# Patient Record
Sex: FEMALE | Race: BLACK OR AFRICAN AMERICAN | Employment: FULL TIME | URBAN - METROPOLITAN AREA
[De-identification: names, ages, dates, MRNs, and addresses within clinical notes are randomized per-mention and may not be internally consistent; named-entity substitution may affect disease eponyms.]

---

## 2017-01-11 DIAGNOSIS — I10 ESSENTIAL HYPERTENSION: ICD-10-CM

## 2017-01-11 DIAGNOSIS — E11.65 TYPE 2 DIABETES MELLITUS WITH HYPERGLYCEMIA, WITHOUT LONG-TERM CURRENT USE OF INSULIN (HCC): ICD-10-CM

## 2017-01-11 DIAGNOSIS — E55.9 HYPOVITAMINOSIS D: ICD-10-CM

## 2017-01-12 ENCOUNTER — LAB ONLY (OUTPATIENT)
Dept: INTERNAL MEDICINE CLINIC | Age: 57
End: 2017-01-12

## 2017-01-13 LAB
25(OH)D3 SERPL-MCNC: 21.9 NG/ML (ref 32–100)
A-G RATIO,AGRAT: 1.9 RATIO (ref 1.1–2.6)
ABSOLUTE LYMPHOCYTE COUNT, 10803: 1.9 K/UL (ref 1–4.8)
ALBUMIN SERPL-MCNC: 4.2 G/DL (ref 3.5–5)
ALP SERPL-CCNC: 25 U/L (ref 25–115)
ALT SERPL-CCNC: 13 U/L (ref 5–40)
ANION GAP SERPL CALC-SCNC: 17 MMOL/L
AST SERPL W P-5'-P-CCNC: 14 U/L (ref 10–37)
AVG GLU, 10930: 129 MG/DL (ref 91–123)
BASOPHILS # BLD: 0 K/UL (ref 0–0.2)
BASOPHILS NFR BLD: 0 % (ref 0–2)
BILIRUB SERPL-MCNC: 0.2 MG/DL (ref 0.2–1.2)
BUN SERPL-MCNC: 18 MG/DL (ref 6–22)
CALCIUM SERPL-MCNC: 9.5 MG/DL (ref 8.4–10.5)
CHLORIDE SERPL-SCNC: 103 MMOL/L (ref 98–110)
CHOLEST SERPL-MCNC: 168 MG/DL (ref 110–200)
CO2 SERPL-SCNC: 26 MMOL/L (ref 20–32)
CREAT SERPL-MCNC: 0.8 MG/DL (ref 0.5–1.2)
EOSINOPHIL # BLD: 0.1 K/UL (ref 0–0.5)
EOSINOPHIL NFR BLD: 2 % (ref 0–6)
ERYTHROCYTE [DISTWIDTH] IN BLOOD BY AUTOMATED COUNT: 13.8 % (ref 10–16)
GFRAA, 66117: >60
GFRNA, 66118: >60
GLOBULIN,GLOB: 2.2 G/DL (ref 2–4)
GLUCOSE SERPL-MCNC: 93 MG/DL (ref 65–99)
GRANULOCYTES,GRANS: 57 % (ref 40–75)
HBA1C MFR BLD HPLC: 6.1 % (ref 4.8–5.9)
HCT VFR BLD AUTO: 38.8 % (ref 35.1–48)
HDLC SERPL-MCNC: 65 MG/DL (ref 40–59)
HGB BLD-MCNC: 12 G/DL (ref 11.7–16)
LDLC SERPL CALC-MCNC: 95 MG/DL (ref 50–99)
LYMPHOCYTES, LYMLT: 37 % (ref 27–45)
MCH RBC QN AUTO: 27 PG (ref 26–34)
MCHC RBC AUTO-ENTMCNC: 31 G/DL (ref 32–36)
MCV RBC AUTO: 86 FL (ref 80–95)
MONOCYTES # BLD: 0.2 K/UL (ref 0.1–0.9)
MONOCYTES NFR BLD: 4 % (ref 3–9)
NEUTROPHILS # BLD AUTO: 2.9 K/UL (ref 1.8–7.7)
PLATELET # BLD AUTO: 360 K/UL (ref 140–440)
PMV BLD AUTO: 9.3 FL (ref 6–10.8)
POTASSIUM SERPL-SCNC: 4.1 MMOL/L (ref 3.5–5.5)
PROT SERPL-MCNC: 6.4 G/DL (ref 6.4–8.3)
RBC # BLD AUTO: 4.49 M/UL (ref 3.8–5.2)
SODIUM SERPL-SCNC: 146 MMOL/L (ref 133–145)
T4 FREE SERPL-MCNC: 1.1 NG/DL (ref 0.9–1.8)
TRIGL SERPL-MCNC: 44 MG/DL (ref 40–149)
TSH SERPL DL<=0.005 MIU/L-ACNC: 2.32 MCU/ML (ref 0.27–4.2)
VLDLC SERPL CALC-MCNC: 9 MG/DL (ref 8–30)
WBC # BLD AUTO: 5.2 K/UL (ref 4–11)

## 2017-01-19 ENCOUNTER — OFFICE VISIT (OUTPATIENT)
Dept: INTERNAL MEDICINE CLINIC | Age: 57
End: 2017-01-19

## 2017-01-19 VITALS
SYSTOLIC BLOOD PRESSURE: 134 MMHG | DIASTOLIC BLOOD PRESSURE: 72 MMHG | TEMPERATURE: 97.8 F | BODY MASS INDEX: 33.61 KG/M2 | WEIGHT: 178 LBS | HEIGHT: 61 IN | HEART RATE: 65 BPM | RESPIRATION RATE: 16 BRPM | OXYGEN SATURATION: 98 %

## 2017-01-19 DIAGNOSIS — E55.9 HYPOVITAMINOSIS D: ICD-10-CM

## 2017-01-19 DIAGNOSIS — E66.9 OBESITY (BMI 30.0-34.9): ICD-10-CM

## 2017-01-19 DIAGNOSIS — E11.65 TYPE 2 DIABETES MELLITUS WITH HYPERGLYCEMIA, WITHOUT LONG-TERM CURRENT USE OF INSULIN (HCC): Primary | ICD-10-CM

## 2017-01-19 DIAGNOSIS — R09.81 SINUS CONGESTION: ICD-10-CM

## 2017-01-19 DIAGNOSIS — I10 ESSENTIAL HYPERTENSION: ICD-10-CM

## 2017-01-19 DIAGNOSIS — Z23 NEED FOR PNEUMOCOCCAL VACCINATION: ICD-10-CM

## 2017-01-19 RX ORDER — ERGOCALCIFEROL 1.25 MG/1
50000 CAPSULE ORAL
Qty: 8 CAP | Refills: 5 | Status: SHIPPED | OUTPATIENT
Start: 2017-01-19 | End: 2017-12-12 | Stop reason: SDUPTHER

## 2017-01-19 RX ORDER — CETIRIZINE HCL 10 MG
10 TABLET ORAL DAILY
Qty: 30 TAB | Refills: 0 | Status: SHIPPED | OUTPATIENT
Start: 2017-01-19 | End: 2018-03-04

## 2017-01-19 RX ORDER — CYCLOBENZAPRINE HCL 10 MG
5 TABLET ORAL
Qty: 60 TAB | Refills: 0 | Status: SHIPPED | OUTPATIENT
Start: 2017-01-19 | End: 2017-12-12 | Stop reason: ALTCHOICE

## 2017-01-19 NOTE — PROGRESS NOTES
Chief Complaint   Patient presents with    Follow-up    Hypertension       HPI:  Patient is a 49year old obese  female with medical problems listed below presents today for follow up of Hypertension, DM 2, Hypovitaminosis D, etc. She has not taken Contrave for the last several weeks and ate more during the holidays and has gained 6 pounds since her last visit 3 months ago. She endorse recent sinus congestion causing irritation around her nose that sometimes causes nose bleed. Otherwise, she has been feeling well and voices no other complaints today. She is complaint with taking her other medications with no adverse effects reported. Past Medical History   Diagnosis Date    AR (allergic rhinitis)     Arthritis     Heart murmur     Hypertension     Hypovitaminosis D 3/3/2014       No Known Allergies      Current Outpatient Prescriptions   Medication Sig Dispense Refill    amLODIPine (NORVASC) 5 mg tablet TAKE 1 TABLET BY MOUTH EVERY DAY 30 Tab 5    ergocalciferol (VITAMIN D2) 50,000 unit capsule Take 50,000 Units by mouth.  losartan-hydrochlorothiazide (HYZAAR) 100-25 mg per tablet TAKE 1 TABLET BY MOUTH EVERY DAY 30 Tab 3    naltrexone-buPROPion (CONTRAVE) 8-90 mg TbER ER tablet 2 tablets  Tab 0    ondansetron (ZOFRAN ODT) 4 mg disintegrating tablet DISSOLVE 1 TABLET BY MOUTH EVERY EIGHT (8) HOURS AS NEEDED FOR NAUSEA. 30 Tab 0    polyethylene glycol (MIRALAX) 17 gram packet Take 1 Packet by mouth daily. 30 Packet 0    fluticasone (FLONASE) 50 mcg/actuation nasal spray 2 Sprays by Both Nostrils route daily as needed for Rhinitis. 1 Bottle 3    ibuprofen (MOTRIN) 600 mg tablet Take 1 Tab by mouth every six (6) hours as needed for Pain.  60 Tab 0          ROS:  Constitutional: Negative for fever, chills, or fatigue  HEENT: Positive for recent sinus congestion  Neurological: Negative for headache, dizziness, visual disturbance, or loss of conciousness  Respiratory: Negative for SOB, hemoptysis, or wheezing  Cardiovascular: Negative for chest pain, palpitation, or leg swelling  Gastrointestinal: Negative for abdominal pain, nausea, vomiting, diarrhea, blood in stool, melena, or heartburn  Musculoskeletal: Negative for falls        Physical Exam:  Visit Vitals    /72    Pulse 65    Temp 97.8 °F (36.6 °C) (Oral)    Resp 16    Ht 5' 1\" (1.549 m)    Wt 178 lb (80.7 kg)    SpO2 98%    BMI 33.63 kg/m2       General: Obese black female, a & o x 3, afebrile, well-nourished, interacting appropriately, in no acute distress  HEENT: Dried blood noted in nose  Respiratory: symmetrical chest expansion, lung sounds clear bilaterally  Cardiovascular: normal S1S2, regular rate and rhythm, no murmurs, no peripheral edema, no JVD  Abdomen: non-distended, normoactive bowel sounds x 4 quadrants, soft, non-tender to palpation  Extremity: No edema noted  DM foot exam: Pedal pulses palpable and no callus noted        Assessment/Plan:    ICD-10-CM ICD-9-CM    1. Type 2 diabetes mellitus with hyperglycemia, without long-term current use of insulin (HCC) E11.65 250.00 Controlled with recent HBA1C at 6.1. Continue current meds and she was counseled on diabetic diet. HEMOGLOBIN A1C W/O EAG     790.29 MICROALBUMIN, UR, RAND W/ MICROALBUMIN/CREA RATIO   2. Essential hypertension I10 401.9 Controlled  Continue current meds and she was counseled on low salt diet. 3. Hypovitaminosis D E55.9 268.9 Recent VIt D low at 21.9  Vit D 50,000 units Q week started today   4. Obesity (BMI 30.0-34. 9) E66.9 278.00 I have reviewed/discussed the above normal BMI with the patient. I have recommended the following interventions: dietary management education, guidance, and counseling, encourage exercise and lifestyle education regarding diet . The plan is as follows: I have counseled this patient on diet and exercise regimens. .    naltrexone-buPROPion (CONTRAVE) 8-90 mg TbER ER tablet   5.  Need for pneumococcal vaccination Z23 V03.82 Prevnar given today  PNEUMOCOCCAL CONJ VACCINE 13 VALENT IM   6. Sinus congestion R09.81 478.19 cetirizine (ZYRTEC) 10 mg tablet         Orders Placed This Encounter    PNEUMOCOCCAL CONJ VACCINE 13 VALENT IM    HEMOGLOBIN A1C W/O EAG     Standing Status:   Future     Standing Expiration Date:   2018    MICROALBUMIN, UR, RAND W/ MICROALBUMIN/CREA RATIO     Standing Status:   Future     Standing Expiration Date:   2018    ergocalciferol (VITAMIN D2) 50,000 unit capsule     Sig: Take 1 Cap by mouth every seven (7) days. Dispense:  8 Cap     Refill:  5    cyclobenzaprine (FLEXERIL) 10 mg tablet     Sig: Take 0.5 Tabs by mouth two (2) times daily as needed for Muscle Spasm(s). Dispense:  60 Tab     Refill:  0    cetirizine (ZYRTEC) 10 mg tablet     Sig: Take 1 Tab by mouth daily. Dispense:  30 Tab     Refill:  0    naltrexone-buPROPion (CONTRAVE) 8-90 mg TbER ER tablet     Si tablets BID     Dispense:  120 Tab     Refill:  0        Recent labs reviewed with pt        Additional Notes: Discussed today's diagnosis, treatment plans. Discussed medication indications and side effects. Weight Management: Counseled  After Visit Summary: Discussed provided printed patient instructions. Answered questions accordingly. Follow-up Disposition: In 3 months with labs 1 week prior.           Krystina Tobias DO, MPH  Internal Medicine

## 2017-01-19 NOTE — PROGRESS NOTES
Pt is here for 3 month follow up. HTN, Diabetes, Cholesterol, Vit D Def. Labs done    Do you have an advance directive no  Request Pt bring a copy of advance directive for scanning. Do you want information on an advance directive no        1. Have you been to the ER, urgent care clinic since your last visit? Hospitalized since your last visit? No    2. Have you seen or consulted any other health care providers outside of the [address] [place] [address] since your last visit? Include any pap smears or colon screening. Dr Shin Avina exam 1/17      Pt given Prevnar 13 vaccine in R deltoid per verbral read back order Dr Sean Rascon  Pt tolerated procedure w/o reaction.

## 2017-01-19 NOTE — MR AVS SNAPSHOT
Visit Information Date & Time Provider Department Dept. Phone Encounter #  
 1/19/2017  8:00 AM Corey Gallardo, DO [facility] 8 [id number] Follow-up Instructions Return in about 3 months (around 4/19/2017) for Labs 1 week before. Upcoming Health Maintenance Date Due Pneumococcal 19-64 Medium Risk (1 of 1 - PPSV23) 4/23/1981 DTaP/Tdap/Td series (1 - Tdap) 9/2/2012 PAP AKA CERVICAL CYTOLOGY 10/16/2016 FOOT EXAM Q1 4/7/2017 MICROALBUMIN Q1 6/30/2017 HEMOGLOBIN A1C Q6M 7/12/2017 BREAST CANCER SCRN MAMMOGRAM 10/1/2017 EYE EXAM RETINAL OR DILATED Q1 11/8/2017 LIPID PANEL Q1 1/12/2018 COLONOSCOPY 5/14/2022 Allergies as of 1/19/2017  Review Complete On: 1/19/2017 By: Corey Gallardo, DO No Known Allergies Current Immunizations  Reviewed on 10/13/2016 Name Date Influenza Vaccine 11/3/2014 Influenza Vaccine (Quad) PF 10/13/2016, 10/8/2015 Pneumococcal Conjugate (PCV-13)  Incomplete Td 9/1/2012 Not reviewed this visit You Were Diagnosed With   
  
 Codes Comments Type 2 diabetes mellitus with hyperglycemia, without long-term current use of insulin (HCC)    -  Primary ICD-10-CM: E11.65 ICD-9-CM: 250.00, 790.29 Essential hypertension     ICD-10-CM: I10 
ICD-9-CM: 401.9 Hypovitaminosis D     ICD-10-CM: E55.9 ICD-9-CM: 268.9 Obesity (BMI 30.0-34.9)     ICD-10-CM: Y16.5 ICD-9-CM: 278.00 Need for pneumococcal vaccination     ICD-10-CM: R11 ICD-9-CM: V03.82 Vitals BP Pulse Temp Resp Height(growth percentile) Weight(growth percentile) 134/72 65 97.8 °F (36.6 °C) (Oral) 16 5' 1\" (1.549 m) 178 lb (80.7 kg) SpO2 BMI OB Status Smoking Status 98% 33.63 kg/m2 Hysterectomy Never Smoker Vitals History BMI and BSA Data Body Mass Index Body Surface Area  
 33.63 kg/m 2 1.86 m 2 Preferred Pharmacy Pharmacy Name Phone [id number]- Kalin Dunn, [address] [place] [address] Your Updated Medication List  
  
   
This list is accurate as of: 1/19/17  8:50 AM.  Always use your most recent med list. amLODIPine 5 mg tablet Commonly known as:  Dixon Poli TAKE 1 TABLET BY MOUTH EVERY DAY  
  
 cetirizine 10 mg tablet Commonly known as:  ZYRTEC Take 1 Tab by mouth daily. cyclobenzaprine 10 mg tablet Commonly known as:  FLEXERIL Take 0.5 Tabs by mouth two (2) times daily as needed for Muscle Spasm(s). ergocalciferol 50,000 unit capsule Commonly known as:  VITAMIN D2 Take 1 Cap by mouth every seven (7) days. fluticasone 50 mcg/actuation nasal spray Commonly known as:  [place] Busy 2 Sprays by Both Nostrils route daily as needed for Rhinitis. ibuprofen 600 mg tablet Commonly known as:  MOTRIN Take 1 Tab by mouth every six (6) hours as needed for Pain.  
  
 losartan-hydroCHLOROthiazide 100-25 mg per tablet Commonly known as:  HYZAAR  
TAKE 1 TABLET BY MOUTH EVERY DAY  
  
 naltrexone-buPROPion 8-90 mg Tber ER tablet Commonly known as:  CONTRAVE  
2 tablets BID  
  
 ondansetron 4 mg disintegrating tablet Commonly known as:  ZOFRAN ODT  
DISSOLVE 1 TABLET BY MOUTH EVERY EIGHT (8) HOURS AS NEEDED FOR NAUSEA. polyethylene glycol 17 gram packet Commonly known as:  Don Black Take 1 Packet by mouth daily. Prescriptions Printed Refills  
 ergocalciferol (VITAMIN D2) 50,000 unit capsule 5 Sig: Take 1 Cap by mouth every seven (7) days. Class: Print Route: Oral  
 cyclobenzaprine (FLEXERIL) 10 mg tablet 0 Sig: Take 0.5 Tabs by mouth two (2) times daily as needed for Muscle Spasm(s). Class: Print Route: Oral  
 cetirizine (ZYRTEC) 10 mg tablet 0 Sig: Take 1 Tab by mouth daily. Class: Print Route: Oral  
  
We Performed the Following PNEUMOCOCCAL CONJ VACCINE 13 VALENT IM [id number] CPT(R)] Follow-up Instructions Return in about 3 months (around 4/19/2017) for Labs 1 week before. To-Do List   
 04/19/2017 Lab:  HEMOGLOBIN A1C W/O EAG   
  
 04/19/2017 Lab:  MICROALBUMIN, UR, RAND W/ MICROALBUMIN/CREA RATIO Patient Instructions Vitamin D (By mouth) Vitamin D (VYE-ta-min D) Maintains calcium and phosphorus in your body and makes your bones strong. This medicine is a vitamin. Brand Name(s):Mohan Vitamin D, Mohan Vitamin D3, Delta D3, Dialyvite Vitamin D3 Max, Drisdol, Leader Vitamin D3, Dardne Echo , Aurelio Natural Vitamin D, Nature's Blend Super Strength Vitamin D3, Nature's Blend Vitamin D, Nature's Blend Vitamin D3, PharmAssure Vitamin D-3, R[employer] Aid Vitamin D-3, [employer] Naturals Vitamin D3, Thera-D 2000 There may be other brand names for this medicine. When This Medicine Should Not Be Used: You should not use this medicine if you have had an allergic reaction to vitamin D. How to Use This Medicine:  
Tablet, Liquid Filled Capsule · Your doctor will tell you how much medicine to use. Do not use more than directed. · Follow the instructions on the medicine label if you are using this medicine without a prescription. · It is best to take this medicine with food or milk. · Carefully follow your doctor's instructions about any special diet. If a dose is missed: · Take a dose as soon as you remember. If it is almost time for your next dose, wait until then and take a regular dose. Do not take extra medicine to make up for a missed dose. How to Store and Dispose of This Medicine: · Store the medicine in a closed container at room temperature, away from heat, moisture, and direct light. · Ask your pharmacist, doctor, or health caregiver about the best way to dispose of any outdated medicine or medicine no longer needed. · Keep all medicine out of the reach of children. Never share your medicine with anyone. Drugs and Foods to Avoid: Ask your doctor or pharmacist before using any other medicine, including over-the-counter medicines, vitamins, and herbal products. · Make sure your doctor knows about all other medicines you are using. Warnings While Using This Medicine: · Make sure your doctor knows if you are pregnant or breast feeding. · Make sure your doctor knows if you have kidney stones, sarcoidosis, or overactive parathyroid gland. · You should not use this medicine if you are under 25years of age. Possible Side Effects While Using This Medicine:  
Call your doctor right away if you notice any of these side effects: · Allergic reaction: Itching or hives, swelling in your face or hands, swelling or tingling in your mouth or throat, chest tightness, trouble breathing · Change in how much or how often you urinate. If you notice these less serious side effects, talk with your doctor: · Diarrhea. · Headache. · Weight loss. If you notice other side effects that you think are caused by this medicine, tell your doctor. Call your doctor for medical advice about side effects. You may report side effects to FDA at 8-412-FDA-6114 © 2016 [address] is for End User's use only and may not be sold, redistributed or otherwise used for commercial purposes. The above information is an  only. It is not intended as medical advice for individual conditions or treatments. Talk to your doctor, nurse or pharmacist before following any medical regimen to see if it is safe and effective for you. DASH Diet: Care Instructions Your Care Instructions The DASH diet is an eating plan that can help lower your blood pressure. DASH stands for Dietary Approaches to Stop Hypertension. Hypertension is high blood pressure. The DASH diet focuses on eating foods that are high in calcium, potassium, and magnesium. These nutrients can lower blood pressure.  The foods that are highest in these nutrients are fruits, vegetables, low-fat dairy products, nuts, seeds, and legumes. But taking calcium, potassium, and magnesium supplements instead of eating foods that are high in those nutrients does not have the same effect. The DASH diet also includes whole grains, fish, and poultry. The DASH diet is one of several lifestyle changes your doctor may recommend to lower your high blood pressure. Your doctor may also want you to decrease the amount of sodium in your diet. Lowering sodium while following the DASH diet can lower blood pressure even further than just the DASH diet alone. Follow-up care is a key part of your treatment and safety. Be sure to make and go to all appointments, and call your doctor if you are having problems. It's also a good idea to know your test results and keep a list of the medicines you take. How can you care for yourself at home? Following the DASH diet · Eat 4 to 5 servings of fruit each day. A serving is 1 medium-sized piece of fruit, ½ cup chopped or canned fruit, 1/4 cup dried fruit, or 4 ounces (½ cup) of fruit juice. Choose fruit more often than fruit juice. · Eat 4 to 5 servings of vegetables each day. A serving is 1 cup of lettuce or raw leafy vegetables, ½ cup of chopped or cooked vegetables, or 4 ounces (½ cup) of vegetable juice. Choose vegetables more often than vegetable juice. · Get 2 to 3 servings of low-fat and fat-free dairy each day. A serving is 8 ounces of milk, 1 cup of yogurt, or 1 ½ ounces of cheese. · Eat 6 to 8 servings of grains each day. A serving is 1 slice of bread, 1 ounce of dry cereal, or ½ cup of cooked rice, pasta, or cooked cereal. Try to choose whole-grain products as much as possible. · Limit lean meat, poultry, and fish to 2 servings each day. A serving is 3 ounces, about the size of a deck of cards.  
· Eat 4 to 5 servings of nuts, seeds, and legumes (cooked dried beans, lentils, and split peas) each week. A serving is 1/3 cup of nuts, 2 tablespoons of seeds, or ½ cup of cooked beans or peas. · Limit fats and oils to 2 to 3 servings each day. A serving is 1 teaspoon of vegetable oil or 2 tablespoons of salad dressing. · Limit sweets and added sugars to 5 servings or less a week. A serving is 1 tablespoon jelly or jam, ½ cup sorbet, or 1 cup of lemonade. · Eat less than 2,300 milligrams (mg) of sodium a day. If you limit your sodium to 1,500 mg a day, you can lower your blood pressure even more. Tips for success · Start small. Do not try to make dramatic changes to your diet all at once. You might feel that you are missing out on your favorite foods and then be more likely to not follow the plan. Make small changes, and stick with them. Once those changes become habit, add a few more changes. · Try some of the following: ¨ Make it a goal to eat a fruit or vegetable at every meal and at snacks. This will make it easy to get the recommended amount of fruits and vegetables each day. ¨ Try yogurt topped with fruit and nuts for a snack or healthy dessert. ¨ Add lettuce, tomato, cucumber, and onion to sandwiches. ¨ Combine a ready-made pizza crust with low-fat mozzarella cheese and lots of vegetable toppings. Try using tomatoes, squash, spinach, broccoli, carrots, cauliflower, and onions. ¨ Have a variety of cut-up vegetables with a low-fat dip as an appetizer instead of chips and dip. ¨ Sprinkle sunflower seeds or chopped almonds over salads. Or try adding chopped walnuts or almonds to cooked vegetables. ¨ Try some vegetarian meals using beans and peas. Add garbanzo or kidney beans to salads. Make burritos and tacos with mashed [place] beans or black beans. Where can you learn more? Go to http://killian-heather.info/. Enter A684 in the search box to learn more about \"DASH Diet: Care Instructions. \" Current as of: March 23, 2016 Content Version: 11.1 © 2488-0788 Healthwise, Incorporated. Care instructions adapted under license by [employer] (which disclaims liability or warranty for this information). If you have questions about a medical condition or this instruction, always ask your healthcare professional. [address] any warranty or liability for your use of this information. Learning About Diabetes Food Guidelines Your Care Instructions Meal planning is important to manage diabetes. It helps keep your blood sugar at a target level (which you set with your doctor). You don't have to eat special foods. You can eat what your family eats, including sweets once in a while. But you do have to pay attention to how often you eat and how much you eat of certain foods. You may want to work with a dietitian or a certified diabetes educator (CDE) to help you plan meals and snacks. A dietitian or CDE can also help you lose weight if that is one of your goals. What should you know about eating carbs? Managing the amount of carbohydrate (carbs) you eat is an important part of healthy meals when you have diabetes. Carbohydrate is found in many foods. · Learn which foods have carbs. And learn the amounts of carbs in different foods. ¨ Bread, cereal, pasta, and rice have about 15 grams of carbs in a serving. A serving is 1 slice of bread (1 ounce), ½ cup of cooked cereal, or 1/3 cup of cooked pasta or rice. ¨ Fruits have 15 grams of carbs in a serving. A serving is 1 small fresh fruit, such as an apple or orange; ½ of a banana; ½ cup of cooked or canned fruit; ½ cup of fruit juice; 1 cup of melon or raspberries; or 2 tablespoons of dried fruit. ¨ Milk and no-sugar-added yogurt have 15 grams of carbs in a serving. A serving is 1 cup of milk or 2/3 cup of no-sugar-added yogurt. ¨ Starchy vegetables have 15 grams of carbs in a serving.  A serving is ½ cup of mashed potatoes or sweet potato; 1 cup winter squash; ½ of a small baked potato; ½ cup of cooked beans; or ½ cup cooked corn or green peas. · Learn how much carbs to eat each day and at each meal. A dietitian or CDE can teach you how to keep track of the amount of carbs you eat. This is called carbohydrate counting. · If you are not sure how to count carbohydrate grams, use the Plate Method to plan meals. It is a good, quick way to make sure that you have a balanced meal. It also helps you spread carbs throughout the day. ¨ Divide your plate by types of foods. Put non-starchy vegetables on half the plate, meat or other protein food on one-quarter of the plate, and a grain or starchy vegetable in the final quarter of the plate. To this you can add a small piece of fruit and 1 cup of milk or yogurt, depending on how many carbs you are supposed to eat at a meal. 
· Try to eat about the same amount of carbs at each meal. Do not \"save up\" your daily allowance of carbs to eat at one meal. 
· Proteins have very little or no carbs per serving. Examples of proteins are beef, chicken, turkey, fish, eggs, tofu, cheese, cottage cheese, and peanut butter. A serving size of meat is 3 ounces, which is about the size of a deck of cards. Examples of meat substitute serving sizes (equal to 1 ounce of meat) are 1/4 cup of cottage cheese, 1 egg, 1 tablespoon of peanut butter, and ½ cup of tofu. How can you eat out and still eat healthy? · Learn to estimate the serving sizes of foods that have carbohydrate. If you measure food at home, it will be easier to estimate the amount in a serving of restaurant food. · If the meal you order has too much carbohydrate (such as potatoes, corn, or baked beans), ask to have a low-carbohydrate food instead. Ask for a salad or green vegetables. · If you use insulin, check your blood sugar before and after eating out to help you plan how much to eat in the future.  
· If you eat more carbohydrate at a meal than you had planned, take a walk or do other exercise. This will help lower your blood sugar. What else should you know? · Limit saturated fat, such as the fat from meat and dairy products. This is a healthy choice because people who have diabetes are at higher risk of heart disease. So choose lean cuts of meat and nonfat or low-fat dairy products. Use olive or canola oil instead of butter or shortening when cooking. · Don't skip meals. Your blood sugar may drop too low if you skip meals and take insulin or certain medicines for diabetes. · Check with your doctor before you drink alcohol. Alcohol can cause your blood sugar to drop too low. Alcohol can also cause a bad reaction if you take certain diabetes medicines. Follow-up care is a key part of your treatment and safety. Be sure to make and go to all appointments, and call your doctor if you are having problems. It's also a good idea to know your test results and keep a list of the medicines you take. Where can you learn more? Go to http://killian-heather.info/. Enter L942 in the search box to learn more about \"Learning About Diabetes Food Guidelines. \" Current as of: May 23, 2016 Content Version: 11.1 © 0767-1864 Maytech. Care instructions adapted under license by [employer] (which disclaims liability or warranty for this information). If you have questions about a medical condition or this instruction, always ask your healthcare professional. [address] any warranty or liability for your use of this information. Starting a Weight Loss Plan: Care Instructions Your Care Instructions If you are thinking about losing weight, it can be hard to know where to start. Your doctor can help you set up a weight loss plan that best meets your needs. You may want to take a class on nutrition or exercise, or join a weight loss support group.  If you have questions about how to make changes to your eating or exercise habits, ask your doctor about seeing a registered dietitian or an exercise specialist. 
It can be a big challenge to lose weight. But you do not have to make huge changes at once. Make small changes, and stick with them. When those changes become habit, add a few more changes. If you do not think you are ready to make changes right now, try to pick a date in the future. Make an appointment to see your doctor to discuss whether the time is right for you to start a plan. Follow-up care is a key part of your treatment and safety. Be sure to make and go to all appointments, and call your doctor if you are having problems. Its also a good idea to know your test results and keep a list of the medicines you take. How can you care for yourself at home? · Set realistic goals. Many people expect to lose much more weight than is likely. A weight loss of 5% to 10% of your body weight may be enough to improve your health. · Get family and friends involved to provide support. Talk to them about why you are trying to lose weight, and ask them to help. They can help by participating in exercise and having meals with you, even if they may be eating something different. · Find what works best for you. If you do not have time or do not like to cook, a program that offers meal replacement bars or shakes may be better for you. Or if you like to prepare meals, finding a plan that includes daily menus and recipes may be best. 
· Ask your doctor about other health professionals who can help you achieve your weight loss goals. ¨ A dietitian can help you make healthy changes in your diet. ¨ An exercise specialist or  can help you develop a safe and effective exercise program. 
¨ A counselor or psychiatrist can help you cope with issues such as depression, anxiety, or family problems that can make it hard to focus on weight loss. · Consider joining a support group for people who are trying to lose weight. Your doctor can suggest groups in your area. Where can you learn more? Go to http://killian-heather.info/. Enter W283 in the search box to learn more about \"Starting a Weight Loss Plan: Care Instructions. \" Current as of: February 16, 2016 Content Version: 11.1 © 9912-4541 [employer]. Care instructions adapted under license by [employer] (which disclaims liability or warranty for this information). If you have questions about a medical condition or this instruction, always ask your healthcare professional. [address] any warranty or liability for your use of this information. Introducing [facility]! Dear Sofiya Vila: Thank you for requesting a EXENDIS account. Our records indicate that you already have an active EXENDIS account. You can access your account anytime at [email or web address] - teamwork across [email or web address] Did you know that you can access your hospital and ER discharge instructions at any time in EXENDIS? You can also review all of your test results from your hospital stay or ER visit. Additional Information If you have questions, please visit the Frequently Asked Questions section of the EXENDIS website at [email or web address] - [email or web address] Remember, EXENDIS is NOT to be used for urgent needs. For medical emergencies, dial 911. Now available from your iPhone and Android! Please provide this summary of care documentation to your next provider. Your primary care clinician is listed as Vanessa . If you have any questions after today's visit, please call [phone].

## 2017-01-19 NOTE — PATIENT INSTRUCTIONS
Vitamin D (By mouth)   Vitamin D (VYE-ta-min D)  Maintains calcium and phosphorus in your body and makes your bones strong. This medicine is a vitamin. Brand Name(s):Mohan Vitamin D, Mohan Vitamin D3, Delta D3, Dialyvite Vitamin D3 Max, Drisdol, Leader Vitamin D3, Henriquez Hands , Aurelio Natural Vitamin D, Nature's Blend Super Strength Vitamin D3, Nature's Blend Vitamin D, Nature's Blend Vitamin D3, PharmAssure Vitamin D-3, [facility] Vitamin D-3, [place] Naturals Vitamin D3, Thera-D 2000   There may be other brand names for this medicine. When This Medicine Should Not Be Used: You should not use this medicine if you have had an allergic reaction to vitamin D. How to Use This Medicine:   Tablet, Liquid Filled Capsule  · Your doctor will tell you how much medicine to use. Do not use more than directed. · Follow the instructions on the medicine label if you are using this medicine without a prescription. · It is best to take this medicine with food or milk. · Carefully follow your doctor's instructions about any special diet. If a dose is missed:   · Take a dose as soon as you remember. If it is almost time for your next dose, wait until then and take a regular dose. Do not take extra medicine to make up for a missed dose. How to Store and Dispose of This Medicine:   · Store the medicine in a closed container at room temperature, away from heat, moisture, and direct light. · Ask your pharmacist, doctor, or health caregiver about the best way to dispose of any outdated medicine or medicine no longer needed. · Keep all medicine out of the reach of children. Never share your medicine with anyone. Drugs and Foods to Avoid:   Ask your doctor or pharmacist before using any other medicine, including over-the-counter medicines, vitamins, and herbal products. · Make sure your doctor knows about all other medicines you are using.   Warnings While Using This Medicine:   · Make sure your doctor knows if you are pregnant or breast feeding. · Make sure your doctor knows if you have kidney stones, sarcoidosis, or overactive parathyroid gland. · You should not use this medicine if you are under 25years of age. Possible Side Effects While Using This Medicine:   Call your doctor right away if you notice any of these side effects:  · Allergic reaction: Itching or hives, swelling in your face or hands, swelling or tingling in your mouth or throat, chest tightness, trouble breathing  · Change in how much or how often you urinate. If you notice these less serious side effects, talk with your doctor:   · Diarrhea. · Headache. · Weight loss. If you notice other side effects that you think are caused by this medicine, tell your doctor. Call your doctor for medical advice about side effects. You may report side effects to FDA at 6-491-FDA-3795  © 2016 [address] is for End User's use only and may not be sold, redistributed or otherwise used for commercial purposes. The above information is an  only. It is not intended as medical advice for individual conditions or treatments. Talk to your doctor, nurse or pharmacist before following any medical regimen to see if it is safe and effective for you. DASH Diet: Care Instructions  Your Care Instructions  The DASH diet is an eating plan that can help lower your blood pressure. DASH stands for Dietary Approaches to Stop Hypertension. Hypertension is high blood pressure. The DASH diet focuses on eating foods that are high in calcium, potassium, and magnesium. These nutrients can lower blood pressure. The foods that are highest in these nutrients are fruits, vegetables, low-fat dairy products, nuts, seeds, and legumes. But taking calcium, potassium, and magnesium supplements instead of eating foods that are high in those nutrients does not have the same effect. The DASH diet also includes whole grains, fish, and poultry.   The DASH diet is one of several lifestyle changes your doctor may recommend to lower your high blood pressure. Your doctor may also want you to decrease the amount of sodium in your diet. Lowering sodium while following the DASH diet can lower blood pressure even further than just the DASH diet alone. Follow-up care is a key part of your treatment and safety. Be sure to make and go to all appointments, and call your doctor if you are having problems. It's also a good idea to know your test results and keep a list of the medicines you take. How can you care for yourself at home? Following the DASH diet  · Eat 4 to 5 servings of fruit each day. A serving is 1 medium-sized piece of fruit, ½ cup chopped or canned fruit, 1/4 cup dried fruit, or 4 ounces (½ cup) of fruit juice. Choose fruit more often than fruit juice. · Eat 4 to 5 servings of vegetables each day. A serving is 1 cup of lettuce or raw leafy vegetables, ½ cup of chopped or cooked vegetables, or 4 ounces (½ cup) of vegetable juice. Choose vegetables more often than vegetable juice. · Get 2 to 3 servings of low-fat and fat-free dairy each day. A serving is 8 ounces of milk, 1 cup of yogurt, or 1 ½ ounces of cheese. · Eat 6 to 8 servings of grains each day. A serving is 1 slice of bread, 1 ounce of dry cereal, or ½ cup of cooked rice, pasta, or cooked cereal. Try to choose whole-grain products as much as possible. · Limit lean meat, poultry, and fish to 2 servings each day. A serving is 3 ounces, about the size of a deck of cards. · Eat 4 to 5 servings of nuts, seeds, and legumes (cooked dried beans, lentils, and split peas) each week. A serving is 1/3 cup of nuts, 2 tablespoons of seeds, or ½ cup of cooked beans or peas. · Limit fats and oils to 2 to 3 servings each day. A serving is 1 teaspoon of vegetable oil or 2 tablespoons of salad dressing. · Limit sweets and added sugars to 5 servings or less a week.  A serving is 1 tablespoon jelly or jam, ½ cup sorbet, or 1 cup of lemonade. · Eat less than 2,300 milligrams (mg) of sodium a day. If you limit your sodium to 1,500 mg a day, you can lower your blood pressure even more. Tips for success  · Start small. Do not try to make dramatic changes to your diet all at once. You might feel that you are missing out on your favorite foods and then be more likely to not follow the plan. Make small changes, and stick with them. Once those changes become habit, add a few more changes. · Try some of the following:  ¨ Make it a goal to eat a fruit or vegetable at every meal and at snacks. This will make it easy to get the recommended amount of fruits and vegetables each day. ¨ Try yogurt topped with fruit and nuts for a snack or healthy dessert. ¨ Add lettuce, tomato, cucumber, and onion to sandwiches. ¨ Combine a ready-made pizza crust with low-fat mozzarella cheese and lots of vegetable toppings. Try using tomatoes, squash, spinach, broccoli, carrots, cauliflower, and onions. ¨ Have a variety of cut-up vegetables with a low-fat dip as an appetizer instead of chips and dip. ¨ Sprinkle sunflower seeds or chopped almonds over salads. Or try adding chopped walnuts or almonds to cooked vegetables. ¨ Try some vegetarian meals using beans and peas. Add garbanzo or kidney beans to salads. Make burritos and tacos with mashed [place] beans or black beans. Where can you learn more? Go to http://killian-heather.info/. Enter V486 in the search box to learn more about \"DASH Diet: Care Instructions. \"  Current as of: March 23, 2016  Content Version: 11.1  © 6394-2634 [employer]. Care instructions adapted under license by [employer] (which disclaims liability or warranty for this information).  If you have questions about a medical condition or this instruction, always ask your healthcare professional. [address] any warranty or liability for your use of this information. Learning About Diabetes Food Guidelines  Your Care Instructions  Meal planning is important to manage diabetes. It helps keep your blood sugar at a target level (which you set with your doctor). You don't have to eat special foods. You can eat what your family eats, including sweets once in a while. But you do have to pay attention to how often you eat and how much you eat of certain foods. You may want to work with a dietitian or a certified diabetes educator (CDE) to help you plan meals and snacks. A dietitian or CDE can also help you lose weight if that is one of your goals. What should you know about eating carbs? Managing the amount of carbohydrate (carbs) you eat is an important part of healthy meals when you have diabetes. Carbohydrate is found in many foods. · Learn which foods have carbs. And learn the amounts of carbs in different foods. ¨ Bread, cereal, pasta, and rice have about 15 grams of carbs in a serving. A serving is 1 slice of bread (1 ounce), ½ cup of cooked cereal, or 1/3 cup of cooked pasta or rice. ¨ Fruits have 15 grams of carbs in a serving. A serving is 1 small fresh fruit, such as an apple or orange; ½ of a banana; ½ cup of cooked or canned fruit; ½ cup of fruit juice; 1 cup of melon or raspberries; or 2 tablespoons of dried fruit. ¨ Milk and no-sugar-added yogurt have 15 grams of carbs in a serving. A serving is 1 cup of milk or 2/3 cup of no-sugar-added yogurt. ¨ Starchy vegetables have 15 grams of carbs in a serving. A serving is ½ cup of mashed potatoes or sweet potato; 1 cup winter squash; ½ of a small baked potato; ½ cup of cooked beans; or ½ cup cooked corn or green peas. · Learn how much carbs to eat each day and at each meal. A dietitian or CDE can teach you how to keep track of the amount of carbs you eat. This is called carbohydrate counting. · If you are not sure how to count carbohydrate grams, use the Plate Method to plan meals.  It is a good, quick way to make sure that you have a balanced meal. It also helps you spread carbs throughout the day. ¨ Divide your plate by types of foods. Put non-starchy vegetables on half the plate, meat or other protein food on one-quarter of the plate, and a grain or starchy vegetable in the final quarter of the plate. To this you can add a small piece of fruit and 1 cup of milk or yogurt, depending on how many carbs you are supposed to eat at a meal.  · Try to eat about the same amount of carbs at each meal. Do not \"save up\" your daily allowance of carbs to eat at one meal.  · Proteins have very little or no carbs per serving. Examples of proteins are beef, chicken, turkey, fish, eggs, tofu, cheese, cottage cheese, and peanut butter. A serving size of meat is 3 ounces, which is about the size of a deck of cards. Examples of meat substitute serving sizes (equal to 1 ounce of meat) are 1/4 cup of cottage cheese, 1 egg, 1 tablespoon of peanut butter, and ½ cup of tofu. How can you eat out and still eat healthy? · Learn to estimate the serving sizes of foods that have carbohydrate. If you measure food at home, it will be easier to estimate the amount in a serving of restaurant food. · If the meal you order has too much carbohydrate (such as potatoes, corn, or baked beans), ask to have a low-carbohydrate food instead. Ask for a salad or green vegetables. · If you use insulin, check your blood sugar before and after eating out to help you plan how much to eat in the future. · If you eat more carbohydrate at a meal than you had planned, take a walk or do other exercise. This will help lower your blood sugar. What else should you know? · Limit saturated fat, such as the fat from meat and dairy products. This is a healthy choice because people who have diabetes are at higher risk of heart disease. So choose lean cuts of meat and nonfat or low-fat dairy products.  Use olive or canola oil instead of butter or shortening when cooking. · Don't skip meals. Your blood sugar may drop too low if you skip meals and take insulin or certain medicines for diabetes. · Check with your doctor before you drink alcohol. Alcohol can cause your blood sugar to drop too low. Alcohol can also cause a bad reaction if you take certain diabetes medicines. Follow-up care is a key part of your treatment and safety. Be sure to make and go to all appointments, and call your doctor if you are having problems. It's also a good idea to know your test results and keep a list of the medicines you take. Where can you learn more? Go to http://killian-heather.info/. Enter P097 in the search box to learn more about \"Learning About Diabetes Food Guidelines. \"  Current as of: May 23, 2016  Content Version: 11.1  © 6998-0966 [employer]. Care instructions adapted under license by [employer] (which disclaims liability or warranty for this information). If you have questions about a medical condition or this instruction, always ask your healthcare professional. [address] any warranty or liability for your use of this information. Starting a Weight Loss Plan: Care Instructions  Your Care Instructions  If you are thinking about losing weight, it can be hard to know where to start. Your doctor can help you set up a weight loss plan that best meets your needs. You may want to take a class on nutrition or exercise, or join a weight loss support group. If you have questions about how to make changes to your eating or exercise habits, ask your doctor about seeing a registered dietitian or an exercise specialist.  It can be a big challenge to lose weight. But you do not have to make huge changes at once. Make small changes, and stick with them. When those changes become habit, add a few more changes. If you do not think you are ready to make changes right now, try to pick a date in the future.  Make an appointment to see your doctor to discuss whether the time is right for you to start a plan. Follow-up care is a key part of your treatment and safety. Be sure to make and go to all appointments, and call your doctor if you are having problems. Its also a good idea to know your test results and keep a list of the medicines you take. How can you care for yourself at home? · Set realistic goals. Many people expect to lose much more weight than is likely. A weight loss of 5% to 10% of your body weight may be enough to improve your health. · Get family and friends involved to provide support. Talk to them about why you are trying to lose weight, and ask them to help. They can help by participating in exercise and having meals with you, even if they may be eating something different. · Find what works best for you. If you do not have time or do not like to cook, a program that offers meal replacement bars or shakes may be better for you. Or if you like to prepare meals, finding a plan that includes daily menus and recipes may be best.  · Ask your doctor about other health professionals who can help you achieve your weight loss goals. ¨ A dietitian can help you make healthy changes in your diet. ¨ An exercise specialist or  can help you develop a safe and effective exercise program.  ¨ A counselor or psychiatrist can help you cope with issues such as depression, anxiety, or family problems that can make it hard to focus on weight loss. · Consider joining a support group for people who are trying to lose weight. Your doctor can suggest groups in your area. Where can you learn more? Go to http://killian-heather.info/. Enter D192 in the search box to learn more about \"Starting a Weight Loss Plan: Care Instructions. \"  Current as of: February 16, 2016  Content Version: 11.1  © 0586-0545 [employer].  Care instructions adapted under license by [employer] (which disclaims liability or warranty for this information). If you have questions about a medical condition or this instruction, always ask your healthcare professional. [address] any warranty or liability for your use of this information.

## 2017-01-30 ENCOUNTER — TELEPHONE (OUTPATIENT)
Dept: INTERNAL MEDICINE CLINIC | Age: 57
End: 2017-01-30

## 2017-01-30 RX ORDER — TRAMADOL HYDROCHLORIDE AND ACETAMINOPHEN 37.5; 325 MG/1; MG/1
1 TABLET ORAL
Qty: 40 TAB | Refills: 0 | Status: SHIPPED | OUTPATIENT
Start: 2017-01-30 | End: 2018-03-04

## 2017-01-30 NOTE — TELEPHONE ENCOUNTER
Pt aware presciption is available for  at [address] per Arabella Zamora until 5 PM today.  Pt given address and directions     Per pt her  (on phi) will pick the prescription up today

## 2017-01-30 NOTE — TELEPHONE ENCOUNTER
Pt calling regarding arm pain is worse since OV 01/19/2017 said\" pain is 10 times worse\"    She said the flexeril does NOT help much, makes her sleepy and when it wears off the pain is back.     She missed work today due pain    Request call asap, does she need f/u appt here, or to be referred to specialist?    [facility] is [facility]

## 2017-02-07 ENCOUNTER — OFFICE VISIT (OUTPATIENT)
Dept: INTERNAL MEDICINE CLINIC | Age: 57
End: 2017-02-07

## 2017-02-07 VITALS
SYSTOLIC BLOOD PRESSURE: 110 MMHG | DIASTOLIC BLOOD PRESSURE: 70 MMHG | TEMPERATURE: 98.3 F | WEIGHT: 176.2 LBS | OXYGEN SATURATION: 98 % | HEART RATE: 70 BPM | HEIGHT: 61 IN | BODY MASS INDEX: 33.27 KG/M2 | RESPIRATION RATE: 17 BRPM

## 2017-02-07 DIAGNOSIS — M54.6 ACUTE RIGHT-SIDED THORACIC BACK PAIN: Primary | ICD-10-CM

## 2017-02-07 PROBLEM — M54.50 LOW BACK PAIN: Status: ACTIVE | Noted: 2017-02-07

## 2017-02-07 NOTE — PROGRESS NOTES
Chief Complaint   Patient presents with    Spasms     back    Back Pain     right mid under shoulder blade    Other     requesting xray be ordered. 1. Have you been to the ER, urgent care clinic since your last visit? Hospitalized since your last visit? No    2. Have you seen or consulted any other health care providers outside of the [facility] since your last visit? Include any pap smears or colon screening.  No

## 2017-02-07 NOTE — PATIENT INSTRUCTIONS
Back Pain: Care Instructions  Your Care Instructions    Back pain has many possible causes. It is often related to problems with muscles and ligaments of the back. It may also be related to problems with the nerves, discs, or bones of the back. Moving, lifting, standing, sitting, or sleeping in an awkward way can strain the back. Sometimes you don't notice the injury until later. Arthritis is another common cause of back pain. Although it may hurt a lot, back pain usually improves on its own within several weeks. Most people recover in 12 weeks or less. Using good home treatment and being careful not to stress your back can help you feel better sooner. Follow-up care is a key part of your treatment and safety. Be sure to make and go to all appointments, and call your doctor if you are having problems. Its also a good idea to know your test results and keep a list of the medicines you take. How can you care for yourself at home? · Sit or lie in positions that are most comfortable and reduce your pain. Try one of these positions when you lie down:  ¨ Lie on your back with your knees bent and supported by large pillows. ¨ Lie on the floor with your legs on the seat of a sofa or chair. Salomon Ports on your side with your knees and hips bent and a pillow between your legs. ¨ Lie on your stomach if it does not make pain worse. · Do not sit up in bed, and avoid soft couches and twisted positions. Bed rest can help relieve pain at first, but it delays healing. Avoid bed rest after the first day of back pain. · Change positions every 30 minutes. If you must sit for long periods of time, take breaks from sitting. Get up and walk around, or lie in a comfortable position. · Try using a heating pad on a low or medium setting for 15 to 20 minutes every 2 or 3 hours. Try a warm shower in place of one session with the heating pad. · You can also try an ice pack for 10 to 15 minutes every 2 to 3 hours.  Put a thin cloth between the ice pack and your skin. · Take pain medicines exactly as directed. ¨ If the doctor gave you a prescription medicine for pain, take it as prescribed. ¨ If you are not taking a prescription pain medicine, ask your doctor if you can take an over-the-counter medicine. · Take short walks several times a day. You can start with 5 to 10 minutes, 3 or 4 times a day, and work up to longer walks. Walk on level surfaces and avoid hills and stairs until your back is better. · Return to work and other activities as soon as you can. Continued rest without activity is usually not good for your back. · To prevent future back pain, do exercises to stretch and strengthen your back and stomach. Learn how to use good posture, safe lifting techniques, and proper body mechanics. When should you call for help? Call your doctor now or seek immediate medical care if:  · You have new or worsening numbness in your legs. · You have new or worsening weakness in your legs. (This could make it hard to stand up.)  · You lose control of your bladder or bowels. Watch closely for changes in your health, and be sure to contact your doctor if:  · Your pain gets worse. · You are not getting better after 2 weeks. Where can you learn more? Go to http://killian-heather.info/. Enter O052 in the search box to learn more about \"Back Pain: Care Instructions. \"  Current as of: May 23, 2016  Content Version: 11.1  © 0064-8868 [employer] [place]. Care instructions adapted under license by [employer] (which disclaims liability or warranty for this information). If you have questions about a medical condition or this instruction, always ask your healthcare professional. [address] any warranty or liability for your use of this information. Back Pain: Care Instructions  Your Care Instructions    Back pain has many possible causes.  It is often related to problems with muscles and ligaments of the back. It may also be related to problems with the nerves, discs, or bones of the back. Moving, lifting, standing, sitting, or sleeping in an awkward way can strain the back. Sometimes you don't notice the injury until later. Arthritis is another common cause of back pain. Although it may hurt a lot, back pain usually improves on its own within several weeks. Most people recover in 12 weeks or less. Using good home treatment and being careful not to stress your back can help you feel better sooner. Follow-up care is a key part of your treatment and safety. Be sure to make and go to all appointments, and call your doctor if you are having problems. Its also a good idea to know your test results and keep a list of the medicines you take. How can you care for yourself at home? · Sit or lie in positions that are most comfortable and reduce your pain. Try one of these positions when you lie down:  ¨ Lie on your back with your knees bent and supported by large pillows. ¨ Lie on the floor with your legs on the seat of a sofa or chair. Harrison Loach on your side with your knees and hips bent and a pillow between your legs. ¨ Lie on your stomach if it does not make pain worse. · Do not sit up in bed, and avoid soft couches and twisted positions. Bed rest can help relieve pain at first, but it delays healing. Avoid bed rest after the first day of back pain. · Change positions every 30 minutes. If you must sit for long periods of time, take breaks from sitting. Get up and walk around, or lie in a comfortable position. · Try using a heating pad on a low or medium setting for 15 to 20 minutes every 2 or 3 hours. Try a warm shower in place of one session with the heating pad. · You can also try an ice pack for 10 to 15 minutes every 2 to 3 hours. Put a thin cloth between the ice pack and your skin. · Take pain medicines exactly as directed.   ¨ If the doctor gave you a prescription medicine for pain, take it as prescribed. ¨ If you are not taking a prescription pain medicine, ask your doctor if you can take an over-the-counter medicine. · Take short walks several times a day. You can start with 5 to 10 minutes, 3 or 4 times a day, and work up to longer walks. Walk on level surfaces and avoid hills and stairs until your back is better. · Return to work and other activities as soon as you can. Continued rest without activity is usually not good for your back. · To prevent future back pain, do exercises to stretch and strengthen your back and stomach. Learn how to use good posture, safe lifting techniques, and proper body mechanics. When should you call for help? Call your doctor now or seek immediate medical care if:  · You have new or worsening numbness in your legs. · You have new or worsening weakness in your legs. (This could make it hard to stand up.)  · You lose control of your bladder or bowels. Watch closely for changes in your health, and be sure to contact your doctor if:  · Your pain gets worse. · You are not getting better after 2 weeks. Where can you learn more? Go to http://killian-heather.info/. Enter D368 in the search box to learn more about \"Back Pain: Care Instructions. \"  Current as of: May 23, 2016  Content Version: 11.1  © 6457-1803 [employer]. Care instructions adapted under license by [employer] (which disclaims liability or warranty for this information). If you have questions about a medical condition or this instruction, always ask your healthcare professional. [place] [address] any warranty or liability for your use of this information.

## 2017-02-07 NOTE — PROGRESS NOTES
Chief Complaint   Patient presents with    Spasms     back    Back Pain     right mid under shoulder blade    Other     requesting xray be ordered. HPI:  Patient is a 49year old  female with medical problems listed below presents today for follow up for back pain. She has been having back pain for several weeks now that is mostly localized on her right thoracic region that was described as spasms and worse on standing. She denied recent fall, injury, or trauma and was given Flexeril initially with no improvement and was later switched to Ultracet with some improvement noted. She has been seen twice by a chiropractor who performed stretching and manipulation to the area with improvement also noted. She states that the pain is virtually gone, but she is requesting imaging for further evaluation. Otherwise, she feels well and denies abdominal pain, nausea, vomiting, fever, chills, CP, or SOB. Past Medical History   Diagnosis Date    AR (allergic rhinitis)     Arthritis     Heart murmur     Hypertension     Hypovitaminosis D 3/3/2014       No Known Allergies      Current Outpatient Prescriptions   Medication Sig Dispense Refill    ergocalciferol (VITAMIN D2) 50,000 unit capsule Take 1 Cap by mouth every seven (7) days. 8 Cap 5    cetirizine (ZYRTEC) 10 mg tablet Take 1 Tab by mouth daily. 30 Tab 0    naltrexone-buPROPion (CONTRAVE) 8-90 mg TbER ER tablet 2 tablets  Tab 0    amLODIPine (NORVASC) 5 mg tablet TAKE 1 TABLET BY MOUTH EVERY DAY 30 Tab 5    ondansetron (ZOFRAN ODT) 4 mg disintegrating tablet DISSOLVE 1 TABLET BY MOUTH EVERY EIGHT (8) HOURS AS NEEDED FOR NAUSEA. 30 Tab 0    polyethylene glycol (MIRALAX) 17 gram packet Take 1 Packet by mouth daily. 30 Packet 0    fluticasone (FLONASE) 50 mcg/actuation nasal spray 2 Sprays by Both Nostrils route daily as needed for Rhinitis.  1 Bottle 3    losartan-hydrochlorothiazide (HYZAAR) 100-25 mg per tablet TAKE 1 TABLET BY MOUTH EVERY DAY 30 Tab 3    ibuprofen (MOTRIN) 600 mg tablet Take 1 Tab by mouth every six (6) hours as needed for Pain. 60 Tab 0    traMADol-acetaminophen (ULTRACET) 37.5-325 mg per tablet Take 1 Tab by mouth every four (4) hours as needed for Pain. Max Daily Amount: 6 Tabs. 40 Tab 0    cyclobenzaprine (FLEXERIL) 10 mg tablet Take 0.5 Tabs by mouth two (2) times daily as needed for Muscle Spasm(s). 60 Tab 0          ROS:  Pertinent as in HPI        Physical Exam:  Visit Vitals    /70 (BP 1 Location: Left arm, BP Patient Position: Sitting)    Pulse 70    Temp 98.3 °F (36.8 °C) (Oral)    Resp 17    Ht 5' 1\" (1.549 m)    Wt 176 lb 3.2 oz (79.9 kg)    SpO2 98%    BMI 33.29 kg/m2       General: a & o x 3, afebrile, well-nourished, interacting appropriately, in no acute distress  Respiratory: symmetrical chest expansion, lung sounds clear bilaterally  Cardiovascular: normal S1S2, regular rate and rhythm  Abdomen: non-distended, normoactive bowel sounds x 4 quadrants, soft, non-tender to palpation  Musculoskeletal: No tenderness noted on back        Assessment/Plan:    ICD-10-CM ICD-9-CM    1. Acute right-sided thoracic back pain M54.6 724.1 Much improved. She was advised to continue Flexeril and Ultracet as needed and will obtain imaging. XR SPINE THORAC 4 V         Orders Placed This Encounter    XR SPINE THORAC 4 V     Standing Status:   Future     Standing Expiration Date:   3/7/2018     Order Specific Question:   Reason for Exam     Answer:   Evaluate acute rt sided thoracic pain     Order Specific Question:   Is Patient Allergic to Contrast Dye? Answer:   Unknown           Additional Notes: Discussed today's diagnosis, treatment plans. Discussed medication indications and side effects. After Visit Summary: Discussed provided printed patient instructions. Answered questions accordingly.   Follow-up Disposition: In 1 week to review imaging          Marcela Parks DO, MPH  Internal Medicine

## 2017-02-07 NOTE — MR AVS SNAPSHOT
Visit Information Date & Time Provider Department Dept. Phone Encounter #  
 2/7/2017  8:00  [address] DO [facility] [phone] Follow-up Instructions Return in about 1 week (around 2/14/2017) for to review imaging. Your Appointments 4/13/2017  8:00 AM  
LAB with [address] DO [facility] (--) Appt Note: 3 mo f/u lab [address] [id number] [phone]  
  
   
 [address] [place] [address]  
  
    
 4/20/2017  8:00 AM  
ROUTINE CARE with [address] DO [facility] (--) Appt Note: 3 mo f/u  
 [address] [id number] [phone]  
  
   
 [address] [place] [id number] Upcoming Health Maintenance Date Due DTaP/Tdap/Td series (1 - Tdap) 9/2/2012 PAP AKA CERVICAL CYTOLOGY 10/16/2016 FOOT EXAM Q1 4/7/2017 MICROALBUMIN Q1 6/30/2017 HEMOGLOBIN A1C Q6M 7/12/2017 BREAST CANCER SCRN MAMMOGRAM 10/1/2017 EYE EXAM RETINAL OR DILATED Q1 11/8/2017 LIPID PANEL Q1 1/12/2018 COLONOSCOPY 5/14/2022 Allergies as of 2/7/2017  Review Complete On: 2/7/2017 By: Parul Goldman LPN No Known Allergies Current Immunizations  Reviewed on 10/13/2016 Name Date Influenza Vaccine 11/3/2014 Influenza Vaccine (Quad) PF 10/13/2016, 10/8/2015 Pneumococcal Conjugate (PCV-13) 1/19/2017 Td 9/1/2012 Not reviewed this visit You Were Diagnosed With   
  
 Codes Comments Acute right-sided thoracic back pain    -  Primary ICD-10-CM: M54.6 ICD-9-CM: 724.1 Vitals BP Pulse Temp Resp Height(growth percentile) Weight(growth percentile) 110/70 (BP 1 Location: Left arm, BP Patient Position: Sitting) 70 98.3 °F (36.8 °C) (Oral) 17 5' 1\" (1.549 m) 176 lb 3.2 oz (79.9 kg) SpO2 BMI OB Status Smoking Status 98% 33.29 kg/m2 Hysterectomy Never Smoker Vitals History BMI and BSA Data Body Mass Index Body Surface Area  
 33.29 kg/m 2 1.85 m 2 Preferred Pharmacy Pharmacy Name Phone [facility] Derek Cordova [address] [place] [address] [phone] Your Updated Medication List  
  
   
This list is accurate as of: 2/7/17  9:00 AM.  Always use your most recent med list. amLODIPine 5 mg tablet Commonly known as:  Pardo Cater TAKE 1 TABLET BY MOUTH EVERY DAY  
  
 cetirizine 10 mg tablet Commonly known as:  ZYRTEC Take 1 Tab by mouth daily. cyclobenzaprine 10 mg tablet Commonly known as:  FLEXERIL Take 0.5 Tabs by mouth two (2) times daily as needed for Muscle Spasm(s). ergocalciferol 50,000 unit capsule Commonly known as:  VITAMIN D2 Take 1 Cap by mouth every seven (7) days. fluticasone 50 mcg/actuation nasal spray Commonly known as:  Candida Reges 2 Sprays by Both Nostrils route daily as needed for Rhinitis. ibuprofen 600 mg tablet Commonly known as:  MOTRIN Take 1 Tab by mouth every six (6) hours as needed for Pain.  
  
 losartan-hydroCHLOROthiazide 100-25 mg per tablet Commonly known as:  HYZAAR  
TAKE 1 TABLET BY MOUTH EVERY DAY  
  
 naltrexone-buPROPion 8-90 mg Tber ER tablet Commonly known as:  CONTRAVE  
2 tablets BID  
  
 ondansetron 4 mg disintegrating tablet Commonly known as:  ZOFRAN ODT  
DISSOLVE 1 TABLET BY MOUTH EVERY EIGHT (8) HOURS AS NEEDED FOR NAUSEA. polyethylene glycol 17 gram packet Commonly known as:  Iliana Nicci Take 1 Packet by mouth daily. traMADol-acetaminophen 37.5-325 mg per tablet Commonly known as:  ULTRACET Take 1 Tab by mouth every four (4) hours as needed for Pain. Max Daily Amount: 6 Tabs. Follow-up Instructions Return in about 1 week (around 2/14/2017) for to review imaging. To-Do List   
 02/07/2017 Imaging:  XR SPINE THORAC 4 V Patient Instructions Back Pain: Care Instructions Your Care Instructions Back pain has many possible causes. It is often related to problems with muscles and ligaments of the back. It may also be related to problems with the nerves, discs, or bones of the back. Moving, lifting, standing, sitting, or sleeping in an awkward way can strain the back. Sometimes you don't notice the injury until later. Arthritis is another common cause of back pain. Although it may hurt a lot, back pain usually improves on its own within several weeks. Most people recover in 12 weeks or less. Using good home treatment and being careful not to stress your back can help you feel better sooner. Follow-up care is a key part of your treatment and safety. Be sure to make and go to all appointments, and call your doctor if you are having problems. Its also a good idea to know your test results and keep a list of the medicines you take. How can you care for yourself at home? · Sit or lie in positions that are most comfortable and reduce your pain. Try one of these positions when you lie down: ¨ Lie on your back with your knees bent and supported by large pillows. ¨ Lie on the floor with your legs on the seat of a sofa or chair. Jose Running on your side with your knees and hips bent and a pillow between your legs. ¨ Lie on your stomach if it does not make pain worse. · Do not sit up in bed, and avoid soft couches and twisted positions. Bed rest can help relieve pain at first, but it delays healing. Avoid bed rest after the first day of back pain. · Change positions every 30 minutes. If you must sit for long periods of time, take breaks from sitting. Get up and walk around, or lie in a comfortable position. · Try using a heating pad on a low or medium setting for 15 to 20 minutes every 2 or 3 hours. Try a warm shower in place of one session with the heating pad. · You can also try an ice pack for 10 to 15 minutes every 2 to 3 hours. Put a thin cloth between the ice pack and your skin. · Take pain medicines exactly as directed. ¨ If the doctor gave you a prescription medicine for pain, take it as prescribed. ¨ If you are not taking a prescription pain medicine, ask your doctor if you can take an over-the-counter medicine. · Take short walks several times a day. You can start with 5 to 10 minutes, 3 or 4 times a day, and work up to longer walks. Walk on level surfaces and avoid hills and stairs until your back is better. · Return to work and other activities as soon as you can. Continued rest without activity is usually not good for your back. · To prevent future back pain, do exercises to stretch and strengthen your back and stomach. Learn how to use good posture, safe lifting techniques, and proper body mechanics. When should you call for help? Call your doctor now or seek immediate medical care if: 
· You have new or worsening numbness in your legs. · You have new or worsening weakness in your legs. (This could make it hard to stand up.) · You lose control of your bladder or bowels. Watch closely for changes in your health, and be sure to contact your doctor if: 
· Your pain gets worse. · You are not getting better after 2 weeks. Where can you learn more? Go to http://killian-heather.info/. Enter S696 in the search box to learn more about \"Back Pain: Care Instructions. \" Current as of: May 23, 2016 Content Version: 11.1 © 3259-1760 [employer]. Care instructions adapted under license by [employer] (which disclaims liability or warranty for this information). If you have questions about a medical condition or this instruction, always ask your healthcare professional. [place] [address] any warranty or liability for your use of this information. Back Pain: Care Instructions Your Care Instructions Back pain has many possible causes. It is often related to problems with muscles and ligaments of the back. It may also be related to problems with the nerves, discs, or bones of the back. Moving, lifting, standing, sitting, or sleeping in an awkward way can strain the back. Sometimes you don't notice the injury until later. Arthritis is another common cause of back pain. Although it may hurt a lot, back pain usually improves on its own within several weeks. Most people recover in 12 weeks or less. Using good home treatment and being careful not to stress your back can help you feel better sooner. Follow-up care is a key part of your treatment and safety. Be sure to make and go to all appointments, and call your doctor if you are having problems. Its also a good idea to know your test results and keep a list of the medicines you take. How can you care for yourself at home? · Sit or lie in positions that are most comfortable and reduce your pain. Try one of these positions when you lie down: ¨ Lie on your back with your knees bent and supported by large pillows. ¨ Lie on the floor with your legs on the seat of a sofa or chair. [place] Left on your side with your knees and hips bent and a pillow between your legs. ¨ Lie on your stomach if it does not make pain worse. · Do not sit up in bed, and avoid soft couches and twisted positions. Bed rest can help relieve pain at first, but it delays healing. Avoid bed rest after the first day of back pain. · Change positions every 30 minutes. If you must sit for long periods of time, take breaks from sitting. Get up and walk around, or lie in a comfortable position. · Try using a heating pad on a low or medium setting for 15 to 20 minutes every 2 or 3 hours. Try a warm shower in place of one session with the heating pad. · You can also try an ice pack for 10 to 15 minutes every 2 to 3 hours. Put a thin cloth between the ice pack and your skin. · Take pain medicines exactly as directed. ¨ If the doctor gave you a prescription medicine for pain, take it as prescribed. ¨ If you are not taking a prescription pain medicine, ask your doctor if you can take an over-the-counter medicine. · Take short walks several times a day. You can start with 5 to 10 minutes, 3 or 4 times a day, and work up to longer walks. Walk on level surfaces and avoid hills and stairs until your back is better. · Return to work and other activities as soon as you can. Continued rest without activity is usually not good for your back. · To prevent future back pain, do exercises to stretch and strengthen your back and stomach. Learn how to use good posture, safe lifting techniques, and proper body mechanics. When should you call for help? Call your doctor now or seek immediate medical care if: 
· You have new or worsening numbness in your legs. · You have new or worsening weakness in your legs. (This could make it hard to stand up.) · You lose control of your bladder or bowels. Watch closely for changes in your health, and be sure to contact your doctor if: 
· Your pain gets worse. · You are not getting better after 2 weeks. Where can you learn more? Go to http://killian-haether.info/. Enter A614 in the search box to learn more about \"Back Pain: Care Instructions. \" Current as of: May 23, 2016 Content Version: 11.1 © 7687-0698 [employer]. Care instructions adapted under license by [employer] (which disclaims liability or warranty for this information). If you have questions about a medical condition or this instruction, always ask your healthcare professional. [address] any warranty or liability for your use of this information. Introducing [facility]! Dear Nathen New: Thank you for requesting a [facility] account.   Our records indicate that you already have an active [email or web address] account. You can access your account anytime at [email or web address] Did you know that you can access your hospital and ER discharge instructions at any time in [email or web address]? You can also review all of your test results from your hospital stay or ER visit. Additional Information If you have questions, please visit the Frequently Asked Questions section of the [email or web address] website at [email or web address] Remember, [email or web address] is NOT to be used for urgent needs. For medical emergencies, dial 911. Now available from your iPhone and Android! Please provide this summary of care documentation to your next provider. Your primary care clinician is listed as Mony Hutchins. If you have any questions after today's visit, please call [phone].

## 2017-02-08 ENCOUNTER — HOSPITAL ENCOUNTER (OUTPATIENT)
Dept: GENERAL RADIOLOGY | Age: 57
Discharge: HOME | End: 2017-02-08
Payer: COMMERCIAL

## 2017-02-08 DIAGNOSIS — M54.6 ACUTE RIGHT-SIDED THORACIC BACK PAIN: ICD-10-CM

## 2017-02-08 PROCEDURE — 72070 X-RAY EXAM THORAC SPINE 2VWS: CPT

## 2017-02-14 ENCOUNTER — OFFICE VISIT (OUTPATIENT)
Dept: INTERNAL MEDICINE CLINIC | Age: 57
End: 2017-02-14

## 2017-02-14 VITALS
DIASTOLIC BLOOD PRESSURE: 60 MMHG | HEIGHT: 61 IN | OXYGEN SATURATION: 98 % | SYSTOLIC BLOOD PRESSURE: 110 MMHG | TEMPERATURE: 98.8 F | WEIGHT: 177.8 LBS | BODY MASS INDEX: 33.57 KG/M2 | RESPIRATION RATE: 16 BRPM | HEART RATE: 80 BPM

## 2017-02-14 DIAGNOSIS — M51.34 DDD (DEGENERATIVE DISC DISEASE), THORACIC: Primary | ICD-10-CM

## 2017-02-14 NOTE — PROGRESS NOTES
Chief Complaint   Patient presents with    Results     xray     Patient is here for Xray result review. 1. Have you been to the ER, urgent care clinic since your last visit? Hospitalized since your last visit? No    2. Have you seen or consulted any other health care providers outside of the [address] [place] [address] since your last visit? Include any pap smears or colon screening.  No

## 2017-02-14 NOTE — PATIENT INSTRUCTIONS
Arthritis: Care Instructions  Your Care Instructions  Arthritis, also called osteoarthritis, is a breakdown of the cartilage that cushions your joints. When the cartilage wears down, your bones rub against each other. This causes pain and stiffness. Many people have some arthritis as they age. Arthritis most often affects the joints of the spine, hands, hips, knees, or feet. You can take simple measures to protect your joints, ease your pain, and help you stay active. Follow-up care is a key part of your treatment and safety. Be sure to make and go to all appointments, and call your doctor if you are having problems. It's also a good idea to know your test results and keep a list of the medicines you take. How can you care for yourself at home? · Stay at a healthy weight. Being overweight puts extra strain on your joints. · Talk to your doctor or physical therapist about exercises that will help ease joint pain. ¨ Stretch. You may enjoy gentle forms of yoga to help keep your joints and muscles flexible. ¨ Walk instead of jog. Other types of exercise that are less stressful on the joints include riding a bicycle, swimming, or water exercise. ¨ Lift weights. Strong muscles help reduce stress on your joints. Stronger thigh muscles, for example, take some of the stress off of the knees and hips. Learn the right way to lift weights so you do not make joint pain worse. · Take your medicines exactly as prescribed. Call your doctor if you think you are having a problem with your medicine. · Take pain medicines exactly as directed. ¨ If the doctor gave you a prescription medicine for pain, take it as prescribed. ¨ If you are not taking a prescription pain medicine, ask your doctor if you can take an over-the-counter medicine. · Use a cane, crutch, walker, or another device if you need help to get around. These can help rest your joints.  You also can use other things to make life easier, such as a higher toilet seat and padded handles on kitchen utensils. · Do not sit in low chairs, which can make it hard to get up. · Put heat or cold on your sore joints as needed. Use whichever helps you most. You also can take turns with hot and cold packs. ¨ Apply heat 2 or 3 times a day for 20 to 30 minutesusing a heating pad, hot shower, or hot packto relieve pain and stiffness. ¨ Put ice or a cold pack on your sore joint for 10 to 20 minutes at a time. Put a thin cloth between the ice and your skin. When should you call for help? Call your doctor now or seek immediate medical care if:  · You have sudden swelling, warmth, or pain in any joint. · You have joint pain and a fever or rash. · You have such bad pain that you cannot use a joint. Watch closely for changes in your health, and be sure to contact your doctor if:  · You have mild joint symptoms that continue even with more than 6 weeks of care at home. · You have stomach pain or other problems with your medicine. Where can you learn more? Go to http://killian-heather.info/. Enter N715 in the search box to learn more about \"Arthritis: Care Instructions. \"  Current as of: February 24, 2016  Content Version: 11.1  © 6885-1130 [employer]. Care instructions adapted under license by [employer] (which disclaims liability or warranty for this information). If you have questions about a medical condition or this instruction, always ask your healthcare professional. [place] [address] any warranty or liability for your use of this information.

## 2017-02-14 NOTE — MR AVS SNAPSHOT
Visit Information Date & Time Provider Department Dept. Phone Encounter #  
 2/14/2017  7:45 AM Lori Bound, DO [facility] [id number] Your Appointments 4/13/2017  8:00 AM  
LAB with Lori Bound, DO [facility] (--) Appt Note: 3 mo f/u lab [address] [id number] [phone]  
  
   
 [address] [address]  
  
    
 4/20/2017  8:00 AM  
ROUTINE CARE with Lori Bound, DO [facility] (--) Appt Note: 3 mo f/u  
 [address] [id number] [phone]  
  
   
 [address] [address] Upcoming Health Maintenance Date Due DTaP/Tdap/Td series (1 - Tdap) 9/2/2012 PAP AKA CERVICAL CYTOLOGY 10/16/2016 FOOT EXAM Q1 4/7/2017 MICROALBUMIN Q1 6/30/2017 HEMOGLOBIN A1C Q6M 7/12/2017 BREAST CANCER SCRN MAMMOGRAM 10/1/2017 EYE EXAM RETINAL OR DILATED Q1 11/8/2017 LIPID PANEL Q1 1/12/2018 COLONOSCOPY 5/14/2022 Allergies as of 2/14/2017  Review Complete On: 2/14/2017 By: Se Lipscomb LPN No Known Allergies Current Immunizations  Reviewed on 10/13/2016 Name Date Influenza Vaccine 11/3/2014 Influenza Vaccine (Quad) PF 10/13/2016, 10/8/2015 Pneumococcal Conjugate (PCV-13) 1/19/2017 Td 9/1/2012 Not reviewed this visit You Were Diagnosed With   
  
 Codes Comments DDD (degenerative disc disease), thoracic    -  Primary ICD-10-CM: M51.34 
ICD-9-CM: 722.51 Vitals BP Pulse Temp Resp Height(growth percentile) Weight(growth percentile) 110/60 (BP 1 Location: Left arm, BP Patient Position: Sitting) 80 98.8 °F (37.1 °C) (Oral) 16 5' 1\" (1.549 m) 177 lb 12.8 oz (80.6 kg) SpO2 BMI OB Status Smoking Status 98% 33.6 kg/m2 Hysterectomy Never Smoker Vitals History BMI and BSA Data Body Mass Index Body Surface Area  
 33.6 kg/m 2 1.86 m 2 [facility] Derek Cordova [address] [place] [address] [phone] Your Updated Medication List  
  
   
This list is accurate as of: 2/14/17  8:25 AM.  Always use your most recent med list. amLODIPine 5 mg tablet Commonly known as:  [place] TAKE 1 TABLET BY MOUTH EVERY DAY  
  
 cetirizine 10 mg tablet Commonly known as:  ZYRTEC Take 1 Tab by mouth daily. cyclobenzaprine 10 mg tablet Commonly known as:  FLEXERIL Take 0.5 Tabs by mouth two (2) times daily as needed for Muscle Spasm(s). ergocalciferol 50,000 unit capsule Commonly known as:  VITAMIN D2 Take 1 Cap by mouth every seven (7) days. fluticasone 50 mcg/actuation nasal spray Commonly known as:  Rea Tovar 2 Sprays by Both Nostrils route daily as needed for Rhinitis. ibuprofen 600 mg tablet Commonly known as:  MOTRIN Take 1 Tab by mouth every six (6) hours as needed for Pain.  
  
 losartan-hydroCHLOROthiazide 100-25 mg per tablet Commonly known as:  HYZAAR  
TAKE 1 TABLET BY MOUTH EVERY DAY  
  
 naltrexone-buPROPion 8-90 mg Tber ER tablet Commonly known as:  CONTRAVE  
2 tablets BID  
  
 ondansetron 4 mg disintegrating tablet Commonly known as:  ZOFRAN ODT  
DISSOLVE 1 TABLET BY MOUTH EVERY EIGHT (8) HOURS AS NEEDED FOR NAUSEA. polyethylene glycol 17 gram packet Commonly known as:  Serene Jimena Take 1 Packet by mouth daily. traMADol-acetaminophen 37.5-325 mg per tablet Commonly known as:  ULTRACET Take 1 Tab by mouth every four (4) hours as needed for Pain. Max Daily Amount: 6 Tabs. We Performed the Following REFERRAL TO ORTHOPEDICS [[id number] Custom] Comments:  
 Pt with moderate multilevel degenerative disc disease noted on recent imaging - please evaluate and treat. Thanks. Jennifer Person Referral Information Referral ID Referred By Referred To  
  
 [id number] Tierra Haas MD   
   [address] [facility] [address]   
   [address] Phone: [phone] Fax: [phone] Visits Status Start Date End Date 1 New Request 2/14/17 2/14/18 If your referral has a status of pending review or denied, additional information will be sent to support the outcome of this decision. Patient Instructions Arthritis: Care Instructions Your Care Instructions Arthritis, also called osteoarthritis, is a breakdown of the cartilage that cushions your joints. When the cartilage wears down, your bones rub against each other. This causes pain and stiffness. Many people have some arthritis as they age. Arthritis most often affects the joints of the spine, hands, hips, knees, or feet. You can take simple measures to protect your joints, ease your pain, and help you stay active. Follow-up care is a key part of your treatment and safety. Be sure to make and go to all appointments, and call your doctor if you are having problems. It's also a good idea to know your test results and keep a list of the medicines you take. How can you care for yourself at home? · Stay at a healthy weight. Being overweight puts extra strain on your joints. · Talk to your doctor or physical therapist about exercises that will help ease joint pain. ¨ Stretch. You may enjoy gentle forms of yoga to help keep your joints and muscles flexible. ¨ Walk instead of jog. Other types of exercise that are less stressful on the joints include riding a bicycle, swimming, or water exercise. ¨ Lift weights. Strong muscles help reduce stress on your joints. Stronger thigh muscles, for example, take some of the stress off of the knees and hips. Learn the right way to lift weights so you do not make joint pain worse. · Take your medicines exactly as prescribed. Call your doctor if you think you are having a problem with your medicine. · Take pain medicines exactly as directed. ¨ If the doctor gave you a prescription medicine for pain, take it as prescribed. ¨ If you are not taking a prescription pain medicine, ask your doctor if you can take an over-the-counter medicine. · Use a cane, crutch, walker, or another device if you need help to get around. These can help rest your joints. You also can use other things to make life easier, such as a higher toilet seat and padded handles on kitchen utensils. · Do not sit in low chairs, which can make it hard to get up. · Put heat or cold on your sore joints as needed. Use whichever helps you most. You also can take turns with hot and cold packs. ¨ Apply heat 2 or 3 times a day for 20 to 30 minutesusing a heating pad, hot shower, or hot packto relieve pain and stiffness. ¨ Put ice or a cold pack on your sore joint for 10 to 20 minutes at a time. Put a thin cloth between the ice and your skin. When should you call for help? Call your doctor now or seek immediate medical care if: 
· You have sudden swelling, warmth, or pain in any joint. · You have joint pain and a fever or rash. · You have such bad pain that you cannot use a joint. Watch closely for changes in your health, and be sure to contact your doctor if: 
· You have mild joint symptoms that continue even with more than 6 weeks of care at home. · You have stomach pain or other problems with your medicine. Where can you learn more? Go to http://killian-heather.info/. Enter X938 in the search box to learn more about \"Arthritis: Care Instructions. \" Current as of: February 24, 2016 Content Version: 11.1 © 5949-0987 [employer].  Care instructions adapted under license by [employer] (which disclaims liability or warranty for this information). If you have questions about a medical condition or this instruction, always ask your healthcare professional. [address] any warranty or liability for your use of this information. Introducing [facility]! Dear Nadine Castano: Thank you for requesting a tripJane account. Our records indicate that you already have an active tripJane account. You can access your account anytime at [email or web address] Did you know that you can access your hospital and ER discharge instructions at any time in tripJane? You can also review all of your test results from your hospital stay or ER visit. Additional Information If you have questions, please visit the Frequently Asked Questions section of the tripJane website at [email or web address] Remember, tripJane is NOT to be used for urgent needs. For medical emergencies, dial 911. Now available from your iPhone and Android! Please provide this summary of care documentation to your next provider. Your primary care clinician is listed as Nika Briscoe. If you have any questions after today's visit, please call [phone].

## 2017-02-14 NOTE — PROGRESS NOTES
Chief Complaint   Patient presents with    Results     xray       HPI:  Patient is a 49year old  female with medical problems listed below presents today to review imaging ordered at her recent visit one week ago to evaluate acute right sided thoracic back pain. Thoracic imaging done on 2/8/17 revealed moderate multilevel degenerative disc disease. She states that the pain is virtually gone for which she takes Flexeril as needed. Otherwise, she feels well today and denies abdominal pain, nausea, vomiting, fever, chills, CP, or SOB. Past Medical History   Diagnosis Date    AR (allergic rhinitis)     Arthritis     Heart murmur     Hypertension     Hypovitaminosis D 3/3/2014       No Known Allergies      Current Outpatient Prescriptions   Medication Sig Dispense Refill    traMADol-acetaminophen (ULTRACET) 37.5-325 mg per tablet Take 1 Tab by mouth every four (4) hours as needed for Pain. Max Daily Amount: 6 Tabs. 40 Tab 0    ergocalciferol (VITAMIN D2) 50,000 unit capsule Take 1 Cap by mouth every seven (7) days. 8 Cap 5    cyclobenzaprine (FLEXERIL) 10 mg tablet Take 0.5 Tabs by mouth two (2) times daily as needed for Muscle Spasm(s). 60 Tab 0    cetirizine (ZYRTEC) 10 mg tablet Take 1 Tab by mouth daily. 30 Tab 0    naltrexone-buPROPion (CONTRAVE) 8-90 mg TbER ER tablet 2 tablets  Tab 0    amLODIPine (NORVASC) 5 mg tablet TAKE 1 TABLET BY MOUTH EVERY DAY 30 Tab 5    ondansetron (ZOFRAN ODT) 4 mg disintegrating tablet DISSOLVE 1 TABLET BY MOUTH EVERY EIGHT (8) HOURS AS NEEDED FOR NAUSEA. 30 Tab 0    polyethylene glycol (MIRALAX) 17 gram packet Take 1 Packet by mouth daily. 30 Packet 0    fluticasone (FLONASE) 50 mcg/actuation nasal spray 2 Sprays by Both Nostrils route daily as needed for Rhinitis.  1 Bottle 3    losartan-hydrochlorothiazide (HYZAAR) 100-25 mg per tablet TAKE 1 TABLET BY MOUTH EVERY DAY 30 Tab 3    ibuprofen (MOTRIN) 600 mg tablet Take 1 Tab by mouth every six (6) hours as needed for Pain. 60 Tab 0          ROS:  Pertinent as in HPI        Physical Exam:  Visit Vitals    /60 (BP 1 Location: Left arm, BP Patient Position: Sitting)    Pulse 80    Temp 98.8 °F (37.1 °C) (Oral)    Resp 16    Ht 5' 1\" (1.549 m)    Wt 177 lb 12.8 oz (80.6 kg)    SpO2 98%    BMI 33.6 kg/m2     General: a & o x 3, afebrile, well-nourished, interacting appropriately, in no acute distress  Respiratory: symmetrical chest expansion, lung sounds clear bilaterally  Cardiovascular: normal S1S2, regular rate and rhythm  Abdomen: non-distended, normoactive bowel sounds x 4 quadrants, soft, non-tender to palpation  Musculoskeletal: No tenderness noted on mid back        Assessment/Plan:    ICD-10-CM ICD-9-CM    1. DDD (degenerative disc disease), thoracic M51.34 722.51 Much improved. She was advised to continue Flexeril and will refer to [address] [place] [address] This Encounter    REFERRAL TO ORTHOPEDICS     Referral Priority:   Routine     Referral Type:   Consultation     Referral Reason:   Specialty Services Required     Referred to Provider:   Saloni Andres MD         Additional Notes: Discussed today's diagnosis, treatment plans. Discussed medication indications and side effects. After Visit Summary: Discussed provided printed patient instructions. Answered questions accordingly.   Follow-up Disposition: As previously scheduled          Corey Gallardo DO, MPH  Internal Medicine

## 2017-03-22 ENCOUNTER — OFFICE VISIT (OUTPATIENT)
Dept: ORTHOPEDIC SURGERY | Age: 57
End: 2017-03-22

## 2017-03-22 VITALS
BODY MASS INDEX: 33.99 KG/M2 | WEIGHT: 180 LBS | SYSTOLIC BLOOD PRESSURE: 116 MMHG | OXYGEN SATURATION: 98 % | DIASTOLIC BLOOD PRESSURE: 63 MMHG | HEART RATE: 64 BPM | HEIGHT: 61 IN | RESPIRATION RATE: 14 BRPM

## 2017-03-22 DIAGNOSIS — M51.34 DDD (DEGENERATIVE DISC DISEASE), THORACIC: Primary | ICD-10-CM

## 2017-03-22 DIAGNOSIS — M47.816 SPONDYLOSIS OF LUMBAR REGION WITHOUT MYELOPATHY OR RADICULOPATHY: ICD-10-CM

## 2017-03-22 DIAGNOSIS — M54.9 BACK PAIN, UNSPECIFIED BACK LOCATION, UNSPECIFIED BACK PAIN LATERALITY, UNSPECIFIED CHRONICITY: ICD-10-CM

## 2017-03-22 RX ORDER — METHYLPREDNISOLONE 4 MG/1
TABLET ORAL
Refills: 0 | COMMUNITY
Start: 2017-01-24 | End: 2017-09-05 | Stop reason: ALTCHOICE

## 2017-03-22 NOTE — PROGRESS NOTES
[address], [address]  [place] [address]  Phone: [phone]  Fax: [phone]        Myriam Armstrong  : 1960  PCP: Suzzanne Fabry, DO      NEW PATIENT      ASSESSMENT AND PLAN     Anderson Acharya was seen today for back pain. Diagnoses and all orders for this visit:    DDD (degenerative disc disease), thoracic    Spondylosis of lumbar region without myelopathy or radiculopathy    Back pain, unspecified back location, unspecified back pain laterality, unspecified chronicity  -     [[id number]] LS Spine 4V    1. Physical therapy and medications discussed as possible treatment options. 2. Continue Naprosyn. 3. Advised to stay active as tolerated. To stand every hour at work. Recommend using a pillow as reminder  to improve posture. 4. Discussed urgent symptoms with pt.   5. Given home exercises. Follow-up Disposition:  Return if symptoms worsen or fail to improve. CHIEF COMPLAINT  Vibha Stone is seen today in consultation at the request of Dr. Ramón Block for complaints of back pain and stiffness x 3 months. HISTORY OF PRESENT ILLNESS  Vibha Stone is a 49 y.o. female. Pt denies any specific incident or injury that caused their pain. Her pain is felt with standing and lifting. She denies any pain in the office today. Pt states that when she first started having back pain, she was experiencing muscle spasms. Pt went to the chiropractor with benefit that relieved her muscle spasms in her back. She states that she feels a difference in her back now. Her back pain will feel tired with prolonged standing. Her pain is felt with cooking and doing dishes. She denies any shooting pain in her BLE. She reports that her pain is mainly felt in her back. She denies and pain and weakness in her back with walking. Pt stays active by walking. She denies her mid back pain wrapping around her rib cage. She denies a PMHx of shingles.  With standing, she experiences pain in her low back. No weakness of BLE. No saddle paresthesia. Pt no longer takes Flexeril as she is unable to tolerate it. Pt takes PRN Naprosyn. Denies persistent fevers, chills, weight changes, neurogenic bowel or bladder symptoms. Pt denies recent ED visits or hospitalizations. 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 She has been using an aqua track that benefits her. Pain Assessment  3/22/2017   Location of Pain Back   Severity of Pain 0   Quality of Pain Dull;Aching; Sharp   Duration of Pain Persistent   Frequency of Pain Constant   Aggravating Factors Standing   Limiting Behavior Yes   Relieving Factors Rest;Heat   Result of Injury No         XR Results (most recent):    Results from Hospital Encounter encounter on 17   XR SPINE THORAC 2 V   Narrative EXAM: Thoracic X-ray    INDICATION: Right-sided thoracic pain    TECHNIQUE: 2 views of the thoracic spine obtained. COMPARISON: None. FINDINGS: There are 12 thoracic type vertebra. The alignment of the thoracic  spine is unremarkable. No evidence for acute fracture or dislocation. Pedicles  are present at every level. Moderate disc space height loss is noted at every  level with anterior osteophyte formation. Impression IMPRESSION:  1. Moderate multilevel degenerative disc disease.                Lumbar spine xray films from 14 reviewed:  Impression:    Normal lumbar spine           PAST MEDICAL HISTORY   Past Medical History:   Diagnosis Date    AR (allergic rhinitis)     Arthritis     Heart murmur     Hypertension     Hypovitaminosis D 3/3/2014       Past Surgical History:   Procedure Laterality Date    HX  SECTION      HX COLONOSCOPY      normal per patient (by Dr. Jodi Lenz)   [place] HYSTERECTOMY  2006    s/p vaginal hysterectomy        MEDICATIONS    Current Outpatient Prescriptions   Medication Sig Dispense Refill    traMADol-acetaminophen (ULTRACET) 37.5-325 mg per tablet Take 1 Tab by mouth every four (4) hours as needed for Pain. Max Daily Amount: 6 Tabs. 40 Tab 0    ergocalciferol (VITAMIN D2) 50,000 unit capsule Take 1 Cap by mouth every seven (7) days. 8 Cap 5    cyclobenzaprine (FLEXERIL) 10 mg tablet Take 0.5 Tabs by mouth two (2) times daily as needed for Muscle Spasm(s). 60 Tab 0    cetirizine (ZYRTEC) 10 mg tablet Take 1 Tab by mouth daily. 30 Tab 0    naltrexone-buPROPion (CONTRAVE) 8-90 mg TbER ER tablet 2 tablets  Tab 0    amLODIPine (NORVASC) 5 mg tablet TAKE 1 TABLET BY MOUTH EVERY DAY 30 Tab 5    ondansetron (ZOFRAN ODT) 4 mg disintegrating tablet DISSOLVE 1 TABLET BY MOUTH EVERY EIGHT (8) HOURS AS NEEDED FOR NAUSEA. 30 Tab 0    polyethylene glycol (MIRALAX) 17 gram packet Take 1 Packet by mouth daily. 30 Packet 0    fluticasone (FLONASE) 50 mcg/actuation nasal spray 2 Sprays by Both Nostrils route daily as needed for Rhinitis. 1 Bottle 3    losartan-hydrochlorothiazide (HYZAAR) 100-25 mg per tablet TAKE 1 TABLET BY MOUTH EVERY DAY 30 Tab 3    ibuprofen (MOTRIN) 600 mg tablet Take 1 Tab by mouth every six (6) hours as needed for Pain. 60 Tab 0    methylPREDNISolone (MEDROL DOSEPACK) 4 mg tablet TAKE AS DIRECTED ORALLY  0       ALLERGIES  No Known Allergies       SOCIAL HISTORY    Social History     Social History    Marital status:      Spouse name: N/A    Number of children: N/A    Years of education: N/A     Occupational History    Not on file.      Social History Main Topics    Smoking status: Never Smoker    Smokeless tobacco: Never Used    Alcohol use 0.5 oz/week     1 Standard drinks or equivalent per week    Drug use: No    Sexual activity: Yes     Partners: Male     Other Topics Concern    Not on file     Social History Narrative       FAMILY HISTORY  Family History   Problem Relation Age of Onset    Hypertension Mother     Hypertension Father     Headache Father     Diabetes Father    [address] [facility] [address] Arthritis-rheumatoid Father     Hypertension Sister     Diabetes Sister     Cancer Sister 47     breast cancer    Hypertension Brother          REVIEW OF SYSTEMS  Review of Systems   Constitutional: Negative for chills, fever and weight loss. Respiratory: Negative for shortness of breath. Cardiovascular: Negative for chest pain. Gastrointestinal: Negative for constipation. Negative for fecal incontinence   Genitourinary: Negative for dysuria. Negative for urinary incontinence   Musculoskeletal:        Per HPI   Skin: Negative for rash. Neurological: Negative for dizziness, tingling, tremors, focal weakness and headaches. Endo/Heme/Allergies: Does not bruise/bleed easily. Psychiatric/Behavioral: The patient does not have insomnia. PHYSICAL EXAMINATION  Visit Vitals    /63 (BP 1 Location: Left arm, BP Patient Position: Sitting)    Pulse 64    Resp 14    Ht 5' 1\" (1.549 m)    Wt 180 lb (81.6 kg)    SpO2 98%    BMI 34.01 kg/m2          Accompanied by self. Constitutional:  Well developed, well nourished, in no acute distress. Psychiatric: Affect and mood are appropriate. Integumentary: No rashes or abrasions noted on exposed areas. Cardiovascular/Peripheral Vascular: Intact l pulses. No peripheral edema is noted. Lymphatic:  No evidence of lymphedema. No cervical lymphadenopathy. SPINE/MUSCULOSKELETAL EXAM    Elevation of RT shoulder compared to LT. Lumbar spine:  No rash, ecchymosis, or gross obliquity. No fasciculations. No focal atrophy is noted. No tenderness to palpation at the sciatic notch. SI joints non-tender. Trochanters non tender. Sensation grossly intact to light touch.       MOTOR:       Hip Flex  Quads Hamstrings Ankle DF EHL Ankle PF   Right +4/5 +4/5 +4/5 +4/5 +4/5 +4/5   Left +4/5 +4/5 +4/5 +4/5 +4/5 +4/5     DTRs are 2+ biceps, triceps, brachioradialis, and LT patella,   DTRs are hypoactive of RT patella and bilateral Achilles. Straight Leg raise negative. Mild difficulty with tandem gait. Heel walk intact. Toe rise intact    Ambulation without assistive device. FWB. RADIOGRAPHS  Lumbar spine xray films reviewed:  1) Normal alignment. 2) Mild facet changes in lower lumbar level  3) No instability       Written by Yaima Urbano, as dictated by Deborah Christian MD.    Ms. Sadie Mireles may have a reminder for a \"due or due soon\" health maintenance. I have asked that she contact her primary care provider for follow-up on this health maintenance.

## 2017-03-22 NOTE — MR AVS SNAPSHOT
Visit Information Date & Time Provider Department Dept. Phone Encounter #  
 3/22/2017  9:30 AM Ashley Queen MD [facility] [phone] [id number] Follow-up Instructions Return if symptoms worsen or fail to improve. Your Appointments 4/13/2017  8:00 AM  
LAB with Laura Scott DO Internists at [facility] (--) Appt Note: 3 mo f/u lab [address] [id number] [phone]  
  
   
 [address] [phone]  
  
    
 4/20/2017  8:00 AM  
ROUTINE CARE with Laura Scott DO Internists at [facility] (--) Appt Note: 3 mo f/u  
 [address] [id number] [phone]  
  
   
 [address] [phone] Upcoming Health Maintenance Date Due DTaP/Tdap/Td series (1 - Tdap) 9/2/2012 PAP AKA CERVICAL CYTOLOGY 10/16/2016 FOOT EXAM Q1 4/7/2017 MICROALBUMIN Q1 6/30/2017 HEMOGLOBIN A1C Q6M 7/12/2017 BREAST CANCER SCRN MAMMOGRAM 10/1/2017 EYE EXAM RETINAL OR DILATED Q1 11/8/2017 LIPID PANEL Q1 1/12/2018 COLONOSCOPY 5/14/2022 Allergies as of 3/22/2017  Review Complete On: 3/22/2017 By: Ashley Queen MD  
 No Known Allergies Current Immunizations  Reviewed on 10/13/2016 Name Date Influenza Vaccine 11/3/2014 Influenza Vaccine (Quad) PF 10/13/2016, 10/8/2015 Pneumococcal Conjugate (PCV-13) 1/19/2017 Td 9/1/2012 Not reviewed this visit You Were Diagnosed With   
  
 Codes Comments DDD (degenerative disc disease), thoracic    -  Primary ICD-10-CM: M51.34 
ICD-9-CM: 722.51 Spondylosis of lumbar region without myelopathy or radiculopathy     ICD-10-CM: M47.816 ICD-9-CM: 721.3 Back pain, unspecified back location, unspecified back pain laterality, unspecified chronicity     ICD-10-CM: M54.9 ICD-9-CM: 724.5 Vitals BP Pulse Resp Height(growth percentile) Weight(growth percentile) SpO2  
 116/63 (BP 1 Location: Left arm, BP Patient Position: Sitting) 64 14 5' 1\" (1.549 m) 180 lb (81.6 kg) 98% BMI OB Status Smoking Status 34.01 kg/m2 Hysterectomy Never Smoker BMI and BSA Data Body Mass Index Body Surface Area 34.01 kg/m 2 1.87 m 2 Preferred Pharmacy Pharmacy Name Phone [facility] Derek Cordova [address] [place], [place] [phone] Your Updated Medication List  
  
   
This list is accurate as of: 3/22/17 10:47 AM.  Always use your most recent med list. amLODIPine 5 mg tablet Commonly known as:  Voncile [place] TAKE 1 TABLET BY MOUTH EVERY DAY  
  
 cetirizine 10 mg tablet Commonly known as:  ZYRTEC Take 1 Tab by mouth daily. cyclobenzaprine 10 mg tablet Commonly known as:  FLEXERIL Take 0.5 Tabs by mouth two (2) times daily as needed for Muscle Spasm(s). ergocalciferol 50,000 unit capsule Commonly known as:  VITAMIN D2 Take 1 Cap by mouth every seven (7) days. fluticasone 50 mcg/actuation nasal spray Commonly known as:  [place] Fails 2 Sprays by Both Nostrils route daily as needed for Rhinitis. ibuprofen 600 mg tablet Commonly known as:  MOTRIN Take 1 Tab by mouth every six (6) hours as needed for Pain.  
  
 losartan-hydroCHLOROthiazide 100-25 mg per tablet Commonly known as:  HYZAAR  
TAKE 1 TABLET BY MOUTH EVERY DAY  
  
 methylPREDNISolone 4 mg tablet Commonly known as:  MEDROL DOSEPACK  
TAKE AS DIRECTED ORALLY  
  
 naltrexone-buPROPion 8-90 mg Tber ER tablet Commonly known as:  CONTRAVE  
2 tablets BID  
  
 ondansetron 4 mg disintegrating tablet Commonly known as:  ZOFRAN ODT  
DISSOLVE 1 TABLET BY MOUTH EVERY EIGHT (8) HOURS AS NEEDED FOR NAUSEA. polyethylene glycol 17 gram packet Commonly known as:  Demetrice  Take 1 Packet by mouth daily. traMADol-acetaminophen 37.5-325 mg per tablet Commonly known as:  ULTRACET Take 1 Tab by mouth every four (4) hours as needed for Pain. Max Daily Amount: 6 Tabs. We Performed the Following AMB POC XRAY, SPINE, LUMBOSACRAL; 4+ [id number] CPT(R)] Follow-up Instructions Return if symptoms worsen or fail to improve. Patient Instructions Back Care and Preventing Injuries: Care Instructions Your Care Instructions You can hurt your back doing many everyday activities: lifting a heavy box, bending down to garden, exercising at the gym, and even getting out of bed. But you can keep your back strong and healthy by doing some exercises. You also can follow a few tips for sitting, sleeping, and lifting to avoid hurting your back again. Talk to your doctor before you start an exercise program. Ask for help if you want to learn more about keeping your back healthy. Follow-up care is a key part of your treatment and safety. Be sure to make and go to all appointments, and call your doctor if you are having problems. It's also a good idea to know your test results and keep a list of the medicines you take. How can you care for yourself at home? · Stay at a healthy weight to avoid strain on your lower back. · Do not smoke. Smoking increases the risk of osteoporosis, which weakens the spine. If you need help quitting, talk to your doctor about stop-smoking programs and medicines. These can increase your chances of quitting for good. · Make sure you sleep in a position that maintains your back's normal curves and on a mattress that feels comfortable. Sleep on your side with a pillow between your knees, or sleep on your back with a pillow under your knees. These positions can reduce strain on your back. · When you get out of bed, lie on your side and bend both knees. Drop your feet over the edge of the bed as you push up with both arms.  Scoot to the edge of the bed. Make sure your feet are in line with your rear end (buttocks), and then stand up. · If you must stand for a long time, put one foot on a stool, ledge, or box. Exercise to strengthen your back and other muscles · Get at least 30 minutes of exercise on most days of the week. Walking is a good choice. You also may want to do other activities, such as running, swimming, cycling, or playing tennis or team sports. · Stretch your back muscles. Here are few exercises to try: ¨ Lie on your back with your knees bent and your feet flat on the floor. Gently pull one bent knee to your chest. Put that foot back on the floor, and then pull the other knee to your chest. Hold for 15 to 30 seconds. Repeat 2 to 4 times. ¨ Do pelvic tilts. Lie on your back with your knees bent. Tighten your stomach muscles. Pull your belly button (navel) in and up toward your ribs. You should feel like your back is pressing to the floor and your hips and pelvis are slightly lifting off the floor. Hold for 6 seconds while breathing smoothly. · Keep your core muscles strong. The muscles of your back, belly (abdomen), and buttocks support your spine. ¨ Pull in your belly, and imagine pulling your navel toward your spine. Hold this for 6 seconds, then relax. Remember to keep breathing normally as you tense your muscles. ¨ Do curl-ups. Always do them with your knees bent. Keep your low back on the floor, and curl your shoulders toward your knees using a smooth, slow motion. Keep your arms folded across your chest. If this bothers your neck, try putting your hands behind your neck (not your head), with your elbows spread apart. ¨ Lie on your back with your knees bent and your feet flat on the floor. Tighten your belly muscles, and then push with your feet and raise your buttocks up a few inches. Hold this position 6 seconds as you continue to breathe normally, then lower yourself slowly to the floor. Repeat 8 to 12 times. ¨ If you like group exercise, try Pilates or yoga. These classes have poses that strengthen the core muscles. Protect your back when you sit · Place a small pillow, a rolled-up towel, or a lumbar roll in the curve of your back if you need extra support. · Sit in a chair that is low enough to let you place both feet flat on the floor with both knees nearly level with your hips. If your chair or desk is too high, use a foot rest to raise your knees. · When driving, keep your knees nearly level with your hips. Sit straight, and drive with both hands on the steering wheel. Your arms should be in a slightly bent position. · Try a kneeling chair, which helps tilt your hips forward. This takes pressure off your lower back. · Try sitting on an exercise ball. It can rock from side to side, which helps keep your back loose. Lift properly · Squat down, bending at the hips and knees only. If you need to, put one knee to the floor and extend your other knee in front of you, bent at a right angle (half kneeling). · Press your chest straight forward. This helps keep your upper back straight while keeping a slight arch in your low back. · Hold the load as close to your body as possible, at the level of your navel. · Use your feet to change direction, taking small steps. · Lead with your hips as you change direction. Keep your shoulders in line with your hips as you move. Do not twist your body. · Set down your load carefully, squatting with your knees and hips only. When should you call for help? Watch closely for changes in your health, and be sure to contact your doctor if: 
· You want more exercises to make your back and other core muscles stronger. Where can you learn more? Go to http://killian-heather.info/. Enter S810 in the search box to learn more about \"Back Care and Preventing Injuries: Care Instructions. \" Current as of: May 23, 2016 Content Version: 11.1 © 0510-3738 Healthwise, Incorporated. Care instructions adapted under license by [employer] (which disclaims liability or warranty for this information). If you have questions about a medical condition or this instruction, always ask your healthcare professional. [address] any warranty or liability for your use of this information. Healthy Upper Back: Exercises Your Care Instructions Here are some examples of exercises for your upper back. Start each exercise slowly. Ease off the exercise if you start to have pain. Your doctor or physical therapist will tell you when you can start these exercises and which ones will work best for you. How to do the exercises Lower neck and upper back stretch 1. Stretch your arms out in front of your body. Clasp one hand on top of your other hand. 2. Gently reach out so that you feel your shoulder blades stretching away from each other. 3. Gently bend your head forward. 4. Hold for 15 to 30 seconds. 5. Repeat 2 to 4 times. Midback stretch Note: If you have knee pain, do not do this exercise. 1. Kneel on the floor, and sit back on your ankles. 2. Lean forward, place your hands on the floor, and stretch your arms out in front of you. Rest your head between your arms. 3. Gently push your chest toward the floor, reaching as far in front of you as possible. 4. Hold for 15 to 30 seconds. 5. Repeat 2 to 4 times. Shoulder rolls 1. Sit comfortably with your feet shoulder-width apart. You can also do this exercise while standing. 2. Roll your shoulders up, then back, and then down in a smooth, circular motion. 3. Repeat 2 to 4 times. Wall push-up 1. Stand against a wall with your feet about 12 to 24 inches back from the wall. If you feel any pain when you do this exercise, stand closer to the wall. 2. Place your hands on the wall slightly wider apart than your shoulders, and lean forward. 3. Gently lean your body toward the wall. Then push back to your starting position. Keep the motion smooth and controlled. 4. Repeat 8 to 12 times. Resisted shoulder blade squeeze Note: For this exercise, you will need elastic exercise material, such as surgical tubing or Thera-Band. 1. Sit or stand, holding the band in both hands in front of you. Keep your elbows close to your sides, bent at a 90-degree angle. Your palms should face up. 2. Squeeze your shoulder blades together, and move your arms to the outside, stretching the band. Be sure to keep your elbows at your sides while you do this. 3. Relax. 4. Repeat 8 to 12 times. Resisted rows Note: For this exercise, you will need elastic exercise material, such as surgical tubing or Thera-Band. 1. Put the band around a solid object, such as a bedpost, at about waist level. Hold one end of the band in each hand. 2. With your elbows at your sides and bent to 90 degrees, pull the band back to move your shoulder blades toward each other. Return to the starting position. 3. Repeat 8 to 12 times. Follow-up care is a key part of your treatment and safety. Be sure to make and go to all appointments, and call your doctor if you are having problems. It's also a good idea to know your test results and keep a list of the medicines you take. Where can you learn more? Go to http://killian-heather.info/. Enter B519 in the search box to learn more about \"Healthy Upper Back: Exercises. \" Current as of: May 23, 2016 Content Version: 11.1 © 7147-1175 Healthwise, Incorporated. Care instructions adapted under license by [employer] (which disclaims liability or warranty for this information). If you have questions about a medical condition or this instruction, always ask your healthcare professional. [address] any warranty or liability for your use of this information. Low Back Pain: Exercises Your Care Instructions Here are some examples of typical rehabilitation exercises for your condition. Start each exercise slowly. Ease off the exercise if you start to have pain. Your doctor or physical therapist will tell you when you can start these exercises and which ones will work best for you. How to do the exercises Press-up 6. Lie on your stomach, supporting your body with your forearms. 7. Press your elbows down into the floor to raise your upper back. As you do this, relax your stomach muscles and allow your back to arch without using your back muscles. As your press up, do not let your hips or pelvis come off the floor. 8. Hold for 15 to 30 seconds, then relax. 9. Repeat 2 to 4 times. Alternate arm and leg (bird dog) exercise Note: Do this exercise slowly. Try to keep your body straight at all times, and do not let one hip drop lower than the other. 6. Start on the floor, on your hands and knees. 7. Tighten your belly muscles. 8. Raise one leg off the floor, and hold it straight out behind you. Be careful not to let your hip drop down, because that will twist your trunk. 9. Hold for about 6 seconds, then lower your leg and switch to the other leg. 10. Repeat 8 to 12 times on each leg. 11. Over time, work up to holding for 10 to 30 seconds each time. 12. If you feel stable and secure with your leg raised, try raising the opposite arm straight out in front of you at the same time. Knee-to-chest exercise 4. Lie on your back with your knees bent and your feet flat on the floor. 5. Bring one knee to your chest, keeping the other foot flat on the floor (or keeping the other leg straight, whichever feels better on your lower back). 6. Keep your lower back pressed to the floor. Hold for at least 15 to 30 seconds. 7. Relax, and lower the knee to the starting position. 8. Repeat with the other leg. Repeat 2 to 4 times with each leg. 9. To get more stretch, put your other leg flat on the floor while pulling your knee to your chest. 
Curl-ups 5. Lie on the floor on your back with your knees bent at a 90-degree angle. Your feet should be flat on the floor, about 12 inches from your buttocks. 6. Cross your arms over your chest. If this bothers your neck, try putting your hands behind your neck (not your head), with your elbows spread apart. 7. Slowly tighten your belly muscles and raise your shoulder blades off the floor. 8. Keep your head in line with your body, and do not press your chin to your chest. 
9. Hold this position for 1 or 2 seconds, then slowly lower yourself back down to the floor. 10. Repeat 8 to 12 times. Pelvic tilt exercise 5. Lie on your back with your knees bent. 6. \"Brace\" your stomach. This means to tighten your muscles by pulling in and imagining your belly button moving toward your spine. You should feel like your back is pressing to the floor and your hips and pelvis are rocking back. 7. Hold for about 6 seconds while you breathe smoothly. 8. Repeat 8 to 12 times. Heel dig bridging 4. Lie on your back with both knees bent and your ankles bent so that only your heels are digging into the floor. Your knees should be bent about 90 degrees. 5. Then push your heels into the floor, squeeze your buttocks, and lift your hips off the floor until your shoulders, hips, and knees are all in a straight line. 6. Hold for about 6 seconds as you continue to breathe normally, and then slowly lower your hips back down to the floor and rest for up to 10 seconds. 7. Do 8 to 12 repetitions. Hamstring stretch in doorway 1. Lie on your back in a doorway, with one leg through the open door. 2. Slide your leg up the wall to straighten your knee. You should feel a gentle stretch down the back of your leg. 3. Hold the stretch for at least 15 to 30 seconds.  Do not arch your back, point your toes, or bend either knee. Keep one heel touching the floor and the other heel touching the wall. 4. Repeat with your other leg. 5. Do 2 to 4 times for each leg. Hip flexor stretch 1. Kneel on the floor with one knee bent and one leg behind you. Place your forward knee over your foot. Keep your other knee touching the floor. 2. Slowly push your hips forward until you feel a stretch in the upper thigh of your rear leg. 3. Hold the stretch for at least 15 to 30 seconds. Repeat with your other leg. 4. Do 2 to 4 times on each side. Wall sit 1. Stand with your back 10 to 12 inches away from a wall. 2. Lean into the wall until your back is flat against it. 3. Slowly slide down until your knees are slightly bent, pressing your lower back into the wall. 4. Hold for about 6 seconds, then slide back up the wall. 5. Repeat 8 to 12 times. Follow-up care is a key part of your treatment and safety. Be sure to make and go to all appointments, and call your doctor if you are having problems. It's also a good idea to know your test results and keep a list of the medicines you take. Where can you learn more? Go to http://killian-heather.info/. Enter P646 in the search box to learn more about \"Low Back Pain: Exercises. \" Current as of: May 23, 2016 Content Version: 11.1 © 7901-9719 [employer], Incorporated. Care instructions adapted under license by [employer] (which disclaims liability or warranty for this information). If you have questions about a medical condition or this instruction, always ask your healthcare professional. [place] [address] any warranty or liability for your use of this information. Introducing [facility]! Dear Christopher Quintero: Thank you for requesting a [employer] account. Our records indicate that you already have an active [employer] account. You can access your account anytime at [email or web address] Did you know that you can access your hospital and ER discharge instructions at any time in [employer]? You can also review all of your test results from your hospital stay or ER visit. Additional Information If you have questions, please visit the Frequently Asked Questions section of the [employer] website at [email or web address] Remember, [employer] is NOT to be used for urgent needs. For medical emergencies, dial 911. Now available from your iPhone and Android! Please provide this summary of care documentation to your next provider. Your primary care clinician is listed as Corey Milian.. If you have any questions after today's visit, please call [phone].

## 2017-03-22 NOTE — PATIENT INSTRUCTIONS
Back Care and Preventing Injuries: Care Instructions  Your Care Instructions  You can hurt your back doing many everyday activities: lifting a heavy box, bending down to garden, exercising at the gym, and even getting out of bed. But you can keep your back strong and healthy by doing some exercises. You also can follow a few tips for sitting, sleeping, and lifting to avoid hurting your back again. Talk to your doctor before you start an exercise program. Ask for help if you want to learn more about keeping your back healthy. Follow-up care is a key part of your treatment and safety. Be sure to make and go to all appointments, and call your doctor if you are having problems. It's also a good idea to know your test results and keep a list of the medicines you take. How can you care for yourself at home? · Stay at a healthy weight to avoid strain on your lower back. · Do not smoke. Smoking increases the risk of osteoporosis, which weakens the spine. If you need help quitting, talk to your doctor about stop-smoking programs and medicines. These can increase your chances of quitting for good. · Make sure you sleep in a position that maintains your back's normal curves and on a mattress that feels comfortable. Sleep on your side with a pillow between your knees, or sleep on your back with a pillow under your knees. These positions can reduce strain on your back. · When you get out of bed, lie on your side and bend both knees. Drop your feet over the edge of the bed as you push up with both arms. Scoot to the edge of the bed. Make sure your feet are in line with your rear end (buttocks), and then stand up. · If you must stand for a long time, put one foot on a stool, ledge, or box. Exercise to strengthen your back and other muscles  · Get at least 30 minutes of exercise on most days of the week. Walking is a good choice.  You also may want to do other activities, such as running, swimming, cycling, or playing tennis or team sports. · Stretch your back muscles. Here are few exercises to try:  Jyoti Russell on your back with your knees bent and your feet flat on the floor. Gently pull one bent knee to your chest. Put that foot back on the floor, and then pull the other knee to your chest. Hold for 15 to 30 seconds. Repeat 2 to 4 times. ¨ Do pelvic tilts. Lie on your back with your knees bent. Tighten your stomach muscles. Pull your belly button (navel) in and up toward your ribs. You should feel like your back is pressing to the floor and your hips and pelvis are slightly lifting off the floor. Hold for 6 seconds while breathing smoothly. · Keep your core muscles strong. The muscles of your back, belly (abdomen), and buttocks support your spine. ¨ Pull in your belly, and imagine pulling your navel toward your spine. Hold this for 6 seconds, then relax. Remember to keep breathing normally as you tense your muscles. ¨ Do curl-ups. Always do them with your knees bent. Keep your low back on the floor, and curl your shoulders toward your knees using a smooth, slow motion. Keep your arms folded across your chest. If this bothers your neck, try putting your hands behind your neck (not your head), with your elbows spread apart. ¨ Lie on your back with your knees bent and your feet flat on the floor. Tighten your belly muscles, and then push with your feet and raise your buttocks up a few inches. Hold this position 6 seconds as you continue to breathe normally, then lower yourself slowly to the floor. Repeat 8 to 12 times. ¨ If you like group exercise, try Pilates or yoga. These classes have poses that strengthen the core muscles. Protect your back when you sit  · Place a small pillow, a rolled-up towel, or a lumbar roll in the curve of your back if you need extra support. · Sit in a chair that is low enough to let you place both feet flat on the floor with both knees nearly level with your hips.  If your chair or desk is too high, use a foot rest to raise your knees. · When driving, keep your knees nearly level with your hips. Sit straight, and drive with both hands on the steering wheel. Your arms should be in a slightly bent position. · Try a kneeling chair, which helps tilt your hips forward. This takes pressure off your lower back. · Try sitting on an exercise ball. It can rock from side to side, which helps keep your back loose. Lift properly  · Squat down, bending at the hips and knees only. If you need to, put one knee to the floor and extend your other knee in front of you, bent at a right angle (half kneeling). · Press your chest straight forward. This helps keep your upper back straight while keeping a slight arch in your low back. · Hold the load as close to your body as possible, at the level of your navel. · Use your feet to change direction, taking small steps. · Lead with your hips as you change direction. Keep your shoulders in line with your hips as you move. Do not twist your body. · Set down your load carefully, squatting with your knees and hips only. When should you call for help? Watch closely for changes in your health, and be sure to contact your doctor if:  · You want more exercises to make your back and other core muscles stronger. Where can you learn more? Go to http://killian-heather.info/. Enter S810 in the search box to learn more about \"Back Care and Preventing Injuries: Care Instructions. \"  Current as of: May 23, 2016  Content Version: 11.1  © 9589-6067 [employer]. Care instructions adapted under license by [employer] (which disclaims liability or warranty for this information). If you have questions about a medical condition or this instruction, always ask your healthcare professional. [place] [address] any warranty or liability for your use of this information.        Healthy Upper Back: Exercises  Your Care Instructions  Here are some examples of exercises for your upper back. Start each exercise slowly. Ease off the exercise if you start to have pain. Your doctor or physical therapist will tell you when you can start these exercises and which ones will work best for you. How to do the exercises  Lower neck and upper back stretch    1. Stretch your arms out in front of your body. Clasp one hand on top of your other hand. 2. Gently reach out so that you feel your shoulder blades stretching away from each other. 3. Gently bend your head forward. 4. Hold for 15 to 30 seconds. 5. Repeat 2 to 4 times. Midback stretch    Note: If you have knee pain, do not do this exercise. 1. Kneel on the floor, and sit back on your ankles. 2. Lean forward, place your hands on the floor, and stretch your arms out in front of you. Rest your head between your arms. 3. Gently push your chest toward the floor, reaching as far in front of you as possible. 4. Hold for 15 to 30 seconds. 5. Repeat 2 to 4 times. Shoulder rolls    1. Sit comfortably with your feet shoulder-width apart. You can also do this exercise while standing. 2. Roll your shoulders up, then back, and then down in a smooth, circular motion. 3. Repeat 2 to 4 times. Wall push-up    1. Stand against a wall with your feet about 12 to 24 inches back from the wall. If you feel any pain when you do this exercise, stand closer to the wall. 2. Place your hands on the wall slightly wider apart than your shoulders, and lean forward. 3. Gently lean your body toward the wall. Then push back to your starting position. Keep the motion smooth and controlled. 4. Repeat 8 to 12 times. Resisted shoulder blade squeeze    Note: For this exercise, you will need elastic exercise material, such as surgical tubing or Thera-Band. 1. Sit or stand, holding the band in both hands in front of you. Keep your elbows close to your sides, bent at a 90-degree angle. Your palms should face up.   2. Squeeze your shoulder blades together, and move your arms to the outside, stretching the band. Be sure to keep your elbows at your sides while you do this. 3. Relax. 4. Repeat 8 to 12 times. Resisted rows    Note: For this exercise, you will need elastic exercise material, such as surgical tubing or Thera-Band. 1. Put the band around a solid object, such as a bedpost, at about waist level. Hold one end of the band in each hand. 2. With your elbows at your sides and bent to 90 degrees, pull the band back to move your shoulder blades toward each other. Return to the starting position. 3. Repeat 8 to 12 times. Follow-up care is a key part of your treatment and safety. Be sure to make and go to all appointments, and call your doctor if you are having problems. It's also a good idea to know your test results and keep a list of the medicines you take. Where can you learn more? Go to http://killianaDealioheather.info/. Enter S102 in the search box to learn more about \"Healthy Upper Back: Exercises. \"  Current as of: May 23, 2016  Content Version: 11.1  © 0563-6827 [employer]. Care instructions adapted under license by [employer] (which disclaims liability or warranty for this information). If you have questions about a medical condition or this instruction, always ask your healthcare professional. [address] any warranty or liability for your use of this information. Low Back Pain: Exercises  Your Care Instructions  Here are some examples of typical rehabilitation exercises for your condition. Start each exercise slowly. Ease off the exercise if you start to have pain. Your doctor or physical therapist will tell you when you can start these exercises and which ones will work best for you. How to do the exercises  Press-up    6. Lie on your stomach, supporting your body with your forearms. 7. Press your elbows down into the floor to raise your upper back.  As you do this, relax your stomach muscles and allow your back to arch without using your back muscles. As your press up, do not let your hips or pelvis come off the floor. 8. Hold for 15 to 30 seconds, then relax. 9. Repeat 2 to 4 times. Alternate arm and leg (bird dog) exercise    Note: Do this exercise slowly. Try to keep your body straight at all times, and do not let one hip drop lower than the other. 6. Start on the floor, on your hands and knees. 7. Tighten your belly muscles. 8. Raise one leg off the floor, and hold it straight out behind you. Be careful not to let your hip drop down, because that will twist your trunk. 9. Hold for about 6 seconds, then lower your leg and switch to the other leg. 10. Repeat 8 to 12 times on each leg. 11. Over time, work up to holding for 10 to 30 seconds each time. 12. If you feel stable and secure with your leg raised, try raising the opposite arm straight out in front of you at the same time. Knee-to-chest exercise    4. Lie on your back with your knees bent and your feet flat on the floor. 5. Bring one knee to your chest, keeping the other foot flat on the floor (or keeping the other leg straight, whichever feels better on your lower back). 6. Keep your lower back pressed to the floor. Hold for at least 15 to 30 seconds. 7. Relax, and lower the knee to the starting position. 8. Repeat with the other leg. Repeat 2 to 4 times with each leg. 9. To get more stretch, put your other leg flat on the floor while pulling your knee to your chest.  Curl-ups    5. Lie on the floor on your back with your knees bent at a 90-degree angle. Your feet should be flat on the floor, about 12 inches from your buttocks. 6. Cross your arms over your chest. If this bothers your neck, try putting your hands behind your neck (not your head), with your elbows spread apart. 7. Slowly tighten your belly muscles and raise your shoulder blades off the floor.   8. Keep your head in line with your body, and do not press your chin to your chest.  9. Hold this position for 1 or 2 seconds, then slowly lower yourself back down to the floor. 10. Repeat 8 to 12 times. Pelvic tilt exercise    5. Lie on your back with your knees bent. 6. \"Brace\" your stomach. This means to tighten your muscles by pulling in and imagining your belly button moving toward your spine. You should feel like your back is pressing to the floor and your hips and pelvis are rocking back. 7. Hold for about 6 seconds while you breathe smoothly. 8. Repeat 8 to 12 times. Heel dig bridging    4. Lie on your back with both knees bent and your ankles bent so that only your heels are digging into the floor. Your knees should be bent about 90 degrees. 5. Then push your heels into the floor, squeeze your buttocks, and lift your hips off the floor until your shoulders, hips, and knees are all in a straight line. 6. Hold for about 6 seconds as you continue to breathe normally, and then slowly lower your hips back down to the floor and rest for up to 10 seconds. 7. Do 8 to 12 repetitions. Hamstring stretch in doorway    1. Lie on your back in a doorway, with one leg through the open door. 2. Slide your leg up the wall to straighten your knee. You should feel a gentle stretch down the back of your leg. 3. Hold the stretch for at least 15 to 30 seconds. Do not arch your back, point your toes, or bend either knee. Keep one heel touching the floor and the other heel touching the wall. 4. Repeat with your other leg. 5. Do 2 to 4 times for each leg. Hip flexor stretch    1. Kneel on the floor with one knee bent and one leg behind you. Place your forward knee over your foot. Keep your other knee touching the floor. 2. Slowly push your hips forward until you feel a stretch in the upper thigh of your rear leg. 3. Hold the stretch for at least 15 to 30 seconds. Repeat with your other leg. 4. Do 2 to 4 times on each side. Wall sit    1.  Stand with your back 10 to 12 inches away from a wall. 2. Lean into the wall until your back is flat against it. 3. Slowly slide down until your knees are slightly bent, pressing your lower back into the wall. 4. Hold for about 6 seconds, then slide back up the wall. 5. Repeat 8 to 12 times. Follow-up care is a key part of your treatment and safety. Be sure to make and go to all appointments, and call your doctor if you are having problems. It's also a good idea to know your test results and keep a list of the medicines you take. Where can you learn more? Go to http://killian-heather.info/. Enter S804 in the search box to learn more about \"Low Back Pain: Exercises. \"  Current as of: May 23, 2016  Content Version: 11.1  © 3425-8483 [employer]. Care instructions adapted under license by [employer] (which disclaims liability or warranty for this information). If you have questions about a medical condition or this instruction, always ask your healthcare professional. [address] any warranty or liability for your use of this information.

## 2017-04-19 DIAGNOSIS — E11.65 TYPE 2 DIABETES MELLITUS WITH HYPERGLYCEMIA, WITHOUT LONG-TERM CURRENT USE OF INSULIN (HCC): ICD-10-CM

## 2017-04-25 ENCOUNTER — LAB ONLY (OUTPATIENT)
Dept: INTERNAL MEDICINE CLINIC | Age: 57
End: 2017-04-25

## 2017-04-26 LAB
AVG GLU, 10930: 125 MG/DL (ref 91–123)
CREATININE, URINE: 310 MG/DL
HBA1C MFR BLD HPLC: 6 % (ref 4.8–5.9)
MICROALB/CREAT RATIO, 140286: NORMAL MCG/MG OF CREATININE (ref 0–30)
MICROALBUMIN,URINE RANDOM 140054: <12 UG/ML (ref 0.1–17)

## 2017-05-02 ENCOUNTER — OFFICE VISIT (OUTPATIENT)
Dept: INTERNAL MEDICINE CLINIC | Age: 57
End: 2017-05-02

## 2017-05-02 VITALS
TEMPERATURE: 98.3 F | HEART RATE: 63 BPM | SYSTOLIC BLOOD PRESSURE: 110 MMHG | HEIGHT: 61 IN | OXYGEN SATURATION: 99 % | DIASTOLIC BLOOD PRESSURE: 70 MMHG | BODY MASS INDEX: 33.68 KG/M2 | RESPIRATION RATE: 16 BRPM | WEIGHT: 178.4 LBS

## 2017-05-02 DIAGNOSIS — E11.65 TYPE 2 DIABETES MELLITUS WITH HYPERGLYCEMIA, WITHOUT LONG-TERM CURRENT USE OF INSULIN (HCC): ICD-10-CM

## 2017-05-02 DIAGNOSIS — E66.9 OBESITY (BMI 30.0-34.9): ICD-10-CM

## 2017-05-02 DIAGNOSIS — M51.34 DDD (DEGENERATIVE DISC DISEASE), THORACIC: ICD-10-CM

## 2017-05-02 DIAGNOSIS — E55.9 HYPOVITAMINOSIS D: ICD-10-CM

## 2017-05-02 DIAGNOSIS — Z79.899 ENCOUNTER FOR LONG-TERM (CURRENT) USE OF MEDICATIONS: ICD-10-CM

## 2017-05-02 DIAGNOSIS — I10 ESSENTIAL HYPERTENSION: Primary | ICD-10-CM

## 2017-05-02 NOTE — PROGRESS NOTES
Chief Complaint   Patient presents with    Hypertension    Vitamin D Deficiency    Diabetes    Results     lab F/U   Patient is her for 3 mth F/U  1. Have you been to the ER, urgent care clinic since your last visit? Hospitalized since your last visit? No    2. Have you seen or consulted any other health care providers outside of the [address] [place] [address] since your last visit? Include any pap smears or colon screening.  Yes Dr Ce Castrejon

## 2017-05-02 NOTE — PATIENT INSTRUCTIONS
DASH Diet: Care Instructions  Your Care Instructions  The DASH diet is an eating plan that can help lower your blood pressure. DASH stands for Dietary Approaches to Stop Hypertension. Hypertension is high blood pressure. The DASH diet focuses on eating foods that are high in calcium, potassium, and magnesium. These nutrients can lower blood pressure. The foods that are highest in these nutrients are fruits, vegetables, low-fat dairy products, nuts, seeds, and legumes. But taking calcium, potassium, and magnesium supplements instead of eating foods that are high in those nutrients does not have the same effect. The DASH diet also includes whole grains, fish, and poultry. The DASH diet is one of several lifestyle changes your doctor may recommend to lower your high blood pressure. Your doctor may also want you to decrease the amount of sodium in your diet. Lowering sodium while following the DASH diet can lower blood pressure even further than just the DASH diet alone. Follow-up care is a key part of your treatment and safety. Be sure to make and go to all appointments, and call your doctor if you are having problems. It's also a good idea to know your test results and keep a list of the medicines you take. How can you care for yourself at home? Following the DASH diet  · Eat 4 to 5 servings of fruit each day. A serving is 1 medium-sized piece of fruit, ½ cup chopped or canned fruit, 1/4 cup dried fruit, or 4 ounces (½ cup) of fruit juice. Choose fruit more often than fruit juice. · Eat 4 to 5 servings of vegetables each day. A serving is 1 cup of lettuce or raw leafy vegetables, ½ cup of chopped or cooked vegetables, or 4 ounces (½ cup) of vegetable juice. Choose vegetables more often than vegetable juice. · Get 2 to 3 servings of low-fat and fat-free dairy each day. A serving is 8 ounces of milk, 1 cup of yogurt, or 1 ½ ounces of cheese. · Eat 6 to 8 servings of grains each day.  A serving is 1 slice of bread, 1 ounce of dry cereal, or ½ cup of cooked rice, pasta, or cooked cereal. Try to choose whole-grain products as much as possible. · Limit lean meat, poultry, and fish to 2 servings each day. A serving is 3 ounces, about the size of a deck of cards. · Eat 4 to 5 servings of nuts, seeds, and legumes (cooked dried beans, lentils, and split peas) each week. A serving is 1/3 cup of nuts, 2 tablespoons of seeds, or ½ cup of cooked beans or peas. · Limit fats and oils to 2 to 3 servings each day. A serving is 1 teaspoon of vegetable oil or 2 tablespoons of salad dressing. · Limit sweets and added sugars to 5 servings or less a week. A serving is 1 tablespoon jelly or jam, ½ cup sorbet, or 1 cup of lemonade. · Eat less than 2,300 milligrams (mg) of sodium a day. If you limit your sodium to 1,500 mg a day, you can lower your blood pressure even more. Tips for success  · Start small. Do not try to make dramatic changes to your diet all at once. You might feel that you are missing out on your favorite foods and then be more likely to not follow the plan. Make small changes, and stick with them. Once those changes become habit, add a few more changes. · Try some of the following:  ¨ Make it a goal to eat a fruit or vegetable at every meal and at snacks. This will make it easy to get the recommended amount of fruits and vegetables each day. ¨ Try yogurt topped with fruit and nuts for a snack or healthy dessert. ¨ Add lettuce, tomato, cucumber, and onion to sandwiches. ¨ Combine a ready-made pizza crust with low-fat mozzarella cheese and lots of vegetable toppings. Try using tomatoes, squash, spinach, broccoli, carrots, cauliflower, and onions. ¨ Have a variety of cut-up vegetables with a low-fat dip as an appetizer instead of chips and dip. ¨ Sprinkle sunflower seeds or chopped almonds over salads. Or try adding chopped walnuts or almonds to cooked vegetables. ¨ Try some vegetarian meals using beans and peas. Add garbanzo or kidney beans to salads. Make burritos and tacos with mashed [place] beans or black beans. Where can you learn more? Go to http://killian-heather.info/. Enter Z456 in the search box to learn more about \"DASH Diet: Care Instructions. \"  Current as of: March 23, 2016  Content Version: 11.2  © 1250-4733 [employer]. Care instructions adapted under license by [employer] (which disclaims liability or warranty for this information). If you have questions about a medical condition or this instruction, always ask your healthcare professional. [place] [address] any warranty or liability for your use of this information. Learning About Diabetes Food Guidelines  Your Care Instructions  Meal planning is important to manage diabetes. It helps keep your blood sugar at a target level (which you set with your doctor). You don't have to eat special foods. You can eat what your family eats, including sweets once in a while. But you do have to pay attention to how often you eat and how much you eat of certain foods. You may want to work with a dietitian or a certified diabetes educator (CDE) to help you plan meals and snacks. A dietitian or CDE can also help you lose weight if that is one of your goals. What should you know about eating carbs? Managing the amount of carbohydrate (carbs) you eat is an important part of healthy meals when you have diabetes. Carbohydrate is found in many foods. · Learn which foods have carbs. And learn the amounts of carbs in different foods. ¨ Bread, cereal, pasta, and rice have about 15 grams of carbs in a serving. A serving is 1 slice of bread (1 ounce), ½ cup of cooked cereal, or 1/3 cup of cooked pasta or rice. ¨ Fruits have 15 grams of carbs in a serving.  A serving is 1 small fresh fruit, such as an apple or orange; ½ of a banana; ½ cup of cooked or canned fruit; ½ cup of fruit juice; 1 cup of melon or raspberries; or 2 tablespoons of dried fruit. ¨ Milk and no-sugar-added yogurt have 15 grams of carbs in a serving. A serving is 1 cup of milk or 2/3 cup of no-sugar-added yogurt. ¨ Starchy vegetables have 15 grams of carbs in a serving. A serving is ½ cup of mashed potatoes or sweet potato; 1 cup winter squash; ½ of a small baked potato; ½ cup of cooked beans; or ½ cup cooked corn or green peas. · Learn how much carbs to eat each day and at each meal. A dietitian or CDE can teach you how to keep track of the amount of carbs you eat. This is called carbohydrate counting. · If you are not sure how to count carbohydrate grams, use the Plate Method to plan meals. It is a good, quick way to make sure that you have a balanced meal. It also helps you spread carbs throughout the day. ¨ Divide your plate by types of foods. Put non-starchy vegetables on half the plate, meat or other protein food on one-quarter of the plate, and a grain or starchy vegetable in the final quarter of the plate. To this you can add a small piece of fruit and 1 cup of milk or yogurt, depending on how many carbs you are supposed to eat at a meal.  · Try to eat about the same amount of carbs at each meal. Do not \"save up\" your daily allowance of carbs to eat at one meal.  · Proteins have very little or no carbs per serving. Examples of proteins are beef, chicken, turkey, fish, eggs, tofu, cheese, cottage cheese, and peanut butter. A serving size of meat is 3 ounces, which is about the size of a deck of cards. Examples of meat substitute serving sizes (equal to 1 ounce of meat) are 1/4 cup of cottage cheese, 1 egg, 1 tablespoon of peanut butter, and ½ cup of tofu. How can you eat out and still eat healthy? · Learn to estimate the serving sizes of foods that have carbohydrate. If you measure food at home, it will be easier to estimate the amount in a serving of restaurant food.   · If the meal you order has too much carbohydrate (such as potatoes, corn, or baked beans), ask to have a low-carbohydrate food instead. Ask for a salad or green vegetables. · If you use insulin, check your blood sugar before and after eating out to help you plan how much to eat in the future. · If you eat more carbohydrate at a meal than you had planned, take a walk or do other exercise. This will help lower your blood sugar. What else should you know? · Limit saturated fat, such as the fat from meat and dairy products. This is a healthy choice because people who have diabetes are at higher risk of heart disease. So choose lean cuts of meat and nonfat or low-fat dairy products. Use olive or canola oil instead of butter or shortening when cooking. · Don't skip meals. Your blood sugar may drop too low if you skip meals and take insulin or certain medicines for diabetes. · Check with your doctor before you drink alcohol. Alcohol can cause your blood sugar to drop too low. Alcohol can also cause a bad reaction if you take certain diabetes medicines. Follow-up care is a key part of your treatment and safety. Be sure to make and go to all appointments, and call your doctor if you are having problems. It's also a good idea to know your test results and keep a list of the medicines you take. Where can you learn more? Go to http://killian-heather.info/. Enter V522 in the search box to learn more about \"Learning About Diabetes Food Guidelines. \"  Current as of: May 23, 2016  Content Version: 11.2  © 6043-3403 [employer]. Care instructions adapted under license by [employer] (which disclaims liability or warranty for this information). If you have questions about a medical condition or this instruction, always ask your healthcare professional. [place] [address] any warranty or liability for your use of this information.        Starting a Weight Loss Plan: Care Instructions  Your Care Instructions  If you are thinking about losing weight, it can be hard to know where to start. Your doctor can help you set up a weight loss plan that best meets your needs. You may want to take a class on nutrition or exercise, or join a weight loss support group. If you have questions about how to make changes to your eating or exercise habits, ask your doctor about seeing a registered dietitian or an exercise specialist.  It can be a big challenge to lose weight. But you do not have to make huge changes at once. Make small changes, and stick with them. When those changes become habit, add a few more changes. If you do not think you are ready to make changes right now, try to pick a date in the future. Make an appointment to see your doctor to discuss whether the time is right for you to start a plan. Follow-up care is a key part of your treatment and safety. Be sure to make and go to all appointments, and call your doctor if you are having problems. Its also a good idea to know your test results and keep a list of the medicines you take. How can you care for yourself at home? · Set realistic goals. Many people expect to lose much more weight than is likely. A weight loss of 5% to 10% of your body weight may be enough to improve your health. · Get family and friends involved to provide support. Talk to them about why you are trying to lose weight, and ask them to help. They can help by participating in exercise and having meals with you, even if they may be eating something different. · Find what works best for you. If you do not have time or do not like to cook, a program that offers meal replacement bars or shakes may be better for you. Or if you like to prepare meals, finding a plan that includes daily menus and recipes may be best.  · Ask your doctor about other health professionals who can help you achieve your weight loss goals. ¨ A dietitian can help you make healthy changes in your diet.   ¨ An exercise specialist or  can help you develop a safe and effective exercise program.  ¨ A counselor or psychiatrist can help you cope with issues such as depression, anxiety, or family problems that can make it hard to focus on weight loss. · Consider joining a support group for people who are trying to lose weight. Your doctor can suggest groups in your area. Where can you learn more? Go to http://killian-heather.info/. Enter M148 in the search box to learn more about \"Starting a Weight Loss Plan: Care Instructions. \"  Current as of: October 13, 2016  Content Version: 11.2  © 5290-7264 [employer]. Care instructions adapted under license by [employer] (which disclaims liability or warranty for this information). If you have questions about a medical condition or this instruction, always ask your healthcare professional. [address] any warranty or liability for your use of this information.

## 2017-05-02 NOTE — MR AVS SNAPSHOT
Visit Information Date & Time Provider Department Dept. Phone Encounter #  
 5/2/2017  8:00 AM Luan Cabrera DO [facility] 8 [id number] Follow-up Instructions Return in about 3 months (around 8/2/2017) for Labs 1 week before. Upcoming Health Maintenance Date Due DTaP/Tdap/Td series (1 - Tdap) 9/2/2012 PAP AKA CERVICAL CYTOLOGY 10/16/2016 FOOT EXAM Q1 4/7/2017 INFLUENZA AGE 9 TO ADULT 8/1/2017 BREAST CANCER SCRN MAMMOGRAM 10/1/2017 HEMOGLOBIN A1C Q6M 10/25/2017 EYE EXAM RETINAL OR DILATED Q1 11/8/2017 LIPID PANEL Q1 1/12/2018 MICROALBUMIN Q1 4/25/2018 COLONOSCOPY 5/14/2022 Allergies as of 5/2/2017  Review Complete On: 5/2/2017 By: Luan Cabrera DO No Known Allergies Current Immunizations  Reviewed on 10/13/2016 Name Date Influenza Vaccine 11/3/2014 Influenza Vaccine (Quad) PF 10/13/2016, 10/8/2015 Pneumococcal Conjugate (PCV-13) 1/19/2017 Td 9/1/2012 Not reviewed this visit You Were Diagnosed With   
  
 Codes Comments Essential hypertension    -  Primary ICD-10-CM: I10 
ICD-9-CM: 401.9 Type 2 diabetes mellitus with hyperglycemia, without long-term current use of insulin (HCC)     ICD-10-CM: E11.65 ICD-9-CM: 250.00, 790.29 Hypovitaminosis D     ICD-10-CM: E55.9 ICD-9-CM: 268.9 Obesity (BMI 30.0-34.9)     ICD-10-CM: G16.5 ICD-9-CM: 278.00   
 DDD (degenerative disc disease), thoracic     ICD-10-CM: M51.34 
ICD-9-CM: 722.51 Encounter for long-term (current) use of medications     ICD-10-CM: Z79.899 ICD-9-CM: V58.69 Vitals BP Pulse Temp Resp Height(growth percentile) Weight(growth percentile) 110/70 (BP 1 Location: Left arm, BP Patient Position: Sitting) 63 98.3 °F (36.8 °C) (Oral) 16 5' 1\" (1.549 m) 178 lb 6.4 oz (80.9 kg) SpO2 BMI OB Status Smoking Status 99% 33.71 kg/m2 Hysterectomy Never Smoker Vitals History BMI and BSA Data Body Mass Index Body Surface Area 33.71 kg/m 2 1.87 m 2 Preferred Pharmacy Pharmacy Name Phone [facility] Derek Cordova [address] [place] [address] [phone] Your Updated Medication List  
  
   
This list is accurate as of: 5/2/17  8:35 AM.  Always use your most recent med list. amLODIPine 5 mg tablet Commonly known as:  Andrea Durie TAKE 1 TABLET BY MOUTH EVERY DAY  
  
 cetirizine 10 mg tablet Commonly known as:  ZYRTEC Take 1 Tab by mouth daily. cyclobenzaprine 10 mg tablet Commonly known as:  FLEXERIL Take 0.5 Tabs by mouth two (2) times daily as needed for Muscle Spasm(s). ergocalciferol 50,000 unit capsule Commonly known as:  VITAMIN D2 Take 1 Cap by mouth every seven (7) days. fluticasone 50 mcg/actuation nasal spray Commonly known as:  Triny Najjar 2 Sprays by Both Nostrils route daily as needed for Rhinitis. ibuprofen 600 mg tablet Commonly known as:  MOTRIN Take 1 Tab by mouth every six (6) hours as needed for Pain.  
  
 losartan-hydroCHLOROthiazide 100-25 mg per tablet Commonly known as:  HYZAAR  
TAKE 1 TABLET BY MOUTH EVERY DAY  
  
 methylPREDNISolone 4 mg tablet Commonly known as:  MEDROL DOSEPACK  
TAKE AS DIRECTED ORALLY  
  
 naltrexone-buPROPion 8-90 mg Tber ER tablet Commonly known as:  CONTRAVE  
2 tablets BID  
  
 ondansetron 4 mg disintegrating tablet Commonly known as:  ZOFRAN ODT  
DISSOLVE 1 TABLET BY MOUTH EVERY EIGHT (8) HOURS AS NEEDED FOR NAUSEA. polyethylene glycol 17 gram packet Commonly known as:  Elbridge Delgado Take 1 Packet by mouth daily. traMADol-acetaminophen 37.5-325 mg per tablet Commonly known as:  ULTRACET Take 1 Tab by mouth every four (4) hours as needed for Pain. Max Daily Amount: 6 Tabs. Follow-up Instructions Return in about 3 months (around 8/2/2017) for Labs 1 week before. To-Do List   
 07/31/2017 Lab:  CBC WITH AUTOMATED DIFF   
  
 07/31/2017 Lab:  HEMOGLOBIN A1C W/O EAG   
  
 07/31/2017 Lab:  LIPID PANEL   
  
 07/31/2017 Lab:  METABOLIC PANEL, COMPREHENSIVE   
  
 07/31/2017 Lab:  T4, FREE   
  
 07/31/2017 Lab:  TSH 3RD GENERATION   
  
 07/31/2017 Lab:  VITAMIN D, 25 HYDROXY Patient Instructions DASH Diet: Care Instructions Your Care Instructions The DASH diet is an eating plan that can help lower your blood pressure. DASH stands for Dietary Approaches to Stop Hypertension. Hypertension is high blood pressure. The DASH diet focuses on eating foods that are high in calcium, potassium, and magnesium. These nutrients can lower blood pressure. The foods that are highest in these nutrients are fruits, vegetables, low-fat dairy products, nuts, seeds, and legumes. But taking calcium, potassium, and magnesium supplements instead of eating foods that are high in those nutrients does not have the same effect. The DASH diet also includes whole grains, fish, and poultry. The DASH diet is one of several lifestyle changes your doctor may recommend to lower your high blood pressure. Your doctor may also want you to decrease the amount of sodium in your diet. Lowering sodium while following the DASH diet can lower blood pressure even further than just the DASH diet alone. Follow-up care is a key part of your treatment and safety. Be sure to make and go to all appointments, and call your doctor if you are having problems. It's also a good idea to know your test results and keep a list of the medicines you take. How can you care for yourself at home? Following the DASH diet · Eat 4 to 5 servings of fruit each day. A serving is 1 medium-sized piece of fruit, ½ cup chopped or canned fruit, 1/4 cup dried fruit, or 4 ounces (½ cup) of fruit juice. Choose fruit more often than fruit juice. · Eat 4 to 5 servings of vegetables each day. A serving is 1 cup of lettuce or raw leafy vegetables, ½ cup of chopped or cooked vegetables, or 4 ounces (½ cup) of vegetable juice. Choose vegetables more often than vegetable juice. · Get 2 to 3 servings of low-fat and fat-free dairy each day. A serving is 8 ounces of milk, 1 cup of yogurt, or 1 ½ ounces of cheese. · Eat 6 to 8 servings of grains each day. A serving is 1 slice of bread, 1 ounce of dry cereal, or ½ cup of cooked rice, pasta, or cooked cereal. Try to choose whole-grain products as much as possible. · Limit lean meat, poultry, and fish to 2 servings each day. A serving is 3 ounces, about the size of a deck of cards. · Eat 4 to 5 servings of nuts, seeds, and legumes (cooked dried beans, lentils, and split peas) each week. A serving is 1/3 cup of nuts, 2 tablespoons of seeds, or ½ cup of cooked beans or peas. · Limit fats and oils to 2 to 3 servings each day. A serving is 1 teaspoon of vegetable oil or 2 tablespoons of salad dressing. · Limit sweets and added sugars to 5 servings or less a week. A serving is 1 tablespoon jelly or jam, ½ cup sorbet, or 1 cup of lemonade. · Eat less than 2,300 milligrams (mg) of sodium a day. If you limit your sodium to 1,500 mg a day, you can lower your blood pressure even more. Tips for success · Start small. Do not try to make dramatic changes to your diet all at once. You might feel that you are missing out on your favorite foods and then be more likely to not follow the plan. Make small changes, and stick with them. Once those changes become habit, add a few more changes. · Try some of the following: ¨ Make it a goal to eat a fruit or vegetable at every meal and at snacks. This will make it easy to get the recommended amount of fruits and vegetables each day. ¨ Try yogurt topped with fruit and nuts for a snack or healthy dessert. ¨ Add lettuce, tomato, cucumber, and onion to sandwiches. ¨ Combine a ready-made pizza crust with low-fat mozzarella cheese and lots of vegetable toppings. Try using tomatoes, squash, spinach, broccoli, carrots, cauliflower, and onions. ¨ Have a variety of cut-up vegetables with a low-fat dip as an appetizer instead of chips and dip. ¨ Sprinkle sunflower seeds or chopped almonds over salads. Or try adding chopped walnuts or almonds to cooked vegetables. ¨ Try some vegetarian meals using beans and peas. Add garbanzo or kidney beans to salads. Make burritos and tacos with mashed [place] beans or black beans. Where can you learn more? Go to http://killian-heather.info/. Enter X048 in the search box to learn more about \"DASH Diet: Care Instructions. \" Current as of: March 23, 2016 Content Version: 11.2 © 4181-5757 [employer]. Care instructions adapted under license by [employer] (which disclaims liability or warranty for this information). If you have questions about a medical condition or this instruction, always ask your healthcare professional. [place] [address] any warranty or liability for your use of this information. Learning About Diabetes Food Guidelines Your Care Instructions Meal planning is important to manage diabetes. It helps keep your blood sugar at a target level (which you set with your doctor). You don't have to eat special foods. You can eat what your family eats, including sweets once in a while. But you do have to pay attention to how often you eat and how much you eat of certain foods. You may want to work with a dietitian or a certified diabetes educator (CDE) to help you plan meals and snacks. A dietitian or CDE can also help you lose weight if that is one of your goals. What should you know about eating carbs? Managing the amount of carbohydrate (carbs) you eat is an important part of healthy meals when you have diabetes. Carbohydrate is found in many foods. · Learn which foods have carbs. And learn the amounts of carbs in different foods. ¨ Bread, cereal, pasta, and rice have about 15 grams of carbs in a serving. A serving is 1 slice of bread (1 ounce), ½ cup of cooked cereal, or 1/3 cup of cooked pasta or rice. ¨ Fruits have 15 grams of carbs in a serving. A serving is 1 small fresh fruit, such as an apple or orange; ½ of a banana; ½ cup of cooked or canned fruit; ½ cup of fruit juice; 1 cup of melon or raspberries; or 2 tablespoons of dried fruit. ¨ Milk and no-sugar-added yogurt have 15 grams of carbs in a serving. A serving is 1 cup of milk or 2/3 cup of no-sugar-added yogurt. ¨ Starchy vegetables have 15 grams of carbs in a serving. A serving is ½ cup of mashed potatoes or sweet potato; 1 cup winter squash; ½ of a small baked potato; ½ cup of cooked beans; or ½ cup cooked corn or green peas. · Learn how much carbs to eat each day and at each meal. A dietitian or CDE can teach you how to keep track of the amount of carbs you eat. This is called carbohydrate counting. · If you are not sure how to count carbohydrate grams, use the Plate Method to plan meals. It is a good, quick way to make sure that you have a balanced meal. It also helps you spread carbs throughout the day. ¨ Divide your plate by types of foods. Put non-starchy vegetables on half the plate, meat or other protein food on one-quarter of the plate, and a grain or starchy vegetable in the final quarter of the plate. To this you can add a small piece of fruit and 1 cup of milk or yogurt, depending on how many carbs you are supposed to eat at a meal. 
· Try to eat about the same amount of carbs at each meal. Do not \"save up\" your daily allowance of carbs to eat at one meal. 
· Proteins have very little or no carbs per serving. Examples of proteins are beef, chicken, turkey, fish, eggs, tofu, cheese, cottage cheese, and peanut butter.  A serving size of meat is 3 ounces, which is about the size of a deck of cards. Examples of meat substitute serving sizes (equal to 1 ounce of meat) are 1/4 cup of cottage cheese, 1 egg, 1 tablespoon of peanut butter, and ½ cup of tofu. How can you eat out and still eat healthy? · Learn to estimate the serving sizes of foods that have carbohydrate. If you measure food at home, it will be easier to estimate the amount in a serving of restaurant food. · If the meal you order has too much carbohydrate (such as potatoes, corn, or baked beans), ask to have a low-carbohydrate food instead. Ask for a salad or green vegetables. · If you use insulin, check your blood sugar before and after eating out to help you plan how much to eat in the future. · If you eat more carbohydrate at a meal than you had planned, take a walk or do other exercise. This will help lower your blood sugar. What else should you know? · Limit saturated fat, such as the fat from meat and dairy products. This is a healthy choice because people who have diabetes are at higher risk of heart disease. So choose lean cuts of meat and nonfat or low-fat dairy products. Use olive or canola oil instead of butter or shortening when cooking. · Don't skip meals. Your blood sugar may drop too low if you skip meals and take insulin or certain medicines for diabetes. · Check with your doctor before you drink alcohol. Alcohol can cause your blood sugar to drop too low. Alcohol can also cause a bad reaction if you take certain diabetes medicines. Follow-up care is a key part of your treatment and safety. Be sure to make and go to all appointments, and call your doctor if you are having problems. It's also a good idea to know your test results and keep a list of the medicines you take. Where can you learn more? Go to http://killian-heather.info/. Enter Q091 in the search box to learn more about \"Learning About Diabetes Food Guidelines. \" Current as of: May 23, 2016 Content Version: 11.2 © 4698-8135 [employer]. Care instructions adapted under license by [employer] (which disclaims liability or warranty for this information). If you have questions about a medical condition or this instruction, always ask your healthcare professional. [address] any warranty or liability for your use of this information. Starting a Weight Loss Plan: Care Instructions Your Care Instructions If you are thinking about losing weight, it can be hard to know where to start. Your doctor can help you set up a weight loss plan that best meets your needs. You may want to take a class on nutrition or exercise, or join a weight loss support group. If you have questions about how to make changes to your eating or exercise habits, ask your doctor about seeing a registered dietitian or an exercise specialist. 
It can be a big challenge to lose weight. But you do not have to make huge changes at once. Make small changes, and stick with them. When those changes become habit, add a few more changes. If you do not think you are ready to make changes right now, try to pick a date in the future. Make an appointment to see your doctor to discuss whether the time is right for you to start a plan. Follow-up care is a key part of your treatment and safety. Be sure to make and go to all appointments, and call your doctor if you are having problems. Its also a good idea to know your test results and keep a list of the medicines you take. How can you care for yourself at home? · Set realistic goals. Many people expect to lose much more weight than is likely. A weight loss of 5% to 10% of your body weight may be enough to improve your health. · Get family and friends involved to provide support. Talk to them about why you are trying to lose weight, and ask them to help.  They can help by participating in exercise and having meals with you, even if they may be eating something different. · Find what works best for you. If you do not have time or do not like to cook, a program that offers meal replacement bars or shakes may be better for you. Or if you like to prepare meals, finding a plan that includes daily menus and recipes may be best. 
· Ask your doctor about other health professionals who can help you achieve your weight loss goals. ¨ A dietitian can help you make healthy changes in your diet. ¨ An exercise specialist or  can help you develop a safe and effective exercise program. 
¨ A counselor or psychiatrist can help you cope with issues such as depression, anxiety, or family problems that can make it hard to focus on weight loss. · Consider joining a support group for people who are trying to lose weight. Your doctor can suggest groups in your area. Where can you learn more? Go to http://killian-heather.info/. Enter X980 in the search box to learn more about \"Starting a Weight Loss Plan: Care Instructions. \" Current as of: October 13, 2016 Content Version: 11.2 © 4826-9857 [employer]. Care instructions adapted under license by [employer] (which disclaims liability or warranty for this information). If you have questions about a medical condition or this instruction, always ask your healthcare professional. [address] any warranty or liability for your use of this information. Introducing [facility]! Dear Contreras Contreras: Thank you for requesting a [employer] account. Our records indicate that you already have an active [employer] account. You can access your account anytime at [email or web address]. Visualant/"[email or web address] Did you know that you can access your hospital and ER discharge instructions at any time in [employer]? You can also review all of your test results from your hospital stay or ER visit. Additional Information If you have questions, please visit the Frequently Asked Questions section of the [employer] website at [email or web address] Remember, [employer] is NOT to be used for urgent needs. For medical emergencies, dial 911. Now available from your iPhone and Android! Please provide this summary of care documentation to your next provider. Your primary care clinician is listed as Pamella Hess. If you have any questions after today's visit, please call [phone].

## 2017-05-02 NOTE — PROGRESS NOTES
Chief Complaint   Patient presents with    Hypertension    Vitamin D Deficiency    Diabetes    Results     lab F/U       HPI:  Patient is a 56year old obese  female with medical problems listed below presents today for follow up of Hypertension, DM 2, Hypovitaminosis D, etc. She has thoracic degenerative disc disease for which she takes Naprosyn as needed and followed by Dr. Shasta Mistry. Otherwise, she has been feeling well and voices no other complaints today. She is complaint with her medications with no adverse effects reported. She has modified her diet though she is no longer taking Contrave and has lost 2 pounds in the last month. Past Medical History:   Diagnosis Date    AR (allergic rhinitis)     Arthritis     Heart murmur     Hypertension     Hypovitaminosis D 3/3/2014       No Known Allergies      Current Outpatient Prescriptions   Medication Sig Dispense Refill    traMADol-acetaminophen (ULTRACET) 37.5-325 mg per tablet Take 1 Tab by mouth every four (4) hours as needed for Pain. Max Daily Amount: 6 Tabs. 40 Tab 0    ergocalciferol (VITAMIN D2) 50,000 unit capsule Take 1 Cap by mouth every seven (7) days. 8 Cap 5    cyclobenzaprine (FLEXERIL) 10 mg tablet Take 0.5 Tabs by mouth two (2) times daily as needed for Muscle Spasm(s). 60 Tab 0    cetirizine (ZYRTEC) 10 mg tablet Take 1 Tab by mouth daily. 30 Tab 0    naltrexone-buPROPion (CONTRAVE) 8-90 mg TbER ER tablet 2 tablets  Tab 0    amLODIPine (NORVASC) 5 mg tablet TAKE 1 TABLET BY MOUTH EVERY DAY 30 Tab 5    ondansetron (ZOFRAN ODT) 4 mg disintegrating tablet DISSOLVE 1 TABLET BY MOUTH EVERY EIGHT (8) HOURS AS NEEDED FOR NAUSEA. 30 Tab 0    polyethylene glycol (MIRALAX) 17 gram packet Take 1 Packet by mouth daily. 30 Packet 0    fluticasone (FLONASE) 50 mcg/actuation nasal spray 2 Sprays by Both Nostrils route daily as needed for Rhinitis.  1 Bottle 3    losartan-hydrochlorothiazide (HYZAAR) 100-25 mg per tablet TAKE 1 TABLET BY MOUTH EVERY DAY 30 Tab 3    ibuprofen (MOTRIN) 600 mg tablet Take 1 Tab by mouth every six (6) hours as needed for Pain. 60 Tab 0    methylPREDNISolone (MEDROL DOSEPACK) 4 mg tablet TAKE AS DIRECTED ORALLY  0          ROS:  Constitutional: Negative for fever, chills, or fatigue  Neurological: Negative for headache, dizziness, visual disturbance, or loss of conciousness  Respiratory: Negative for SOB, hemoptysis, or wheezing  Cardiovascular: Negative for chest pain, palpitation, or leg swelling  Gastrointestinal: Negative for abdominal pain, nausea, vomiting, diarrhea, blood in stool, melena, or heartburn  Musculoskeletal: Positive for thoracic degenerative disc disease. Negative for falls        Physical Exam:  Visit Vitals    /70 (BP 1 Location: Left arm, BP Patient Position: Sitting)    Pulse 63    Temp 98.3 °F (36.8 °C) (Oral)    Resp 16    Ht 5' 1\" (1.549 m)    Wt 178 lb 6.4 oz (80.9 kg)    SpO2 99%    BMI 33.71 kg/m2       General: Obese black female, a & o x 3, afebrile, well-nourished, interacting appropriately, in no acute distress  HEENT: Dried blood noted in nose  Respiratory: symmetrical chest expansion, lung sounds clear bilaterally  Cardiovascular: normal S1S2, regular rate and rhythm, no murmurs, no peripheral edema, no JVD  Abdomen: non-distended, normoactive bowel sounds x 4 quadrants, soft, non-tender to palpation  Extremity: No edema noted  DM foot exam: Pedal pulses palpable and no callus noted        Assessment/Plan:    ICD-10-CM ICD-9-CM    1. Essential hypertension I10 401.9 Controlled  Continue current meds and she was counseled on low salt diet. CBC WITH AUTOMATED DIFF      METABOLIC PANEL, COMPREHENSIVE      T4, FREE      TSH 3RD GENERATION   2. Type 2 diabetes mellitus with hyperglycemia, without long-term current use of insulin (HCC) E11.65 250.00 Controlled with recent HBA1C at 6.0 - she was counseled on diabetic diet.   HEMOGLOBIN A1C W/O EAG 790.29    3. Hypovitaminosis D E55.9 268.9 VITAMIN D, 25 HYDROXY   4. Obesity (BMI 30.0-34. 9) E66.9 278.00 I have reviewed/discussed the above normal BMI with the patient. I have recommended the following interventions: dietary management education, guidance, and counseling, encourage exercise and monitor weight . .     5. DDD (degenerative disc disease), thoracic M51.34 722.51 Continue Naprosyn as needed and followed by   Dr. Luly Eubanks   6. Encounter for long-term (current) use of medications Z79.899 V58.69 LIPID PANEL           Orders Placed This Encounter    CBC WITH AUTOMATED DIFF     Standing Status:   Future     Standing Expiration Date:   10/29/2017    HEMOGLOBIN A1C W/O EAG     Standing Status:   Future     Standing Expiration Date:   5/3/2018    LIPID PANEL     Standing Status:   Future     Standing Expiration Date:   04/13/6724    METABOLIC PANEL, COMPREHENSIVE     Standing Status:   Future     Standing Expiration Date:   10/29/2017    T4, FREE     Standing Status:   Future     Standing Expiration Date:   10/29/2017    TSH 3RD GENERATION     Standing Status:   Future     Standing Expiration Date:   8/30/2017    VITAMIN D, 25 HYDROXY     Standing Status:   Future     Standing Expiration Date:   9/2/2017            Recent labs reviewed with pt        Additional Notes: Discussed today's diagnosis, treatment plans. Discussed medication indications and side effects. Weight Management: Counseled  After Visit Summary: Discussed provided printed patient instructions. Answered questions accordingly. Follow-up Disposition: In 3 months with labs 1 week prior.           Briana Duffy DO, MPH  Internal Medicine

## 2017-05-02 NOTE — ACP (ADVANCE CARE PLANNING)
Do you have an Advance Care Planning? NO  Would you like to receive some information about ACP?  YES

## 2017-05-25 DIAGNOSIS — I10 ESSENTIAL HYPERTENSION: ICD-10-CM

## 2017-05-25 RX ORDER — AMLODIPINE BESYLATE 5 MG/1
TABLET ORAL
Qty: 30 TAB | Refills: 5 | Status: SHIPPED | OUTPATIENT
Start: 2017-05-25 | End: 2017-11-26 | Stop reason: SDUPTHER

## 2017-05-25 NOTE — TELEPHONE ENCOUNTER
I called and left a message for the patient informing the patient that her refill request was approved and sent to the pharmacy. Requested a call back if the patient has any questions.

## 2017-06-28 DIAGNOSIS — I10 ESSENTIAL HYPERTENSION: ICD-10-CM

## 2017-06-28 RX ORDER — LOSARTAN POTASSIUM AND HYDROCHLOROTHIAZIDE 25; 100 MG/1; MG/1
TABLET ORAL
Qty: 30 TAB | Refills: 3 | Status: SHIPPED | OUTPATIENT
Start: 2017-06-28 | End: 2017-12-19 | Stop reason: SDUPTHER

## 2017-06-28 NOTE — TELEPHONE ENCOUNTER
Requested Prescriptions     Pending Prescriptions Disp Refills    losartan-hydroCHLOROthiazide (HYZAAR) 100-25 mg per tablet 30 Tab 3     Sig: TAKE 1 TABLET BY MOUTH EVERY DAY       Last office visit was  5/02/17  Next office visit is     8/02/17    Please assist.

## 2017-06-30 ENCOUNTER — TELEPHONE (OUTPATIENT)
Dept: INTERNAL MEDICINE CLINIC | Age: 57
End: 2017-06-30

## 2017-06-30 DIAGNOSIS — I10 ESSENTIAL HYPERTENSION: ICD-10-CM

## 2017-06-30 NOTE — TELEPHONE ENCOUNTER
Requested Prescriptions     Pending Prescriptions Disp Refills    losartan-hydroCHLOROthiazide (HYZAAR) 100-25 mg per tablet 30 Tab 3     Sig: TAKE 1 TABLET BY MOUTH EVERY DAY        Jolynn with [facility] calling on behalf of pt.

## 2017-06-30 NOTE — TELEPHONE ENCOUNTER
I called [facility] in regards to Rx refill on Hyzaar. Informed [facility] that Rx was filled on 6/28/17. Sts they didn't received the Rx. Verbal given. Called Pt in regards to Rx refill.

## 2017-07-31 DIAGNOSIS — I10 ESSENTIAL HYPERTENSION: ICD-10-CM

## 2017-07-31 DIAGNOSIS — E55.9 HYPOVITAMINOSIS D: ICD-10-CM

## 2017-07-31 DIAGNOSIS — E11.65 TYPE 2 DIABETES MELLITUS WITH HYPERGLYCEMIA, WITHOUT LONG-TERM CURRENT USE OF INSULIN (HCC): ICD-10-CM

## 2017-07-31 DIAGNOSIS — Z79.899 ENCOUNTER FOR LONG-TERM (CURRENT) USE OF MEDICATIONS: ICD-10-CM

## 2017-08-29 ENCOUNTER — LAB ONLY (OUTPATIENT)
Dept: INTERNAL MEDICINE CLINIC | Age: 57
End: 2017-08-29

## 2017-08-30 LAB
25(OH)D3 SERPL-MCNC: 20.4 NG/ML (ref 32–100)
A-G RATIO,AGRAT: 2 RATIO (ref 1.1–2.6)
ABSOLUTE LYMPHOCYTE COUNT, 10803: 1.8 K/UL (ref 1–4.8)
ALBUMIN SERPL-MCNC: 4.2 G/DL (ref 3.5–5)
ALP SERPL-CCNC: 24 U/L (ref 25–115)
ALT SERPL-CCNC: 15 U/L (ref 5–40)
ANION GAP SERPL CALC-SCNC: 18 MMOL/L
AST SERPL W P-5'-P-CCNC: 13 U/L (ref 10–37)
AVG GLU, 10930: 130 MG/DL (ref 91–123)
BASOPHILS # BLD: 0 K/UL (ref 0–0.2)
BASOPHILS NFR BLD: 0 % (ref 0–2)
BILIRUB SERPL-MCNC: 0.4 MG/DL (ref 0.2–1.2)
BUN SERPL-MCNC: 15 MG/DL (ref 6–22)
CALCIUM SERPL-MCNC: 9.1 MG/DL (ref 8.4–10.5)
CHLORIDE SERPL-SCNC: 98 MMOL/L (ref 98–110)
CHOLEST SERPL-MCNC: 148 MG/DL (ref 110–200)
CO2 SERPL-SCNC: 25 MMOL/L (ref 20–32)
CREAT SERPL-MCNC: 1 MG/DL (ref 0.5–1.2)
EOSINOPHIL # BLD: 0.1 K/UL (ref 0–0.5)
EOSINOPHIL NFR BLD: 1 % (ref 0–6)
ERYTHROCYTE [DISTWIDTH] IN BLOOD BY AUTOMATED COUNT: 15.1 % (ref 10–16)
GFRAA, 66117: >60
GFRNA, 66118: 55
GLOBULIN,GLOB: 2.1 G/DL (ref 2–4)
GLUCOSE SERPL-MCNC: 94 MG/DL (ref 65–99)
GRANULOCYTES,GRANS: 57 % (ref 40–75)
HBA1C MFR BLD HPLC: 6.1 % (ref 4.8–5.9)
HCT VFR BLD AUTO: 38.6 % (ref 35.1–48)
HDLC SERPL-MCNC: 61 MG/DL (ref 40–59)
HGB BLD-MCNC: 11.7 G/DL (ref 11.7–16)
LDLC SERPL CALC-MCNC: 79 MG/DL (ref 50–99)
LYMPHOCYTES, LYMLT: 36 % (ref 27–45)
MCH RBC QN AUTO: 27 PG (ref 26–34)
MCHC RBC AUTO-ENTMCNC: 30 G/DL (ref 32–36)
MCV RBC AUTO: 89 FL (ref 80–95)
MONOCYTES # BLD: 0.3 K/UL (ref 0.1–0.9)
MONOCYTES NFR BLD: 5 % (ref 3–9)
NEUTROPHILS # BLD AUTO: 2.8 K/UL (ref 1.8–7.7)
PLATELET # BLD AUTO: 354 K/UL (ref 140–440)
PMV BLD AUTO: 9.5 FL (ref 6–10.8)
POTASSIUM SERPL-SCNC: 4.5 MMOL/L (ref 3.5–5.5)
PROT SERPL-MCNC: 6.3 G/DL (ref 6.4–8.3)
RBC # BLD AUTO: 4.36 M/UL (ref 3.8–5.2)
SODIUM SERPL-SCNC: 141 MMOL/L (ref 133–145)
T4 FREE SERPL-MCNC: 1.1 NG/DL (ref 0.9–1.8)
TRIGL SERPL-MCNC: 40 MG/DL (ref 40–149)
TSH SERPL DL<=0.005 MIU/L-ACNC: 2 MCU/ML (ref 0.27–4.2)
VLDLC SERPL CALC-MCNC: 8 MG/DL (ref 8–30)
WBC # BLD AUTO: 5 K/UL (ref 4–11)

## 2017-09-05 ENCOUNTER — OFFICE VISIT (OUTPATIENT)
Dept: INTERNAL MEDICINE CLINIC | Age: 57
End: 2017-09-05

## 2017-09-05 VITALS
TEMPERATURE: 97.6 F | WEIGHT: 183 LBS | HEART RATE: 60 BPM | RESPIRATION RATE: 16 BRPM | BODY MASS INDEX: 34.55 KG/M2 | OXYGEN SATURATION: 98 % | SYSTOLIC BLOOD PRESSURE: 142 MMHG | HEIGHT: 61 IN | DIASTOLIC BLOOD PRESSURE: 72 MMHG

## 2017-09-05 DIAGNOSIS — E11.65 TYPE 2 DIABETES MELLITUS WITH HYPERGLYCEMIA, WITHOUT LONG-TERM CURRENT USE OF INSULIN (HCC): Primary | ICD-10-CM

## 2017-09-05 DIAGNOSIS — E66.9 OBESITY (BMI 30.0-34.9): ICD-10-CM

## 2017-09-05 DIAGNOSIS — Z23 ENCOUNTER FOR IMMUNIZATION: ICD-10-CM

## 2017-09-05 DIAGNOSIS — I10 ESSENTIAL HYPERTENSION: ICD-10-CM

## 2017-09-05 DIAGNOSIS — E55.9 HYPOVITAMINOSIS D: ICD-10-CM

## 2017-09-05 NOTE — PATIENT INSTRUCTIONS
Vaccine Information Statement    Influenza (Flu) Vaccine (Inactivated or Recombinant): What you need to know    Many Vaccine Information Statements are available in Swedish and other languages. See www.immunize.org/vis  Hojas de Información Sobre Vacunas están disponibles en Español y en muchos otros idiomas. Visite www.immunize.org/vis    1. Why get vaccinated? Influenza (flu) is a contagious disease that spreads around the [place] every year, usually between October and May. Flu is caused by influenza viruses, and is spread mainly by coughing, sneezing, and close contact. Anyone can get flu. Flu strikes suddenly and can last several days. Symptoms vary by age, but can include:   fever/chills   sore throat   muscle aches   fatigue   cough   headache    runny or stuffy nose    Flu can also lead to pneumonia and blood infections, and cause diarrhea and seizures in children. If you have a medical condition, such as heart or lung disease, flu can make it worse. Flu is more dangerous for some people. Infants and young children, people 72years of age and older, pregnant women, and people with certain health conditions or a weakened immune system are at greatest risk. Each year thousands of people in the [facility] die from flu, and many more are hospitalized. Flu vaccine can:   keep you from getting flu,   make flu less severe if you do get it, and   keep you from spreading flu to your family and other people. 2. Inactivated and recombinant flu vaccines    A dose of flu vaccine is recommended every flu season. Children 6 months through 6years of age may need two doses during the same flu season. Everyone else needs only one dose each flu season.        Some inactivated flu vaccines contain a very small amount of a mercury-based preservative called thimerosal. Studies have not shown thimerosal in vaccines to be harmful, but flu vaccines that do not contain thimerosal are available. There is no live flu virus in flu shots. They cannot cause the flu. There are many flu viruses, and they are always changing. Each year a new flu vaccine is made to protect against three or four viruses that are likely to cause disease in the upcoming flu season. But even when the vaccine doesnt exactly match these viruses, it may still provide some protection    Flu vaccine cannot prevent:   flu that is caused by a virus not covered by the vaccine, or   illnesses that look like flu but are not. It takes about 2 weeks for protection to develop after vaccination, and protection lasts through the flu season. 3. Some people should not get this vaccine    Tell the person who is giving you the vaccine:     If you have any severe, life-threatening allergies. If you ever had a life-threatening allergic reaction after a dose of flu vaccine, or have a severe allergy to any part of this vaccine, you may be advised not to get vaccinated. Most, but not all, types of flu vaccine contain a small amount of egg protein.  If you ever had Guillain-Barré Syndrome (also called GBS). Some people with a history of GBS should not get this vaccine. This should be discussed with your doctor.  If you are not feeling well. It is usually okay to get flu vaccine when you have a mild illness, but you might be asked to come back when you feel better. 4. Risks of a vaccine reaction    With any medicine, including vaccines, there is a chance of reactions. These are usually mild and go away on their own, but serious reactions are also possible. Most people who get a flu shot do not have any problems with it.      Minor problems following a flu shot include:    soreness, redness, or swelling where the shot was given     hoarseness   sore, red or itchy eyes   cough   fever   aches   headache   itching   fatigue  If these problems occur, they usually begin soon after the shot and last 1 or 2 days. More serious problems following a flu shot can include the following:     There may be a small increased risk of Guillain-Barré Syndrome (GBS) after inactivated flu vaccine. This risk has been estimated at 1 or 2 additional cases per million people vaccinated. This is much lower than the risk of severe complications from flu, which can be prevented by flu vaccine.  Young children who get the flu shot along with pneumococcal vaccine (PCV13) and/or DTaP vaccine at the same time might be slightly more likely to have a seizure caused by fever. Ask your doctor for more information. Tell your doctor if a child who is getting flu vaccine has ever had a seizure. Problems that could happen after any injected vaccine:      People sometimes faint after a medical procedure, including vaccination. Sitting or lying down for about 15 minutes can help prevent fainting, and injuries caused by a fall. Tell your doctor if you feel dizzy, or have vision changes or ringing in the ears.  Some people get severe pain in the shoulder and have difficulty moving the arm where a shot was given. This happens very rarely.  Any medication can cause a severe allergic reaction. Such reactions from a vaccine are very rare, estimated at about 1 in a million doses, and would happen within a few minutes to a few hours after the vaccination. As with any medicine, there is a very remote chance of a vaccine causing a serious injury or death. The safety of vaccines is always being monitored. For more information, visit: www.cdc.gov/vaccinesafety/    5. What if there is a serious reaction? What should I look for?  Look for anything that concerns you, such as signs of a severe allergic reaction, very high fever, or unusual behavior.     Signs of a severe allergic reaction can include hives, swelling of the face and throat, difficulty breathing, a fast heartbeat, dizziness, and weakness  usually within a few minutes to a few hours after the vaccination. What should I do?  If you think it is a severe allergic reaction or other emergency that cant wait, call 9-1-1 and get the person to the nearest hospital. Otherwise, call your doctor.  Reactions should be reported to the Vaccine Adverse Event Reporting System (VAERS). Your doctor should file this report, or you can do it yourself through  the VAERS web site at www.vaers. [facility].gov, or by calling 4-327.438.9472. VAERS does not give medical advice. 6. The National Vaccine Injury Compensation Program    The [facility] Vaccine Injury Compensation Program (VICP) is a federal program that was created to compensate people who may have been injured by certain vaccines. Persons who believe they may have been injured by a vaccine can learn about the program and about filing a claim by calling 6-713.784.4508 or visiting the [employer] website at www.[facility].gov/vaccinecompensation. There is a time limit to file a claim for compensation. 7. How can I learn more?  Ask your healthcare provider. He or she can give you the vaccine package insert or suggest other sources of information.  Call your local or state health department.  Contact the Centers for Disease Control and Prevention (CDC):  - Call 1-477.452.1421 (1-800-CDC-INFO) or  - Visit CDCs website at www.cdc.gov/flu    Vaccine Information Statement   Inactivated Influenza Vaccine   8/7/2015  42 MISTY [address] [id number]    Department of Health and Human Services  Centers for Disease Control and Prevention    Office Use Only       DASH Diet: Care Instructions  Your Care Instructions  The DASH diet is an eating plan that can help lower your blood pressure. DASH stands for Dietary Approaches to Stop Hypertension. Hypertension is high blood pressure. The DASH diet focuses on eating foods that are high in calcium, potassium, and magnesium. These nutrients can lower blood pressure.  The foods that are highest in these nutrients are fruits, vegetables, low-fat dairy products, nuts, seeds, and legumes. But taking calcium, potassium, and magnesium supplements instead of eating foods that are high in those nutrients does not have the same effect. The DASH diet also includes whole grains, fish, and poultry. The DASH diet is one of several lifestyle changes your doctor may recommend to lower your high blood pressure. Your doctor may also want you to decrease the amount of sodium in your diet. Lowering sodium while following the DASH diet can lower blood pressure even further than just the DASH diet alone. Follow-up care is a key part of your treatment and safety. Be sure to make and go to all appointments, and call your doctor if you are having problems. It's also a good idea to know your test results and keep a list of the medicines you take. How can you care for yourself at home? Following the DASH diet  · Eat 4 to 5 servings of fruit each day. A serving is 1 medium-sized piece of fruit, ½ cup chopped or canned fruit, 1/4 cup dried fruit, or 4 ounces (½ cup) of fruit juice. Choose fruit more often than fruit juice. · Eat 4 to 5 servings of vegetables each day. A serving is 1 cup of lettuce or raw leafy vegetables, ½ cup of chopped or cooked vegetables, or 4 ounces (½ cup) of vegetable juice. Choose vegetables more often than vegetable juice. · Get 2 to 3 servings of low-fat and fat-free dairy each day. A serving is 8 ounces of milk, 1 cup of yogurt, or 1 ½ ounces of cheese. · Eat 6 to 8 servings of grains each day. A serving is 1 slice of bread, 1 ounce of dry cereal, or ½ cup of cooked rice, pasta, or cooked cereal. Try to choose whole-grain products as much as possible. · Limit lean meat, poultry, and fish to 2 servings each day. A serving is 3 ounces, about the size of a deck of cards. · Eat 4 to 5 servings of nuts, seeds, and legumes (cooked dried beans, lentils, and split peas) each week.  A serving is 1/3 cup of nuts, 2 tablespoons of seeds, or ½ cup of cooked beans or peas. · Limit fats and oils to 2 to 3 servings each day. A serving is 1 teaspoon of vegetable oil or 2 tablespoons of salad dressing. · Limit sweets and added sugars to 5 servings or less a week. A serving is 1 tablespoon jelly or jam, ½ cup sorbet, or 1 cup of lemonade. · Eat less than 2,300 milligrams (mg) of sodium a day. If you limit your sodium to 1,500 mg a day, you can lower your blood pressure even more. Tips for success  · Start small. Do not try to make dramatic changes to your diet all at once. You might feel that you are missing out on your favorite foods and then be more likely to not follow the plan. Make small changes, and stick with them. Once those changes become habit, add a few more changes. · Try some of the following:  ¨ Make it a goal to eat a fruit or vegetable at every meal and at snacks. This will make it easy to get the recommended amount of fruits and vegetables each day. ¨ Try yogurt topped with fruit and nuts for a snack or healthy dessert. ¨ Add lettuce, tomato, cucumber, and onion to sandwiches. ¨ Combine a ready-made pizza crust with low-fat mozzarella cheese and lots of vegetable toppings. Try using tomatoes, squash, spinach, broccoli, carrots, cauliflower, and onions. ¨ Have a variety of cut-up vegetables with a low-fat dip as an appetizer instead of chips and dip. ¨ Sprinkle sunflower seeds or chopped almonds over salads. Or try adding chopped walnuts or almonds to cooked vegetables. ¨ Try some vegetarian meals using beans and peas. Add garbanzo or kidney beans to salads. Make burritos and tacos with mashed [place] beans or black beans. Where can you learn more? Go to http://killian-heather.info/. Enter R952 in the search box to learn more about \"DASH Diet: Care Instructions. \"  Current as of: April 3, 2017  Content Version: 11.3  © 6024-5494 [employer].  Care instructions adapted under license by [facility] [address] (which disclaims liability or warranty for this information). If you have questions about a medical condition or this instruction, always ask your healthcare professional. [address] any warranty or liability for your use of this information. Learning About Diabetes Food Guidelines  Your Care Instructions  Meal planning is important to manage diabetes. It helps keep your blood sugar at a target level (which you set with your doctor). You don't have to eat special foods. You can eat what your family eats, including sweets once in a while. But you do have to pay attention to how often you eat and how much you eat of certain foods. You may want to work with a dietitian or a certified diabetes educator (CDE) to help you plan meals and snacks. A dietitian or CDE can also help you lose weight if that is one of your goals. What should you know about eating carbs? Managing the amount of carbohydrate (carbs) you eat is an important part of healthy meals when you have diabetes. Carbohydrate is found in many foods. · Learn which foods have carbs. And learn the amounts of carbs in different foods. ¨ Bread, cereal, pasta, and rice have about 15 grams of carbs in a serving. A serving is 1 slice of bread (1 ounce), ½ cup of cooked cereal, or 1/3 cup of cooked pasta or rice. ¨ Fruits have 15 grams of carbs in a serving. A serving is 1 small fresh fruit, such as an apple or orange; ½ of a banana; ½ cup of cooked or canned fruit; ½ cup of fruit juice; 1 cup of melon or raspberries; or 2 tablespoons of dried fruit. ¨ Milk and no-sugar-added yogurt have 15 grams of carbs in a serving. A serving is 1 cup of milk or 2/3 cup of no-sugar-added yogurt. ¨ Starchy vegetables have 15 grams of carbs in a serving.  A serving is ½ cup of mashed potatoes or sweet potato; 1 cup winter squash; ½ of a small baked potato; ½ cup of cooked beans; or ½ cup cooked corn or green peas.  · Learn how much carbs to eat each day and at each meal. A dietitian or CDE can teach you how to keep track of the amount of carbs you eat. This is called carbohydrate counting. · If you are not sure how to count carbohydrate grams, use the Plate Method to plan meals. It is a good, quick way to make sure that you have a balanced meal. It also helps you spread carbs throughout the day. ¨ Divide your plate by types of foods. Put non-starchy vegetables on half the plate, meat or other protein food on one-quarter of the plate, and a grain or starchy vegetable in the final quarter of the plate. To this you can add a small piece of fruit and 1 cup of milk or yogurt, depending on how many carbs you are supposed to eat at a meal.  · Try to eat about the same amount of carbs at each meal. Do not \"save up\" your daily allowance of carbs to eat at one meal.  · Proteins have very little or no carbs per serving. Examples of proteins are beef, chicken, turkey, fish, eggs, tofu, cheese, cottage cheese, and peanut butter. A serving size of meat is 3 ounces, which is about the size of a deck of cards. Examples of meat substitute serving sizes (equal to 1 ounce of meat) are 1/4 cup of cottage cheese, 1 egg, 1 tablespoon of peanut butter, and ½ cup of tofu. How can you eat out and still eat healthy? · Learn to estimate the serving sizes of foods that have carbohydrate. If you measure food at home, it will be easier to estimate the amount in a serving of restaurant food. · If the meal you order has too much carbohydrate (such as potatoes, corn, or baked beans), ask to have a low-carbohydrate food instead. Ask for a salad or green vegetables. · If you use insulin, check your blood sugar before and after eating out to help you plan how much to eat in the future. · If you eat more carbohydrate at a meal than you had planned, take a walk or do other exercise. This will help lower your blood sugar.   What else should you know?  · Limit saturated fat, such as the fat from meat and dairy products. This is a healthy choice because people who have diabetes are at higher risk of heart disease. So choose lean cuts of meat and nonfat or low-fat dairy products. Use olive or canola oil instead of butter or shortening when cooking. · Don't skip meals. Your blood sugar may drop too low if you skip meals and take insulin or certain medicines for diabetes. · Check with your doctor before you drink alcohol. Alcohol can cause your blood sugar to drop too low. Alcohol can also cause a bad reaction if you take certain diabetes medicines. Follow-up care is a key part of your treatment and safety. Be sure to make and go to all appointments, and call your doctor if you are having problems. It's also a good idea to know your test results and keep a list of the medicines you take. Where can you learn more? Go to http://killian-heather.info/. Enter Z623 in the search box to learn more about \"Learning About Diabetes Food Guidelines. \"  Current as of: March 13, 2017  Content Version: 11.3  © 8256-7900 [employer]. Care instructions adapted under license by [employer] (which disclaims liability or warranty for this information). If you have questions about a medical condition or this instruction, always ask your healthcare professional. [address] any warranty or liability for your use of this information. Starting a Weight Loss Plan: Care Instructions  Your Care Instructions  If you are thinking about losing weight, it can be hard to know where to start. Your doctor can help you set up a weight loss plan that best meets your needs. You may want to take a class on nutrition or exercise, or join a weight loss support group.  If you have questions about how to make changes to your eating or exercise habits, ask your doctor about seeing a registered dietitian or an exercise specialist.  It can be a big challenge to lose weight. But you do not have to make huge changes at once. Make small changes, and stick with them. When those changes become habit, add a few more changes. If you do not think you are ready to make changes right now, try to pick a date in the future. Make an appointment to see your doctor to discuss whether the time is right for you to start a plan. Follow-up care is a key part of your treatment and safety. Be sure to make and go to all appointments, and call your doctor if you are having problems. Its also a good idea to know your test results and keep a list of the medicines you take. How can you care for yourself at home? · Set realistic goals. Many people expect to lose much more weight than is likely. A weight loss of 5% to 10% of your body weight may be enough to improve your health. · Get family and friends involved to provide support. Talk to them about why you are trying to lose weight, and ask them to help. They can help by participating in exercise and having meals with you, even if they may be eating something different. · Find what works best for you. If you do not have time or do not like to cook, a program that offers meal replacement bars or shakes may be better for you. Or if you like to prepare meals, finding a plan that includes daily menus and recipes may be best.  · Ask your doctor about other health professionals who can help you achieve your weight loss goals. ¨ A dietitian can help you make healthy changes in your diet. ¨ An exercise specialist or  can help you develop a safe and effective exercise program.  ¨ A counselor or psychiatrist can help you cope with issues such as depression, anxiety, or family problems that can make it hard to focus on weight loss. · Consider joining a support group for people who are trying to lose weight. Your doctor can suggest groups in your area. Where can you learn more?   Go to http://killian-heather.info/. Enter Q279 in the search box to learn more about \"Starting a Weight Loss Plan: Care Instructions. \"  Current as of: October 13, 2016  Content Version: 11.3  © 8941-5947 [employer]. Care instructions adapted under license by [employer] (which disclaims liability or warranty for this information). If you have questions about a medical condition or this instruction, always ask your healthcare professional. [address] any warranty or liability for your use of this information.

## 2017-09-05 NOTE — MR AVS SNAPSHOT
Visit Information Date & Time Provider Department Dept. Phone Encounter #  
 9/5/2017  8:45  Jr Gallardo DO Internists at [facility] [phone] Follow-up Instructions Return in about 3 months (around 12/5/2017) for Labs 1 week before. Upcoming Health Maintenance Date Due DTaP/Tdap/Td series (1 - Tdap) 9/2/2012 PAP AKA CERVICAL CYTOLOGY 10/16/2016 FOOT EXAM Q1 4/7/2017 BREAST CANCER SCRN MAMMOGRAM 10/1/2017 EYE EXAM RETINAL OR DILATED Q1 11/8/2017 HEMOGLOBIN A1C Q6M 2/28/2018 MICROALBUMIN Q1 4/25/2018 LIPID PANEL Q1 8/29/2018 COLONOSCOPY 5/14/2022 Allergies as of 9/5/2017  Review Complete On: 9/5/2017 By: Karlie Grace LPN No Known Allergies Current Immunizations  Reviewed on 10/13/2016 Name Date Influenza Vaccine 11/3/2014 Influenza Vaccine (Quad) PF 9/5/2017, 10/13/2016, 10/8/2015 Pneumococcal Conjugate (PCV-13) 1/19/2017 Td 9/1/2012 Not reviewed this visit You Were Diagnosed With   
  
 Codes Comments Type 2 diabetes mellitus with hyperglycemia, without long-term current use of insulin (HCC)    -  Primary ICD-10-CM: E11.65 ICD-9-CM: 250.00, 790.29 Obesity (BMI 30.0-34.9)     ICD-10-CM: A34.0 ICD-9-CM: 278.00 Essential hypertension     ICD-10-CM: I10 
ICD-9-CM: 401.9 Hypovitaminosis D     ICD-10-CM: E55.9 ICD-9-CM: 268.9 Encounter for immunization     ICD-10-CM: P51 ICD-9-CM: V03.89 Vitals BP Pulse Temp Resp Height(growth percentile) Weight(growth percentile) 142/72 60 97.6 °F (36.4 °C) (Oral) 16 5' 1\" (1.549 m) 183 lb (83 kg) SpO2 BMI OB Status Smoking Status 98% 34.58 kg/m2 Hysterectomy Never Smoker Vitals History BMI and BSA Data Body Mass Index Body Surface Area 34.58 kg/m 2 1.89 m 2 Preferred Pharmacy Pharmacy Name Phone [id number] [place] [address] Your Updated Medication List  
  
   
This list is accurate as of: 9/5/17  9:54 AM.  Always use your most recent med list. amLODIPine 5 mg tablet Commonly known as:  Ortega Barges TAKE 1 TABLET BY MOUTH EVERY DAY  
  
 cetirizine 10 mg tablet Commonly known as:  ZYRTEC Take 1 Tab by mouth daily. cyclobenzaprine 10 mg tablet Commonly known as:  FLEXERIL Take 0.5 Tabs by mouth two (2) times daily as needed for Muscle Spasm(s). ergocalciferol 50,000 unit capsule Commonly known as:  VITAMIN D2 Take 1 Cap by mouth every seven (7) days. fluticasone 50 mcg/actuation nasal spray Commonly known as:  Ted Alma 2 Sprays by Both Nostrils route daily as needed for Rhinitis. ibuprofen 600 mg tablet Commonly known as:  MOTRIN Take 1 Tab by mouth every six (6) hours as needed for Pain.  
  
 losartan-hydroCHLOROthiazide 100-25 mg per tablet Commonly known as:  HYZAAR  
TAKE 1 TABLET BY MOUTH EVERY DAY  
  
 ondansetron 4 mg disintegrating tablet Commonly known as:  ZOFRAN ODT  
DISSOLVE 1 TABLET BY MOUTH EVERY EIGHT (8) HOURS AS NEEDED FOR NAUSEA. polyethylene glycol 17 gram packet Commonly known as:  Vernel Redder Take 1 Packet by mouth daily. traMADol-acetaminophen 37.5-325 mg per tablet Commonly known as:  ULTRACET Take 1 Tab by mouth every four (4) hours as needed for Pain. Max Daily Amount: 6 Tabs. We Performed the Following INFLUENZA VIRUS VAC QUAD,SPLIT,PRESV FREE SYRINGE 3/> YRS IM [id number] CPT(R)] GA IMMUNIZ ADMIN,1 SINGLE/COMB VAC/TOXOID [id number] CPT(R)] Follow-up Instructions Return in about 3 months (around 12/5/2017) for Labs 1 week before. To-Do List   
 12/04/2017 Lab:  HEMOGLOBIN A1C W/O EAG Patient Instructions Vaccine Information Statement Influenza (Flu) Vaccine (Inactivated or Recombinant): What you need to know Many Vaccine Information Statements are available in Sudanese and other languages. See www.immunize.org/vis Hojas de Información Sobre Vacunas están disponibles en Español y en muchos otros idiomas. Visite www.immunize.org/vis 1. Why get vaccinated? Influenza (flu) is a contagious disease that spreads around the [place] every year, usually between October and May. Flu is caused by influenza viruses, and is spread mainly by coughing, sneezing, and close contact. Anyone can get flu. Flu strikes suddenly and can last several days. Symptoms vary by age, but can include: 
 fever/chills  sore throat  muscle aches  fatigue  cough  headache  runny or stuffy nose Flu can also lead to pneumonia and blood infections, and cause diarrhea and seizures in children. If you have a medical condition, such as heart or lung disease, flu can make it worse. Flu is more dangerous for some people. Infants and young children, people 72years of age and older, pregnant women, and people with certain health conditions or a weakened immune system are at greatest risk. Each year thousands of people in the [facility] die from flu, and many more are hospitalized. Flu vaccine can: 
 keep you from getting flu, 
 make flu less severe if you do get it, and 
 keep you from spreading flu to your family and other people. 2. Inactivated and recombinant flu vaccines A dose of flu vaccine is recommended every flu season. Children 6 months through 6years of age may need two doses during the same flu season. Everyone else needs only one dose each flu season. Some inactivated flu vaccines contain a very small amount of a mercury-based preservative called thimerosal. Studies have not shown thimerosal in vaccines to be harmful, but flu vaccines that do not contain thimerosal are available. There is no live flu virus in flu shots. They cannot cause the flu. There are many flu viruses, and they are always changing.  Each year a new flu vaccine is made to protect against three or four viruses that are likely to cause disease in the upcoming flu season. But even when the vaccine doesnt exactly match these viruses, it may still provide some protection Flu vaccine cannot prevent: 
 flu that is caused by a virus not covered by the vaccine, or 
 illnesses that look like flu but are not. It takes about 2 weeks for protection to develop after vaccination, and protection lasts through the flu season. 3. Some people should not get this vaccine Tell the person who is giving you the vaccine:  If you have any severe, life-threatening allergies. If you ever had a life-threatening allergic reaction after a dose of flu vaccine, or have a severe allergy to any part of this vaccine, you may be advised not to get vaccinated. Most, but not all, types of flu vaccine contain a small amount of egg protein.  If you ever had Guillain-Barré Syndrome (also called GBS). Some people with a history of GBS should not get this vaccine. This should be discussed with your doctor.  If you are not feeling well. It is usually okay to get flu vaccine when you have a mild illness, but you might be asked to come back when you feel better. 4. Risks of a vaccine reaction With any medicine, including vaccines, there is a chance of reactions. These are usually mild and go away on their own, but serious reactions are also possible. Most people who get a flu shot do not have any problems with it. Minor problems following a flu shot include:  
 soreness, redness, or swelling where the shot was given  hoarseness  sore, red or itchy eyes  cough  fever  aches  headache  itching  fatigue If these problems occur, they usually begin soon after the shot and last 1 or 2 days. More serious problems following a flu shot can include the following:  There may be a small increased risk of Guillain-Barré Syndrome (GBS) after inactivated flu vaccine. This risk has been estimated at 1 or 2 additional cases per million people vaccinated. This is much lower than the risk of severe complications from flu, which can be prevented by flu vaccine.  Young children who get the flu shot along with pneumococcal vaccine (PCV13) and/or DTaP vaccine at the same time might be slightly more likely to have a seizure caused by fever. Ask your doctor for more information. Tell your doctor if a child who is getting flu vaccine has ever had a seizure. Problems that could happen after any injected vaccine:  People sometimes faint after a medical procedure, including vaccination. Sitting or lying down for about 15 minutes can help prevent fainting, and injuries caused by a fall. Tell your doctor if you feel dizzy, or have vision changes or ringing in the ears.  Some people get severe pain in the shoulder and have difficulty moving the arm where a shot was given. This happens very rarely.  Any medication can cause a severe allergic reaction. Such reactions from a vaccine are very rare, estimated at about 1 in a million doses, and would happen within a few minutes to a few hours after the vaccination. As with any medicine, there is a very remote chance of a vaccine causing a serious injury or death. The safety of vaccines is always being monitored. For more information, visit: www.cdc.gov/vaccinesafety/ 
 
5. What if there is a serious reaction? What should I look for?  Look for anything that concerns you, such as signs of a severe allergic reaction, very high fever, or unusual behavior. Signs of a severe allergic reaction can include hives, swelling of the face and throat, difficulty breathing, a fast heartbeat, dizziness, and weakness  usually within a few minutes to a few hours after the vaccination. What should I do?  
 
 If you think it is a severe allergic reaction or other emergency that cant wait, call 9-1-1 and get the person to the nearest hospital. Otherwise, call your doctor.  Reactions should be reported to the Vaccine Adverse Event Reporting System (VAERS). Your doctor should file this report, or you can do it yourself through  the VAERS web site at www.vaers. [facility].gov, or by calling 6-175.616.9612. VAERS does not give medical advice. 6. The National Vaccine Injury Compensation Program 
 
The [facility] Vaccine Injury Compensation Program (VICP) is a federal program that was created to compensate people who may have been injured by certain vaccines. Persons who believe they may have been injured by a vaccine can learn about the program and about filing a claim by calling 3-576.899.7781 or visiting the [employer] website at www.[facility].gov/vaccinecompensation. There is a time limit to file a claim for compensation. 7. How can I learn more?  Ask your healthcare provider. He or she can give you the vaccine package insert or suggest other sources of information.  Call your local or state health department.  Contact the Centers for Disease Control and Prevention (CDC): 
- Call 3-741.693.9412 (1-800-CDC-INFO) or 
- Visit CDCs website at www.cdc.gov/flu Vaccine Information Statement Inactivated Influenza Vaccine 8/7/2015 
42 MISTY Diaz 368RL-35 Department of [facility] and [employer] Centers for Disease Control and Prevention Office Use Only DASH Diet: Care Instructions Your Care Instructions The DASH diet is an eating plan that can help lower your blood pressure. DASH stands for Dietary Approaches to Stop Hypertension. Hypertension is high blood pressure. The DASH diet focuses on eating foods that are high in calcium, potassium, and magnesium. These nutrients can lower blood pressure. The foods that are highest in these nutrients are fruits, vegetables, low-fat dairy products, nuts, seeds, and legumes.  But taking calcium, potassium, and magnesium supplements instead of eating foods that are high in those nutrients does not have the same effect. The DASH diet also includes whole grains, fish, and poultry. The DASH diet is one of several lifestyle changes your doctor may recommend to lower your high blood pressure. Your doctor may also want you to decrease the amount of sodium in your diet. Lowering sodium while following the DASH diet can lower blood pressure even further than just the DASH diet alone. Follow-up care is a key part of your treatment and safety. Be sure to make and go to all appointments, and call your doctor if you are having problems. It's also a good idea to know your test results and keep a list of the medicines you take. How can you care for yourself at home? Following the DASH diet · Eat 4 to 5 servings of fruit each day. A serving is 1 medium-sized piece of fruit, ½ cup chopped or canned fruit, 1/4 cup dried fruit, or 4 ounces (½ cup) of fruit juice. Choose fruit more often than fruit juice. · Eat 4 to 5 servings of vegetables each day. A serving is 1 cup of lettuce or raw leafy vegetables, ½ cup of chopped or cooked vegetables, or 4 ounces (½ cup) of vegetable juice. Choose vegetables more often than vegetable juice. · Get 2 to 3 servings of low-fat and fat-free dairy each day. A serving is 8 ounces of milk, 1 cup of yogurt, or 1 ½ ounces of cheese. · Eat 6 to 8 servings of grains each day. A serving is 1 slice of bread, 1 ounce of dry cereal, or ½ cup of cooked rice, pasta, or cooked cereal. Try to choose whole-grain products as much as possible. · Limit lean meat, poultry, and fish to 2 servings each day. A serving is 3 ounces, about the size of a deck of cards. · Eat 4 to 5 servings of nuts, seeds, and legumes (cooked dried beans, lentils, and split peas) each week. A serving is 1/3 cup of nuts, 2 tablespoons of seeds, or ½ cup of cooked beans or peas. · Limit fats and oils to 2 to 3 servings each day. A serving is 1 teaspoon of vegetable oil or 2 tablespoons of salad dressing. · Limit sweets and added sugars to 5 servings or less a week. A serving is 1 tablespoon jelly or jam, ½ cup sorbet, or 1 cup of lemonade. · Eat less than 2,300 milligrams (mg) of sodium a day. If you limit your sodium to 1,500 mg a day, you can lower your blood pressure even more. Tips for success · Start small. Do not try to make dramatic changes to your diet all at once. You might feel that you are missing out on your favorite foods and then be more likely to not follow the plan. Make small changes, and stick with them. Once those changes become habit, add a few more changes. · Try some of the following: ¨ Make it a goal to eat a fruit or vegetable at every meal and at snacks. This will make it easy to get the recommended amount of fruits and vegetables each day. ¨ Try yogurt topped with fruit and nuts for a snack or healthy dessert. ¨ Add lettuce, tomato, cucumber, and onion to sandwiches. ¨ Combine a ready-made pizza crust with low-fat mozzarella cheese and lots of vegetable toppings. Try using tomatoes, squash, spinach, broccoli, carrots, cauliflower, and onions. ¨ Have a variety of cut-up vegetables with a low-fat dip as an appetizer instead of chips and dip. ¨ Sprinkle sunflower seeds or chopped almonds over salads. Or try adding chopped walnuts or almonds to cooked vegetables. ¨ Try some vegetarian meals using beans and peas. Add garbanzo or kidney beans to salads. Make burritos and tacos with mashed [place] beans or black beans. Where can you learn more? Go to http://killian-heather.info/. Enter B752 in the search box to learn more about \"DASH Diet: Care Instructions. \" Current as of: April 3, 2017 Content Version: 11.3 © 7347-5592 [employer], [employer].  Care instructions adapted under license by [facility] [address] (which disclaims liability or warranty for this information). If you have questions about a medical condition or this instruction, always ask your healthcare professional. [address] any warranty or liability for your use of this information. Learning About Diabetes Food Guidelines Your Care Instructions Meal planning is important to manage diabetes. It helps keep your blood sugar at a target level (which you set with your doctor). You don't have to eat special foods. You can eat what your family eats, including sweets once in a while. But you do have to pay attention to how often you eat and how much you eat of certain foods. You may want to work with a dietitian or a certified diabetes educator (CDE) to help you plan meals and snacks. A dietitian or CDE can also help you lose weight if that is one of your goals. What should you know about eating carbs? Managing the amount of carbohydrate (carbs) you eat is an important part of healthy meals when you have diabetes. Carbohydrate is found in many foods. · Learn which foods have carbs. And learn the amounts of carbs in different foods. ¨ Bread, cereal, pasta, and rice have about 15 grams of carbs in a serving. A serving is 1 slice of bread (1 ounce), ½ cup of cooked cereal, or 1/3 cup of cooked pasta or rice. ¨ Fruits have 15 grams of carbs in a serving. A serving is 1 small fresh fruit, such as an apple or orange; ½ of a banana; ½ cup of cooked or canned fruit; ½ cup of fruit juice; 1 cup of melon or raspberries; or 2 tablespoons of dried fruit. ¨ Milk and no-sugar-added yogurt have 15 grams of carbs in a serving. A serving is 1 cup of milk or 2/3 cup of no-sugar-added yogurt. ¨ Starchy vegetables have 15 grams of carbs in a serving. A serving is ½ cup of mashed potatoes or sweet potato; 1 cup winter squash; ½ of a small baked potato; ½ cup of cooked beans; or ½ cup cooked corn or green peas. · Learn how much carbs to eat each day and at each meal. A dietitian or CDE can teach you how to keep track of the amount of carbs you eat. This is called carbohydrate counting. · If you are not sure how to count carbohydrate grams, use the Plate Method to plan meals. It is a good, quick way to make sure that you have a balanced meal. It also helps you spread carbs throughout the day. ¨ Divide your plate by types of foods. Put non-starchy vegetables on half the plate, meat or other protein food on one-quarter of the plate, and a grain or starchy vegetable in the final quarter of the plate. To this you can add a small piece of fruit and 1 cup of milk or yogurt, depending on how many carbs you are supposed to eat at a meal. 
· Try to eat about the same amount of carbs at each meal. Do not \"save up\" your daily allowance of carbs to eat at one meal. 
· Proteins have very little or no carbs per serving. Examples of proteins are beef, chicken, turkey, fish, eggs, tofu, cheese, cottage cheese, and peanut butter. A serving size of meat is 3 ounces, which is about the size of a deck of cards. Examples of meat substitute serving sizes (equal to 1 ounce of meat) are 1/4 cup of cottage cheese, 1 egg, 1 tablespoon of peanut butter, and ½ cup of tofu. How can you eat out and still eat healthy? · Learn to estimate the serving sizes of foods that have carbohydrate. If you measure food at home, it will be easier to estimate the amount in a serving of restaurant food. · If the meal you order has too much carbohydrate (such as potatoes, corn, or baked beans), ask to have a low-carbohydrate food instead. Ask for a salad or green vegetables. · If you use insulin, check your blood sugar before and after eating out to help you plan how much to eat in the future. · If you eat more carbohydrate at a meal than you had planned, take a walk or do other exercise. This will help lower your blood sugar. What else should you know? · Limit saturated fat, such as the fat from meat and dairy products. This is a healthy choice because people who have diabetes are at higher risk of heart disease. So choose lean cuts of meat and nonfat or low-fat dairy products. Use olive or canola oil instead of butter or shortening when cooking. · Don't skip meals. Your blood sugar may drop too low if you skip meals and take insulin or certain medicines for diabetes. · Check with your doctor before you drink alcohol. Alcohol can cause your blood sugar to drop too low. Alcohol can also cause a bad reaction if you take certain diabetes medicines. Follow-up care is a key part of your treatment and safety. Be sure to make and go to all appointments, and call your doctor if you are having problems. It's also a good idea to know your test results and keep a list of the medicines you take. Where can you learn more? Go to http://killianCellvineheather.info/. Enter U586 in the search box to learn more about \"Learning About Diabetes Food Guidelines. \" Current as of: March 13, 2017 Content Version: 11.3 © 0370-0340 [employer]. Care instructions adapted under license by [employer] (which disclaims liability or warranty for this information). If you have questions about a medical condition or this instruction, always ask your healthcare professional. [address] any warranty or liability for your use of this information. Starting a Weight Loss Plan: Care Instructions Your Care Instructions If you are thinking about losing weight, it can be hard to know where to start. Your doctor can help you set up a weight loss plan that best meets your needs. You may want to take a class on nutrition or exercise, or join a weight loss support group.  If you have questions about how to make changes to your eating or exercise habits, ask your doctor about seeing a registered dietitian or an exercise specialist. 
 It can be a big challenge to lose weight. But you do not have to make huge changes at once. Make small changes, and stick with them. When those changes become habit, add a few more changes. If you do not think you are ready to make changes right now, try to pick a date in the future. Make an appointment to see your doctor to discuss whether the time is right for you to start a plan. Follow-up care is a key part of your treatment and safety. Be sure to make and go to all appointments, and call your doctor if you are having problems. Its also a good idea to know your test results and keep a list of the medicines you take. How can you care for yourself at home? · Set realistic goals. Many people expect to lose much more weight than is likely. A weight loss of 5% to 10% of your body weight may be enough to improve your health. · Get family and friends involved to provide support. Talk to them about why you are trying to lose weight, and ask them to help. They can help by participating in exercise and having meals with you, even if they may be eating something different. · Find what works best for you. If you do not have time or do not like to cook, a program that offers meal replacement bars or shakes may be better for you. Or if you like to prepare meals, finding a plan that includes daily menus and recipes may be best. 
· Ask your doctor about other health professionals who can help you achieve your weight loss goals. ¨ A dietitian can help you make healthy changes in your diet. ¨ An exercise specialist or  can help you develop a safe and effective exercise program. 
¨ A counselor or psychiatrist can help you cope with issues such as depression, anxiety, or family problems that can make it hard to focus on weight loss. · Consider joining a support group for people who are trying to lose weight. Your doctor can suggest groups in your area. Where can you learn more? Go to http://killian-heather.info/. Enter B666 in the search box to learn more about \"Starting a Weight Loss Plan: Care Instructions. \" Current as of: October 13, 2016 Content Version: 11.3 © 2832-6447 [employer]. Care instructions adapted under license by [employer] (which disclaims liability or warranty for this information). If you have questions about a medical condition or this instruction, always ask your healthcare professional. [address] any warranty or liability for your use of this information. Introducing [facility]! Dear Arron Peck: Thank you for requesting a [employer] account. Our records indicate that you already have an active [employer] account. You can access your account anytime at [email or web address] Did you know that you can access your hospital and ER discharge instructions at any time in [employer]? You can also review all of your test results from your hospital stay or ER visit. Additional Information If you have questions, please visit the Frequently Asked Questions section of the [employer] website at [email or web address] Remember, [employer] is NOT to be used for urgent needs. For medical emergencies, dial 911. Now available from your iPhone and Android! Please provide this summary of care documentation to your next provider. Your primary care clinician is listed as Job Vivien. If you have any questions after today's visit, please call [phone].

## 2017-09-05 NOTE — PROGRESS NOTES
Pt is here for 3 month follow up. Labs done    1. Have you been to the ER, urgent care clinic since your last visit? Hospitalized since your last visit? No    2. Have you seen or consulted any other health care providers outside of the [address] [place] [address] since your last visit? Include any pap smears or colon screening. No      Pt given flu vaccine in R deltoid per verbral read back order Dr Danica Schaffer. Pt tolerated procedure w/o reaction.

## 2017-09-05 NOTE — PROGRESS NOTES
Chief Complaint   Patient presents with    Follow-up    Hypertension       HPI:  Patient is a 56year old obese  female with medical problems listed below presents today for follow up of Hypertension, DM 2, Hypovitaminosis D, etc. She has been feeling well and voices no complaints today. She is complaint with her medications with no adverse effects reported, though she is not very consistent with taking her high dose weekly Vit D. She has eating more starchy foods and has gained 5 pounds in the last 4 months. Past Medical History:   Diagnosis Date    AR (allergic rhinitis)     Arthritis     Heart murmur     Hypertension     Hypovitaminosis D 3/3/2014       No Known Allergies      Current Outpatient Prescriptions   Medication Sig Dispense Refill    losartan-hydroCHLOROthiazide (HYZAAR) 100-25 mg per tablet TAKE 1 TABLET BY MOUTH EVERY DAY 30 Tab 3    amLODIPine (NORVASC) 5 mg tablet TAKE 1 TABLET BY MOUTH EVERY DAY 30 Tab 5    cetirizine (ZYRTEC) 10 mg tablet Take 1 Tab by mouth daily. 30 Tab 0    fluticasone (FLONASE) 50 mcg/actuation nasal spray 2 Sprays by Both Nostrils route daily as needed for Rhinitis. 1 Bottle 3    traMADol-acetaminophen (ULTRACET) 37.5-325 mg per tablet Take 1 Tab by mouth every four (4) hours as needed for Pain. Max Daily Amount: 6 Tabs. 40 Tab 0    ergocalciferol (VITAMIN D2) 50,000 unit capsule Take 1 Cap by mouth every seven (7) days. 8 Cap 5    cyclobenzaprine (FLEXERIL) 10 mg tablet Take 0.5 Tabs by mouth two (2) times daily as needed for Muscle Spasm(s). 60 Tab 0    ondansetron (ZOFRAN ODT) 4 mg disintegrating tablet DISSOLVE 1 TABLET BY MOUTH EVERY EIGHT (8) HOURS AS NEEDED FOR NAUSEA. 30 Tab 0    polyethylene glycol (MIRALAX) 17 gram packet Take 1 Packet by mouth daily. 30 Packet 0    ibuprofen (MOTRIN) 600 mg tablet Take 1 Tab by mouth every six (6) hours as needed for Pain.  60 Tab 0          ROS:  Constitutional: Negative for fever, chills, or fatigue  Neurological: Negative for headache, dizziness, visual disturbance, or loss of conciousness  Respiratory: Negative for SOB, hemoptysis, or wheezing  Cardiovascular: Negative for chest pain, palpitation, or leg swelling  Gastrointestinal: Negative for abdominal pain, nausea, vomiting, diarrhea, blood in stool, melena, or heartburn  Musculoskeletal: Negative for falls        Physical Exam:  Visit Vitals    /72    Pulse 60    Temp 97.6 °F (36.4 °C) (Oral)    Resp 16    Ht 5' 1\" (1.549 m)    Wt 183 lb (83 kg)    SpO2 98%    BMI 34.58 kg/m2       General: Obese black female, a & o x 3, afebrile, well-nourished, interacting appropriately, in no acute distress  Respiratory: symmetrical chest expansion, lung sounds clear bilaterally  Cardiovascular: normal S1S2, regular rate and rhythm, no murmurs, no peripheral edema, no JVD  Abdomen: non-distended, normoactive bowel sounds x 4 quadrants, soft, non-tender to palpation  Extremity: No edema noted  DM foot exam: Pedal pulses palpable, no callus noted, and passed monofilament test        Assessment/Plan:    ICD-10-CM ICD-9-CM    1. Type 2 diabetes mellitus with hyperglycemia, without long-term current use of insulin ([facility]) E11.65 250.00 Controlled with recent HBA1C at 6.1 - she was counseled on diabetic diet. Diabetic foot exam:     Left: Reflexes 2+     Vibratory sensation normal    Proprioception normal   Sharp/dull discrimination normal    Filament test normal sensation with micro filament   Pulse DP: 2+ (normal)   Pulse PT: 2+ (normal)   Deformities: None  Right: Reflexes 2+   Vibratory sensation normal   Proprioception normal   Sharp/dull discrimination normal   Filament test normal sensation with micro filament   Pulse DP: 2+ (normal)   Pulse PT: 2+ (normal)   Deformities: None  HEMOGLOBIN A1C W/O EAG     790.29    2. Obesity (BMI 30.0-34. 9) E66.9 278.00 I have reviewed/discussed the above normal BMI with the patient.   I have recommended the following interventions: dietary management education, guidance, and counseling, encourage exercise and monitor weight . .     3. Essential hypertension I10 401.9 Controlled  Continue current meds and she was counseled on low salt diet. 4. Hypovitaminosis D E55.9 268.9 Recent Vit D is low at 20.4 and she was advised to continue taking weekly Vit D.   5. Encounter for immunization Z23 V03.89 Flu shot given today. INFLUENZA VIRUS VAC QUAD,SPLIT,PRESV FREE SYRINGE 3/> YRS IM      MO IMMUNIZ ADMIN,1 SINGLE/COMB VAC/TOXOID         Orders Placed This Encounter    Influenza virus vaccine (QUADRIVALENT PRES FREE SYRINGE) IM ([id number])    HEMOGLOBIN A1C W/O EAG     Standing Status:   Future     Standing Expiration Date:   9/6/2018    MO IMMUNIZ ADMIN,1 SINGLE/COMB VAC/TOXOID          Recent labs reviewed with pt        Additional Notes: Discussed today's diagnosis, treatment plans. Discussed medication indications and side effects. Weight Management: Counseled  After Visit Summary: Discussed provided printed patient instructions. Answered questions accordingly. Follow-up Disposition: In 3 months with labs 1 week prior. Corey Gallardo DO, MPH  Internal Medicine        .

## 2017-11-26 DIAGNOSIS — I10 ESSENTIAL HYPERTENSION: ICD-10-CM

## 2017-11-27 RX ORDER — AMLODIPINE BESYLATE 5 MG/1
TABLET ORAL
Qty: 30 TAB | Refills: 5 | Status: SHIPPED | OUTPATIENT
Start: 2017-11-27 | End: 2017-12-19 | Stop reason: SDUPTHER

## 2017-12-04 DIAGNOSIS — E11.65 TYPE 2 DIABETES MELLITUS WITH HYPERGLYCEMIA, WITHOUT LONG-TERM CURRENT USE OF INSULIN (HCC): ICD-10-CM

## 2017-12-06 LAB
AVG GLU, 10930: 128 MG/DL (ref 91–123)
HBA1C MFR BLD HPLC: 6.1 % (ref 4.8–5.9)

## 2017-12-12 ENCOUNTER — OFFICE VISIT (OUTPATIENT)
Dept: FAMILY MEDICINE CLINIC | Age: 57
End: 2017-12-12

## 2017-12-12 ENCOUNTER — DOCUMENTATION ONLY (OUTPATIENT)
Dept: FAMILY MEDICINE CLINIC | Age: 57
End: 2017-12-12

## 2017-12-12 VITALS
OXYGEN SATURATION: 97 % | HEIGHT: 61 IN | DIASTOLIC BLOOD PRESSURE: 80 MMHG | SYSTOLIC BLOOD PRESSURE: 130 MMHG | HEART RATE: 60 BPM | TEMPERATURE: 98.5 F | RESPIRATION RATE: 16 BRPM | BODY MASS INDEX: 34.48 KG/M2 | WEIGHT: 182.6 LBS

## 2017-12-12 DIAGNOSIS — E66.9 OBESITY (BMI 30.0-34.9): ICD-10-CM

## 2017-12-12 DIAGNOSIS — Z12.31 ENCOUNTER FOR SCREENING MAMMOGRAM FOR BREAST CANCER: ICD-10-CM

## 2017-12-12 DIAGNOSIS — E55.9 HYPOVITAMINOSIS D: ICD-10-CM

## 2017-12-12 DIAGNOSIS — E11.65 TYPE 2 DIABETES MELLITUS WITH HYPERGLYCEMIA, WITHOUT LONG-TERM CURRENT USE OF INSULIN (HCC): ICD-10-CM

## 2017-12-12 DIAGNOSIS — I10 ESSENTIAL HYPERTENSION: Primary | ICD-10-CM

## 2017-12-12 RX ORDER — ERGOCALCIFEROL 1.25 MG/1
50000 CAPSULE ORAL
Qty: 8 CAP | Refills: 5 | Status: SHIPPED | OUTPATIENT
Start: 2017-12-12 | End: 2019-01-15 | Stop reason: SDUPTHER

## 2017-12-12 RX ORDER — TRIAMCINOLONE ACETONIDE 1 MG/G
CREAM TOPICAL
Refills: 2 | COMMUNITY
Start: 2017-11-30 | End: 2017-12-12 | Stop reason: ALTCHOICE

## 2017-12-12 NOTE — PROGRESS NOTES
Chief Complaint   Patient presents with    Hypertension    Vitamin D Deficiency    Results       HPI:  Patient is a 56year old obese  female with medical problems listed below presents today for follow up of Hypertension, DM 2, Hypovitaminosis D, etc. She has been feeling well and voices no complaints today. She is complaint with her medications with no adverse effects reported. She has modified her diet and has lost one pound in the last three months. Past Medical History:   Diagnosis Date    AR (allergic rhinitis)     Arthritis     Heart murmur     Hypertension     Hypovitaminosis D 3/3/2014       No Known Allergies      Current Outpatient Prescriptions   Medication Sig Dispense Refill    amLODIPine (NORVASC) 5 mg tablet TAKE 1 TABLET BY MOUTH EVERY DAY 30 Tab 5    losartan-hydroCHLOROthiazide (HYZAAR) 100-25 mg per tablet TAKE 1 TABLET BY MOUTH EVERY DAY 30 Tab 3    traMADol-acetaminophen (ULTRACET) 37.5-325 mg per tablet Take 1 Tab by mouth every four (4) hours as needed for Pain. Max Daily Amount: 6 Tabs. 40 Tab 0    ergocalciferol (VITAMIN D2) 50,000 unit capsule Take 1 Cap by mouth every seven (7) days. 8 Cap 5    cetirizine (ZYRTEC) 10 mg tablet Take 1 Tab by mouth daily. 30 Tab 0    fluticasone (FLONASE) 50 mcg/actuation nasal spray 2 Sprays by Both Nostrils route daily as needed for Rhinitis. 1 Bottle 3    ibuprofen (MOTRIN) 600 mg tablet Take 1 Tab by mouth every six (6) hours as needed for Pain.  60 Tab 0          ROS:  Constitutional: Negative for fever, chills, or fatigue  Neurological: Negative for headache, dizziness, visual disturbance, or loss of conciousness  Respiratory: Negative for SOB, hemoptysis, or wheezing  Cardiovascular: Negative for chest pain, palpitation, or leg swelling  Gastrointestinal: Negative for abdominal pain, nausea, vomiting, diarrhea, blood in stool, melena, or heartburn  Musculoskeletal: Negative for falls        Physical Exam:  Visit Vitals    /80 (BP 1 Location: Left arm, BP Patient Position: Sitting)    Pulse 60    Temp 98.5 °F (36.9 °C) (Oral)    Resp 16    Ht 5' 1\" (1.549 m)    Wt 182 lb 9.6 oz (82.8 kg)    SpO2 97%    BMI 34.5 kg/m2       General: Obese black female, a & o x 3, afebrile, well-nourished, interacting appropriately, in no acute distress  Respiratory: symmetrical chest expansion, lung sounds clear bilaterally  Cardiovascular: normal S1S2, regular rate and rhythm, no murmurs, no peripheral edema, no JVD  Abdomen: non-distended, normoactive bowel sounds x 4 quadrants, soft, non-tender to palpation  Extremity: No edema noted  DM foot exam: Pedal pulses palpable and no callus noted        Assessment/Plan:    ICD-10-CM ICD-9-CM    1. Essential hypertension I10 401.9 Controlled  Continue current meds and she was counseled on low salt diet. CBC WITH AUTOMATED DIFF      LIPID PANEL      METABOLIC PANEL, COMPREHENSIVE      T4, FREE      TSH 3RD GENERATION   2. Type 2 diabetes mellitus with hyperglycemia, without long-term current use of insulin ([facility]) E11.65 250.00 Controlled with recent HBA1C at 6.1 and she was counseled on diabetic diet. HEMOGLOBIN A1C W/O EAG     790.29    3. Hypovitaminosis D E55.9 268.9 VITAMIN D, 25 HYDROXY      ergocalciferol (VITAMIN D2) 50,000 unit capsule   4. Obesity (BMI 30.0-34. 9) E66.9 278.00 I have reviewed/discussed the above normal BMI with the patient. I have recommended the following interventions: dietary management education, guidance, and counseling, encourage exercise and monitor weight . .     5.  Encounter for screening mammogram for breast cancer Z12.31 V76.12 PAT MAMMO BI SCREENING INCL CAD         Orders Placed This Encounter    PAT MAMMO BI SCREENING INCL CAD     Standing Status:   Future     Standing Expiration Date:   1/12/2019     Order Specific Question:   Reason for Exam     Answer:   Screening mammogram    CBC WITH AUTOMATED DIFF     Standing Status:   Future Standing Expiration Date:   6/10/2018    HEMOGLOBIN A1C W/O EAG     Standing Status:   Future     Standing Expiration Date:   12/13/2018    LIPID PANEL     Standing Status:   Future     Standing Expiration Date:   7/70/1325    METABOLIC PANEL, COMPREHENSIVE     Standing Status:   Future     Standing Expiration Date:   6/10/2018    T4, FREE     Standing Status:   Future     Standing Expiration Date:   6/10/2018    TSH 3RD GENERATION     Standing Status:   Future     Standing Expiration Date:   4/11/2018    VITAMIN D, 25 HYDROXY     Standing Status:   Future     Standing Expiration Date:   6/10/2018    DISCONTD: triamcinolone acetonide (KENALOG) 0.1 % topical cream     Sig: APPLY TO AFFECTED AREA BEHIND EARS BID FOR 1 TO 2 WEEKS     Refill:  2    ergocalciferol (VITAMIN D2) 50,000 unit capsule     Sig: Take 1 Cap by mouth every seven (7) days. Dispense:  8 Cap     Refill:  5          Recent labs reviewed with pt        Additional Notes: Discussed today's diagnosis, treatment plans. Discussed medication indications and side effects. Weight Management: Counseled  After Visit Summary: Discussed provided printed patient instructions. Answered questions accordingly. Follow-up Disposition: In 3 months with labs 1 week prior. Geraldo Carter DO, MPH  Internal Medicine        .

## 2017-12-12 NOTE — PROGRESS NOTES
Pt here today for OV and said at check out order for mammo not needed. She has GYN that takes care of this for her.

## 2017-12-12 NOTE — PATIENT INSTRUCTIONS
DASH Diet: Care Instructions  Your Care Instructions    The DASH diet is an eating plan that can help lower your blood pressure. DASH stands for Dietary Approaches to Stop Hypertension. Hypertension is high blood pressure. The DASH diet focuses on eating foods that are high in calcium, potassium, and magnesium. These nutrients can lower blood pressure. The foods that are highest in these nutrients are fruits, vegetables, low-fat dairy products, nuts, seeds, and legumes. But taking calcium, potassium, and magnesium supplements instead of eating foods that are high in those nutrients does not have the same effect. The DASH diet also includes whole grains, fish, and poultry. The DASH diet is one of several lifestyle changes your doctor may recommend to lower your high blood pressure. Your doctor may also want you to decrease the amount of sodium in your diet. Lowering sodium while following the DASH diet can lower blood pressure even further than just the DASH diet alone. Follow-up care is a key part of your treatment and safety. Be sure to make and go to all appointments, and call your doctor if you are having problems. It's also a good idea to know your test results and keep a list of the medicines you take. How can you care for yourself at home? Following the DASH diet  · Eat 4 to 5 servings of fruit each day. A serving is 1 medium-sized piece of fruit, ½ cup chopped or canned fruit, 1/4 cup dried fruit, or 4 ounces (½ cup) of fruit juice. Choose fruit more often than fruit juice. · Eat 4 to 5 servings of vegetables each day. A serving is 1 cup of lettuce or raw leafy vegetables, ½ cup of chopped or cooked vegetables, or 4 ounces (½ cup) of vegetable juice. Choose vegetables more often than vegetable juice. · Get 2 to 3 servings of low-fat and fat-free dairy each day. A serving is 8 ounces of milk, 1 cup of yogurt, or 1 ½ ounces of cheese. · Eat 6 to 8 servings of grains each day.  A serving is 1 slice of bread, 1 ounce of dry cereal, or ½ cup of cooked rice, pasta, or cooked cereal. Try to choose whole-grain products as much as possible. · Limit lean meat, poultry, and fish to 2 servings each day. A serving is 3 ounces, about the size of a deck of cards. · Eat 4 to 5 servings of nuts, seeds, and legumes (cooked dried beans, lentils, and split peas) each week. A serving is 1/3 cup of nuts, 2 tablespoons of seeds, or ½ cup of cooked beans or peas. · Limit fats and oils to 2 to 3 servings each day. A serving is 1 teaspoon of vegetable oil or 2 tablespoons of salad dressing. · Limit sweets and added sugars to 5 servings or less a week. A serving is 1 tablespoon jelly or jam, ½ cup sorbet, or 1 cup of lemonade. · Eat less than 2,300 milligrams (mg) of sodium a day. If you limit your sodium to 1,500 mg a day, you can lower your blood pressure even more. Tips for success  · Start small. Do not try to make dramatic changes to your diet all at once. You might feel that you are missing out on your favorite foods and then be more likely to not follow the plan. Make small changes, and stick with them. Once those changes become habit, add a few more changes. · Try some of the following:  ¨ Make it a goal to eat a fruit or vegetable at every meal and at snacks. This will make it easy to get the recommended amount of fruits and vegetables each day. ¨ Try yogurt topped with fruit and nuts for a snack or healthy dessert. ¨ Add lettuce, tomato, cucumber, and onion to sandwiches. ¨ Combine a ready-made pizza crust with low-fat mozzarella cheese and lots of vegetable toppings. Try using tomatoes, squash, spinach, broccoli, carrots, cauliflower, and onions. ¨ Have a variety of cut-up vegetables with a low-fat dip as an appetizer instead of chips and dip. ¨ Sprinkle sunflower seeds or chopped almonds over salads. Or try adding chopped walnuts or almonds to cooked vegetables.   ¨ Try some vegetarian meals using beans and peas. Add garbanzo or kidney beans to salads. Make burritos and tacos with mashed [place] beans or black beans. Where can you learn more? Go to http://killian-heather.info/. Enter Q853 in the search box to learn more about \"DASH Diet: Care Instructions. \"  Current as of: September 21, 2016  Content Version: 11.4  © 1358-3713 [employer]. Care instructions adapted under license by [employer] (which disclaims liability or warranty for this information). If you have questions about a medical condition or this instruction, always ask your healthcare professional. [address] any warranty or liability for your use of this information. Learning About Diabetes Food Guidelines  Your Care Instructions    Meal planning is important to manage diabetes. It helps keep your blood sugar at a target level (which you set with your doctor). You don't have to eat special foods. You can eat what your family eats, including sweets once in a while. But you do have to pay attention to how often you eat and how much you eat of certain foods. You may want to work with a dietitian or a certified diabetes educator (CDE) to help you plan meals and snacks. A dietitian or CDE can also help you lose weight if that is one of your goals. What should you know about eating carbs? Managing the amount of carbohydrate (carbs) you eat is an important part of healthy meals when you have diabetes. Carbohydrate is found in many foods. · Learn which foods have carbs. And learn the amounts of carbs in different foods. ¨ Bread, cereal, pasta, and rice have about 15 grams of carbs in a serving. A serving is 1 slice of bread (1 ounce), ½ cup of cooked cereal, or 1/3 cup of cooked pasta or rice. ¨ Fruits have 15 grams of carbs in a serving.  A serving is 1 small fresh fruit, such as an apple or orange; ½ of a banana; ½ cup of cooked or canned fruit; ½ cup of fruit juice; 1 cup of melon or raspberries; or 2 tablespoons of dried fruit. ¨ Milk and no-sugar-added yogurt have 15 grams of carbs in a serving. A serving is 1 cup of milk or 2/3 cup of no-sugar-added yogurt. ¨ Starchy vegetables have 15 grams of carbs in a serving. A serving is ½ cup of mashed potatoes or sweet potato; 1 cup winter squash; ½ of a small baked potato; ½ cup of cooked beans; or ½ cup cooked corn or green peas. · Learn how much carbs to eat each day and at each meal. A dietitian or CDE can teach you how to keep track of the amount of carbs you eat. This is called carbohydrate counting. · If you are not sure how to count carbohydrate grams, use the Plate Method to plan meals. It is a good, quick way to make sure that you have a balanced meal. It also helps you spread carbs throughout the day. ¨ Divide your plate by types of foods. Put non-starchy vegetables on half the plate, meat or other protein food on one-quarter of the plate, and a grain or starchy vegetable in the final quarter of the plate. To this you can add a small piece of fruit and 1 cup of milk or yogurt, depending on how many carbs you are supposed to eat at a meal.  · Try to eat about the same amount of carbs at each meal. Do not \"save up\" your daily allowance of carbs to eat at one meal.  · Proteins have very little or no carbs per serving. Examples of proteins are beef, chicken, turkey, fish, eggs, tofu, cheese, cottage cheese, and peanut butter. A serving size of meat is 3 ounces, which is about the size of a deck of cards. Examples of meat substitute serving sizes (equal to 1 ounce of meat) are 1/4 cup of cottage cheese, 1 egg, 1 tablespoon of peanut butter, and ½ cup of tofu. How can you eat out and still eat healthy? · Learn to estimate the serving sizes of foods that have carbohydrate. If you measure food at home, it will be easier to estimate the amount in a serving of restaurant food.   · If the meal you order has too much carbohydrate (such as potatoes, corn, or baked beans), ask to have a low-carbohydrate food instead. Ask for a salad or green vegetables. · If you use insulin, check your blood sugar before and after eating out to help you plan how much to eat in the future. · If you eat more carbohydrate at a meal than you had planned, take a walk or do other exercise. This will help lower your blood sugar. What else should you know? · Limit saturated fat, such as the fat from meat and dairy products. This is a healthy choice because people who have diabetes are at higher risk of heart disease. So choose lean cuts of meat and nonfat or low-fat dairy products. Use olive or canola oil instead of butter or shortening when cooking. · Don't skip meals. Your blood sugar may drop too low if you skip meals and take insulin or certain medicines for diabetes. · Check with your doctor before you drink alcohol. Alcohol can cause your blood sugar to drop too low. Alcohol can also cause a bad reaction if you take certain diabetes medicines. Follow-up care is a key part of your treatment and safety. Be sure to make and go to all appointments, and call your doctor if you are having problems. It's also a good idea to know your test results and keep a list of the medicines you take. Where can you learn more? Go to http://killian-heather.info/. Enter N202 in the search box to learn more about \"Learning About Diabetes Food Guidelines. \"  Current as of: March 13, 2017  Content Version: 11.4  © 5216-4336 [employer]. Care instructions adapted under license by [employer] (which disclaims liability or warranty for this information). If you have questions about a medical condition or this instruction, always ask your healthcare professional. [place] [address] any warranty or liability for your use of this information.        Starting a Weight Loss Plan: Care Instructions  Your Care Instructions    If you are thinking about losing weight, it can be hard to know where to start. Your doctor can help you set up a weight loss plan that best meets your needs. You may want to take a class on nutrition or exercise, or join a weight loss support group. If you have questions about how to make changes to your eating or exercise habits, ask your doctor about seeing a registered dietitian or an exercise specialist.  It can be a big challenge to lose weight. But you do not have to make huge changes at once. Make small changes, and stick with them. When those changes become habit, add a few more changes. If you do not think you are ready to make changes right now, try to pick a date in the future. Make an appointment to see your doctor to discuss whether the time is right for you to start a plan. Follow-up care is a key part of your treatment and safety. Be sure to make and go to all appointments, and call your doctor if you are having problems. It's also a good idea to know your test results and keep a list of the medicines you take. How can you care for yourself at home? · Set realistic goals. Many people expect to lose much more weight than is likely. A weight loss of 5% to 10% of your body weight may be enough to improve your health. · Get family and friends involved to provide support. Talk to them about why you are trying to lose weight, and ask them to help. They can help by participating in exercise and having meals with you, even if they may be eating something different. · Find what works best for you. If you do not have time or do not like to cook, a program that offers meal replacement bars or shakes may be better for you. Or if you like to prepare meals, finding a plan that includes daily menus and recipes may be best.  · Ask your doctor about other health professionals who can help you achieve your weight loss goals. ¨ A dietitian can help you make healthy changes in your diet.   ¨ An exercise specialist or  can help you develop a safe and effective exercise program.  ¨ A counselor or psychiatrist can help you cope with issues such as depression, anxiety, or family problems that can make it hard to focus on weight loss. · Consider joining a support group for people who are trying to lose weight. Your doctor can suggest groups in your area. Where can you learn more? Go to http://killian-heather.info/. Enter N945 in the search box to learn more about \"Starting a Weight Loss Plan: Care Instructions. \"  Current as of: October 13, 2016  Content Version: 11.4  © 1124-4573 "[employer]. Care instructions adapted under license by [employer] (which disclaims liability or warranty for this information). If you have questions about a medical condition or this instruction, always ask your healthcare professional. [address] any warranty or liability for your use of this information.

## 2017-12-12 NOTE — PROGRESS NOTES
Chief Complaint   Patient presents with    Hypertension    Vitamin D Deficiency    Results     Patient is here today for F/u.   1. Have you been to the ER, urgent care clinic since your last visit? Hospitalized since your last visit? No    2. Have you seen or consulted any other health care providers outside of the [address] [place] [address] since your last visit? Include any pap smears or colon screening.  No

## 2017-12-12 NOTE — MR AVS SNAPSHOT
Visit Information Date & Time Provider Department Dept. Phone Encounter #  
 12/12/2017  9:00 AM Ursula Allenrogen [phone] [facility] [id number] Follow-up Instructions Return in about 3 months (around 3/12/2018) for Labs 1 week before. Upcoming Health Maintenance Date Due DTaP/Tdap/Td series (1 - Tdap) 9/2/2012 PAP AKA CERVICAL CYTOLOGY 10/16/2016 BREAST CANCER SCRN MAMMOGRAM 10/1/2017 EYE EXAM RETINAL OR DILATED Q1 11/8/2017 MICROALBUMIN Q1 4/25/2018 HEMOGLOBIN A1C Q6M 6/5/2018 LIPID PANEL Q1 8/29/2018 FOOT EXAM Q1 9/5/2018 COLONOSCOPY 5/14/2022 Allergies as of 12/12/2017  Review Complete On: 12/12/2017 By: Chaparro Conte LPN No Known Allergies Current Immunizations  Reviewed on 10/13/2016 Name Date Influenza Vaccine 11/3/2014 Influenza Vaccine (Quad) PF 9/5/2017, 10/13/2016, 10/8/2015 Pneumococcal Conjugate (PCV-13) 1/19/2017 Td 9/1/2012 Not reviewed this visit You Were Diagnosed With   
  
 Codes Comments Essential hypertension    -  Primary ICD-10-CM: I10 
ICD-9-CM: 401.9 Type 2 diabetes mellitus with hyperglycemia, without long-term current use of insulin (HCC)     ICD-10-CM: E11.65 ICD-9-CM: 250.00, 790.29 Hypovitaminosis D     ICD-10-CM: E55.9 ICD-9-CM: 268.9 Obesity (BMI 30.0-34.9)     ICD-10-CM: E97.7 ICD-9-CM: 278.00 Encounter for screening mammogram for breast cancer     ICD-10-CM: Z12.31 
ICD-9-CM: V76.12 Vitals BP Pulse Temp Resp Height(growth percentile) Weight(growth percentile) 130/80 (BP 1 Location: Left arm, BP Patient Position: Sitting) 60 98.5 °F (36.9 °C) (Oral) 16 5' 1\" (1.549 m) 182 lb 9.6 oz (82.8 kg) SpO2 BMI OB Status Smoking Status 97% 34.5 kg/m2 Hysterectomy Never Smoker Vitals History BMI and BSA Data Body Mass Index Body Surface Area 34.5 kg/m 2 1.89 m 2 Preferred Pharmacy Pharmacy Name Phone [address], [address] [place] [address] [phone] [facility] [phone] Your Updated Medication List  
  
   
This list is accurate as of: 12/12/17  9:52 AM.  Always use your most recent med list. amLODIPine 5 mg tablet Commonly known as:  Crawford Mend TAKE 1 TABLET BY MOUTH EVERY DAY  
  
 cetirizine 10 mg tablet Commonly known as:  ZYRTEC Take 1 Tab by mouth daily. ergocalciferol 50,000 unit capsule Commonly known as:  VITAMIN D2 Take 1 Cap by mouth every seven (7) days. fluticasone 50 mcg/actuation nasal spray Commonly known as:  Cathlyn Deshawn 2 Sprays by Both Nostrils route daily as needed for Rhinitis. ibuprofen 600 mg tablet Commonly known as:  MOTRIN Take 1 Tab by mouth every six (6) hours as needed for Pain.  
  
 losartan-hydroCHLOROthiazide 100-25 mg per tablet Commonly known as:  HYZAAR  
TAKE 1 TABLET BY MOUTH EVERY DAY  
  
 traMADol-acetaminophen 37.5-325 mg per tablet Commonly known as:  ULTRACET Take 1 Tab by mouth every four (4) hours as needed for Pain. Max Daily Amount: 6 Tabs. Prescriptions Sent to Pharmacy Refills  
 ergocalciferol (VITAMIN D2) 50,000 unit capsule 5 Sig: Take 1 Cap by mouth every seven (7) days. Class: Normal  
 Pharmacy: [address] [place] [address] #: [phone] Route: Oral  
  
Follow-up Instructions Return in about 3 months (around 3/12/2018) for Labs 1 week before. To-Do List   
 12/12/2017 Imaging:  PAT MAMMO BI SCREENING INCL CAD   
  
 03/12/2018 Lab:  CBC WITH AUTOMATED DIFF   
  
 03/12/2018 Lab:  HEMOGLOBIN A1C W/O EAG   
  
 03/12/2018 Lab:  LIPID PANEL   
  
 03/12/2018 Lab:  METABOLIC PANEL, COMPREHENSIVE   
  
 03/12/2018 Lab:  T4, FREE   
  
 03/12/2018 Lab:  TSH 3RD GENERATION   
  
 03/12/2018 Lab: VITAMIN D, 25 HYDROXY Patient Instructions DASH Diet: Care Instructions Your Care Instructions The DASH diet is an eating plan that can help lower your blood pressure. DASH stands for Dietary Approaches to Stop Hypertension. Hypertension is high blood pressure. The DASH diet focuses on eating foods that are high in calcium, potassium, and magnesium. These nutrients can lower blood pressure. The foods that are highest in these nutrients are fruits, vegetables, low-fat dairy products, nuts, seeds, and legumes. But taking calcium, potassium, and magnesium supplements instead of eating foods that are high in those nutrients does not have the same effect. The DASH diet also includes whole grains, fish, and poultry. The DASH diet is one of several lifestyle changes your doctor may recommend to lower your high blood pressure. Your doctor may also want you to decrease the amount of sodium in your diet. Lowering sodium while following the DASH diet can lower blood pressure even further than just the DASH diet alone. Follow-up care is a key part of your treatment and safety. Be sure to make and go to all appointments, and call your doctor if you are having problems. It's also a good idea to know your test results and keep a list of the medicines you take. How can you care for yourself at home? Following the DASH diet · Eat 4 to 5 servings of fruit each day. A serving is 1 medium-sized piece of fruit, ½ cup chopped or canned fruit, 1/4 cup dried fruit, or 4 ounces (½ cup) of fruit juice. Choose fruit more often than fruit juice. · Eat 4 to 5 servings of vegetables each day. A serving is 1 cup of lettuce or raw leafy vegetables, ½ cup of chopped or cooked vegetables, or 4 ounces (½ cup) of vegetable juice. Choose vegetables more often than vegetable juice. · Get 2 to 3 servings of low-fat and fat-free dairy each day. A serving is 8 ounces of milk, 1 cup of yogurt, or 1 ½ ounces of cheese. · Eat 6 to 8 servings of grains each day. A serving is 1 slice of bread, 1 ounce of dry cereal, or ½ cup of cooked rice, pasta, or cooked cereal. Try to choose whole-grain products as much as possible. · Limit lean meat, poultry, and fish to 2 servings each day. A serving is 3 ounces, about the size of a deck of cards. · Eat 4 to 5 servings of nuts, seeds, and legumes (cooked dried beans, lentils, and split peas) each week. A serving is 1/3 cup of nuts, 2 tablespoons of seeds, or ½ cup of cooked beans or peas. · Limit fats and oils to 2 to 3 servings each day. A serving is 1 teaspoon of vegetable oil or 2 tablespoons of salad dressing. · Limit sweets and added sugars to 5 servings or less a week. A serving is 1 tablespoon jelly or jam, ½ cup sorbet, or 1 cup of lemonade. · Eat less than 2,300 milligrams (mg) of sodium a day. If you limit your sodium to 1,500 mg a day, you can lower your blood pressure even more. Tips for success · Start small. Do not try to make dramatic changes to your diet all at once. You might feel that you are missing out on your favorite foods and then be more likely to not follow the plan. Make small changes, and stick with them. Once those changes become habit, add a few more changes. · Try some of the following: ¨ Make it a goal to eat a fruit or vegetable at every meal and at snacks. This will make it easy to get the recommended amount of fruits and vegetables each day. ¨ Try yogurt topped with fruit and nuts for a snack or healthy dessert. ¨ Add lettuce, tomato, cucumber, and onion to sandwiches. ¨ Combine a ready-made pizza crust with low-fat mozzarella cheese and lots of vegetable toppings. Try using tomatoes, squash, spinach, broccoli, carrots, cauliflower, and onions. ¨ Have a variety of cut-up vegetables with a low-fat dip as an appetizer instead of chips and dip. ¨ Sprinkle sunflower seeds or chopped almonds over salads.  Or try adding chopped walnuts or almonds to cooked vegetables. ¨ Try some vegetarian meals using beans and peas. Add garbanzo or kidney beans to salads. Make burritos and tacos with mashed [place] beans or black beans. Where can you learn more? Go to http://killian-heather.info/. Enter K060 in the search box to learn more about \"DASH Diet: Care Instructions. \" Current as of: September 21, 2016 Content Version: 11.4 © 2214-7286 The Old Reader. Care instructions adapted under license by [employer] (which disclaims liability or warranty for this information). If you have questions about a medical condition or this instruction, always ask your healthcare professional. [address] any warranty or liability for your use of this information. Learning About Diabetes Food Guidelines Your Care Instructions Meal planning is important to manage diabetes. It helps keep your blood sugar at a target level (which you set with your doctor). You don't have to eat special foods. You can eat what your family eats, including sweets once in a while. But you do have to pay attention to how often you eat and how much you eat of certain foods. You may want to work with a dietitian or a certified diabetes educator (CDE) to help you plan meals and snacks. A dietitian or CDE can also help you lose weight if that is one of your goals. What should you know about eating carbs? Managing the amount of carbohydrate (carbs) you eat is an important part of healthy meals when you have diabetes. Carbohydrate is found in many foods. · Learn which foods have carbs. And learn the amounts of carbs in different foods. ¨ Bread, cereal, pasta, and rice have about 15 grams of carbs in a serving. A serving is 1 slice of bread (1 ounce), ½ cup of cooked cereal, or 1/3 cup of cooked pasta or rice. ¨ Fruits have 15 grams of carbs in a serving.  A serving is 1 small fresh fruit, such as an apple or orange; ½ of a banana; ½ cup of cooked or canned fruit; ½ cup of fruit juice; 1 cup of melon or raspberries; or 2 tablespoons of dried fruit. ¨ Milk and no-sugar-added yogurt have 15 grams of carbs in a serving. A serving is 1 cup of milk or 2/3 cup of no-sugar-added yogurt. ¨ Starchy vegetables have 15 grams of carbs in a serving. A serving is ½ cup of mashed potatoes or sweet potato; 1 cup winter squash; ½ of a small baked potato; ½ cup of cooked beans; or ½ cup cooked corn or green peas. · Learn how much carbs to eat each day and at each meal. A dietitian or CDE can teach you how to keep track of the amount of carbs you eat. This is called carbohydrate counting. · If you are not sure how to count carbohydrate grams, use the Plate Method to plan meals. It is a good, quick way to make sure that you have a balanced meal. It also helps you spread carbs throughout the day. ¨ Divide your plate by types of foods. Put non-starchy vegetables on half the plate, meat or other protein food on one-quarter of the plate, and a grain or starchy vegetable in the final quarter of the plate. To this you can add a small piece of fruit and 1 cup of milk or yogurt, depending on how many carbs you are supposed to eat at a meal. 
· Try to eat about the same amount of carbs at each meal. Do not \"save up\" your daily allowance of carbs to eat at one meal. 
· Proteins have very little or no carbs per serving. Examples of proteins are beef, chicken, turkey, fish, eggs, tofu, cheese, cottage cheese, and peanut butter. A serving size of meat is 3 ounces, which is about the size of a deck of cards. Examples of meat substitute serving sizes (equal to 1 ounce of meat) are 1/4 cup of cottage cheese, 1 egg, 1 tablespoon of peanut butter, and ½ cup of tofu. How can you eat out and still eat healthy? · Learn to estimate the serving sizes of foods that have carbohydrate.  If you measure food at home, it will be easier to estimate the amount in a serving of restaurant food. · If the meal you order has too much carbohydrate (such as potatoes, corn, or baked beans), ask to have a low-carbohydrate food instead. Ask for a salad or green vegetables. · If you use insulin, check your blood sugar before and after eating out to help you plan how much to eat in the future. · If you eat more carbohydrate at a meal than you had planned, take a walk or do other exercise. This will help lower your blood sugar. What else should you know? · Limit saturated fat, such as the fat from meat and dairy products. This is a healthy choice because people who have diabetes are at higher risk of heart disease. So choose lean cuts of meat and nonfat or low-fat dairy products. Use olive or canola oil instead of butter or shortening when cooking. · Don't skip meals. Your blood sugar may drop too low if you skip meals and take insulin or certain medicines for diabetes. · Check with your doctor before you drink alcohol. Alcohol can cause your blood sugar to drop too low. Alcohol can also cause a bad reaction if you take certain diabetes medicines. Follow-up care is a key part of your treatment and safety. Be sure to make and go to all appointments, and call your doctor if you are having problems. It's also a good idea to know your test results and keep a list of the medicines you take. Where can you learn more? Go to http://killian-heather.info/. Enter P906 in the search box to learn more about \"Learning About Diabetes Food Guidelines. \" Current as of: March 13, 2017 Content Version: 11.4 © 3685-8320 [employer]. Care instructions adapted under license by [employer] (which disclaims liability or warranty for this information).  If you have questions about a medical condition or this instruction, always ask your healthcare professional. [employer] disclaims any warranty or liability for your use of this information. Starting a Weight Loss Plan: Care Instructions Your Care Instructions If you are thinking about losing weight, it can be hard to know where to start. Your doctor can help you set up a weight loss plan that best meets your needs. You may want to take a class on nutrition or exercise, or join a weight loss support group. If you have questions about how to make changes to your eating or exercise habits, ask your doctor about seeing a registered dietitian or an exercise specialist. 
It can be a big challenge to lose weight. But you do not have to make huge changes at once. Make small changes, and stick with them. When those changes become habit, add a few more changes. If you do not think you are ready to make changes right now, try to pick a date in the future. Make an appointment to see your doctor to discuss whether the time is right for you to start a plan. Follow-up care is a key part of your treatment and safety. Be sure to make and go to all appointments, and call your doctor if you are having problems. It's also a good idea to know your test results and keep a list of the medicines you take. How can you care for yourself at home? · Set realistic goals. Many people expect to lose much more weight than is likely. A weight loss of 5% to 10% of your body weight may be enough to improve your health. · Get family and friends involved to provide support. Talk to them about why you are trying to lose weight, and ask them to help. They can help by participating in exercise and having meals with you, even if they may be eating something different. · Find what works best for you. If you do not have time or do not like to cook, a program that offers meal replacement bars or shakes may be better for you.  Or if you like to prepare meals, finding a plan that includes daily menus and recipes may be best. 
 · Ask your doctor about other health professionals who can help you achieve your weight loss goals. ¨ A dietitian can help you make healthy changes in your diet. ¨ An exercise specialist or  can help you develop a safe and effective exercise program. 
¨ A counselor or psychiatrist can help you cope with issues such as depression, anxiety, or family problems that can make it hard to focus on weight loss. · Consider joining a support group for people who are trying to lose weight. Your doctor can suggest groups in your area. Where can you learn more? Go to http://killian-heather.info/. Enter I821 in the search box to learn more about \"Starting a Weight Loss Plan: Care Instructions. \" Current as of: October 13, 2016 Content Version: 11.4 © 1453-7124 [employer]. Care instructions adapted under license by [employer] (which disclaims liability or warranty for this information). If you have questions about a medical condition or this instruction, always ask your healthcare professional. [place] [address] any warranty or liability for your use of this information. Introducing [facility]! Dear Harshal Filter: Thank you for requesting a [employer] account. Our records indicate that you already have an active [employer] account. You can access your account anytime at [email or web address] Did you know that you can access your hospital and ER discharge instructions at any time in [employer]? You can also review all of your test results from your hospital stay or ER visit. Additional Information If you have questions, please visit the Frequently Asked Questions section of the [employer] website at [email or web address] Remember, [employer] is NOT to be used for urgent needs. For medical emergencies, dial 911. Now available from your iPhone and Android! Please provide this summary of care documentation to your next provider. Your primary care clinician is listed as Suki Andrade. If you have any questions after today's visit, please call [phone].

## 2017-12-19 DIAGNOSIS — I10 ESSENTIAL HYPERTENSION: ICD-10-CM

## 2017-12-19 NOTE — TELEPHONE ENCOUNTER
Pt called in requesting refill of her   Requested Prescriptions     Pending Prescriptions Disp Refills    amLODIPine (NORVASC) 5 mg tablet 30 Tab 5    losartan-hydroCHLOROthiazide (HYZAAR) 100-25 mg per tablet 30 Tab 3     Sig: TAKE 1 TABLET BY MOUTH EVERY DAY   .

## 2017-12-21 RX ORDER — AMLODIPINE BESYLATE 5 MG/1
TABLET ORAL
Qty: 30 TAB | Refills: 5 | Status: SHIPPED | OUTPATIENT
Start: 2017-12-21 | End: 2018-09-04 | Stop reason: SDUPTHER

## 2017-12-21 RX ORDER — LOSARTAN POTASSIUM AND HYDROCHLOROTHIAZIDE 25; 100 MG/1; MG/1
TABLET ORAL
Qty: 30 TAB | Refills: 5 | Status: SHIPPED | OUTPATIENT
Start: 2017-12-21 | End: 2017-12-26 | Stop reason: SDUPTHER

## 2017-12-26 DIAGNOSIS — I10 ESSENTIAL HYPERTENSION: ICD-10-CM

## 2017-12-26 RX ORDER — LOSARTAN POTASSIUM AND HYDROCHLOROTHIAZIDE 25; 100 MG/1; MG/1
TABLET ORAL
Qty: 30 TAB | Refills: 5 | Status: SHIPPED | OUTPATIENT
Start: 2017-12-26 | End: 2018-07-31 | Stop reason: SDUPTHER

## 2018-01-24 NOTE — TELEPHONE ENCOUNTER
I called Pt in regards to refill on Zofran sts that she has started to take Contrave again and it causes Nausea.   Would like to see if the provider will send the Rx to [facility] on [address]      Dr Holden Frank Please advise

## 2018-01-25 RX ORDER — ONDANSETRON 4 MG/1
4 TABLET, ORALLY DISINTEGRATING ORAL
Qty: 30 TAB | Refills: 2 | Status: SHIPPED | OUTPATIENT
Start: 2018-01-25 | End: 2018-03-04

## 2018-03-04 ENCOUNTER — HOSPITAL ENCOUNTER (EMERGENCY)
Age: 58
Discharge: HOME | End: 2018-03-04
Attending: EMERGENCY MEDICINE
Payer: COMMERCIAL

## 2018-03-04 VITALS
DIASTOLIC BLOOD PRESSURE: 77 MMHG | SYSTOLIC BLOOD PRESSURE: 142 MMHG | OXYGEN SATURATION: 97 % | TEMPERATURE: 98.6 F | HEART RATE: 64 BPM | RESPIRATION RATE: 18 BRPM

## 2018-03-04 DIAGNOSIS — J02.0 ACUTE STREPTOCOCCAL PHARYNGITIS: Primary | ICD-10-CM

## 2018-03-04 PROCEDURE — 99282 EMERGENCY DEPT VISIT SF MDM: CPT

## 2018-03-04 RX ORDER — AMOXICILLIN 500 MG/1
500 TABLET, FILM COATED ORAL 2 TIMES DAILY
Qty: 20 TAB | Refills: 0 | Status: SHIPPED | OUTPATIENT
Start: 2018-03-04 | End: 2019-01-15 | Stop reason: ALTCHOICE

## 2018-03-04 RX ORDER — LIDOCAINE HYDROCHLORIDE 20 MG/ML
SOLUTION OROPHARYNGEAL
Qty: 200 ML | Refills: 0 | Status: SHIPPED | OUTPATIENT
Start: 2018-03-04 | End: 2019-01-15 | Stop reason: ALTCHOICE

## 2018-03-04 NOTE — DISCHARGE INSTRUCTIONS
Strep Throat: Care Instructions  Your Care Instructions    Strep throat is a bacterial infection that causes sudden, severe sore throat and fever. Strep throat, which is caused by bacteria called streptococcus, is treated with antibiotics. Sometimes a strep test is necessary to tell if the sore throat is caused by strep bacteria. Treatment can help ease symptoms and may prevent future problems. Follow-up care is a key part of your treatment and safety. Be sure to make and go to all appointments, and call your doctor if you are having problems. It's also a good idea to know your test results and keep a list of the medicines you take. How can you care for yourself at home? · Take your antibiotics as directed. Do not stop taking them just because you feel better. You need to take the full course of antibiotics. · Strep throat can spread to others until 24 hours after you begin taking antibiotics. During this time, you should avoid contact with other people at work or home, especially infants and children. Do not sneeze or cough on others, and wash your hands often. Keep your drinking glass and eating utensils separate from those of others, and wash these items well in hot, soapy water. · Gargle with warm salt water at least once each hour to help reduce swelling and make your throat feel better. Use 1 teaspoon of salt mixed in 8 fluid ounces of warm water. · Take an over-the-counter pain medication, such as acetaminophen (Tylenol), ibuprofen (Advil, Motrin), or naproxen (Aleve). Read and follow all instructions on the label. · Try an over-the-counter anesthetic throat spray or throat lozenges, which may help relieve throat pain. · Drink plenty of fluids. Fluids may help soothe an irritated throat. Hot fluids, such as tea or soup, may help your throat feel better. · Eat soft solids and drink plenty of clear liquids.  Flavored ice pops, ice cream, scrambled eggs, sherbet, and gelatin dessert (such as Jell-O) may also soothe the throat. · Get lots of rest.  · Do not smoke, and avoid secondhand smoke. If you need help quitting, talk to your doctor about stop-smoking programs and medicines. These can increase your chances of quitting for good. · Use a vaporizer or humidifier to add moisture to the air in your bedroom. Follow the directions for cleaning the machine. When should you call for help? Call your doctor now or seek immediate medical care if:  ? · You have a new or higher fever. ? · You have a fever with a stiff neck or severe headache. ? · You have new or worse trouble swallowing. ? · Your sore throat gets much worse on one side. ? · Your pain becomes much worse on one side of your throat. ? Watch closely for changes in your health, and be sure to contact your doctor if:  ? · You are not getting better after 2 days (48 hours). ? · You do not get better as expected. Where can you learn more? Go to http://killian-heather.info/. Enter K625 in the search box to learn more about \"Strep Throat: Care Instructions. \"  Current as of: May 12, 2017  Content Version: 11.4  © 3545-8292 [employer], [employer]. Care instructions adapted under license by [employer] (which disclaims liability or warranty for this information). If you have questions about a medical condition or this instruction, always ask your healthcare professional. [address] any warranty or liability for your use of this information.

## 2018-03-04 NOTE — ED NOTES
Written discharge instructions with 2 prescriptions given to patient. Discharge home ambulatory in no distress.

## 2018-03-04 NOTE — ED PROVIDER NOTES
EMERGENCY DEPARTMENT HISTORY AND PHYSICAL EXAM    11:51 AM      Date: (Not on file)  Patient Name: Marc Ortega    History of Presenting Illness     Chief Complaint   Patient presents with    Sore Throat         History Provided By: Patient    Chief Complaint: sore throat   Duration:  Days  Timing:  Acute  Location: throat  Quality: Aching  Severity: Moderate  Modifying Factors: none  Associated Symptoms: denies any other associated signs or symptoms      Additional History (Context): Marc Ortega is a 56 y.o. female with hypertension who presents with c/o sore throat x 3 days. Notes subjective fever as well. Took Tylenol PTA. Denies ear pain, cough, n/v, sick contacts. Did not receive a flu shot this year. PCP: Rosalind Hassan, DO    Current Outpatient Prescriptions   Medication Sig Dispense Refill    amoxicillin 500 mg tab Take 500 mg by mouth two (2) times a day. 20 Tab 0    lidocaine (LIDOCAINE VISCOUS) 2 % solution Put 10 mL in mouth and swish and spit out QAC and at bedtime 200 mL 0    losartan-hydroCHLOROthiazide (HYZAAR) 100-25 mg per tablet TAKE 1 TABLET BY MOUTH EVERY DAY 30 Tab 5    amLODIPine (NORVASC) 5 mg tablet TAKE 1 TABLET BY MOUTH EVERY DAY 30 Tab 5    ergocalciferol (VITAMIN D2) 50,000 unit capsule Take 1 Cap by mouth every seven (7) days. 8 Cap 5    fluticasone (FLONASE) 50 mcg/actuation nasal spray 2 Sprays by Both Nostrils route daily as needed for Rhinitis.  1 Bottle 3       Past History     Past Medical History:  Past Medical History:   Diagnosis Date    AR (allergic rhinitis)     Arthritis     Heart murmur     Hypertension     Hypovitaminosis D 3/3/2014       Past Surgical History:  Past Surgical History:   Procedure Laterality Date    HX  SECTION      HX COLONOSCOPY  2012    normal per patient (by Dr. Dominga Pino)   [facility] HX HYSTERECTOMY      s/p vaginal hysterectomy        Family History:  Family History   Problem Relation Age of Onset    Hypertension Mother     Hypertension Father     Headache Father     Diabetes Father    [facility] Arthritis-rheumatoid Father     Hypertension Sister     Diabetes Sister     Cancer Sister 47     breast cancer    Hypertension Brother        Social History:  Social History   Substance Use Topics    Smoking status: Never Smoker    Smokeless tobacco: Never Used    Alcohol use 0.5 oz/week     1 Standard drinks or equivalent per week       Allergies:  No Known Allergies      Review of Systems       Review of Systems   Constitutional: Positive for fever. Negative for chills. HENT: Positive for sore throat. Negative for tinnitus, trouble swallowing and voice change. Respiratory: Negative for shortness of breath. Cardiovascular: Negative for chest pain. Gastrointestinal: Negative for abdominal pain, nausea and vomiting. Skin: Negative for rash. Neurological: Negative for weakness. All other systems reviewed and are negative. Physical Exam     Visit Vitals    /77 (BP 1 Location: Left arm, BP Patient Position: Sitting)    Pulse 64    Temp 98.6 °F (37 °C)    Resp 18    SpO2 97%         Physical Exam   Constitutional: She appears well-developed and well-nourished. No distress. HENT:   Head: Normocephalic and atraumatic. Right Ear: Tympanic membrane and ear canal normal.   Left Ear: Tympanic membrane and ear canal normal.   Nose: Nose normal.   Mouth/Throat: Uvula is midline and mucous membranes are normal. Oropharyngeal exudate and posterior oropharyngeal erythema present. No posterior oropharyngeal edema or tonsillar abscesses. Neck: Normal range of motion. Neck supple. Cardiovascular: Normal rate, regular rhythm and normal heart sounds. Exam reveals no gallop and no friction rub. No murmur heard. Pulmonary/Chest: Breath sounds normal. No stridor. No respiratory distress. She has no wheezes. She has no rales. Lymphadenopathy:     She has no cervical adenopathy.    Neurological: She is alert. Skin: Skin is warm. No rash noted. She is not diaphoretic. Nursing note and vitals reviewed. Diagnostic Study Results     Labs -  No results found for this or any previous visit (from the past 12 hour(s)). Radiologic Studies -   No orders to display         Medical Decision Making   I am the first provider for this patient. I reviewed the vital signs, available nursing notes, past medical history, past surgical history, family history and social history. Vital Signs-Reviewed the patient's vital signs. Pulse Oximetry Analysis -  97 on room air     Records Reviewed: Nursing Notes and Old Medical Records (Time of Review: 11:51 AM)    Provider Notes (Medical Decision Making): 55 yo F who presents due to sore throat x 3 days. +pharynx exudates and erythema, lack of cough, subjective fever. Will treat with Amoxicillin and viscous lidocaine for discomfort. No drooling or stridor, looks well, VS stable. Stable for d/c with outpatient follow-up. Diagnosis     Clinical Impression:   1. Acute streptococcal pharyngitis        Disposition: home    Follow-up Information     Follow up With Details Comments Contact Info    [facility] EMERGENCY DEPT  If symptoms worsen [address] [place] In 2 days  [address] [place] [address] [place] [address]             Patient's Medications   Start Taking    AMOXICILLIN 500 MG TAB    Take 500 mg by mouth two (2) times a day. LIDOCAINE (LIDOCAINE VISCOUS) 2 % SOLUTION    Put 10 mL in mouth and swish and spit out QAC and at bedtime   Continue Taking    AMLODIPINE (NORVASC) 5 MG TABLET    TAKE 1 TABLET BY MOUTH EVERY DAY    ERGOCALCIFEROL (VITAMIN D2) 50,000 UNIT CAPSULE    Take 1 Cap by mouth every seven (7) days. FLUTICASONE (FLONASE) 50 MCG/ACTUATION NASAL SPRAY    2 Sprays by Both Nostrils route daily as needed for Rhinitis.     LOSARTAN-HYDROCHLOROTHIAZIDE (HYZAAR) 100-25 MG PER TABLET    TAKE 1 TABLET BY MOUTH EVERY DAY   These Medications have changed    No medications on file   Stop Taking    CETIRIZINE (ZYRTEC) 10 MG TABLET    Take 1 Tab by mouth daily. IBUPROFEN (MOTRIN) 600 MG TABLET    Take 1 Tab by mouth every six (6) hours as needed for Pain. ONDANSETRON (ZOFRAN ODT) 4 MG DISINTEGRATING TABLET    Take 1 Tab by mouth every eight (8) hours as needed for Nausea. TRAMADOL-ACETAMINOPHEN (ULTRACET) 37.5-325 MG PER TABLET    Take 1 Tab by mouth every four (4) hours as needed for Pain. Max Daily Amount: 6 Tabs.

## 2018-03-04 NOTE — LETTER
[address] [place] [address] [facility] 
[address] [facility] [address] [phone] Work/School Note Date: 3/4/2018 To Whom It May concern: Heidi Jain was seen and treated today in the emergency room by the following provider(s): 
Attending Provider: Mane Flanagan MD 
Physician Assistant: Ino Larson. Heidi Jain may return to work on 3/6/18. Sincerely, 
 
 
 
 
Ino Larson

## 2018-03-09 LAB
25(OH)D3 SERPL-MCNC: 27.6 NG/ML (ref 32–100)
A-G RATIO,AGRAT: 1.8 RATIO (ref 1.1–2.6)
ABSOLUTE LYMPHOCYTE COUNT, 10803: 2 K/UL (ref 1–4.8)
ALBUMIN SERPL-MCNC: 4.2 G/DL (ref 3.5–5)
ALP SERPL-CCNC: 30 U/L (ref 25–115)
ALT SERPL-CCNC: 14 U/L (ref 5–40)
ANION GAP SERPL CALC-SCNC: 13 MMOL/L
AST SERPL W P-5'-P-CCNC: 12 U/L (ref 10–37)
AVG GLU, 10930: 123 MG/DL (ref 91–123)
BASOPHILS # BLD: 0 K/UL (ref 0–0.2)
BASOPHILS NFR BLD: 0 % (ref 0–2)
BILIRUB SERPL-MCNC: 0.3 MG/DL (ref 0.2–1.2)
BUN SERPL-MCNC: 12 MG/DL (ref 6–22)
CALCIUM SERPL-MCNC: 9.3 MG/DL (ref 8.4–10.5)
CHLORIDE SERPL-SCNC: 102 MMOL/L (ref 98–110)
CHOLEST SERPL-MCNC: 161 MG/DL (ref 110–200)
CO2 SERPL-SCNC: 26 MMOL/L (ref 20–32)
CREAT SERPL-MCNC: 0.8 MG/DL (ref 0.5–1.2)
EOSINOPHIL # BLD: 0.1 K/UL (ref 0–0.5)
EOSINOPHIL NFR BLD: 2 % (ref 0–6)
ERYTHROCYTE [DISTWIDTH] IN BLOOD BY AUTOMATED COUNT: 14 % (ref 10–16)
GFRAA, 66117: >60
GFRNA, 66118: >60
GLOBULIN,GLOB: 2.4 G/DL (ref 2–4)
GLUCOSE SERPL-MCNC: 96 MG/DL (ref 70–99)
GRANULOCYTES,GRANS: 60 % (ref 40–75)
HBA1C MFR BLD HPLC: 5.9 % (ref 4.8–5.9)
HCT VFR BLD AUTO: 38.7 % (ref 35.1–48)
HDLC SERPL-MCNC: 3.1 MG/DL (ref 0–5)
HDLC SERPL-MCNC: 52 MG/DL (ref 40–59)
HGB BLD-MCNC: 12.1 G/DL (ref 11.7–16)
LDLC SERPL CALC-MCNC: 99 MG/DL (ref 50–99)
LYMPHOCYTES, LYMLT: 32 % (ref 27–45)
MCH RBC QN AUTO: 27 PG (ref 26–34)
MCHC RBC AUTO-ENTMCNC: 31 G/DL (ref 32–36)
MCV RBC AUTO: 86 FL (ref 80–95)
MONOCYTES # BLD: 0.4 K/UL (ref 0.1–0.9)
MONOCYTES NFR BLD: 6 % (ref 3–9)
NEUTROPHILS # BLD AUTO: 3.8 K/UL (ref 1.8–7.7)
PLATELET # BLD AUTO: 406 K/UL (ref 140–440)
PMV BLD AUTO: 9.4 FL (ref 6–10.8)
POTASSIUM SERPL-SCNC: 4.1 MMOL/L (ref 3.5–5.5)
PROT SERPL-MCNC: 6.6 G/DL (ref 6.4–8.3)
RBC # BLD AUTO: 4.52 M/UL (ref 3.8–5.2)
SODIUM SERPL-SCNC: 141 MMOL/L (ref 133–145)
T4 FREE SERPL-MCNC: 1.2 NG/DL (ref 0.9–1.8)
TRIGL SERPL-MCNC: 51 MG/DL (ref 40–149)
TSH SERPL DL<=0.005 MIU/L-ACNC: 1.99 MCU/ML (ref 0.27–4.2)
VLDLC SERPL CALC-MCNC: 10 MG/DL (ref 8–30)
WBC # BLD AUTO: 6.4 K/UL (ref 4–11)

## 2018-03-12 DIAGNOSIS — I10 ESSENTIAL HYPERTENSION: ICD-10-CM

## 2018-03-12 DIAGNOSIS — E55.9 HYPOVITAMINOSIS D: ICD-10-CM

## 2018-03-12 DIAGNOSIS — E11.65 TYPE 2 DIABETES MELLITUS WITH HYPERGLYCEMIA, WITHOUT LONG-TERM CURRENT USE OF INSULIN (HCC): ICD-10-CM

## 2018-03-14 ENCOUNTER — OFFICE VISIT (OUTPATIENT)
Dept: FAMILY MEDICINE CLINIC | Age: 58
End: 2018-03-14

## 2018-03-14 VITALS
SYSTOLIC BLOOD PRESSURE: 112 MMHG | OXYGEN SATURATION: 98 % | BODY MASS INDEX: 34.55 KG/M2 | DIASTOLIC BLOOD PRESSURE: 70 MMHG | HEIGHT: 61 IN | HEART RATE: 64 BPM | RESPIRATION RATE: 18 BRPM | TEMPERATURE: 98.1 F | WEIGHT: 183 LBS

## 2018-03-14 DIAGNOSIS — E66.9 OBESITY (BMI 30.0-34.9): ICD-10-CM

## 2018-03-14 DIAGNOSIS — Z79.899 ENCOUNTER FOR LONG-TERM (CURRENT) USE OF MEDICATIONS: ICD-10-CM

## 2018-03-14 DIAGNOSIS — E78.5 DYSLIPIDEMIA: ICD-10-CM

## 2018-03-14 DIAGNOSIS — E55.9 HYPOVITAMINOSIS D: ICD-10-CM

## 2018-03-14 DIAGNOSIS — E11.65 TYPE 2 DIABETES MELLITUS WITH HYPERGLYCEMIA, WITHOUT LONG-TERM CURRENT USE OF INSULIN (HCC): Primary | ICD-10-CM

## 2018-03-14 DIAGNOSIS — I10 ESSENTIAL HYPERTENSION: ICD-10-CM

## 2018-03-14 RX ORDER — SALICYLIC ACID, COLLOIDAL SULFUR 20; 20 MG/1000ML; MG/1000ML
SHAMPOO TOPICAL
Refills: 5 | COMMUNITY
Start: 2017-12-05 | End: 2021-10-11

## 2018-03-14 NOTE — PROGRESS NOTES
Urvashi Del Castillo is a  56 y.o. female presents today for office visit for routine follow up. 1. Have you been to the ER, urgent care clinic or hospitalized since your last visit? YES [facility] 3-4-2018       2. Have you seen or consulted any other health care providers outside of the [address] [place] [address] since your last visit (Include any pap smears or colon screening)?  YES, Criselda KENDALL

## 2018-03-14 NOTE — PATIENT INSTRUCTIONS
DASH Diet: Care Instructions  Your Care Instructions    The DASH diet is an eating plan that can help lower your blood pressure. DASH stands for Dietary Approaches to Stop Hypertension. Hypertension is high blood pressure. The DASH diet focuses on eating foods that are high in calcium, potassium, and magnesium. These nutrients can lower blood pressure. The foods that are highest in these nutrients are fruits, vegetables, low-fat dairy products, nuts, seeds, and legumes. But taking calcium, potassium, and magnesium supplements instead of eating foods that are high in those nutrients does not have the same effect. The DASH diet also includes whole grains, fish, and poultry. The DASH diet is one of several lifestyle changes your doctor may recommend to lower your high blood pressure. Your doctor may also want you to decrease the amount of sodium in your diet. Lowering sodium while following the DASH diet can lower blood pressure even further than just the DASH diet alone. Follow-up care is a key part of your treatment and safety. Be sure to make and go to all appointments, and call your doctor if you are having problems. It's also a good idea to know your test results and keep a list of the medicines you take. How can you care for yourself at home? Following the DASH diet  · Eat 4 to 5 servings of fruit each day. A serving is 1 medium-sized piece of fruit, ½ cup chopped or canned fruit, 1/4 cup dried fruit, or 4 ounces (½ cup) of fruit juice. Choose fruit more often than fruit juice. · Eat 4 to 5 servings of vegetables each day. A serving is 1 cup of lettuce or raw leafy vegetables, ½ cup of chopped or cooked vegetables, or 4 ounces (½ cup) of vegetable juice. Choose vegetables more often than vegetable juice. · Get 2 to 3 servings of low-fat and fat-free dairy each day. A serving is 8 ounces of milk, 1 cup of yogurt, or 1 ½ ounces of cheese. · Eat 6 to 8 servings of grains each day.  A serving is 1 slice of bread, 1 ounce of dry cereal, or ½ cup of cooked rice, pasta, or cooked cereal. Try to choose whole-grain products as much as possible. · Limit lean meat, poultry, and fish to 2 servings each day. A serving is 3 ounces, about the size of a deck of cards. · Eat 4 to 5 servings of nuts, seeds, and legumes (cooked dried beans, lentils, and split peas) each week. A serving is 1/3 cup of nuts, 2 tablespoons of seeds, or ½ cup of cooked beans or peas. · Limit fats and oils to 2 to 3 servings each day. A serving is 1 teaspoon of vegetable oil or 2 tablespoons of salad dressing. · Limit sweets and added sugars to 5 servings or less a week. A serving is 1 tablespoon jelly or jam, ½ cup sorbet, or 1 cup of lemonade. · Eat less than 2,300 milligrams (mg) of sodium a day. If you limit your sodium to 1,500 mg a day, you can lower your blood pressure even more. Tips for success  · Start small. Do not try to make dramatic changes to your diet all at once. You might feel that you are missing out on your favorite foods and then be more likely to not follow the plan. Make small changes, and stick with them. Once those changes become habit, add a few more changes. · Try some of the following:  ¨ Make it a goal to eat a fruit or vegetable at every meal and at snacks. This will make it easy to get the recommended amount of fruits and vegetables each day. ¨ Try yogurt topped with fruit and nuts for a snack or healthy dessert. ¨ Add lettuce, tomato, cucumber, and onion to sandwiches. ¨ Combine a ready-made pizza crust with low-fat mozzarella cheese and lots of vegetable toppings. Try using tomatoes, squash, spinach, broccoli, carrots, cauliflower, and onions. ¨ Have a variety of cut-up vegetables with a low-fat dip as an appetizer instead of chips and dip. ¨ Sprinkle sunflower seeds or chopped almonds over salads. Or try adding chopped walnuts or almonds to cooked vegetables.   ¨ Try some vegetarian meals using beans and peas. Add garbanzo or kidney beans to salads. Make burritos and tacos with mashed [place] beans or black beans. Where can you learn more? Go to http://killian-heather.info/. Enter Q868 in the search box to learn more about \"DASH Diet: Care Instructions. \"  Current as of: September 21, 2016  Content Version: 11.4  © 6746-5782 [employer]. Care instructions adapted under license by [employer] (which disclaims liability or warranty for this information). If you have questions about a medical condition or this instruction, always ask your healthcare professional. [address] any warranty or liability for your use of this information. Learning About Diabetes Food Guidelines  Your Care Instructions    Meal planning is important to manage diabetes. It helps keep your blood sugar at a target level (which you set with your doctor). You don't have to eat special foods. You can eat what your family eats, including sweets once in a while. But you do have to pay attention to how often you eat and how much you eat of certain foods. You may want to work with a dietitian or a certified diabetes educator (CDE) to help you plan meals and snacks. A dietitian or CDE can also help you lose weight if that is one of your goals. What should you know about eating carbs? Managing the amount of carbohydrate (carbs) you eat is an important part of healthy meals when you have diabetes. Carbohydrate is found in many foods. · Learn which foods have carbs. And learn the amounts of carbs in different foods. ¨ Bread, cereal, pasta, and rice have about 15 grams of carbs in a serving. A serving is 1 slice of bread (1 ounce), ½ cup of cooked cereal, or 1/3 cup of cooked pasta or rice. ¨ Fruits have 15 grams of carbs in a serving.  A serving is 1 small fresh fruit, such as an apple or orange; ½ of a banana; ½ cup of cooked or canned fruit; ½ cup of fruit juice; 1 cup of melon or raspberries; or 2 tablespoons of dried fruit. ¨ Milk and no-sugar-added yogurt have 15 grams of carbs in a serving. A serving is 1 cup of milk or 2/3 cup of no-sugar-added yogurt. ¨ Starchy vegetables have 15 grams of carbs in a serving. A serving is ½ cup of mashed potatoes or sweet potato; 1 cup winter squash; ½ of a small baked potato; ½ cup of cooked beans; or ½ cup cooked corn or green peas. · Learn how much carbs to eat each day and at each meal. A dietitian or CDE can teach you how to keep track of the amount of carbs you eat. This is called carbohydrate counting. · If you are not sure how to count carbohydrate grams, use the Plate Method to plan meals. It is a good, quick way to make sure that you have a balanced meal. It also helps you spread carbs throughout the day. ¨ Divide your plate by types of foods. Put non-starchy vegetables on half the plate, meat or other protein food on one-quarter of the plate, and a grain or starchy vegetable in the final quarter of the plate. To this you can add a small piece of fruit and 1 cup of milk or yogurt, depending on how many carbs you are supposed to eat at a meal.  · Try to eat about the same amount of carbs at each meal. Do not \"save up\" your daily allowance of carbs to eat at one meal.  · Proteins have very little or no carbs per serving. Examples of proteins are beef, chicken, turkey, fish, eggs, tofu, cheese, cottage cheese, and peanut butter. A serving size of meat is 3 ounces, which is about the size of a deck of cards. Examples of meat substitute serving sizes (equal to 1 ounce of meat) are 1/4 cup of cottage cheese, 1 egg, 1 tablespoon of peanut butter, and ½ cup of tofu. How can you eat out and still eat healthy? · Learn to estimate the serving sizes of foods that have carbohydrate. If you measure food at home, it will be easier to estimate the amount in a serving of restaurant food.   · If the meal you order has too much carbohydrate (such as potatoes, corn, or baked beans), ask to have a low-carbohydrate food instead. Ask for a salad or green vegetables. · If you use insulin, check your blood sugar before and after eating out to help you plan how much to eat in the future. · If you eat more carbohydrate at a meal than you had planned, take a walk or do other exercise. This will help lower your blood sugar. What else should you know? · Limit saturated fat, such as the fat from meat and dairy products. This is a healthy choice because people who have diabetes are at higher risk of heart disease. So choose lean cuts of meat and nonfat or low-fat dairy products. Use olive or canola oil instead of butter or shortening when cooking. · Don't skip meals. Your blood sugar may drop too low if you skip meals and take insulin or certain medicines for diabetes. · Check with your doctor before you drink alcohol. Alcohol can cause your blood sugar to drop too low. Alcohol can also cause a bad reaction if you take certain diabetes medicines. Follow-up care is a key part of your treatment and safety. Be sure to make and go to all appointments, and call your doctor if you are having problems. It's also a good idea to know your test results and keep a list of the medicines you take. Where can you learn more? Go to http://killian-heather.info/. Enter D645 in the search box to learn more about \"Learning About Diabetes Food Guidelines. \"  Current as of: March 13, 2017  Content Version: 11.4  © 7180-4573 [employer]. Care instructions adapted under license by [employer] (which disclaims liability or warranty for this information). If you have questions about a medical condition or this instruction, always ask your healthcare professional. [place] [address] any warranty or liability for your use of this information. Learning About the [address] Diet  What is the Mediterranean diet?     The Mediterranean diet is a style of eating rather than a diet plan. It features foods eaten in [place], [place], [place] and [place], and other countries along the [facility]. It emphasizes eating foods like fish, fruits, vegetables, beans, high-fiber breads and whole grains, nuts, and olive oil. This style of eating includes limited red meat, cheese, and sweets. Why choose the Mediterranean diet? A Mediterranean-style diet may improve heart health. It contains more fat than other heart-healthy diets. But the fats are mainly from nuts, unsaturated oils (such as fish oils and olive oil), and certain nut or seed oils (such as canola, soybean, or flaxseed oil). These fats may help protect the heart and blood vessels. How can you get started on the Mediterranean diet? Here are some things you can do to switch to a more Mediterranean way of eating. What to eat  · Eat a variety of fruits and vegetables each day, such as grapes, blueberries, tomatoes, broccoli, peppers, figs, olives, spinach, eggplant, beans, lentils, and chickpeas. · Eat a variety of whole-grain foods each day, such as oats, brown rice, and whole wheat bread, pasta, and couscous. · Eat fish at least 2 times a week. Try tuna, salmon, mackerel, lake trout, herring, or sardines. · Eat moderate amounts of low-fat dairy products, such as milk, cheese, or yogurt. · Eat moderate amounts of poultry and eggs. · Choose healthy (unsaturated) fats, such as nuts, olive oil, and certain nut or seed oils like canola, soybean, and flaxseed. · Limit unhealthy (saturated) fats, such as butter, palm oil, and coconut oil. And limit fats found in animal products, such as meat and dairy products made with whole milk. Try to eat red meat only a few times a month in very small amounts. · Limit sweets and desserts to only a few times a week. This includes sugar-sweetened drinks like soda.   The Mediterranean diet may also include red wine with your meal-1 glass each day for women and up to 2 glasses a day for men. Tips for eating at home  · Use herbs, spices, garlic, lemon zest, and citrus juice instead of salt to add flavor to foods. · Add avocado slices to your sandwich instead of sutton. · Have fish for lunch or dinner instead of red meat. Brush the fish with olive oil, and broil or grill it. · Sprinkle your salad with seeds or nuts instead of cheese. · Cook with olive or canola oil instead of butter or oils that are high in saturated fat. · Switch from 2% milk or whole milk to 1% or fat-free milk. · Dip raw vegetables in a vinaigrette dressing or hummus instead of dips made from mayonnaise or sour cream.  · Have a piece of fruit for dessert instead of a piece of cake. Try baked apples, or have some dried fruit. Tips for eating out  · Try broiled, grilled, baked, or poached fish instead of having it fried or breaded. · Ask your  to have your meals prepared with olive oil instead of butter. · Order dishes made with marinara sauce or sauces made from olive oil. Avoid sauces made from cream or mayonnaise. · Choose whole-grain breads, whole wheat pasta and pizza crust, brown rice, beans, and lentils. · Cut back on butter or margarine on bread. Instead, you can dip your bread in a small amount of olive oil. · Ask for a side salad or grilled vegetables instead of french fries or chips. Where can you learn more? Go to http://killian-heather.info/. Enter 639-554-8913 in the search box to learn more about \"Learning About the Mediterranean Diet. \"  Current as of: May 12, 2017  Content Version: 11.4  © 7460-8514 Healthwise, Incorporated. Care instructions adapted under license by [employer] (which disclaims liability or warranty for this information).  If you have questions about a medical condition or this instruction, always ask your healthcare professional. [address] any warranty or liability for your use of this information. Starting a Weight Loss Plan: Care Instructions  Your Care Instructions    If you are thinking about losing weight, it can be hard to know where to start. Your doctor can help you set up a weight loss plan that best meets your needs. You may want to take a class on nutrition or exercise, or join a weight loss support group. If you have questions about how to make changes to your eating or exercise habits, ask your doctor about seeing a registered dietitian or an exercise specialist.  It can be a big challenge to lose weight. But you do not have to make huge changes at once. Make small changes, and stick with them. When those changes become habit, add a few more changes. If you do not think you are ready to make changes right now, try to pick a date in the future. Make an appointment to see your doctor to discuss whether the time is right for you to start a plan. Follow-up care is a key part of your treatment and safety. Be sure to make and go to all appointments, and call your doctor if you are having problems. It's also a good idea to know your test results and keep a list of the medicines you take. How can you care for yourself at home? · Set realistic goals. Many people expect to lose much more weight than is likely. A weight loss of 5% to 10% of your body weight may be enough to improve your health. · Get family and friends involved to provide support. Talk to them about why you are trying to lose weight, and ask them to help. They can help by participating in exercise and having meals with you, even if they may be eating something different. · Find what works best for you. If you do not have time or do not like to cook, a program that offers meal replacement bars or shakes may be better for you.  Or if you like to prepare meals, finding a plan that includes daily menus and recipes may be best.  · Ask your doctor about other health professionals who can help you achieve your weight loss goals.  ¨ A dietitian can help you make healthy changes in your diet. ¨ An exercise specialist or  can help you develop a safe and effective exercise program.  ¨ A counselor or psychiatrist can help you cope with issues such as depression, anxiety, or family problems that can make it hard to focus on weight loss. · Consider joining a support group for people who are trying to lose weight. Your doctor can suggest groups in your area. Where can you learn more? Go to http://killian-heather.info/. Enter R156 in the search box to learn more about \"Starting a Weight Loss Plan: Care Instructions. \"  Current as of: October 13, 2016  Content Version: 11.4  © 1832-4499 Healthwise, Incorporated. Care instructions adapted under license by [employer] (which disclaims liability or warranty for this information). If you have questions about a medical condition or this instruction, always ask your healthcare professional. [address] any warranty or liability for your use of this information.

## 2018-03-14 NOTE — MR AVS SNAPSHOT
Christina Joe 
 
 
 [address] [address] 
[phone] Patient: Joshua Quiroz MRN: [id number] PRL:[id number]/15[id number] Visit Information Date & Time Provider Department Dept. Phone Encounter #  
 3/14/2018  9:00 AM Melissa Hicks [address] [facility] [phone] [id number] Follow-up Instructions Return in about 4 months (around 7/14/2018) for Labs 1 week before. Upcoming Health Maintenance Date Due DTaP/Tdap/Td series (1 - Tdap) 9/2/2012 PAP AKA CERVICAL CYTOLOGY 10/16/2016 BREAST CANCER SCRN MAMMOGRAM 10/1/2017 EYE EXAM RETINAL OR DILATED Q1 11/8/2017 MICROALBUMIN Q1 4/25/2018 FOOT EXAM Q1 9/5/2018 HEMOGLOBIN A1C Q6M 9/8/2018 LIPID PANEL Q1 3/8/2019 COLONOSCOPY 5/14/2022 Allergies as of 3/14/2018  Review Complete On: 3/14/2018 By: Param Mike No Known Allergies Current Immunizations  Reviewed on 10/13/2016 Name Date Influenza Vaccine 11/3/2014 Influenza Vaccine (Quad) PF 9/5/2017, 10/13/2016, 10/8/2015 Pneumococcal Conjugate (PCV-13) 1/19/2017 Td 9/1/2012 Not reviewed this visit You Were Diagnosed With   
  
 Codes Comments Type 2 diabetes mellitus with hyperglycemia, without long-term current use of insulin (HCC)    -  Primary ICD-10-CM: E11.65 ICD-9-CM: 250.00, 790.29 Obesity (BMI 30.0-34.9)     ICD-10-CM: T41.3 ICD-9-CM: 278.00 Hypovitaminosis D     ICD-10-CM: E55.9 ICD-9-CM: 268.9 Essential hypertension     ICD-10-CM: I10 
ICD-9-CM: 401.9 Encounter for long-term (current) use of medications     ICD-10-CM: Z79.899 ICD-9-CM: V58.69 Dyslipidemia     ICD-10-CM: E78.5 ICD-9-CM: 272.4 Vitals BP Pulse Temp Resp Height(growth percentile) Weight(growth percentile) 112/70 (BP 1 Location: Left arm, BP Patient Position: Sitting) 64 98.1 °F (36.7 °C) (Oral) 18 5' 1\" (1.549 m) 183 lb (83 kg) SpO2 BMI OB Status Smoking Status 98% 34.58 kg/m2 Hysterectomy Never Smoker BMI and BSA Data Body Mass Index Body Surface Area 34.58 kg/m 2 1.89 m 2 Preferred Pharmacy Pharmacy Name Phone [address], [address] [facility] 22 [phone] [facility] [phone] Your Updated Medication List  
  
   
This list is accurate as of 3/14/18  9:46 AM.  Always use your most recent med list. amLODIPine 5 mg tablet Commonly known as:  Frashanika Zazuetaman TAKE 1 TABLET BY MOUTH EVERY DAY  
  
 amoxicillin 500 mg Tab Take 500 mg by mouth two (2) times a day. ergocalciferol 50,000 unit capsule Commonly known as:  VITAMIN D2 Take 1 Cap by mouth every seven (7) days. fluticasone 50 mcg/actuation nasal spray Commonly known as:  Scott Goody 2 Sprays by Both Nostrils route daily as needed for Rhinitis.  
  
 lidocaine 2 % solution Commonly known as:  LIDOCAINE VISCOUS Put 10 mL in mouth and swish and spit out QAC and at bedtime  
  
 losartan-hydroCHLOROthiazide 100-25 mg per tablet Commonly known as:  HYZAAR  
TAKE 1 TABLET BY MOUTH EVERY DAY  
  
 SEBEX 2-2 % shampoo Generic drug:  salicyclic acid-sulfur USE SHAMPOO WEEKLY AS DIRECTED Follow-up Instructions Return in about 4 months (around 7/14/2018) for Labs 1 week before. To-Do List   
 07/12/2018 Lab:  LIPID PANEL   
  
 07/12/2018 Lab:  VITAMIN D, 25 HYDROXY Patient Instructions DASH Diet: Care Instructions Your Care Instructions The DASH diet is an eating plan that can help lower your blood pressure. DASH stands for Dietary Approaches to Stop Hypertension. Hypertension is high blood pressure. The DASH diet focuses on eating foods that are high in calcium, potassium, and magnesium. These nutrients can lower blood pressure.  The foods that are highest in these nutrients are fruits, vegetables, low-fat dairy products, nuts, seeds, and legumes. But taking calcium, potassium, and magnesium supplements instead of eating foods that are high in those nutrients does not have the same effect. The DASH diet also includes whole grains, fish, and poultry. The DASH diet is one of several lifestyle changes your doctor may recommend to lower your high blood pressure. Your doctor may also want you to decrease the amount of sodium in your diet. Lowering sodium while following the DASH diet can lower blood pressure even further than just the DASH diet alone. Follow-up care is a key part of your treatment and safety. Be sure to make and go to all appointments, and call your doctor if you are having problems. It's also a good idea to know your test results and keep a list of the medicines you take. How can you care for yourself at home? Following the DASH diet · Eat 4 to 5 servings of fruit each day. A serving is 1 medium-sized piece of fruit, ½ cup chopped or canned fruit, 1/4 cup dried fruit, or 4 ounces (½ cup) of fruit juice. Choose fruit more often than fruit juice. · Eat 4 to 5 servings of vegetables each day. A serving is 1 cup of lettuce or raw leafy vegetables, ½ cup of chopped or cooked vegetables, or 4 ounces (½ cup) of vegetable juice. Choose vegetables more often than vegetable juice. · Get 2 to 3 servings of low-fat and fat-free dairy each day. A serving is 8 ounces of milk, 1 cup of yogurt, or 1 ½ ounces of cheese. · Eat 6 to 8 servings of grains each day. A serving is 1 slice of bread, 1 ounce of dry cereal, or ½ cup of cooked rice, pasta, or cooked cereal. Try to choose whole-grain products as much as possible. · Limit lean meat, poultry, and fish to 2 servings each day. A serving is 3 ounces, about the size of a deck of cards. · Eat 4 to 5 servings of nuts, seeds, and legumes (cooked dried beans, lentils, and split peas) each week.  A serving is 1/3 cup of nuts, 2 tablespoons of seeds, or ½ cup of cooked beans or peas. · Limit fats and oils to 2 to 3 servings each day. A serving is 1 teaspoon of vegetable oil or 2 tablespoons of salad dressing. · Limit sweets and added sugars to 5 servings or less a week. A serving is 1 tablespoon jelly or jam, ½ cup sorbet, or 1 cup of lemonade. · Eat less than 2,300 milligrams (mg) of sodium a day. If you limit your sodium to 1,500 mg a day, you can lower your blood pressure even more. Tips for success · Start small. Do not try to make dramatic changes to your diet all at once. You might feel that you are missing out on your favorite foods and then be more likely to not follow the plan. Make small changes, and stick with them. Once those changes become habit, add a few more changes. · Try some of the following: ¨ Make it a goal to eat a fruit or vegetable at every meal and at snacks. This will make it easy to get the recommended amount of fruits and vegetables each day. ¨ Try yogurt topped with fruit and nuts for a snack or healthy dessert. ¨ Add lettuce, tomato, cucumber, and onion to sandwiches. ¨ Combine a ready-made pizza crust with low-fat mozzarella cheese and lots of vegetable toppings. Try using tomatoes, squash, spinach, broccoli, carrots, cauliflower, and onions. ¨ Have a variety of cut-up vegetables with a low-fat dip as an appetizer instead of chips and dip. ¨ Sprinkle sunflower seeds or chopped almonds over salads. Or try adding chopped walnuts or almonds to cooked vegetables. ¨ Try some vegetarian meals using beans and peas. Add garbanzo or kidney beans to salads. Make burritos and tacos with mashed [place] beans or black beans. Where can you learn more? Go to http://killian-heather.info/. Enter B045 in the search box to learn more about \"DASH Diet: Care Instructions. \" Current as of: September 21, 2016 Content Version: 11.4 © 5315-0763 Healthwise, Incorporated.  Care instructions adapted under license by [facility] [address] (which disclaims liability or warranty for this information). If you have questions about a medical condition or this instruction, always ask your healthcare professional. [address] any warranty or liability for your use of this information. Learning About Diabetes Food Guidelines Your Care Instructions Meal planning is important to manage diabetes. It helps keep your blood sugar at a target level (which you set with your doctor). You don't have to eat special foods. You can eat what your family eats, including sweets once in a while. But you do have to pay attention to how often you eat and how much you eat of certain foods. You may want to work with a dietitian or a certified diabetes educator (CDE) to help you plan meals and snacks. A dietitian or CDE can also help you lose weight if that is one of your goals. What should you know about eating carbs? Managing the amount of carbohydrate (carbs) you eat is an important part of healthy meals when you have diabetes. Carbohydrate is found in many foods. · Learn which foods have carbs. And learn the amounts of carbs in different foods. ¨ Bread, cereal, pasta, and rice have about 15 grams of carbs in a serving. A serving is 1 slice of bread (1 ounce), ½ cup of cooked cereal, or 1/3 cup of cooked pasta or rice. ¨ Fruits have 15 grams of carbs in a serving. A serving is 1 small fresh fruit, such as an apple or orange; ½ of a banana; ½ cup of cooked or canned fruit; ½ cup of fruit juice; 1 cup of melon or raspberries; or 2 tablespoons of dried fruit. ¨ Milk and no-sugar-added yogurt have 15 grams of carbs in a serving. A serving is 1 cup of milk or 2/3 cup of no-sugar-added yogurt. ¨ Starchy vegetables have 15 grams of carbs in a serving. A serving is ½ cup of mashed potatoes or sweet potato; 1 cup winter squash; ½ of a small baked potato; ½ cup of cooked beans; or ½ cup cooked corn or green peas. · Learn how much carbs to eat each day and at each meal. A dietitian or CDE can teach you how to keep track of the amount of carbs you eat. This is called carbohydrate counting. · If you are not sure how to count carbohydrate grams, use the Plate Method to plan meals. It is a good, quick way to make sure that you have a balanced meal. It also helps you spread carbs throughout the day. ¨ Divide your plate by types of foods. Put non-starchy vegetables on half the plate, meat or other protein food on one-quarter of the plate, and a grain or starchy vegetable in the final quarter of the plate. To this you can add a small piece of fruit and 1 cup of milk or yogurt, depending on how many carbs you are supposed to eat at a meal. 
· Try to eat about the same amount of carbs at each meal. Do not \"save up\" your daily allowance of carbs to eat at one meal. 
· Proteins have very little or no carbs per serving. Examples of proteins are beef, chicken, turkey, fish, eggs, tofu, cheese, cottage cheese, and peanut butter. A serving size of meat is 3 ounces, which is about the size of a deck of cards. Examples of meat substitute serving sizes (equal to 1 ounce of meat) are 1/4 cup of cottage cheese, 1 egg, 1 tablespoon of peanut butter, and ½ cup of tofu. How can you eat out and still eat healthy? · Learn to estimate the serving sizes of foods that have carbohydrate. If you measure food at home, it will be easier to estimate the amount in a serving of restaurant food. · If the meal you order has too much carbohydrate (such as potatoes, corn, or baked beans), ask to have a low-carbohydrate food instead. Ask for a salad or green vegetables. · If you use insulin, check your blood sugar before and after eating out to help you plan how much to eat in the future. · If you eat more carbohydrate at a meal than you had planned, take a walk or do other exercise. This will help lower your blood sugar. What else should you know? · Limit saturated fat, such as the fat from meat and dairy products. This is a healthy choice because people who have diabetes are at higher risk of heart disease. So choose lean cuts of meat and nonfat or low-fat dairy products. Use olive or canola oil instead of butter or shortening when cooking. · Don't skip meals. Your blood sugar may drop too low if you skip meals and take insulin or certain medicines for diabetes. · Check with your doctor before you drink alcohol. Alcohol can cause your blood sugar to drop too low. Alcohol can also cause a bad reaction if you take certain diabetes medicines. Follow-up care is a key part of your treatment and safety. Be sure to make and go to all appointments, and call your doctor if you are having problems. It's also a good idea to know your test results and keep a list of the medicines you take. Where can you learn more? Go to http://killian-heather.info/. Enter D991 in the search box to learn more about \"Learning About Diabetes Food Guidelines. \" Current as of: March 13, 2017 Content Version: 11.4 © 7783-7409 [employer]. Care instructions adapted under license by [employer] (which disclaims liability or warranty for this information). If you have questions about a medical condition or this instruction, always ask your healthcare professional. [address] any warranty or liability for your use of this information. Learning About the [address] Diet What is the Mediterranean diet? The Mediterranean diet is a style of eating rather than a diet plan. It features foods eaten in [place], [place], [place], and other countries along the [facility]. It emphasizes eating foods like fish, fruits, vegetables, beans, high-fiber breads and whole grains, nuts, and olive oil. This style of eating includes limited red meat, cheese, and sweets. Why choose the Mediterranean diet? A Mediterranean-style diet may improve heart health. It contains more fat than other heart-healthy diets. But the fats are mainly from nuts, unsaturated oils (such as fish oils and olive oil), and certain nut or seed oils (such as canola, soybean, or flaxseed oil). These fats may help protect the heart and blood vessels. How can you get started on the Mediterranean diet? Here are some things you can do to switch to a more Mediterranean way of eating. What to eat · Eat a variety of fruits and vegetables each day, such as grapes, blueberries, tomatoes, broccoli, peppers, figs, olives, spinach, eggplant, beans, lentils, and chickpeas. · Eat a variety of whole-grain foods each day, such as oats, brown rice, and whole wheat bread, pasta, and couscous. · Eat fish at least 2 times a week. Try tuna, salmon, mackerel, lake trout, herring, or sardines. · Eat moderate amounts of low-fat dairy products, such as milk, cheese, or yogurt. · Eat moderate amounts of poultry and eggs. · Choose healthy (unsaturated) fats, such as nuts, olive oil, and certain nut or seed oils like canola, soybean, and flaxseed. · Limit unhealthy (saturated) fats, such as butter, palm oil, and coconut oil. And limit fats found in animal products, such as meat and dairy products made with whole milk. Try to eat red meat only a few times a month in very small amounts. · Limit sweets and desserts to only a few times a week. This includes sugar-sweetened drinks like soda. The Mediterranean diet may also include red wine with your meal-1 glass each day for women and up to 2 glasses a day for men. Tips for eating at home · Use herbs, spices, garlic, lemon zest, and citrus juice instead of salt to add flavor to foods. · Add avocado slices to your sandwich instead of sutton. · Have fish for lunch or dinner instead of red meat. Brush the fish with olive oil, and broil or grill it. · Sprinkle your salad with seeds or nuts instead of cheese. · Cook with olive or canola oil instead of butter or oils that are high in saturated fat. · Switch from 2% milk or whole milk to 1% or fat-free milk. · Dip raw vegetables in a vinaigrette dressing or hummus instead of dips made from mayonnaise or sour cream. 
· Have a piece of fruit for dessert instead of a piece of cake. Try baked apples, or have some dried fruit. Tips for eating out · Try broiled, grilled, baked, or poached fish instead of having it fried or breaded. · Ask your  to have your meals prepared with olive oil instead of butter. · Order dishes made with marinara sauce or sauces made from olive oil. Avoid sauces made from cream or mayonnaise. · Choose whole-grain breads, whole wheat pasta and pizza crust, brown rice, beans, and lentils. · Cut back on butter or margarine on bread. Instead, you can dip your bread in a small amount of olive oil. · Ask for a side salad or grilled vegetables instead of french fries or chips. Where can you learn more? Go to http://killian-heather.info/. Enter 888-790-8500 in the search box to learn more about \"Learning About the Mediterranean Diet. \" Current as of: May 12, 2017 Content Version: 11.4 © 2693-2699 [employer]. Care instructions adapted under license by [employer] (which disclaims liability or warranty for this information). If you have questions about a medical condition or this instruction, always ask your healthcare professional. [place] [address] any warranty or liability for your use of this information. Starting a Weight Loss Plan: Care Instructions Your Care Instructions If you are thinking about losing weight, it can be hard to know where to start. Your doctor can help you set up a weight loss plan that best meets your needs. You may want to take a class on nutrition or exercise, or join a weight loss support group.  If you have questions about how to make changes to your eating or exercise habits, ask your doctor about seeing a registered dietitian or an exercise specialist. 
It can be a big challenge to lose weight. But you do not have to make huge changes at once. Make small changes, and stick with them. When those changes become habit, add a few more changes. If you do not think you are ready to make changes right now, try to pick a date in the future. Make an appointment to see your doctor to discuss whether the time is right for you to start a plan. Follow-up care is a key part of your treatment and safety. Be sure to make and go to all appointments, and call your doctor if you are having problems. It's also a good idea to know your test results and keep a list of the medicines you take. How can you care for yourself at home? · Set realistic goals. Many people expect to lose much more weight than is likely. A weight loss of 5% to 10% of your body weight may be enough to improve your health. · Get family and friends involved to provide support. Talk to them about why you are trying to lose weight, and ask them to help. They can help by participating in exercise and having meals with you, even if they may be eating something different. · Find what works best for you. If you do not have time or do not like to cook, a program that offers meal replacement bars or shakes may be better for you. Or if you like to prepare meals, finding a plan that includes daily menus and recipes may be best. 
· Ask your doctor about other health professionals who can help you achieve your weight loss goals. ¨ A dietitian can help you make healthy changes in your diet. ¨ An exercise specialist or  can help you develop a safe and effective exercise program. 
¨ A counselor or psychiatrist can help you cope with issues such as depression, anxiety, or family problems that can make it hard to focus on weight loss. · Consider joining a support group for people who are trying to lose weight. Your doctor can suggest groups in your area. Where can you learn more? Go to http://killian-heather.info/. Enter B099 in the search box to learn more about \"Starting a Weight Loss Plan: Care Instructions. \" Current as of: October 13, 2016 Content Version: 11.4 © 5603-1318 [employer]. Care instructions adapted under license by [employer] (which disclaims liability or warranty for this information). If you have questions about a medical condition or this instruction, always ask your healthcare professional. [place] [address] any warranty or liability for your use of this information. Introducing [facility]! Dear Nelly Chase: Thank you for requesting a wireLawyer account. Our records indicate that you already have an active wireLawyer account. You can access your account anytime at [email or web address] Did you know that you can access your hospital and ER discharge instructions at any time in wireLawyer? You can also review all of your test results from your hospital stay or ER visit. Additional Information If you have questions, please visit the Frequently Asked Questions section of the wireLawyer website at [email or web address] Remember, wireLawyer is NOT to be used for urgent needs. For medical emergencies, dial 911. Now available from your iPhone and Android! Please provide this summary of care documentation to your next provider. Your primary care clinician is listed as [address] If you have any questions after today's visit, please call [phone].

## 2018-03-14 NOTE — PROGRESS NOTES
Chief Complaint   Patient presents with    Labs     result       HPI:  Patient is a 56year old obese  female with medical problems listed below presents today for follow up of Hypertension, DM 2, Hypovitaminosis D, etc. She has been feeling well and voices no complaints today. She is complaint with her medications with no adverse effects reported and she has gained one pound in the last three months. Past Medical History:   Diagnosis Date    AR (allergic rhinitis)     Arthritis     Heart murmur     Hypertension     Hypovitaminosis D 3/3/2014       No Known Allergies      Current Outpatient Prescriptions   Medication Sig Dispense Refill    SEBEX 2-2 % shampoo USE SHAMPOO WEEKLY AS DIRECTED  5    amoxicillin 500 mg tab Take 500 mg by mouth two (2) times a day. 20 Tab 0    losartan-hydroCHLOROthiazide (HYZAAR) 100-25 mg per tablet TAKE 1 TABLET BY MOUTH EVERY DAY 30 Tab 5    amLODIPine (NORVASC) 5 mg tablet TAKE 1 TABLET BY MOUTH EVERY DAY 30 Tab 5    ergocalciferol (VITAMIN D2) 50,000 unit capsule Take 1 Cap by mouth every seven (7) days. 8 Cap 5    lidocaine (LIDOCAINE VISCOUS) 2 % solution Put 10 mL in mouth and swish and spit out QAC and at bedtime 200 mL 0    fluticasone (FLONASE) 50 mcg/actuation nasal spray 2 Sprays by Both Nostrils route daily as needed for Rhinitis.  1 Bottle 3          ROS:  Constitutional: Negative for fever, chills, or fatigue  Neurological: Negative for headache, dizziness, visual disturbance, or loss of conciousness  Respiratory: Negative for SOB, hemoptysis, or wheezing  Cardiovascular: Negative for chest pain, palpitation, or leg swelling  Gastrointestinal: Negative for abdominal pain, nausea, vomiting, diarrhea, blood in stool, melena, or heartburn  Musculoskeletal: Negative for falls        Physical Exam:  Visit Vitals    /70 (BP 1 Location: Left arm, BP Patient Position: Sitting)    Pulse 64    Temp 98.1 °F (36.7 °C) (Oral)    Resp 18    Ht 5' 1\" (1.549 m)    Wt 183 lb (83 kg)    SpO2 98%    BMI 34.58 kg/m2     General: Obese black female, a & o x 3, afebrile, well-nourished, interacting appropriately, in no acute distress  Respiratory: symmetrical chest expansion, lung sounds clear bilaterally  Cardiovascular: normal S1S2, regular rate and rhythm, no murmurs, no peripheral edema, no JVD  Abdomen: non-distended, normoactive bowel sounds x 4 quadrants, soft, non-tender to palpation  Extremity: No edema noted  DM foot exam: Pedal pulses palpable and no callus noted        Assessment/Plan:    ICD-10-CM ICD-9-CM    1. Type 2 diabetes mellitus with hyperglycemia, without long-term current use of insulin (HCC) E11.65 250.00 Controlled with recent HBA1C improved at 5.9 and he was counseled on diabetic diet.     790.29    2. Obesity (BMI 30.0-34. 9) E66.9 278.00 I have reviewed/discussed the above normal BMI with the patient. I have recommended the following interventions: dietary management education, guidance, and counseling, encourage exercise and monitor weight . .     3. Hypovitaminosis D E55.9 268.9 Recent Vit D slightly improved but remains low at 27.6 and she was advised to continue Vit D 50,000 units Q week. VITAMIN D, 25 HYDROXY   4. Essential hypertension I10 401.9 Controlled  Continue current meds and she was counseled on low salt diet. 5. Encounter for long-term (current) use of medications Z79.899 V58.69    6. Dyslipidemia E78.5 272.4 Recent lipid panel done 3/8/18 reviewed with pt and increased revealing Cho 161 and LDL 99 and she was counseled on low fat diet. LIPID PANEL          Recent labs reviewed with pt        Additional Notes: Discussed today's diagnosis, treatment plans. Discussed medication indications and side effects. Weight Management: Counseled  After Visit Summary: Discussed provided printed patient instructions. Answered questions accordingly. Follow-up Disposition:  In 4 months with labs 1 week prior.          Moore DO Hu, MPH  Internal Medicine        .

## 2018-05-29 ENCOUNTER — HOSPITAL ENCOUNTER (OUTPATIENT)
Dept: PHYSICAL THERAPY | Age: 58
Discharge: HOME | End: 2018-05-29
Payer: COMMERCIAL

## 2018-05-29 PROCEDURE — 97162 PT EVAL MOD COMPLEX 30 MIN: CPT

## 2018-05-29 PROCEDURE — 97110 THERAPEUTIC EXERCISES: CPT

## 2018-05-29 NOTE — PROGRESS NOTES
PHYSICAL THERAPY - DAILY TREATMENT NOTE    Patient Name: Estela Downing        Date: 2018  : 1960   yes Patient  Verified  Visit #:     Insurance: Payor: Renata Walker / Plan: [address] [place] [address] [place] [employer] / Product Type: HMO /      In time: 4:35 Out time: 5:16   Total Treatment Time: 41     Medicare Time Tracking (below)   Total Timed Codes (min):  N/A 1:1 Treatment Time:  N/A     TREATMENT AREA =  Left hip pain [M25.552]    SUBJECTIVE  Pain Level (on 0 to 10 scale):  2  / 10   Medication Changes/New allergies or changes in medical history, any new surgeries or procedures?    no  If yes, update Summary List   Subjective Functional Status/Changes:  []  No changes reported     See initial eval          OBJECTIVE    10 min Therapeutic Exercise:  [x]  See flow sheet   Rationale:      increase strength to improve the patients ability to complete transfers. N/A min Patient Education:  yes  Reviewed HEP   []  Progressed/Changed HEP based on:  Pt instructed on and given HEP to be completed daily. Pt educated on proper attire for PT sessions and importance of proper footwear. Pt educated on red flags and to seek immediate medical attention/911 if those occur. Pt instructed to ice prn 10-15 minutes on / 1 hour off. Reviewed POC and goals. Pt reports understanding. Other Objective/Functional Measures:    See initial eval     Post Treatment Pain Level (on 0 to 10) scale:   0  / 10     ASSESSMENT  Assessment/Changes in Function:     See initial eval     []  See Progress Note/Recertification   Patient will continue to benefit from skilled PT services to see initial eval   Progress toward goals / Updated goals:    Pt denied sharp pains or red flags with initial eval or therapeutic ex. Pt denied pain at end of session. See initial eval.     PLAN  [x]  Upgrade activities as tolerated yes Continue plan of care   []  Discharge due to :    [x]  Other: PT 2x/week for 4-6 weeks.      Therapist: Garlin Mcardle, PT Date: 5/29/2018 Time: 5:16 PM     Future Appointments  Date Time Provider Madi Malave   6/4/2018 4:00 PM Mack Hinojosa, PT [facility]   6/6/2018 4:00 PM [address], PT [facility]   6/12/2018 5:00 PM Dewey Mireles, PT [facility]   6/19/2018 3:30 PM Dewey Mireles, PT [facility]   6/21/2018 3:30 PM Dewey Mireles, PT [facility]   6/25/2018 4:00 PM [address] [facility], PT [facility]   6/27/2018 4:00 PM [address] [facility], PT [facility]   7/12/2018 8:05 AM [facility] NURSE [facility]   7/19/2018 9:00 AM Mono Lynne, DO 11 [facility]

## 2018-05-29 NOTE — PROGRESS NOTES
[facility] PHYSICAL THERAPY  [address] [place] [address] 201,[facility], [address] [facility] - Phone: [phone]  Fax: [phone]  PLAN OF CARE / [address] [facility]  Patient Name: Marla Sampson : 1960   Medical   Diagnosis: L Hip Pain, Bursitis, Trochanteric Bursitis Treatment Diagnosis: Left hip pain [M25.552]   Onset Date: Chronic     Referral Source: Sissy Campbell MD Start of Care [facility]): 2018   Prior Hospitalization: See medical history Provider #: [id number]   Prior Level of Function: Complete prolonged sitting, L side-lying without L hip pain or difficulty   Comorbidities: OA, HTN, Intermittent LBP, L/S DDD   Medications: Verified on Patient Summary List   The Plan of Care and following information is based on the information from the initial evaluation.   ===========================================================================================  Assessment / key information:  Pt is a 65 y/o female reporting L hip pain rated 1-8/10 that has been chronic for the past 3 years. Pt reports completing leg press at gym 3 years ago and noticing L lat hip/glute pain the next day. Pt reports she has had multiple injections in L hip with temporary improvement in pain/sxs (last injection ). L hip pain increases with prolonged L side-lying and prolonged sitting > 1 hour; decreases with movement. Pt reports L hip discomfort with don/doffing shoes/socks. Pt denies groin pain. Pt reports L hip pain radiates to L foot if she wakes up in L side-lying position. Pt reports L LE numbness/tingling from hip to foot also occurs if wake up in L side-lying, but not with any other positions/activities/motions. Most recent imaging of hip was  x-ray indicated bursitis per pt. No MRI. Pt describes pain as dull and stiffness. Pt reports intermittent LBP for the past year. Pt denies falls or red flags.  Pt demonstrates (+) B Trendelenberg, decreased B glute/hip strength, decreased core strength/stability, (-) Scour, decreased HS/quad/RF flexibility, decreased L hip AROM (86 flex, 20 ER, 22 IR), l/s AROM WFL, TTP B glute max/med, piriformis, and proximal 1/3 ITB, decreased flexibility/no pain with JENNIFER, even innominates, poor mechanics with transfers, and decreased functional mobility. FOTO Score: 47/100  ===========================================================================================  Eval Complexity: History MEDIUM  Complexity : 1-2 comorbidities / personal factors will impact the outcome/ POC ;  Examination  HIGH Complexity : 4+ Standardized tests and measures addressing body structure, function, activity limitation and / or participation in recreation ; Presentation MEDIUM Complexity : Evolving with changing characteristics ; Decision Making MEDIUM Complexity : FOTO score of 26-74; Overall Complexity MEDIUM  Problem List: pain affecting function, decrease ROM, decrease strength, impaired gait/ balance, decrease ADL/ functional abilitiies, decrease activity tolerance, decrease flexibility/ joint mobility and decrease transfer abilities   Treatment Plan may include any combination of the following: Therapeutic exercise, Therapeutic activities, Neuromuscular re-education, Physical agent/modality, Gait/balance training, Manual therapy, Aquatic therapy, Patient education, Self Care training, Functional mobility training, Home safety training and Stair training  Patient / Family readiness to learn indicated by: asking questions, trying to perform skills and interest  Persons(s) to be included in education: patient (P)  Barriers to Learning/Limitations: None  Measures taken:    Patient Goal (s): \"Reduced pain\"   Patient self reported health status: good  Rehabilitation Potential: good   Short Term Goals: To be accomplished in  2  weeks:  1.  Pt to demonstrate independence with HEP to improve glute/core/hip strength/stability for transfers. 2. Pt to demonstrate correct body mechanics with 10 reps mini-squat to improve mechanics with sit to stand transfers.  Long Term Goals: To be accomplished in  4-6  weeks:  1. Pt to demonstrate (-) B Trendelenberg to improve glute strength/stability for prolonged amb/standing. 2. Pt to report 59/100 or > on FOTO scores to improve functional mobility for prolonged positioning, transfers. 3. Pt to demonstrate 30 degrees L hip ER AROM to improve ease of don/doffing shoes and socks. Frequency / Duration:   Patient to be seen  2  times per week for 4-6  weeks:  Patient / Caregiver education and instruction: self care, activity modification and exercises  G-Codes (GP): N/A  Therapist Signature: Be Carter PT Date: 8/87/2687   Certification Period: N/A Time: 5:16 PM   ===========================================================================================  I certify that the above Physical Therapy Services are being furnished while the patient is under my care. I agree with the treatment plan and certify that this therapy is necessary. Physician Signature:        Date:       Time:     Please sign and return to [facility], [facility]. or you may fax the signed copy to [phone]. Thank you.

## 2018-06-04 ENCOUNTER — HOSPITAL ENCOUNTER (OUTPATIENT)
Dept: PHYSICAL THERAPY | Age: 58
Discharge: HOME | End: 2018-06-04
Payer: COMMERCIAL

## 2018-06-04 PROCEDURE — 97110 THERAPEUTIC EXERCISES: CPT

## 2018-06-04 PROCEDURE — 97140 MANUAL THERAPY 1/> REGIONS: CPT

## 2018-06-04 NOTE — PROGRESS NOTES
PHYSICAL THERAPY - DAILY TREATMENT NOTE    Patient Name: Lorenzo Perez        Date: 2018  : 1960   yes Patient  Verified  Visit #:     Insurance: Payor: Hollie Paige / Plan: [address] [place] H[employer] / Product Type: HMO /      In time: 405 Out time: 505   Total Treatment Time: 60     Medicare Time Tracking (below)   Total Timed Codes (min):  na 1:1 Treatment Time:  na     TREATMENT AREA =  Left hip pain [M25.552]    SUBJECTIVE  Pain Level (on 0 to 10 scale):  0  / 10   Medication Changes/New allergies or changes in medical history, any new surgeries or procedures?    no  If yes, update Summary List   Subjective Functional Status/Changes:  []  No changes reported     Pt denies any pain increase since last visit. Pt reports 1x/day compliance with HEP.            OBJECTIVE  Modalities Rationale:     decrease inflammation, decrease pain and increase tissue extensibility to improve patient's ability to perform functional ADL's   min [] Estim, type/location:                                      []  att     []  unatt     []  w/US     []  w/ice    []  w/heat    min []  Mechanical Traction: type/lbs                   []  pro   []  sup   []  int   []  cont    []  before manual    []  after manual    min []  Ultrasound, settings/location:      min []  Iontophoresis w/ dexamethasone, location:                                               []  take home patch       []  in clinic   10 min [x]  Ice     []  Heat    location/position: Sidelying on the  L hip    min []  Vasopneumatic Device, press/temp:     min []  Other:    [] Skin assessment post-treatment (if applicable):    []  intact    []  redness- no adverse reaction     []redness  adverse reaction:        35 min Therapeutic Exercise:  [x]  See flow sheet   Rationale:      increase ROM and increase strength to improve the patients ability to perform functional ADL's     15 min Manual Therapy: STM to L glute med, pirifomris   Rationale:      decrease pain, increase ROM and increase tissue extensibility to improve patient's ability to perform functional ADL's     min Patient Education:  yes  Reviewed HEP   []  Progressed/Changed HEP based on: Other Objective/Functional Measures:    Initiated therex today per flow sheet, requiring vc and demo 100% of the time for proper form and technique. Post Treatment Pain Level (on 0 to 10) scale:   0  / 10     ASSESSMENT  Assessment/Changes in Function:     Good tolerance to all initial therex. No pain following treatments today. Will continue to progress therex within current POC as patient is able. []  See Progress Note/Recertification   Patient will continue to benefit from skilled PT services to modify and progress therapeutic interventions, address functional mobility deficits, address ROM deficits, address strength deficits, analyze and address soft tissue restrictions, analyze and cue movement patterns, analyze and modify body mechanics/ergonomics and assess and modify postural abnormalities to attain remaining goals.    Progress toward goals / Updated goals:    First visit since IE     PLAN  []  Upgrade activities as tolerated yes Continue plan of care   []  Discharge due to :    []  Other:      Therapist: Nora Perry PT, DPT    Date: 6/4/2018 Time: 8:41 AM     Future Appointments  Date Time Provider Madi Malave   6/4/2018 4:00 PM Marchia Curling Houchins, PT [facility]   6/6/2018 4:00 PM Karolina Perry, PT [facility]   6/12/2018 5:00 PM Hansa Perez PT [facility]   6/19/2018 3:30 PM Hansa Perez PT [facility]   6/21/2018 3:30 PM Hansa Perez, PT [facility]   6/25/2018 4:00 PM Karolina Perry PT [facility]   6/27/2018 4:00 PM Karolina Perry, PT [facility]   7/12/2018 8:05 AM [facility] NURSE [facility]   7/19/2018 9:00 AM Mono Vargas, DO 11 [facility]

## 2018-06-06 ENCOUNTER — HOSPITAL ENCOUNTER (OUTPATIENT)
Dept: PHYSICAL THERAPY | Age: 58
Discharge: HOME | End: 2018-06-06
Payer: COMMERCIAL

## 2018-06-06 PROCEDURE — 97110 THERAPEUTIC EXERCISES: CPT

## 2018-06-06 PROCEDURE — 97140 MANUAL THERAPY 1/> REGIONS: CPT

## 2018-06-06 NOTE — PROGRESS NOTES
PHYSICAL THERAPY - DAILY TREATMENT NOTE    Patient Name: Mandy Schwarz        Date: 2018  : 1960   yes Patient  Verified  Visit #:   3   of   12  Insurance: Payor: Georgette Chacon / Plan: [place] H[employer] / Product Type: HMO /      In time: 4:00 Out time: 4:38   Total Treatment Time: 38     Medicare Time Tracking (below)   Total Timed Codes (min):  na 1:1 Treatment Time:  na     TREATMENT AREA =  Left hip pain [M25.552]    SUBJECTIVE  Pain Level (on 0 to 10 scale):  0  / 10   Medication Changes/New allergies or changes in medical history, any new surgeries or procedures?    no  If yes, update Summary List   Subjective Functional Status/Changes:  []  No changes reported     Pt reports muscle soreness after last visit but no increases in pain.  Reports compliance to HEP w/o difficulty          OBJECTIVE  Modalities Rationale:     n/a to improve patient's ability to n/a- PD CP   min [] Estim, type/location:                                      []  att     []  unatt     []  w/US     []  w/ice    []  w/heat    min []  Mechanical Traction: type/lbs                   []  pro   []  sup   []  int   []  cont    []  before manual    []  after manual    min []  Ultrasound, settings/location:      min []  Iontophoresis w/ dexamethasone, location:                                               []  take home patch       []  in clinic    min []  Ice     []  Heat    location/position:     min []  Vasopneumatic Device, press/temp:     min []  Other:    [] Skin assessment post-treatment (if applicable):    []  intact    []  redness- no adverse reaction     []redness  adverse reaction:        28 min Therapeutic Exercise:  [x]  See flow sheet   Rationale:      increase ROM and increase strength to improve the patients ability to ambulate     10 min Manual Therapy: stm to the L glute med, piriformis, TFL   Rationale:      decrease pain, increase ROM, increase tissue extensibility and decrease trigger points to improve patient's ability to lay on L side     min Patient Education:  yes  Reviewed HEP   []  Progressed/Changed HEP based on: Other Objective/Functional Measures:    Min cueing req for breathing t/o core exercises  ttp to glute med and piri, min ttp to tfl       Post Treatment Pain Level (on 0 to 10) scale:   0  / 10     ASSESSMENT  Assessment/Changes in Function:     Pt denied pain post tx session. C/c pain w/ laying on L side     []  See Progress Note/Recertification   Patient will continue to benefit from skilled PT services to modify and progress therapeutic interventions, address functional mobility deficits, address ROM deficits, address strength deficits, analyze and address soft tissue restrictions, analyze and cue movement patterns, analyze and modify body mechanics/ergonomics and instruct in home and community integration to attain remaining goals.    Progress toward goals / Updated goals:    Progressing to STG #2; req min cueing for minisquat w/ band assist     PLAN  []  Upgrade activities as tolerated yes Continue plan of care   []  Discharge due to :    []  Other:      Therapist: Bolivar Gonzales PT    Date: 6/6/2018 Time: 4:20 PM     Future Appointments  Date Time Provider Madi Malave   6/12/2018 5:00 PM Kenia Carlin PT [facility]   6/19/2018 3:30 PM Kenia Carlin PT [facility]   6/21/2018 3:30 PM Kenia Carlin PT [facility]   6/25/2018 4:00 PM Bolivar Gonzales PT [facility]   6/27/2018 4:00 PM Bolivar Gonzales PT [facility]   7/12/2018 8:05 AM [facility] NURSE [facility]   7/19/2018 9:00 AM Mono Blackwell, DO 11 [facility]

## 2018-06-12 ENCOUNTER — HOSPITAL ENCOUNTER (OUTPATIENT)
Dept: PHYSICAL THERAPY | Age: 58
Discharge: HOME | End: 2018-06-12
Payer: COMMERCIAL

## 2018-06-12 PROCEDURE — 97110 THERAPEUTIC EXERCISES: CPT

## 2018-06-12 PROCEDURE — 97140 MANUAL THERAPY 1/> REGIONS: CPT

## 2018-06-12 NOTE — PROGRESS NOTES
PHYSICAL THERAPY - DAILY TREATMENT NOTE    Patient Name: Leonard Chino        Date: 2018  : 1960   YES Patient  Verified  Visit #:     Insurance: Payor: Lilly Boone / Plan: [address] [place] [address] [place] [employer]MO / Product Type: HMO /      In time: 500 Out time: 543   Total Treatment Time: 43     Medicare Time Tracking (below)   Total Timed Codes (min):  na 1:1 Treatment Time:  na     TREATMENT AREA =  Left hip pain [M25.552]    SUBJECTIVE  Pain Level (on 0 to 10 scale):  0  / 10   Medication Changes/New allergies or changes in medical history, any new surgeries or procedures? NO    If yes, update Summary List   Subjective Functional Status/Changes:  []  No changes reported     Patient denies complications since her LV. Patient states she has been performing her HEP but doesn't notice a significant difference in symptoms at this time. OBJECTIVE  31 min Therapeutic Exercise:  [x]  See flow sheet   Rationale:      increase ROM and increase strength to improve the patients ability to perform walking activities. 12 min Manual Therapy: STM to L TFL, L glute med, L piriformis; L hip ER stretch; L HS stretch   Rationale:      decrease pain, increase ROM, increase tissue extensibility and decrease trigger points to improve patient's ability to perform standing activities. min Patient Education:  YES  Reviewed HEP   []  Progressed/Changed HEP based on: Other Objective/Functional Measures:    Patient presenting with increased tenderness and muscular tone to L PF and glute med during MT  Cued during dead bugs to maintain proper lumbar spine positioning and avoiding extension compensation     Post Treatment Pain Level (on 0 to 10) scale:   0  / 10     ASSESSMENT  Assessment/Changes in Function:     Patient declined modalities post session stating she felt good. Patient would benefit from progression of program at [facility] in order to further address symptoms and improve function.       []  See Progress Note/Recertification   Patient will continue to benefit from skilled PT services to modify and progress therapeutic interventions, address functional mobility deficits, address ROM deficits, address strength deficits, analyze and address soft tissue restrictions, analyze and cue movement patterns, analyze and modify body mechanics/ergonomics and assess and modify postural abnormalities to attain remaining goals.    Progress toward goals / Updated goals:    Progressing with STG#1     PLAN  [x]  Upgrade activities as tolerated YES Continue plan of care   []  Discharge due to :    []  Other:      Therapist: Graciela Keene PT    Date: 6/12/2018 Time: 5:44 PM     Future Appointments  Date Time Provider Madi Malave   6/19/2018 3:30 PM Graciela Keene, PT [facility]   6/21/2018 3:30 PM Graciela Keene, PT [facility]   6/25/2018 4:00 PM [address] [place] [address], PT [facility]   6/27/2018 4:00 PM [address] [place] [address], PT [facility]   7/12/2018 8:05 AM [facility] NURSE [facility]   7/19/2018 9:00 AM Mono Mcmahan, DO 11 [facility]

## 2018-06-19 ENCOUNTER — HOSPITAL ENCOUNTER (OUTPATIENT)
Dept: PHYSICAL THERAPY | Age: 58
Discharge: HOME | End: 2018-06-19
Payer: COMMERCIAL

## 2018-06-19 PROCEDURE — 97140 MANUAL THERAPY 1/> REGIONS: CPT

## 2018-06-19 PROCEDURE — 97110 THERAPEUTIC EXERCISES: CPT

## 2018-06-19 NOTE — PROGRESS NOTES
PHYSICAL THERAPY - DAILY TREATMENT NOTE    Patient Name: Ney Costello        Date: 2018  : 1960   YES Patient  Verified  Visit #:      of   12  Insurance: Payor: Ever Simon / Plan: Patel Newman HMO / Product Type: HMO /      In time: 333 Out time: 435   Total Treatment Time: 62     Medicare Time Tracking (below)   Total Timed Codes (min):  na 1:1 Treatment Time:  na     TREATMENT AREA =  Left hip pain [M25.552]    SUBJECTIVE  Pain Level (on 0 to 10 scale):  0  / 10   Medication Changes/New allergies or changes in medical history, any new surgeries or procedures? NO    If yes, update Summary List   Subjective Functional Status/Changes:  []  No changes reported     Patient states she was sore from the manual therapy after her last visit, but felt better the next day. Patient reports her hip has been doing well since this time. OBJECTIVE  Modalities Rationale:     decrease inflammation and decrease pain to improve patient's ability to perform work activities. min [] Estim, type/location:                                      []  att     []  unatt     []  w/US     []  w/ice    []  w/heat    min []  Mechanical Traction: type/lbs                   []  pro   []  sup   []  int   []  cont    []  before manual    []  after manual    min []  Ultrasound, settings/location:      min []  Iontophoresis w/ dexamethasone, location:                                               []  take home patch       []  in clinic   10 min [x]  Ice     []  Heat    location/position: To L hip in long sit    min []  Vasopneumatic Device, press/temp:     min []  Other:    [x] Skin assessment post-treatment (if applicable):    [x]  intact    []  redness- no adverse reaction     []redness  adverse reaction:        40 min Therapeutic Exercise:  [x]  See flow sheet   Rationale:      increase ROM, increase strength, improve coordination and improve balance to improve the patients ability to perform walking activities. 12 min Manual Therapy: STM to L glute med, L piriformis, L TFL; L hip ER stretch   Rationale:      decrease pain, increase ROM, increase tissue extensibility and decrease trigger points to improve patient's ability to perform recreational activities. min Patient Education:  YES  Reviewed HEP   []  Progressed/Changed HEP based on: Other Objective/Functional Measures:    Progressed mini band IR/ER, Side steps and F/B to 2x10 for all movement; progressed squats with GTB to 2x10 and pallof press to 15 repetitions  Patient continues to present with tenderness to L glute med and piriformis during MT  Required cuing during mini band, pallof press and squats for proper hip hinge mechanics as well as keeping heels flat on floor. Post Treatment Pain Level (on 0 to 10) scale:   0  / 10     ASSESSMENT  Assessment/Changes in Function:     Patient tolerated progression of program well, noting only increased muscle fatigue and no increases in pain. Patient would benefit from continued gradual progression of program per tolerance in order to further reduce pain and improve function. []  See Progress Note/Recertification   Patient will continue to benefit from skilled PT services to modify and progress therapeutic interventions, address functional mobility deficits, address ROM deficits, address strength deficits, analyze and address soft tissue restrictions, analyze and cue movement patterns, analyze and modify body mechanics/ergonomics, assess and modify postural abnormalities and address imbalance/dizziness to attain remaining goals.    Progress toward goals / Updated goals:    Progressing well with STG#2     PLAN  [x]  Upgrade activities as tolerated YES Continue plan of care   []  Discharge due to :    []  Other:      Therapist: Janette Huang PT    Date: 6/19/2018 Time: 5:57 PM     Future Appointments  Date Time Provider Madi Malave   6/21/2018 3:30 PM Janette Huang PT [facility] 6/25/2018 4:00 PM [address] [facility], PT [facility]   6/27/2018 4:00 PM [address] [facility], PT [facility]   7/12/2018 8:05 AM [facility] NURSE [facility]   7/19/2018 9:00 AM Mono Ayala [address]

## 2018-06-21 ENCOUNTER — HOSPITAL ENCOUNTER (OUTPATIENT)
Dept: PHYSICAL THERAPY | Age: 58
Discharge: HOME | End: 2018-06-21
Payer: COMMERCIAL

## 2018-06-21 PROCEDURE — 97140 MANUAL THERAPY 1/> REGIONS: CPT

## 2018-06-21 PROCEDURE — 97110 THERAPEUTIC EXERCISES: CPT

## 2018-06-21 NOTE — PROGRESS NOTES
PHYSICAL THERAPY - DAILY TREATMENT NOTE    Patient Name: Jared Alaniz        Date: 2018  : 1960   YES Patient  Verified  Visit #:     Insurance: Payor: Grace Calvert / Plan: Daniel Garcia HMO / Product Type: HMO /      In time: 328 Out time: 425   Total Treatment Time: 57     Medicare Time Tracking (below)   Total Timed Codes (min):  na 1:1 Treatment Time:  na     TREATMENT AREA =  Left hip pain [M25.552]    SUBJECTIVE  Pain Level (on 0 to 10 scale):  0  / 10   Medication Changes/New allergies or changes in medical history, any new surgeries or procedures? NO    If yes, update Summary List   Subjective Functional Status/Changes:  []  No changes reported     Patient reports feeling mild soreness at the start of today's session. Patient also noted some muscle soreness after her last session which she related to manual therapy. OBJECTIVE  Modalities Rationale:     decrease inflammation and decrease pain to improve patient's ability to perform work activities.     min [] Estim, type/location:                                      []  att     []  unatt     []  w/US     []  w/ice    []  w/heat    min []  Mechanical Traction: type/lbs                   []  pro   []  sup   []  int   []  cont    []  before manual    []  after manual    min []  Ultrasound, settings/location:      min []  Iontophoresis w/ dexamethasone, location:                                               []  take home patch       []  in clinic   10 min [x]  Ice     []  Heat    location/position: To L hip in R SL position    min []  Vasopneumatic Device, press/temp:     min []  Other:    [x] Skin assessment post-treatment (if applicable):    [x]  intact    []  redness- no adverse reaction     []redness  adverse reaction:        35 min Therapeutic Exercise:  [x]  See flow sheet   Rationale:      increase ROM, increase strength, improve coordination and increase proprioception to improve the patients ability to perform walking activities. 12 min Manual Therapy: STM to L glute med, L piriformis, L TFL; L hip ER stretch   Rationale:      decrease pain, increase ROM, increase tissue extensibility and decrease trigger points to improve patient's ability to perform standing activities. min Patient Education:  YES  Reviewed HEP   []  Progressed/Changed HEP based on: Other Objective/Functional Measures:    Tenderness noted to L glute med and TFL during MT with reproduction of symptoms down posterior thigh  Required cuing on standing hip hike to reduce UE support and hold time for neutral pelvis  Patient also cued during BW squats to reduce heel rise and proper better hip hinge mechanics. Post Treatment Pain Level (on 0 to 10) scale:   0  / 10     ASSESSMENT  Assessment/Changes in Function:     Patient tolerated session well, reporting increased fatigue post session. Held on progression of patient's program secondary to increased soreness from previous visit, plan to progress at [place] as appropriate. []  See Progress Note/Recertification   Patient will continue to benefit from skilled PT services to modify and progress therapeutic interventions, address functional mobility deficits, address ROM deficits, address strength deficits, analyze and address soft tissue restrictions, analyze and cue movement patterns, analyze and modify body mechanics/ergonomics and assess and modify postural abnormalities to attain remaining goals.    Progress toward goals / Updated goals:    Progressing with LTG#3 per performance in PT     PLAN  [x]  Upgrade activities as tolerated YES Continue plan of care   []  Discharge due to :    []  Other:      Therapist: Herb Lujan PT    Date: 6/21/2018 Time: 4:27 PM     Future Appointments  Date Time Provider Madi Malave   6/25/2018 4:00 PM [address] [place] [address], PT [facility]   6/27/2018 4:00 PM [address] [place] [address], PT [facility]   7/12/2018 8:05 AM [facility] NURSE [facility]   7/19/2018 9:00 AM Mono Vincent, DO 11 [facility]

## 2018-06-25 ENCOUNTER — HOSPITAL ENCOUNTER (OUTPATIENT)
Dept: PHYSICAL THERAPY | Age: 58
Discharge: HOME | End: 2018-06-25
Payer: COMMERCIAL

## 2018-06-25 PROCEDURE — 97110 THERAPEUTIC EXERCISES: CPT

## 2018-06-25 PROCEDURE — 97140 MANUAL THERAPY 1/> REGIONS: CPT

## 2018-06-25 NOTE — PROGRESS NOTES
PHYSICAL THERAPY - DAILY TREATMENT NOTE    Patient Name: Wojciech Paula        Date: 2018  : 1960   yes Patient  Verified  Visit #:     Insurance: Payor: Igor Ch / Plan: [address] [place] [address] [place] HMO / Product Type: HMO /      In time: 4:00 Out time: 4:45   Total Treatment Time: 45     Medicare/BCBS Time Tracking (below)   Total Timed Codes (min):  na 1:1 Treatment Time:  na     TREATMENT AREA =  Left hip pain [M25.552]    SUBJECTIVE  Pain Level (on 0 to 10 scale):  0  / 10   Medication Changes/New allergies or changes in medical history, any new surgeries or procedures?    no  If yes, update Summary List   Subjective Functional Status/Changes:  []  No changes reported     Pt reports she was able to walk around [facility] this weekend w/o inc in pain. Reports she notices improvement in prolonged sitting but still has difficulty w/ L SL. Reports 50% overall improvement in sx          OBJECTIVE      35 min Therapeutic Exercise:  [x]  See flow sheet   Rationale:      increase ROM, increase strength and improve coordination to improve the patients ability to ambulate     10 min Manual Therapy: stm to L glute med, piri, TFL, supine hs str, hip er str   Rationale:      decrease pain, increase ROM, increase tissue extensibility and decrease trigger points to improve patient's ability to tolerate prolonged sitting     min Patient Education:  yes  Reviewed HEP   []  Progressed/Changed HEP based on: Other Objective/Functional Measures:    ttp to the L piriformis persists  FOTO: 63/100  GROC +5     Post Treatment Pain Level (on 0 to 10) scale:   0  / 10     ASSESSMENT  Assessment/Changes in Function:     Demo significant improvement in FOTO score indicating improving function.  Denied pain t/o session     []  See Progress Note/Recertification   Patient will continue to benefit from skilled PT services to modify and progress therapeutic interventions, address functional mobility deficits, address ROM deficits, address strength deficits, analyze and address soft tissue restrictions, analyze and cue movement patterns, analyze and modify body mechanics/ergonomics, assess and modify postural abnormalities and instruct in home and community integration to attain remaining goals.    Progress toward goals / Updated goals:    Reassess for PN at [address] [facility]  []  Upgrade activities as tolerated yes Continue plan of care   []  Discharge due to :    []  Other:      Therapist: Andreas Pérez PT    Date: 6/25/2018 Time: 4:02 PM     Future Appointments  Date Time Provider Madi Malave   6/27/2018 4:00 PM Andreas Pérez, ZHANNA [facility]   7/12/2018 8:05 AM [facility] [address]   7/19/2018 9:00 AM Ector Fuentes DO 11 [facility]

## 2018-06-27 ENCOUNTER — HOSPITAL ENCOUNTER (OUTPATIENT)
Dept: PHYSICAL THERAPY | Age: 58
Discharge: HOME | End: 2018-06-27
Payer: COMMERCIAL

## 2018-06-27 PROCEDURE — 97110 THERAPEUTIC EXERCISES: CPT

## 2018-06-27 PROCEDURE — 97140 MANUAL THERAPY 1/> REGIONS: CPT

## 2018-06-27 NOTE — PROGRESS NOTES
PHYSICAL THERAPY - DAILY TREATMENT NOTE    Patient Name: Dagmar Sandoval        Date: 2018  : 1960   yes Patient  Verified  Visit #:     Insurance: Payor: Jeffery Birch / Plan: [place] HMO / Product Type: HMO /      In time: 4:00 Out time: 4:57   Total Treatment Time: 57     Medicare/BCBS Time Tracking (below)   Total Timed Codes (min):  na 1:1 Treatment Time:  na     TREATMENT AREA =  Left hip pain [M25.552]    SUBJECTIVE  Pain Level (on 0 to 10 scale):  0  / 10   Medication Changes/New allergies or changes in medical history, any new surgeries or procedures?    no  If yes, update Summary List   Subjective Functional Status/Changes:  []  No changes reported     Pt reports 50% overall improvement in sx, cont to c/o stiffness in the AM and after sitting for prolonged periods of time, difficulty sleeping on L side. Reports she is able to lay on the L side approx 1 hour. Reports 4/10 max pain, 4/10 avg pain. OBJECTIVE    45 min Therapeutic Exercise:  [x]  See flow sheet   Rationale:      increase ROM and increase strength to improve the patients ability to complete ADLs     12 min Manual Therapy: stm to the L glute med, TFL, piriformis, supine hs str, supine hip ER str   Rationale:      decrease pain, increase ROM, increase tissue extensibility and decrease trigger points to improve patient's ability to complete ADLs     min Patient Education:  yes  Reviewed HEP   []  Progressed/Changed HEP based on:        Other Objective/Functional Measures:    ER PROM 37 deg  MMT hip flex 4/5, abd 4-/5, ER 4/5, ext 4-/5       Post Treatment Pain Level (on 0 to 10) scale:   0  / 10     ASSESSMENT  Assessment/Changes in Function:     See PN     [x]  See Progress Note/Recertification   Patient will continue to benefit from skilled PT services to modify and progress therapeutic interventions, address functional mobility deficits, address ROM deficits, address strength deficits, analyze and address soft tissue restrictions, analyze and cue movement patterns, analyze and modify body mechanics/ergonomics and instruct in home and community integration to attain remaining goals.    Progress toward goals / Updated goals:    See PN     PLAN  []  Upgrade activities as tolerated yes Continue plan of care   []  Discharge due to :    []  Other:      Therapist: Roque Bhatt PT    Date: 6/27/2018 Time: 4:10 PM     Future Appointments  Date Time Provider Madi Malave   7/3/2018 5:00 PM Isaura Trujillo, PT [facility]   7/10/2018 5:00 PM Isaura Trujillo, PT [facility]   7/12/2018 8:05 AM [facility] NURSE [facility]   7/12/2018 5:00 PM Isaura Trujillo, PT [facility]   7/17/2018 5:00 PM Isaura Trujillo, PT [facility]   7/19/2018 9:00 AM Monet Sofia [facility]   7/19/2018 5:00 PM Isaura Trujillo, PT [facility]   7/24/2018 5:00 PM Isaura Trujillo, PT [facility]   7/26/2018 5:00 PM Isaura Trujillo, PT [facility]

## 2018-06-27 NOTE — PROGRESS NOTES
[address]  [facility] THERAPY  [address], [place] [address],[facility], [address] [facility] - Phone: [phone]  Fax: [phone]  PROGRESS NOTE  Patient Name: Leonie Howe : 1960   Treatment/Medical Diagnosis: Left hip pain [M25.552]   Referral Source: Marcellus Smith MD     Date of Initial Visit: 18 Attended Visits: 8 Missed Visits: 0     SUMMARY OF TREATMENT  Pt has attended 8 sessions of PT for the tx of L trochanteric bursitis. PT has consisted of therex, manual tx, modalities, and HEP in order to improve ROM, tissue extensibility, glute/core stability, and dec pain. CURRENT STATUS  Pt currently reports 50% overall improvement in sx, 4/10 max and 4/10 avg pain (though arrives consistently to PT reporting 0/10 pain level). Pt reports improvement ability to lay on the L side but notes tolerance of < 1 hour. Reports minimal instances of L foot numbness in the last several weeks. Pt continues to c/o stiffness in the AM and after prolonged sitting (>1 hour). Pt currently demo's improvement in L hip ER PROM (37 deg, previously 20 deg). MMT hip flex 4/5, abd 4-/5, ER 4/5, ext 4-/5. (-)Trendelenburg in SLS w/ cueing. Cont to c/o ttp to the L glute med and piriformis. FOTO score 63/100, GROC +5 \"a good deal better\"    Goal/Measure of Progress Goal Met? 1.  Pt to demo (-)B trendelenburg to improve glute strength/stability for prolonged amb/standing   Status at last Eval: (+) trendelenburg B Current Status: (-)trendelenburg w/ cueing yes   2. Pt to report 59/100 or > on FOTO scores to improve functional mobility for prolonged positioning, transfers   Status at last Eval: 47/100 Current Status: 63/100 yes   3. Pt to demo 30 deg L hip ER AROM to improve ease of don/doffing shoes and socks   Status at last Eval: 20 Current Status: 37 yes     New Goals to be achieved in __3-4__  weeks:  1.  Pt will report at least 75% overall improvement in sx for improve tolerance to sitting  2. Pt will improve L hip abd MMT to at least 4/5 to improve hip stability w/ ambulation and stairs  3. Pt will demo I w/ long-term HEP in prep for DC    RECOMMENDATIONS  Recommend pt continue PT 2x/wk for 3-4 weeks to further address LE strength/stability deficits to enable pt improved functional mobility. Thank you. If you have any questions/comments please contact us directly at [phone]. Thank you for allowing us to assist in the care of your patient. Therapist Signature: Derek Aponte PT Date: 6/27/2018     Time: 5:20 PM   NOTE TO PHYSICIAN:  PLEASE COMPLETE THE ORDERS BELOW AND FAX TO   [facility] [place] Physical [facility]: [phone]  If you are unable to process this request in 24 hours please contact our office: [phone]    ___ I have read the above report and request that my patient continue as recommended.   ___ I have read the above report and request that my patient continue therapy with the following changes/special instructions:_________________________________________________________   ___ I have read the above report and request that my patient be discharged from therapy.      Physician Signature:        Date:       Time:

## 2018-07-03 ENCOUNTER — HOSPITAL ENCOUNTER (OUTPATIENT)
Dept: PHYSICAL THERAPY | Age: 58
Discharge: HOME | End: 2018-07-03
Payer: COMMERCIAL

## 2018-07-03 PROCEDURE — 97140 MANUAL THERAPY 1/> REGIONS: CPT

## 2018-07-03 PROCEDURE — 97110 THERAPEUTIC EXERCISES: CPT

## 2018-07-03 NOTE — PROGRESS NOTES
PHYSICAL THERAPY - DAILY TREATMENT NOTE    Patient Name: Ivana Mariano        Date: 7/3/2018  : 1960   YES Patient  Verified  Visit #:   9   of   12(+8)  Insurance: Payor: Pancho Flower / Plan: [employer] / Product Type: HMO /      In time: 500 Out time: 607   Total Treatment Time: 67     Medicare Time Tracking (below)   Total Timed Codes (min):  na 1:1 Treatment Time:  na     TREATMENT AREA =  Left hip pain [M25.552]    SUBJECTIVE  Pain Level (on 0 to 10 scale):  0  / 10   Medication Changes/New allergies or changes in medical history, any new surgeries or procedures? NO    If yes, update Summary List   Subjective Functional Status/Changes:  []  No changes reported     Patient states her L lower leg has been bothering her over the past 2 days for unknown reasons. Patient states her hip is doing well at this time. OBJECTIVE  Modalities Rationale:     decrease inflammation and decrease pain to improve patient's ability to perform transfers. min [] Estim, type/location:                                      []  att     []  unatt     []  w/US     []  w/ice    []  w/heat    min []  Mechanical Traction: type/lbs                   []  pro   []  sup   []  int   []  cont    []  before manual    []  after manual    min []  Ultrasound, settings/location:      min []  Iontophoresis w/ dexamethasone, location:                                               []  take home patch       []  in clinic   10 min [x]  Ice     []  Heat    location/position: To L hip in R SL position    min []  Vasopneumatic Device, press/temp:     min []  Other:    [x] Skin assessment post-treatment (if applicable):    [x]  intact    []  redness- no adverse reaction     []redness  adverse reaction:        42 min Therapeutic Exercise:  [x]  See flow sheet   Rationale:      increase ROM, increase strength, improve coordination and improve balance to improve the patients ability to perform walking activities.       15 min Manual Therapy: STM to L glute med, L piriformis, L TFL; L hip flexion and ER stretch   Rationale:      decrease pain, increase ROM, increase tissue extensibility and decrease trigger points to improve patient's ability to perform standing activities. min Patient Education:  YES  Reviewed HEP   []  Progressed/Changed HEP based on: Other Objective/Functional Measures:    Patient cued during bodyweight squat to increased depth and improve posterior weight shift  Added blue theraband for glute bridge this session in order to increase glute activation     Post Treatment Pain Level (on 0 to 10) scale:   0  / 10     ASSESSMENT  Assessment/Changes in Function:     Patient tolerated session well, denied increased pain. Patient did not present with signs or symptoms consistent with DVT to L calf musculature, however was instructed on red flags. []  See Progress Note/Recertification   Patient will continue to benefit from skilled PT services to modify and progress therapeutic interventions, address functional mobility deficits, address ROM deficits, address strength deficits, analyze and address soft tissue restrictions, analyze and cue movement patterns, analyze and modify body mechanics/ergonomics and assess and modify postural abnormalities to attain remaining goals.    Progress toward goals / Updated goals:    Progressing with LTG#2     PLAN  [x]  Upgrade activities as tolerated YES Continue plan of care   []  Discharge due to :    []  Other:      Therapist: Farhat Valadez PT    Date: 7/3/2018 Time: 6:26 PM     Future Appointments  Date Time Provider Madi Malave   7/10/2018 5:00 PM Farhat Valadez, PT [facility]   7/12/2018 8:05 AM [facility] NURSE 11 [facility]   7/12/2018 5:00 PM Farhat Valadez PT [facility]   7/17/2018 5:00 PM Farhat Valadez PT [facility]   7/19/2018 9:00 AM Donna El Crews, DO [facility]   7/19/2018 5:00 PM Farhat Valadez PT [facility]   7/24/2018 5:00 PM Doc Babin Montrell Rodriguez, PT [facility]   7/26/2018 5:00 PM Elza Gu, PT [facility]

## 2018-07-10 ENCOUNTER — HOSPITAL ENCOUNTER (OUTPATIENT)
Dept: PHYSICAL THERAPY | Age: 58
Discharge: HOME | End: 2018-07-10
Payer: COMMERCIAL

## 2018-07-10 PROCEDURE — 97110 THERAPEUTIC EXERCISES: CPT

## 2018-07-10 PROCEDURE — 97140 MANUAL THERAPY 1/> REGIONS: CPT

## 2018-07-10 NOTE — PROGRESS NOTES
PHYSICAL THERAPY - DAILY TREATMENT NOTE    Patient Name: Santina Primrose        Date: 7/10/2018  : 1960   YES Patient  Verified  Visit #:   10   of   20  Insurance: Payor: Inessa Garcia / Plan: [place] HMO / Product Type: HMO /      In time: 500 Out time: 552   Total Treatment Time: 52     Medicare Time Tracking (below)   Total Timed Codes (min):  na 1:1 Treatment Time:  na     TREATMENT AREA =  Left hip pain [M25.552]    SUBJECTIVE  Pain Level (on 0 to 10 scale):  0  / 10   Medication Changes/New allergies or changes in medical history, any new surgeries or procedures? NO    If yes, update Summary List   Subjective Functional Status/Changes:  []  No changes reported     Patient states her hip is doing well and feels like she is ready to be done with PT soon. OBJECTIVE      40 min Therapeutic Exercise:  [x]  See flow sheet   Rationale:      increase ROM, increase strength, improve coordination and improve balance to improve the patients ability to perform walking activities. 12 min Manual Therapy: STM to L glute med, L piriformis, L TFL; L hip ER stretch   Rationale:      decrease pain, increase ROM, increase tissue extensibility and decrease trigger points to improve patient's ability to perform recreational activities. min Patient Education:  YES  Reviewed HEP   []  Progressed/Changed HEP based on: Other Objective/Functional Measures:    Patient requiring cuing on mini band exercises as well as squats in order to correct form  Continued tenderness noted to L lateral hip musculature during MT  Moderate restriction to L hip ER PROM     Post Treatment Pain Level (on 0 to 10) scale:   0  / 10     ASSESSMENT  Assessment/Changes in Function:     Patient denied increased symptoms post session. Plan to progress patient to DC at the end of her NV pending symptom level.       []  See Progress Note/Recertification   Patient will continue to benefit from skilled PT services to modify and progress therapeutic interventions, address functional mobility deficits, address ROM deficits, address strength deficits, analyze and address soft tissue restrictions, analyze and cue movement patterns, analyze and modify body mechanics/ergonomics, assess and modify postural abnormalities and address imbalance/dizziness to attain remaining goals.    Progress toward goals / Updated goals:    Plan to DC to HEP at the end of her NV      PLAN  [x]  Upgrade activities as tolerated YES Continue plan of care   []  Discharge due to :    []  Other:      Therapist: Finn Sims PT    Date: 7/10/2018 Time: 5:58 PM     Future Appointments  Date Time Provider Madi Malave   7/12/2018 8:05 AM Maryellen [facility] 67 22   7/12/2018 5:00 PM Finn Sims PT [facility]   7/17/2018 5:00 PM Finn Sims, PT [facility]   7/19/2018 9:00 AM Mono Kong, DO 11 [facility]   7/19/2018 5:00 PM Finn Sims, PT [facility]   7/24/2018 5:00 PM Finn Sims PT [facility]   7/26/2018 5:00 PM Finn Sims, PT [facility]

## 2018-07-12 ENCOUNTER — APPOINTMENT (OUTPATIENT)
Dept: PHYSICAL THERAPY | Age: 58
End: 2018-07-12
Payer: COMMERCIAL

## 2018-07-12 DIAGNOSIS — E55.9 HYPOVITAMINOSIS D: ICD-10-CM

## 2018-07-12 DIAGNOSIS — E78.5 DYSLIPIDEMIA: ICD-10-CM

## 2018-07-13 LAB
25(OH)D3 SERPL-MCNC: 41.2 NG/ML (ref 32–100)
CHOLEST SERPL-MCNC: 136 MG/DL (ref 110–200)
HDLC SERPL-MCNC: 2.5 MG/DL (ref 0–5)
HDLC SERPL-MCNC: 54 MG/DL (ref 40–59)
LDLC SERPL CALC-MCNC: 70 MG/DL (ref 50–99)
TRIGL SERPL-MCNC: 60 MG/DL (ref 40–149)
VLDLC SERPL CALC-MCNC: 12 MG/DL (ref 8–30)

## 2018-07-17 ENCOUNTER — APPOINTMENT (OUTPATIENT)
Dept: PHYSICAL THERAPY | Age: 58
End: 2018-07-17
Payer: COMMERCIAL

## 2018-07-19 ENCOUNTER — OFFICE VISIT (OUTPATIENT)
Dept: FAMILY MEDICINE CLINIC | Age: 58
End: 2018-07-19

## 2018-07-19 ENCOUNTER — APPOINTMENT (OUTPATIENT)
Dept: PHYSICAL THERAPY | Age: 58
End: 2018-07-19
Payer: COMMERCIAL

## 2018-07-19 VITALS
HEIGHT: 61 IN | HEART RATE: 59 BPM | OXYGEN SATURATION: 98 % | RESPIRATION RATE: 18 BRPM | BODY MASS INDEX: 35.27 KG/M2 | WEIGHT: 186.8 LBS | DIASTOLIC BLOOD PRESSURE: 73 MMHG | TEMPERATURE: 98.5 F | SYSTOLIC BLOOD PRESSURE: 122 MMHG

## 2018-07-19 DIAGNOSIS — E55.9 HYPOVITAMINOSIS D: ICD-10-CM

## 2018-07-19 DIAGNOSIS — I10 ESSENTIAL HYPERTENSION: Primary | ICD-10-CM

## 2018-07-19 DIAGNOSIS — E11.65 TYPE 2 DIABETES MELLITUS WITH HYPERGLYCEMIA, WITHOUT LONG-TERM CURRENT USE OF INSULIN (HCC): ICD-10-CM

## 2018-07-19 DIAGNOSIS — E78.5 DYSLIPIDEMIA: ICD-10-CM

## 2018-07-19 DIAGNOSIS — E66.01 SEVERE OBESITY (BMI 35.0-39.9): ICD-10-CM

## 2018-07-19 NOTE — PROGRESS NOTES
Chief Complaint   Patient presents with    Labs     results       HPI:  Patient is a 66year old obese  female with medical problems listed below presents today for follow up of Hypertension, DM 2, Hypovitaminosis D, etc. She has been feeling well and voices no complaints today. She is complaint with her medications with no adverse effects reported and she has gained three pound in the last four months. Past Medical History:   Diagnosis Date    AR (allergic rhinitis)     Arthritis     Heart murmur     Hypertension     Hypovitaminosis D 3/3/2014       No Known Allergies      Current Outpatient Prescriptions   Medication Sig Dispense Refill    losartan-hydroCHLOROthiazide (HYZAAR) 100-25 mg per tablet TAKE 1 TABLET BY MOUTH EVERY DAY 30 Tab 3    losartan-hydroCHLOROthiazide (HYZAAR) 100-25 mg per tablet TAKE 1 TABLET BY MOUTH EVERY DAY 30 Tab 5    amLODIPine (NORVASC) 5 mg tablet TAKE 1 TABLET BY MOUTH EVERY DAY 30 Tab 5    ergocalciferol (VITAMIN D2) 50,000 unit capsule Take 1 Cap by mouth every seven (7) days. 8 Cap 5    SEBEX 2-2 % shampoo USE SHAMPOO WEEKLY AS DIRECTED  5    amoxicillin 500 mg tab Take 500 mg by mouth two (2) times a day. 20 Tab 0    lidocaine (LIDOCAINE VISCOUS) 2 % solution Put 10 mL in mouth and swish and spit out QAC and at bedtime 200 mL 0    fluticasone (FLONASE) 50 mcg/actuation nasal spray 2 Sprays by Both Nostrils route daily as needed for Rhinitis.  1 Bottle 3          ROS:  Constitutional: Negative for fever, chills, or fatigue  Neurological: Negative for headache, dizziness, visual disturbance, or loss of conciousness  Respiratory: Negative for SOB, hemoptysis, or wheezing  Cardiovascular: Negative for chest pain, palpitation, or leg swelling  Gastrointestinal: Negative for abdominal pain, nausea, vomiting, diarrhea, blood in stool, melena, or heartburn  Musculoskeletal: Negative for falls        Physical Exam:  Visit Vitals    /73    Pulse (!) 59    Temp 98.5 °F (36.9 °C) (Oral)    Resp 18    Ht 5' 1\" (1.549 m)    Wt 186 lb 12.8 oz (84.7 kg)    SpO2 98%    BMI 35.3 kg/m2     General: Obese black female, a & o x 3, afebrile, well-nourished, interacting appropriately, in no acute distress  Skin: warm and dry, no rashes , no bruises  Neck: supple, symmetrical, no thyromegaly  Respiratory: symmetrical chest expansion, lung sounds clear bilaterally, good air entry  Cardiovascular: normal S1S2, regular rate and rhythm, no murmurs  Abdomen: non-distended, normoactive bowel sounds x 4 quadrants, soft, non-tender to palpation  Musculoskeletal: normal ROM on all joints, no swelling or deformity, no perilumbar tenderness, steady gait  Psychiatry: appropriate mood and affect  Extremity: No edema noted        Assessment/Plan:    ICD-10-CM ICD-9-CM    1. Essential hypertension I10 401.9 Controlled   Continue current medications and she was counseled on low-salt diet  CBC WITH AUTOMATED DIFF      METABOLIC PANEL, COMPREHENSIVE      T4, FREE      TSH 3RD GENERATION   2. Hypovitaminosis D E55.9 268.9 Recent vitamin D is good   3. Type 2 diabetes mellitus with hyperglycemia, without long-term current use of insulin ([facility]) E11.65 250.00 Continue current medications she was counseled on diabetic diet. HEMOGLOBIN A1C W/O EAG     790.29 MICROALBUMIN, UR, RAND W/ MICROALB/CREAT RATIO   4. Dyslipidemia E78.5 272.4 Recent lipid profile is controlled and much improved. She was counseled on low-fat diet. 5. Severe obesity (BMI 35.0-39.9) ([facility]) E66.01 278.01 I have reviewed/discussed the above normal BMI with the patient. I have recommended the following interventions: dietary management education, guidance, and counseling, encourage exercise and monitor weight . and she was referred to NUTRITION today.   REFERRAL TO NUTRITION         Orders Placed This Encounter    CBC WITH AUTOMATED DIFF     Standing Status:   Future     Standing Expiration Date:   1/15/2019   Holden Higgins HEMOGLOBIN A1C W/O EAG     Standing Status:   Future     Standing Expiration Date:   8/28/6926    METABOLIC PANEL, COMPREHENSIVE     Standing Status:   Future     Standing Expiration Date:   1/15/2019    T4, FREE     Standing Status:   Future     Standing Expiration Date:   1/15/2019    TSH 3RD GENERATION     Standing Status:   Future     Standing Expiration Date:   11/16/2018    MICROALBUMIN, UR, RAND W/ MICROALB/CREAT RATIO     Standing Status:   Future     Standing Expiration Date:   7/20/2019    REFERRAL TO NUTRITION     Referral Priority:   Routine     Referral Type:   Consultation     Referral Reason:   Specialty Services Required          Recent labs reviewed with pt        Additional Notes: Discussed today's diagnosis, treatment plans. Discussed medication indications and side effects. Weight Management: Counseled  After Visit Summary: Discussed provided printed patient instructions. Answered questions accordingly. Follow-up Disposition: In 3 months with labs 1 week prior. Kosta Paul DO, MPH  Internal Medicine        .

## 2018-07-19 NOTE — PATIENT INSTRUCTIONS
DASH Diet: Care Instructions  Your Care Instructions    The DASH diet is an eating plan that can help lower your blood pressure. DASH stands for Dietary Approaches to Stop Hypertension. Hypertension is high blood pressure. The DASH diet focuses on eating foods that are high in calcium, potassium, and magnesium. These nutrients can lower blood pressure. The foods that are highest in these nutrients are fruits, vegetables, low-fat dairy products, nuts, seeds, and legumes. But taking calcium, potassium, and magnesium supplements instead of eating foods that are high in those nutrients does not have the same effect. The DASH diet also includes whole grains, fish, and poultry. The DASH diet is one of several lifestyle changes your doctor may recommend to lower your high blood pressure. Your doctor may also want you to decrease the amount of sodium in your diet. Lowering sodium while following the DASH diet can lower blood pressure even further than just the DASH diet alone. Follow-up care is a key part of your treatment and safety. Be sure to make and go to all appointments, and call your doctor if you are having problems. It's also a good idea to know your test results and keep a list of the medicines you take. How can you care for yourself at home? Following the DASH diet  · Eat 4 to 5 servings of fruit each day. A serving is 1 medium-sized piece of fruit, ½ cup chopped or canned fruit, 1/4 cup dried fruit, or 4 ounces (½ cup) of fruit juice. Choose fruit more often than fruit juice. · Eat 4 to 5 servings of vegetables each day. A serving is 1 cup of lettuce or raw leafy vegetables, ½ cup of chopped or cooked vegetables, or 4 ounces (½ cup) of vegetable juice. Choose vegetables more often than vegetable juice. · Get 2 to 3 servings of low-fat and fat-free dairy each day. A serving is 8 ounces of milk, 1 cup of yogurt, or 1 ½ ounces of cheese. · Eat 6 to 8 servings of grains each day.  A serving is 1 slice of bread, 1 ounce of dry cereal, or ½ cup of cooked rice, pasta, or cooked cereal. Try to choose whole-grain products as much as possible. · Limit lean meat, poultry, and fish to 2 servings each day. A serving is 3 ounces, about the size of a deck of cards. · Eat 4 to 5 servings of nuts, seeds, and legumes (cooked dried beans, lentils, and split peas) each week. A serving is 1/3 cup of nuts, 2 tablespoons of seeds, or ½ cup of cooked beans or peas. · Limit fats and oils to 2 to 3 servings each day. A serving is 1 teaspoon of vegetable oil or 2 tablespoons of salad dressing. · Limit sweets and added sugars to 5 servings or less a week. A serving is 1 tablespoon jelly or jam, ½ cup sorbet, or 1 cup of lemonade. · Eat less than 2,300 milligrams (mg) of sodium a day. If you limit your sodium to 1,500 mg a day, you can lower your blood pressure even more. Tips for success  · Start small. Do not try to make dramatic changes to your diet all at once. You might feel that you are missing out on your favorite foods and then be more likely to not follow the plan. Make small changes, and stick with them. Once those changes become habit, add a few more changes. · Try some of the following:  ¨ Make it a goal to eat a fruit or vegetable at every meal and at snacks. This will make it easy to get the recommended amount of fruits and vegetables each day. ¨ Try yogurt topped with fruit and nuts for a snack or healthy dessert. ¨ Add lettuce, tomato, cucumber, and onion to sandwiches. ¨ Combine a ready-made pizza crust with low-fat mozzarella cheese and lots of vegetable toppings. Try using tomatoes, squash, spinach, broccoli, carrots, cauliflower, and onions. ¨ Have a variety of cut-up vegetables with a low-fat dip as an appetizer instead of chips and dip. ¨ Sprinkle sunflower seeds or chopped almonds over salads. Or try adding chopped walnuts or almonds to cooked vegetables.   ¨ Try some vegetarian meals using beans and peas. Add garbanzo or kidney beans to salads. Make burritos and tacos with mashed [place] beans or black beans. Where can you learn more? Go to http://killian-heather.info/. Enter Y357 in the search box to learn more about \"DASH Diet: Care Instructions. \"  Current as of: December 6, 2017  Content Version: 11.7  © 7839-2267 [employer]. Care instructions adapted under license by [employer] (which disclaims liability or warranty for this information). If you have questions about a medical condition or this instruction, always ask your healthcare professional. [address] any warranty or liability for your use of this information. Learning About the [address] Diet  What is the Mediterranean diet? The Mediterranean diet is a style of eating rather than a diet plan. It features foods eaten in [place], [place], [place] and [place], and other countries along the [facility]. It emphasizes eating foods like fish, fruits, vegetables, beans, high-fiber breads and whole grains, nuts, and olive oil. This style of eating includes limited red meat, cheese, and sweets. Why choose the Mediterranean diet? A Mediterranean-style diet may improve heart health. It contains more fat than other heart-healthy diets. But the fats are mainly from nuts, unsaturated oils (such as fish oils and olive oil), and certain nut or seed oils (such as canola, soybean, or flaxseed oil). These fats may help protect the heart and blood vessels. How can you get started on the Mediterranean diet? Here are some things you can do to switch to a more Mediterranean way of eating. What to eat  · Eat a variety of fruits and vegetables each day, such as grapes, blueberries, tomatoes, broccoli, peppers, figs, olives, spinach, eggplant, beans, lentils, and chickpeas.   · Eat a variety of whole-grain foods each day, such as oats, brown rice, and whole wheat bread, pasta, and couscous. · Eat fish at least 2 times a week. Try tuna, salmon, mackerel, lake trout, herring, or sardines. · Eat moderate amounts of low-fat dairy products, such as milk, cheese, or yogurt. · Eat moderate amounts of poultry and eggs. · Choose healthy (unsaturated) fats, such as nuts, olive oil, and certain nut or seed oils like canola, soybean, and flaxseed. · Limit unhealthy (saturated) fats, such as butter, palm oil, and coconut oil. And limit fats found in animal products, such as meat and dairy products made with whole milk. Try to eat red meat only a few times a month in very small amounts. · Limit sweets and desserts to only a few times a week. This includes sugar-sweetened drinks like soda. The Mediterranean diet may also include red wine with your meal-1 glass each day for women and up to 2 glasses a day for men. Tips for eating at home  · Use herbs, spices, garlic, lemon zest, and citrus juice instead of salt to add flavor to foods. · Add avocado slices to your sandwich instead of sutton. · Have fish for lunch or dinner instead of red meat. Brush the fish with olive oil, and broil or grill it. · Sprinkle your salad with seeds or nuts instead of cheese. · Cook with olive or canola oil instead of butter or oils that are high in saturated fat. · Switch from 2% milk or whole milk to 1% or fat-free milk. · Dip raw vegetables in a vinaigrette dressing or hummus instead of dips made from mayonnaise or sour cream.  · Have a piece of fruit for dessert instead of a piece of cake. Try baked apples, or have some dried fruit. Tips for eating out  · Try broiled, grilled, baked, or poached fish instead of having it fried or breaded. · Ask your  to have your meals prepared with olive oil instead of butter. · Order dishes made with marinara sauce or sauces made from olive oil. Avoid sauces made from cream or mayonnaise.   · Choose whole-grain breads, whole wheat pasta and pizza crust, brown rice, beans, and lentils. · Cut back on butter or margarine on bread. Instead, you can dip your bread in a small amount of olive oil. · Ask for a side salad or grilled vegetables instead of french fries or chips. Where can you learn more? Go to http://killian-heather.info/. Enter [phone] in the search box to learn more about \"Learning About the Mediterranean Diet. \"  Current as of: May 12, 2017  Content Version: 11.7  © 2507-3486 [employer]. Care instructions adapted under license by [employer] (which disclaims liability or warranty for this information). If you have questions about a medical condition or this instruction, always ask your healthcare professional. [address] any warranty or liability for your use of this information. Learning About Diabetes Food Guidelines  Your Care Instructions    Meal planning is important to manage diabetes. It helps keep your blood sugar at a target level (which you set with your doctor). You don't have to eat special foods. You can eat what your family eats, including sweets once in a while. But you do have to pay attention to how often you eat and how much you eat of certain foods. You may want to work with a dietitian or a certified diabetes educator (CDE) to help you plan meals and snacks. A dietitian or CDE can also help you lose weight if that is one of your goals. What should you know about eating carbs? Managing the amount of carbohydrate (carbs) you eat is an important part of healthy meals when you have diabetes. Carbohydrate is found in many foods. · Learn which foods have carbs. And learn the amounts of carbs in different foods. ¨ Bread, cereal, pasta, and rice have about 15 grams of carbs in a serving. A serving is 1 slice of bread (1 ounce), ½ cup of cooked cereal, or 1/3 cup of cooked pasta or rice. ¨ Fruits have 15 grams of carbs in a serving.  A serving is 1 small fresh fruit, such as an apple or orange; ½ of a banana; ½ cup of cooked or canned fruit; ½ cup of fruit juice; 1 cup of melon or raspberries; or 2 tablespoons of dried fruit. ¨ Milk and no-sugar-added yogurt have 15 grams of carbs in a serving. A serving is 1 cup of milk or 2/3 cup of no-sugar-added yogurt. ¨ Starchy vegetables have 15 grams of carbs in a serving. A serving is ½ cup of mashed potatoes or sweet potato; 1 cup winter squash; ½ of a small baked potato; ½ cup of cooked beans; or ½ cup cooked corn or green peas. · Learn how much carbs to eat each day and at each meal. A dietitian or CDE can teach you how to keep track of the amount of carbs you eat. This is called carbohydrate counting. · If you are not sure how to count carbohydrate grams, use the Plate Method to plan meals. It is a good, quick way to make sure that you have a balanced meal. It also helps you spread carbs throughout the day. ¨ Divide your plate by types of foods. Put non-starchy vegetables on half the plate, meat or other protein food on one-quarter of the plate, and a grain or starchy vegetable in the final quarter of the plate. To this you can add a small piece of fruit and 1 cup of milk or yogurt, depending on how many carbs you are supposed to eat at a meal.  · Try to eat about the same amount of carbs at each meal. Do not \"save up\" your daily allowance of carbs to eat at one meal.  · Proteins have very little or no carbs per serving. Examples of proteins are beef, chicken, turkey, fish, eggs, tofu, cheese, cottage cheese, and peanut butter. A serving size of meat is 3 ounces, which is about the size of a deck of cards. Examples of meat substitute serving sizes (equal to 1 ounce of meat) are 1/4 cup of cottage cheese, 1 egg, 1 tablespoon of peanut butter, and ½ cup of tofu. How can you eat out and still eat healthy? · Learn to estimate the serving sizes of foods that have carbohydrate.  If you measure food at home, it will be easier to estimate the amount in a serving of restaurant food. · If the meal you order has too much carbohydrate (such as potatoes, corn, or baked beans), ask to have a low-carbohydrate food instead. Ask for a salad or green vegetables. · If you use insulin, check your blood sugar before and after eating out to help you plan how much to eat in the future. · If you eat more carbohydrate at a meal than you had planned, take a walk or do other exercise. This will help lower your blood sugar. What else should you know? · Limit saturated fat, such as the fat from meat and dairy products. This is a healthy choice because people who have diabetes are at higher risk of heart disease. So choose lean cuts of meat and nonfat or low-fat dairy products. Use olive or canola oil instead of butter or shortening when cooking. · Don't skip meals. Your blood sugar may drop too low if you skip meals and take insulin or certain medicines for diabetes. · Check with your doctor before you drink alcohol. Alcohol can cause your blood sugar to drop too low. Alcohol can also cause a bad reaction if you take certain diabetes medicines. Follow-up care is a key part of your treatment and safety. Be sure to make and go to all appointments, and call your doctor if you are having problems. It's also a good idea to know your test results and keep a list of the medicines you take. Where can you learn more? Go to http://killian-heather.info/. Enter M747 in the search box to learn more about \"Learning About Diabetes Food Guidelines. \"  Current as of: December 7, 2017  Content Version: 11.7  © 7544-3336 [employer]. Care instructions adapted under license by [employer] (which disclaims liability or warranty for this information).  If you have questions about a medical condition or this instruction, always ask your healthcare professional. [address] any warranty or liability for your use of this information.

## 2018-07-19 NOTE — MR AVS SNAPSHOT
[address] [place] [address] 
 
 
 [address] [place] [address] 
[phone] Patient: Liam Huynh MRN: [id number] RRA:[id number] Visit Information Date & Time Provider Department Dept. Phone Encounter #  
 7/19/2018  9:00 AM Melissa Alarcon [address] [id number] Follow-up Instructions Return in about 3 months (around 10/19/2018) for Labs 1 week before. Upcoming Health Maintenance Date Due DTaP/Tdap/Td series (1 - Tdap) 9/2/2012 PAP AKA CERVICAL CYTOLOGY 10/16/2016 MICROALBUMIN Q1 4/25/2018 Influenza Age 5 to Adult 8/1/2018 FOOT EXAM Q1 9/5/2018 HEMOGLOBIN A1C Q6M 9/8/2018 EYE EXAM RETINAL OR DILATED Q1 3/1/2019 LIPID PANEL Q1 7/12/2019 BREAST CANCER SCRN MAMMOGRAM 2/23/2020 COLONOSCOPY 5/14/2022 Allergies as of 7/19/2018  Review Complete On: 7/19/2018 By: Ramona Mckeon No Known Allergies Current Immunizations  Reviewed on 10/13/2016 Name Date Influenza Vaccine 11/3/2014 Influenza Vaccine (Quad) PF 9/5/2017, 10/13/2016, 10/8/2015 Pneumococcal Conjugate (PCV-13) 1/19/2017 Td 9/1/2012 Not reviewed this visit You Were Diagnosed With   
  
 Codes Comments Essential hypertension    -  Primary ICD-10-CM: I10 
ICD-9-CM: 401.9 Hypovitaminosis D     ICD-10-CM: E55.9 ICD-9-CM: 268.9 Type 2 diabetes mellitus with hyperglycemia, without long-term current use of insulin (HCC)     ICD-10-CM: E11.65 ICD-9-CM: 250.00, 790.29 Dyslipidemia     ICD-10-CM: E78.5 ICD-9-CM: 272.4 Severe obesity (BMI 35.0-39.9) (HCC)     ICD-10-CM: E66.01 
ICD-9-CM: 278.01 Vitals BP Pulse Temp Resp Height(growth percentile) Weight(growth percentile) 122/73 (!) 59 98.5 °F (36.9 °C) (Oral) 18 5' 1\" (1.549 m) 186 lb 12.8 oz (84.7 kg) SpO2 BMI OB Status Smoking Status 98% 35.3 kg/m2 Hysterectomy Never Smoker BMI and BSA Data Body Mass Index Body Surface Area  
 35.3 kg/m 2 1.91 m 2 Preferred Pharmacy Pharmacy Name Phone [address] [facility] Adriano Curry [address] [facility] [phone] Your Updated Medication List  
  
   
This list is accurate as of 7/19/18  9:56 AM.  Always use your most recent med list. amLODIPine 5 mg tablet Commonly known as:  Goessel Citron TAKE 1 TABLET BY MOUTH EVERY DAY  
  
 amoxicillin 500 mg Tab Take 500 mg by mouth two (2) times a day. ergocalciferol 50,000 unit capsule Commonly known as:  VITAMIN D2 Take 1 Cap by mouth every seven (7) days. fluticasone 50 mcg/actuation nasal spray Commonly known as:  Montine Pyatt 2 Sprays by Both Nostrils route daily as needed for Rhinitis.  
  
 lidocaine 2 % solution Commonly known as:  LIDOCAINE VISCOUS Put 10 mL in mouth and swish and spit out QAC and at bedtime * losartan-hydroCHLOROthiazide 100-25 mg per tablet Commonly known as:  HYZAAR  
TAKE 1 TABLET BY MOUTH EVERY DAY  
  
 * losartan-hydroCHLOROthiazide 100-25 mg per tablet Commonly known as:  HYZAAR  
TAKE 1 TABLET BY MOUTH EVERY DAY  
  
 SEBEX 2-2 % shampoo Generic drug:  salicyclic acid-sulfur USE SHAMPOO WEEKLY AS DIRECTED * Notice: This list has 2 medication(s) that are the same as other medications prescribed for you. Read the directions carefully, and ask your doctor or other care provider to review them with you. We Performed the Following REFERRAL TO NUTRITION [REF50 Custom] Comments:  
 Please refer to [address] [place] [address] Follow-up Instructions Return in about 3 months (around 10/19/2018) for Labs 1 week before. To-Do List   
 10/17/2018 Lab:  CBC WITH AUTOMATED DIFF   
  
 10/17/2018 Lab:  HEMOGLOBIN A1C W/O EAG   
  
 10/17/2018 Lab:  METABOLIC PANEL, COMPREHENSIVE   
  
 10/17/2018 Lab:  MICROALBUMIN, UR, RAND W/ MICROALB/CREAT RATIO 10/17/2018 Lab:  T4, FREE   
  
 10/17/2018 Lab:  TSH 3RD GENERATION Referral Information Referral ID Referred By Referred To  
  
 [id number] Jennifer [place] Not Available Visits Status Start Date End Date 1 New Request 7/19/18 7/19/19 If your referral has a status of pending review or denied, additional information will be sent to support the outcome of this decision. Patient Instructions DASH Diet: Care Instructions Your Care Instructions The DASH diet is an eating plan that can help lower your blood pressure. DASH stands for Dietary Approaches to Stop Hypertension. Hypertension is high blood pressure. The DASH diet focuses on eating foods that are high in calcium, potassium, and magnesium. These nutrients can lower blood pressure. The foods that are highest in these nutrients are fruits, vegetables, low-fat dairy products, nuts, seeds, and legumes. But taking calcium, potassium, and magnesium supplements instead of eating foods that are high in those nutrients does not have the same effect. The DASH diet also includes whole grains, fish, and poultry. The DASH diet is one of several lifestyle changes your doctor may recommend to lower your high blood pressure. Your doctor may also want you to decrease the amount of sodium in your diet. Lowering sodium while following the DASH diet can lower blood pressure even further than just the DASH diet alone. Follow-up care is a key part of your treatment and safety. Be sure to make and go to all appointments, and call your doctor if you are having problems. It's also a good idea to know your test results and keep a list of the medicines you take. How can you care for yourself at home? Following the DASH diet · Eat 4 to 5 servings of fruit each day.  A serving is 1 medium-sized piece of fruit, ½ cup chopped or canned fruit, 1/4 cup dried fruit, or 4 ounces (½ cup) of fruit juice. Choose fruit more often than fruit juice. · Eat 4 to 5 servings of vegetables each day. A serving is 1 cup of lettuce or raw leafy vegetables, ½ cup of chopped or cooked vegetables, or 4 ounces (½ cup) of vegetable juice. Choose vegetables more often than vegetable juice. · Get 2 to 3 servings of low-fat and fat-free dairy each day. A serving is 8 ounces of milk, 1 cup of yogurt, or 1 ½ ounces of cheese. · Eat 6 to 8 servings of grains each day. A serving is 1 slice of bread, 1 ounce of dry cereal, or ½ cup of cooked rice, pasta, or cooked cereal. Try to choose whole-grain products as much as possible. · Limit lean meat, poultry, and fish to 2 servings each day. A serving is 3 ounces, about the size of a deck of cards. · Eat 4 to 5 servings of nuts, seeds, and legumes (cooked dried beans, lentils, and split peas) each week. A serving is 1/3 cup of nuts, 2 tablespoons of seeds, or ½ cup of cooked beans or peas. · Limit fats and oils to 2 to 3 servings each day. A serving is 1 teaspoon of vegetable oil or 2 tablespoons of salad dressing. · Limit sweets and added sugars to 5 servings or less a week. A serving is 1 tablespoon jelly or jam, ½ cup sorbet, or 1 cup of lemonade. · Eat less than 2,300 milligrams (mg) of sodium a day. If you limit your sodium to 1,500 mg a day, you can lower your blood pressure even more. Tips for success · Start small. Do not try to make dramatic changes to your diet all at once. You might feel that you are missing out on your favorite foods and then be more likely to not follow the plan. Make small changes, and stick with them. Once those changes become habit, add a few more changes. · Try some of the following: ¨ Make it a goal to eat a fruit or vegetable at every meal and at snacks. This will make it easy to get the recommended amount of fruits and vegetables each day. ¨ Try yogurt topped with fruit and nuts for a snack or healthy dessert. ¨ Add lettuce, tomato, cucumber, and onion to sandwiches. ¨ Combine a ready-made pizza crust with low-fat mozzarella cheese and lots of vegetable toppings. Try using tomatoes, squash, spinach, broccoli, carrots, cauliflower, and onions. ¨ Have a variety of cut-up vegetables with a low-fat dip as an appetizer instead of chips and dip. ¨ Sprinkle sunflower seeds or chopped almonds over salads. Or try adding chopped walnuts or almonds to cooked vegetables. ¨ Try some vegetarian meals using beans and peas. Add garbanzo or kidney beans to salads. Make burritos and tacos with mashed [place] beans or black beans. Where can you learn more? Go to http://killianXcell Medicalheather.info/. Enter Z616 in the search box to learn more about \"DASH Diet: Care Instructions. \" Current as of: December 6, 2017 Content Version: 11.7 © 6792-6909 ASYM III. Care instructions adapted under license by [employer] (which disclaims liability or warranty for this information). If you have questions about a medical condition or this instruction, always ask your healthcare professional. [address] any warranty or liability for your use of this information. Learning About the [address] Diet What is the Mediterranean diet? The Mediterranean diet is a style of eating rather than a diet plan. It features foods eaten in [place], [place], [place] and [place], and other countries along the [facility]. It emphasizes eating foods like fish, fruits, vegetables, beans, high-fiber breads and whole grains, nuts, and olive oil. This style of eating includes limited red meat, cheese, and sweets. Why choose the Mediterranean diet? A Mediterranean-style diet may improve heart health. It contains more fat than other heart-healthy diets.  But the fats are mainly from nuts, unsaturated oils (such as fish oils and olive oil), and certain nut or seed oils (such as canola, soybean, or flaxseed oil). These fats may help protect the heart and blood vessels. How can you get started on the Mediterranean diet? Here are some things you can do to switch to a more Mediterranean way of eating. What to eat · Eat a variety of fruits and vegetables each day, such as grapes, blueberries, tomatoes, broccoli, peppers, figs, olives, spinach, eggplant, beans, lentils, and chickpeas. · Eat a variety of whole-grain foods each day, such as oats, brown rice, and whole wheat bread, pasta, and couscous. · Eat fish at least 2 times a week. Try tuna, salmon, mackerel, lake trout, herring, or sardines. · Eat moderate amounts of low-fat dairy products, such as milk, cheese, or yogurt. · Eat moderate amounts of poultry and eggs. · Choose healthy (unsaturated) fats, such as nuts, olive oil, and certain nut or seed oils like canola, soybean, and flaxseed. · Limit unhealthy (saturated) fats, such as butter, palm oil, and coconut oil. And limit fats found in animal products, such as meat and dairy products made with whole milk. Try to eat red meat only a few times a month in very small amounts. · Limit sweets and desserts to only a few times a week. This includes sugar-sweetened drinks like soda. The Mediterranean diet may also include red wine with your meal-1 glass each day for women and up to 2 glasses a day for men. Tips for eating at home · Use herbs, spices, garlic, lemon zest, and citrus juice instead of salt to add flavor to foods. · Add avocado slices to your sandwich instead of sutton. · Have fish for lunch or dinner instead of red meat. Brush the fish with olive oil, and broil or grill it. · Sprinkle your salad with seeds or nuts instead of cheese. · Cook with olive or canola oil instead of butter or oils that are high in saturated fat. · Switch from 2% milk or whole milk to 1% or fat-free milk. · Dip raw vegetables in a vinaigrette dressing or hummus instead of dips made from mayonnaise or sour cream. 
· Have a piece of fruit for dessert instead of a piece of cake. Try baked apples, or have some dried fruit. Tips for eating out · Try broiled, grilled, baked, or poached fish instead of having it fried or breaded. · Ask your  to have your meals prepared with olive oil instead of butter. · Order dishes made with marinara sauce or sauces made from olive oil. Avoid sauces made from cream or mayonnaise. · Choose whole-grain breads, whole wheat pasta and pizza crust, brown rice, beans, and lentils. · Cut back on butter or margarine on bread. Instead, you can dip your bread in a small amount of olive oil. · Ask for a side salad or grilled vegetables instead of french fries or chips. Where can you learn more? Go to http://killian-heather.info/. Enter 570-851-1596 in the search box to learn more about \"Learning About the Mediterranean Diet. \" Current as of: May 12, 2017 Content Version: 11.7 © 7948-4668 Ryan. Care instructions adapted under license by [employer] (which disclaims liability or warranty for this information). If you have questions about a medical condition or this instruction, always ask your healthcare professional. [place] [address] any warranty or liability for your use of this information. Learning About Diabetes Food Guidelines Your Care Instructions Meal planning is important to manage diabetes. It helps keep your blood sugar at a target level (which you set with your doctor). You don't have to eat special foods. You can eat what your family eats, including sweets once in a while. But you do have to pay attention to how often you eat and how much you eat of certain foods.  
You may want to work with a dietitian or a certified diabetes educator (CDE) to help you plan meals and snacks. A dietitian or CDE can also help you lose weight if that is one of your goals. What should you know about eating carbs? Managing the amount of carbohydrate (carbs) you eat is an important part of healthy meals when you have diabetes. Carbohydrate is found in many foods. · Learn which foods have carbs. And learn the amounts of carbs in different foods. ¨ Bread, cereal, pasta, and rice have about 15 grams of carbs in a serving. A serving is 1 slice of bread (1 ounce), ½ cup of cooked cereal, or 1/3 cup of cooked pasta or rice. ¨ Fruits have 15 grams of carbs in a serving. A serving is 1 small fresh fruit, such as an apple or orange; ½ of a banana; ½ cup of cooked or canned fruit; ½ cup of fruit juice; 1 cup of melon or raspberries; or 2 tablespoons of dried fruit. ¨ Milk and no-sugar-added yogurt have 15 grams of carbs in a serving. A serving is 1 cup of milk or 2/3 cup of no-sugar-added yogurt. ¨ Starchy vegetables have 15 grams of carbs in a serving. A serving is ½ cup of mashed potatoes or sweet potato; 1 cup winter squash; ½ of a small baked potato; ½ cup of cooked beans; or ½ cup cooked corn or green peas. · Learn how much carbs to eat each day and at each meal. A dietitian or CDE can teach you how to keep track of the amount of carbs you eat. This is called carbohydrate counting. · If you are not sure how to count carbohydrate grams, use the Plate Method to plan meals. It is a good, quick way to make sure that you have a balanced meal. It also helps you spread carbs throughout the day. ¨ Divide your plate by types of foods. Put non-starchy vegetables on half the plate, meat or other protein food on one-quarter of the plate, and a grain or starchy vegetable in the final quarter of the plate.  To this you can add a small piece of fruit and 1 cup of milk or yogurt, depending on how many carbs you are supposed to eat at a meal. 
 · Try to eat about the same amount of carbs at each meal. Do not \"save up\" your daily allowance of carbs to eat at one meal. 
· Proteins have very little or no carbs per serving. Examples of proteins are beef, chicken, turkey, fish, eggs, tofu, cheese, cottage cheese, and peanut butter. A serving size of meat is 3 ounces, which is about the size of a deck of cards. Examples of meat substitute serving sizes (equal to 1 ounce of meat) are 1/4 cup of cottage cheese, 1 egg, 1 tablespoon of peanut butter, and ½ cup of tofu. How can you eat out and still eat healthy? · Learn to estimate the serving sizes of foods that have carbohydrate. If you measure food at home, it will be easier to estimate the amount in a serving of restaurant food. · If the meal you order has too much carbohydrate (such as potatoes, corn, or baked beans), ask to have a low-carbohydrate food instead. Ask for a salad or green vegetables. · If you use insulin, check your blood sugar before and after eating out to help you plan how much to eat in the future. · If you eat more carbohydrate at a meal than you had planned, take a walk or do other exercise. This will help lower your blood sugar. What else should you know? · Limit saturated fat, such as the fat from meat and dairy products. This is a healthy choice because people who have diabetes are at higher risk of heart disease. So choose lean cuts of meat and nonfat or low-fat dairy products. Use olive or canola oil instead of butter or shortening when cooking. · Don't skip meals. Your blood sugar may drop too low if you skip meals and take insulin or certain medicines for diabetes. · Check with your doctor before you drink alcohol. Alcohol can cause your blood sugar to drop too low. Alcohol can also cause a bad reaction if you take certain diabetes medicines. Follow-up care is a key part of your treatment and safety.  Be sure to make and go to all appointments, and call your doctor if you are having problems. It's also a good idea to know your test results and keep a list of the medicines you take. Where can you learn more? Go to http://killian-heather.info/. Enter V661 in the search box to learn more about \"Learning About Diabetes Food Guidelines. \" Current as of: December 7, 2017 Content Version: 11.7 © 1409-3656 [employer]. Care instructions adapted under license by [employer] (which disclaims liability or warranty for this information). If you have questions about a medical condition or this instruction, always ask your healthcare professional. [address] any warranty or liability for your use of this information. Introducing [facility]! Dear Gabriela Raymond: Thank you for requesting a [employer] account. Our records indicate that you already have an active [employer] account. You can access your account anytime at [email or web address] Did you know that you can access your hospital and ER discharge instructions at any time in [employer]? You can also review all of your test results from your hospital stay or ER visit. Additional Information If you have questions, please visit the Frequently Asked Questions section of the [employer] website at [email or web address] Remember, [employer] is NOT to be used for urgent needs. For medical emergencies, dial 911. Now available from your iPhone and Android! Please provide this summary of care documentation to your next provider. Your primary care clinician is listed as Susan Walker. If you have any questions after today's visit, please call [phone].

## 2018-07-19 NOTE — PROGRESS NOTES
Anahy Gaviria is a  66 y.o. female presents today for office visit for routine follow up. 1. Have you been to the ER, urgent care clinic or hospitalized since your last visit? NO    2. Have you seen or consulted any other health care providers outside of the [facility] since your last visit (Include any pap smears or colon screening)?  YES, Criselda KENDALL

## 2018-07-20 NOTE — PROGRESS NOTES
[address] [facility] THERAPY   [facility], [address] [facility] [place], [address] [place] [address] - Phone: [phone]  Fax: [phone]  DISCHARGE SUMMARY  Patient Name: Lima Pak : 1960   Treatment/Medical Diagnosis: Left hip pain [M25.552]   Referral Source: Carol Mon MD     Date of Initial Visit: 18 Attended Visits: 10 Missed Visits: 1     SUMMARY OF TREATMENT  Patient attended 9 follow up visits since her initial evaluation on 18. Patient has received therapeutic exercise, manual therapy and modalities in order to improve L hip ROM, mobility, flexibility, strength, stability, soft tissue extensibility and pain reduction. CURRENT STATUS  Patient had been progressing well with her PT program and was due to be discharged at her next scheduled follow up appointment. Patient contacted our office prior to this appointment and canceled, stating she wished to be DC'd at this time. Patient was emailed her exercise program so she may continue independently. Thank you for this referral.   RECOMMENDATIONS  Discontinue therapy. Progressing towards or have reached established goals. If you have any questions/comments please contact us directly at [phone]. Thank you for allowing us to assist in the care of your patient. Therapist Signature:  Jose cAosta PT Date: 18     Time: 8:06 AM

## 2018-07-24 ENCOUNTER — APPOINTMENT (OUTPATIENT)
Dept: PHYSICAL THERAPY | Age: 58
End: 2018-07-24
Payer: COMMERCIAL

## 2018-07-26 ENCOUNTER — APPOINTMENT (OUTPATIENT)
Dept: PHYSICAL THERAPY | Age: 58
End: 2018-07-26
Payer: COMMERCIAL

## 2018-08-02 ENCOUNTER — HOSPITAL ENCOUNTER (OUTPATIENT)
Dept: NUTRITION | Age: 58
Discharge: HOME | End: 2018-08-02
Payer: COMMERCIAL

## 2018-08-02 PROCEDURE — 97802 MEDICAL NUTRITION INDIV IN: CPT

## 2018-08-02 NOTE — PROGRESS NOTES
[address] [place] [address] Jeremy Oscar, [address] [facility], [address] [facility] Phone: [phone]  Fax: [phone] Nutrition Assessment  Medical Nutrition Therapy Outpatient Initial Evaluation Patient Name: Can Tee : 1960 Treatment Diagnosis: Obesity Referral Source: Corrinne [facility]): 2018 Gender: female Age: 66 y.o. Ht: 61 In Wt: 187 lb  kg BMI: 35.3 BMR Male  [facility] Female 1370 Anthropometrics Assessment: Per BMI, pt is considered obese. Past Medical History includes: HTN, Pre-DM Pertinent Medications:  
HCTZ, Vit D2 Biochemical Data:  
Lab Results Component Value Date/Time Hemoglobin A1c 5.9 2018 08:15 AM  
 Hemoglobin A1c (POC) 5.5 2015 09:58 AM  
 
Lab Results Component Value Date/Time Cholesterol, total 136 2018 07:27 AM  
 HDL Cholesterol 54 2018 07:27 AM  
 LDL, calculated 70 2018 07:27 AM  
 VLDL, calculated 12 2018 07:27 AM  
 Triglyceride 60 2018 07:27 AM  
 
Lab Results Component Value Date/Time ALT (SGPT) 14 2018 08:15 AM  
 AST (SGOT) 12 2018 08:15 AM  
 Alk. phosphatase 30 2018 08:15 AM  
 Bilirubin, total 0.3 2018 08:15 AM  
 
Lab Results Component Value Date/Time Creatinine 0.8 2018 08:15 AM  
 
Lab Results Component Value Date/Time BUN 12 2018 08:15 AM  
 
Lab Results Component Value Date/Time Microalb/Creat ratio (ug/mg creat.)  2017 09:00 AM  
   Comment:  
   ** Unable to calculate Microablumin/Creatinine Ratio due to low Microalbumin Subjective/Assessment: 
 65 yo female being seen for weight loss nutrition therapy. Pt reports continued weight gain over the past several years. She describes herself as a \"mindful eater\" but thinks there are BLT's (bites, licks, treats) contributing excess calories without her knowing.  Pt works FT as a domestic violence clinician and lives at home with her . She eats out up to 5 times/week, varying between breakfast/lunch/dinner. Exercise includes 30 minutes of either walking or bike riding 5 days/week after work. She was receptive to nutrition education and evaluation of her eating habits, knowing weight loss will be beneficial for her overall health. Pt will benefit from follow up to review and provide further support. Current Eating Patterns: B: fast-food (chick bart'a scramble bowl, coffee w/ creamer L: fast-food ([employer] grilled chicken sandwich, lite lemonade) S: kept at desk (almonds, fruit cups, Nabs, fresh fruit) D: \"complete struggle\", may eat out or have prepared at home. Ex baked ziti, salad Dessert: 3-4 [employer] Estimate Needs Calories: 1500  Protein: 144 Carbs: 136 Fat: 53 Kcal/day  g/day  g/day  g/day   
    percent: 36  34  30 Education & Recommendations provided: Provided weight loss nutrition therapy, including tips for eating out and how to make healthier snack choices. Handouts Provided: []  Carbohydrates 
[]  Protein []  Fiber 
[x]  Serving Sizes [x]  Meal and Snack Ideas 
[]  Food Journals []  Diabetes []  Cholesterol 
[]  Sodium 
[]  Gen Nutr Guidelines 
[]  SBGM Guidelines 
[x]  Others: Eating Out Information Reviewed with: Pt Readiness to Change Stage: []  Pre-contemplative    []  Contemplative [x]  Preparation               [x]  Action                  []  Maintenance Potential Barriers to Learning: []  Decline in memory    []  Language barrier   []  Other: 
[]  Emotional                  []  Limited mobility 
[]  Lack of motivation     [] Vision, hearing or cognitive impairment Expected Compliance: Good Nutritional Goal - To promote lifestyle changes to result in:   
[x]  Weight loss 
[]  Improved diabetic control 
[]  Decreased cholesterol levels 
[]  Decreased blood pressure 
[]  Weight maintenance []  Preventing any interactions associated with food allergies []  Adequate weight gain toward goal weight 
[]  Other:  
  
 
Patient Goals: SMART goals 1. Keep food log to track food/beverage intake for at least 1 week. 2. Exercise for a minimum of 30 min 4-5 times/week either walking or bike riding. Dietitian Signature: Jose Raul Gómez RDN Date: 8/2/2018 Follow-up: August 28 at 1700 Time: 10:37 AM

## 2018-09-04 DIAGNOSIS — I10 ESSENTIAL HYPERTENSION: ICD-10-CM

## 2018-09-04 RX ORDER — AMLODIPINE BESYLATE 5 MG/1
TABLET ORAL
Qty: 30 TAB | Refills: 3 | Status: SHIPPED | OUTPATIENT
Start: 2018-09-04 | End: 2019-01-10 | Stop reason: SDUPTHER

## 2018-10-11 LAB
ABSOLUTE LYMPHOCYTE COUNT, 10803: 1.8 K/UL (ref 1–4.8)
AVG GLU, 10930: 118 MG/DL (ref 91–123)
BASOPHILS # BLD: 0 K/UL (ref 0–0.2)
BASOPHILS NFR BLD: 0 % (ref 0–2)
CREATININE, URINE: 182 MG/DL
EOSINOPHIL # BLD: 0.1 K/UL (ref 0–0.5)
EOSINOPHIL NFR BLD: 1 % (ref 0–6)
ERYTHROCYTE [DISTWIDTH] IN BLOOD BY AUTOMATED COUNT: 13.8 % (ref 10–15.5)
GRANULOCYTES,GRANS: 59 % (ref 40–75)
HBA1C MFR BLD HPLC: 5.8 % (ref 4.8–5.9)
HCT VFR BLD AUTO: 38.3 % (ref 35.1–48)
HGB BLD-MCNC: 12 G/DL (ref 11.7–16)
LYMPHOCYTES, LYMLT: 36 % (ref 20–45)
MCH RBC QN AUTO: 26 PG (ref 26–34)
MCHC RBC AUTO-ENTMCNC: 31 G/DL (ref 31–36)
MCV RBC AUTO: 84 FL (ref 80–95)
MICROALB/CREAT RATIO, 140286: NORMAL MCG/MG OF CREATININE (ref 0–30)
MICROALBUMIN,URINE RANDOM 140054: <12 MCG/ML (ref 0.1–17)
MONOCYTES # BLD: 0.2 K/UL (ref 0.1–1)
MONOCYTES NFR BLD: 4 % (ref 3–12)
NEUTROPHILS # BLD AUTO: 3 K/UL (ref 1.8–7.7)
PLATELET # BLD AUTO: 391 K/UL (ref 140–440)
PMV BLD AUTO: 9.4 FL (ref 9–13)
RBC # BLD AUTO: 4.55 M/UL (ref 3.8–5.2)
WBC # BLD AUTO: 5.1 K/UL (ref 4–11)

## 2018-10-12 LAB
A-G RATIO,AGRAT: 1.7 RATIO (ref 1.1–2.6)
ALBUMIN SERPL-MCNC: 4 G/DL (ref 3.5–5)
ALP SERPL-CCNC: 29 U/L (ref 25–115)
ALT SERPL-CCNC: 11 U/L (ref 5–40)
ANION GAP SERPL CALC-SCNC: 13 MMOL/L
AST SERPL W P-5'-P-CCNC: 11 U/L (ref 10–37)
BILIRUB SERPL-MCNC: 0.3 MG/DL (ref 0.2–1.2)
BUN SERPL-MCNC: 15 MG/DL (ref 6–22)
CALCIUM SERPL-MCNC: 9.3 MG/DL (ref 8.4–10.5)
CHLORIDE SERPL-SCNC: 100 MMOL/L (ref 98–110)
CO2 SERPL-SCNC: 25 MMOL/L (ref 20–32)
CREAT SERPL-MCNC: 0.8 MG/DL (ref 0.5–1.2)
GFRAA, 66117: >60
GFRNA, 66118: >60
GLOBULIN,GLOB: 2.3 G/DL (ref 2–4)
GLUCOSE SERPL-MCNC: 95 MG/DL (ref 70–99)
POTASSIUM SERPL-SCNC: 3.7 MMOL/L (ref 3.5–5.5)
PROT SERPL-MCNC: 6.3 G/DL (ref 6.4–8.3)
SODIUM SERPL-SCNC: 138 MMOL/L (ref 133–145)
T4 FREE SERPL-MCNC: 1.2 NG/DL (ref 0.9–1.8)
TSH SERPL DL<=0.005 MIU/L-ACNC: 2.56 MCU/ML (ref 0.27–4.2)

## 2018-10-17 DIAGNOSIS — E11.65 TYPE 2 DIABETES MELLITUS WITH HYPERGLYCEMIA, WITHOUT LONG-TERM CURRENT USE OF INSULIN (HCC): ICD-10-CM

## 2018-10-17 DIAGNOSIS — I10 ESSENTIAL HYPERTENSION: ICD-10-CM

## 2018-10-18 ENCOUNTER — OFFICE VISIT (OUTPATIENT)
Dept: FAMILY MEDICINE CLINIC | Age: 58
End: 2018-10-18

## 2018-10-18 VITALS
TEMPERATURE: 98.1 F | HEART RATE: 58 BPM | SYSTOLIC BLOOD PRESSURE: 128 MMHG | DIASTOLIC BLOOD PRESSURE: 77 MMHG | WEIGHT: 187 LBS | RESPIRATION RATE: 18 BRPM | OXYGEN SATURATION: 98 % | BODY MASS INDEX: 35.3 KG/M2 | HEIGHT: 61 IN

## 2018-10-18 DIAGNOSIS — E78.5 DYSLIPIDEMIA: ICD-10-CM

## 2018-10-18 DIAGNOSIS — Z23 ENCOUNTER FOR IMMUNIZATION: ICD-10-CM

## 2018-10-18 DIAGNOSIS — I10 ESSENTIAL HYPERTENSION: ICD-10-CM

## 2018-10-18 DIAGNOSIS — E55.9 HYPOVITAMINOSIS D: ICD-10-CM

## 2018-10-18 DIAGNOSIS — E11.65 TYPE 2 DIABETES MELLITUS WITH HYPERGLYCEMIA, WITHOUT LONG-TERM CURRENT USE OF INSULIN (HCC): Primary | ICD-10-CM

## 2018-10-18 DIAGNOSIS — E66.9 OBESITY (BMI 30.0-34.9): ICD-10-CM

## 2018-10-18 RX ORDER — NAPROXEN 500 MG/1
500 TABLET ORAL 2 TIMES DAILY WITH MEALS
Qty: 120 TAB | Refills: 1 | Status: SHIPPED | OUTPATIENT
Start: 2018-10-18 | End: 2019-04-05 | Stop reason: SDUPTHER

## 2018-10-18 NOTE — PATIENT INSTRUCTIONS
DASH Diet: Care Instructions Your Care Instructions The DASH diet is an eating plan that can help lower your blood pressure. DASH stands for Dietary Approaches to Stop Hypertension. Hypertension is high blood pressure. The DASH diet focuses on eating foods that are high in calcium, potassium, and magnesium. These nutrients can lower blood pressure. The foods that are highest in these nutrients are fruits, vegetables, low-fat dairy products, nuts, seeds, and legumes. But taking calcium, potassium, and magnesium supplements instead of eating foods that are high in those nutrients does not have the same effect. The DASH diet also includes whole grains, fish, and poultry. The DASH diet is one of several lifestyle changes your doctor may recommend to lower your high blood pressure. Your doctor may also want you to decrease the amount of sodium in your diet. Lowering sodium while following the DASH diet can lower blood pressure even further than just the DASH diet alone. Follow-up care is a key part of your treatment and safety. Be sure to make and go to all appointments, and call your doctor if you are having problems. It's also a good idea to know your test results and keep a list of the medicines you take. How can you care for yourself at home? Following the DASH diet · Eat 4 to 5 servings of fruit each day. A serving is 1 medium-sized piece of fruit, ½ cup chopped or canned fruit, 1/4 cup dried fruit, or 4 ounces (½ cup) of fruit juice. Choose fruit more often than fruit juice. · Eat 4 to 5 servings of vegetables each day. A serving is 1 cup of lettuce or raw leafy vegetables, ½ cup of chopped or cooked vegetables, or 4 ounces (½ cup) of vegetable juice. Choose vegetables more often than vegetable juice. · Get 2 to 3 servings of low-fat and fat-free dairy each day. A serving is 8 ounces of milk, 1 cup of yogurt, or 1 ½ ounces of cheese. · Eat 6 to 8 servings of grains each day. A serving is 1 slice of bread, 1 ounce of dry cereal, or ½ cup of cooked rice, pasta, or cooked cereal. Try to choose whole-grain products as much as possible. · Limit lean meat, poultry, and fish to 2 servings each day. A serving is 3 ounces, about the size of a deck of cards. · Eat 4 to 5 servings of nuts, seeds, and legumes (cooked dried beans, lentils, and split peas) each week. A serving is 1/3 cup of nuts, 2 tablespoons of seeds, or ½ cup of cooked beans or peas. · Limit fats and oils to 2 to 3 servings each day. A serving is 1 teaspoon of vegetable oil or 2 tablespoons of salad dressing. · Limit sweets and added sugars to 5 servings or less a week. A serving is 1 tablespoon jelly or jam, ½ cup sorbet, or 1 cup of lemonade. · Eat less than 2,300 milligrams (mg) of sodium a day. If you limit your sodium to 1,500 mg a day, you can lower your blood pressure even more. Tips for success · Start small. Do not try to make dramatic changes to your diet all at once. You might feel that you are missing out on your favorite foods and then be more likely to not follow the plan. Make small changes, and stick with them. Once those changes become habit, add a few more changes. · Try some of the following: ? Make it a goal to eat a fruit or vegetable at every meal and at snacks. This will make it easy to get the recommended amount of fruits and vegetables each day. ? Try yogurt topped with fruit and nuts for a snack or healthy dessert. ? Add lettuce, tomato, cucumber, and onion to sandwiches. ? Combine a ready-made pizza crust with low-fat mozzarella cheese and lots of vegetable toppings. Try using tomatoes, squash, spinach, broccoli, carrots, cauliflower, and onions. ? Have a variety of cut-up vegetables with a low-fat dip as an appetizer instead of chips and dip. ? Sprinkle sunflower seeds or chopped almonds over salads.  Or try adding chopped walnuts or almonds to cooked vegetables. ? Try some vegetarian meals using beans and peas. Add garbanzo or kidney beans to salads. Make burritos and tacos with mashed [place] beans or black beans. Where can you learn more? Go to http://killian-heather.info/. Enter F703 in the search box to learn more about \"DASH Diet: Care Instructions. \" Current as of: December 6, 2017 Content Version: 11.8 © 5065-9925 [email or web address]. Care instructions adapted under license by [employer] (which disclaims liability or warranty for this information). If you have questions about a medical condition or this instruction, always ask your healthcare professional. [address] any warranty or liability for your use of this information. Learning About the [address] Diet What is the Mediterranean diet? The Mediterranean diet is a style of eating rather than a diet plan. It features foods eaten in [place], [place], [place] and [place], and other countries along the [facility]. It emphasizes eating foods like fish, fruits, vegetables, beans, high-fiber breads and whole grains, nuts, and olive oil. This style of eating includes limited red meat, cheese, and sweets. Why choose the Mediterranean diet? A Mediterranean-style diet may improve heart health. It contains more fat than other heart-healthy diets. But the fats are mainly from nuts, unsaturated oils (such as fish oils and olive oil), and certain nut or seed oils (such as canola, soybean, or flaxseed oil). These fats may help protect the heart and blood vessels. How can you get started on the Mediterranean diet? Here are some things you can do to switch to a more Mediterranean way of eating. What to eat · Eat a variety of fruits and vegetables each day, such as grapes, blueberries, tomatoes, broccoli, peppers, figs, olives, spinach, eggplant, beans, lentils, and chickpeas. · Eat a variety of whole-grain foods each day, such as oats, brown rice, and whole wheat bread, pasta, and couscous. · Eat fish at least 2 times a week. Try tuna, salmon, mackerel, lake trout, herring, or sardines. · Eat moderate amounts of low-fat dairy products, such as milk, cheese, or yogurt. · Eat moderate amounts of poultry and eggs. · Choose healthy (unsaturated) fats, such as nuts, olive oil, and certain nut or seed oils like canola, soybean, and flaxseed. · Limit unhealthy (saturated) fats, such as butter, palm oil, and coconut oil. And limit fats found in animal products, such as meat and dairy products made with whole milk. Try to eat red meat only a few times a month in very small amounts. · Limit sweets and desserts to only a few times a week. This includes sugar-sweetened drinks like soda. The Mediterranean diet may also include red wine with your meal1 glass each day for women and up to 2 glasses a day for men. Tips for eating at home · Use herbs, spices, garlic, lemon zest, and citrus juice instead of salt to add flavor to foods. · Add avocado slices to your sandwich instead of sutton. · Have fish for lunch or dinner instead of red meat. Brush the fish with olive oil, and broil or grill it. · Sprinkle your salad with seeds or nuts instead of cheese. · Cook with olive or canola oil instead of butter or oils that are high in saturated fat. · Switch from 2% milk or whole milk to 1% or fat-free milk. · Dip raw vegetables in a vinaigrette dressing or hummus instead of dips made from mayonnaise or sour cream. 
· Have a piece of fruit for dessert instead of a piece of cake. Try baked apples, or have some dried fruit. Tips for eating out · Try broiled, grilled, baked, or poached fish instead of having it fried or breaded. · Ask your  to have your meals prepared with olive oil instead of butter. · Order dishes made with marinara sauce or sauces made from olive oil. Avoid sauces made from cream or mayonnaise. · Choose whole-grain breads, whole wheat pasta and pizza crust, brown rice, beans, and lentils. · Cut back on butter or margarine on bread. Instead, you can dip your bread in a small amount of olive oil. · Ask for a side salad or grilled vegetables instead of french fries or chips. Where can you learn more? Go to http://killian-heather.info/. Enter [phone] in the search box to learn more about \"Learning About the Mediterranean Diet. \" Current as of: March 29, 2018 Content Version: 11.8 © 5997-5355 [employer]. Care instructions adapted under license by [employer] (which disclaims liability or warranty for this information). If you have questions about a medical condition or this instruction, always ask your healthcare professional. [address] any warranty or liability for your use of this information. Learning About Diabetes Food Guidelines Your Care Instructions Meal planning is important to manage diabetes. It helps keep your blood sugar at a target level (which you set with your doctor). You don't have to eat special foods. You can eat what your family eats, including sweets once in a while. But you do have to pay attention to how often you eat and how much you eat of certain foods. You may want to work with a dietitian or a certified diabetes educator (CDE) to help you plan meals and snacks. A dietitian or CDE can also help you lose weight if that is one of your goals. What should you know about eating carbs? Managing the amount of carbohydrate (carbs) you eat is an important part of healthy meals when you have diabetes. Carbohydrate is found in many foods. · Learn which foods have carbs. And learn the amounts of carbs in different foods. ? Bread, cereal, pasta, and rice have about 15 grams of carbs in a serving.  A serving is 1 slice of bread (1 ounce), ½ cup of cooked cereal, or 1/3 cup of cooked pasta or rice. ? Fruits have 15 grams of carbs in a serving. A serving is 1 small fresh fruit, such as an apple or orange; ½ of a banana; ½ cup of cooked or canned fruit; ½ cup of fruit juice; 1 cup of melon or raspberries; or 2 tablespoons of dried fruit. ? Milk and no-sugar-added yogurt have 15 grams of carbs in a serving. A serving is 1 cup of milk or 2/3 cup of no-sugar-added yogurt. ? Starchy vegetables have 15 grams of carbs in a serving. A serving is ½ cup of mashed potatoes or sweet potato; 1 cup winter squash; ½ of a small baked potato; ½ cup of cooked beans; or ½ cup cooked corn or green peas. · Learn how much carbs to eat each day and at each meal. A dietitian or CDE can teach you how to keep track of the amount of carbs you eat. This is called carbohydrate counting. · If you are not sure how to count carbohydrate grams, use the Plate Method to plan meals. It is a good, quick way to make sure that you have a balanced meal. It also helps you spread carbs throughout the day. ? Divide your plate by types of foods. Put non-starchy vegetables on half the plate, meat or other protein food on one-quarter of the plate, and a grain or starchy vegetable in the final quarter of the plate. To this you can add a small piece of fruit and 1 cup of milk or yogurt, depending on how many carbs you are supposed to eat at a meal. 
· Try to eat about the same amount of carbs at each meal. Do not \"save up\" your daily allowance of carbs to eat at one meal. 
· Proteins have very little or no carbs per serving. Examples of proteins are beef, chicken, turkey, fish, eggs, tofu, cheese, cottage cheese, and peanut butter. A serving size of meat is 3 ounces, which is about the size of a deck of cards. Examples of meat substitute serving sizes (equal to 1 ounce of meat) are 1/4 cup of cottage cheese, 1 egg, 1 tablespoon of peanut butter, and ½ cup of tofu. How can you eat out and still eat healthy? · Learn to estimate the serving sizes of foods that have carbohydrate. If you measure food at home, it will be easier to estimate the amount in a serving of restaurant food. · If the meal you order has too much carbohydrate (such as potatoes, corn, or baked beans), ask to have a low-carbohydrate food instead. Ask for a salad or green vegetables. · If you use insulin, check your blood sugar before and after eating out to help you plan how much to eat in the future. · If you eat more carbohydrate at a meal than you had planned, take a walk or do other exercise. This will help lower your blood sugar. What else should you know? · Limit saturated fat, such as the fat from meat and dairy products. This is a healthy choice because people who have diabetes are at higher risk of heart disease. So choose lean cuts of meat and nonfat or low-fat dairy products. Use olive or canola oil instead of butter or shortening when cooking. · Don't skip meals. Your blood sugar may drop too low if you skip meals and take insulin or certain medicines for diabetes. · Check with your doctor before you drink alcohol. Alcohol can cause your blood sugar to drop too low. Alcohol can also cause a bad reaction if you take certain diabetes medicines. Follow-up care is a key part of your treatment and safety. Be sure to make and go to all appointments, and call your doctor if you are having problems. It's also a good idea to know your test results and keep a list of the medicines you take. Where can you learn more? Go to http://killian-heather.info/. Enter G223 in the search box to learn more about \"Learning About Diabetes Food Guidelines. \" Current as of: December 7, 2017 Content Version: 11.8 © 6929-5970 [employer]. Care instructions adapted under license by [employer] (which disclaims liability or warranty for this information).  If you have questions about a medical condition or this instruction, always ask your healthcare professional. [place] [address] any warranty or liability for your use of this information.

## 2018-10-18 NOTE — PROGRESS NOTES
Chief Complaint Patient presents with  Results  
  labs HPI: 
Patient is a 66year old obese  female with medical problems listed below presents today for follow up of Hypertension, DM 2, Hypovitaminosis D, etc. She has been feeling well and voices no complaints today. She is complaint with her medications with no adverse effects reported and she has gained one pound in the last three months. She is requesting refill of naproxen today. Past Medical History:  
Diagnosis Date  AR (allergic rhinitis)  Arthritis  Heart murmur  Hypertension  Hypovitaminosis D 3/3/2014 No Known Allergies Current Outpatient Medications Medication Sig Dispense Refill  amLODIPine (NORVASC) 5 mg tablet TAKE 1 TABLET BY MOUTH EVERY DAY 30 Tab 3  
 losartan-hydroCHLOROthiazide (HYZAAR) 100-25 mg per tablet TAKE 1 TABLET BY MOUTH EVERY DAY 30 Tab 3  
 SEBEX 2-2 % shampoo USE SHAMPOO WEEKLY AS DIRECTED  5  
 lidocaine (LIDOCAINE VISCOUS) 2 % solution Put 10 mL in mouth and swish and spit out QAC and at bedtime 200 mL 0  
 fluticasone (FLONASE) 50 mcg/actuation nasal spray 2 Sprays by Both Nostrils route daily as needed for Rhinitis. 1 Bottle 3  
 amoxicillin 500 mg tab Take 500 mg by mouth two (2) times a day. 20 Tab 0  
 ergocalciferol (VITAMIN D2) 50,000 unit capsule Take 1 Cap by mouth every seven (7) days. 8 Cap 5  
 
 
  
ROS: 
Constitutional: Negative for fever, chills, or fatigue Neurological: Negative for headache, dizziness, visual disturbance, or loss of conciousness Respiratory: Negative for SOB, hemoptysis, or wheezing Cardiovascular: Negative for chest pain, palpitation, or leg swelling Gastrointestinal: Negative for abdominal pain, nausea, vomiting, diarrhea, blood in stool, melena, or heartburn Musculoskeletal: Negative for falls Physical Exam: 
Visit Vitals /77 Pulse (!) 58 Temp 98.1 °F (36.7 °C) (Oral) Resp 18 Ht 5' 1\" (1.549 m) Wt 187 lb (84.8 kg) SpO2 98% BMI 35.33 kg/m² General: Obese black female, a & o x 3, afebrile, well-nourished, interacting appropriately, in no acute distress Skin: warm and dry, no rashes , no bruises Neck: supple, symmetrical, no thyromegaly Respiratory: symmetrical chest expansion, lung sounds clear bilaterally, good air entry Cardiovascular: normal S1S2, regular rate and rhythm, no murmurs Abdomen: non-distended, normoactive bowel sounds x 4 quadrants, soft, non-tender to palpation Musculoskeletal: normal ROM on all joints, no swelling or deformity, no perilumbar tenderness, steady gait Psychiatry: appropriate mood and affect Extremity: No edema noted Assessment/Plan: ICD-10-CM ICD-9-CM 1. Type 2 diabetes mellitus with hyperglycemia, without long-term current use of insulin (HCC) E11.65 250.00 controlled with recent A1c improved at 5.8. Continue current medications and she was counseled on diabetic diet. Diabetic foot exam:  
 
Left Foot: 
 Visual Exam: normal  
 Pulse DP: 2+ (normal) Filament test: normal sensation Vibratory sensation: normal 
   
Right Foot: 
 Visual Exam: normal  
 Pulse DP: 2+ (normal) Filament test: normal sensation Vibratory sensation: normal 
HEMOGLOBIN A1C W/O EAG  
  790.29   
2. Dyslipidemia E78.5 272.4  she was counseled on low-fat diet. 3. Essential hypertension I10 401.9 controlled. Continue current medications and she was counseled on low-salt diet. 4. Hypovitaminosis D E55.9 268.9 VITAMIN D, 25 HYDROXY 5. Obesity (BMI 30.0-34. 9) E66.9 278.00 counseled on diet and exercise. 6. Encounter for immunization Z23 V03.89 INFLUENZA VIRUS VAC QUAD,SPLIT,PRESV FREE SYRINGE IM Orders Placed This Encounter  Influenza virus vaccine (QUADRIVALENT PRES FREE SYRINGE) IM ([id number])  HEMOGLOBIN A1C W/O EAG Standing Status:   Future Standing Expiration Date:   10/19/2019  VITAMIN D, 25 HYDROXY Standing Status:   Future Standing Expiration Date:   4/16/2019  
 naproxen (NAPROSYN) 500 mg tablet Sig: Take 1 Tab by mouth two (2) times daily (with meals). Dispense:  120 Tab Refill:  1 Recent labs reviewed with pt Additional Notes: Discussed today's diagnosis, treatment plans. Discussed medication indications and side effects. Weight Management: Counseled After Visit Summary: Discussed provided printed patient instructions. Answered questions accordingly. Follow-up Disposition: In 3 months with labs 1 week prior. Khanh Fernandes DO, MPH Internal Medicine [facility]

## 2018-10-18 NOTE — PROGRESS NOTES
Eitan Nava is a  66 y.o. female presents today for office visit for routine follow up. 1. Have you been to the ER, urgent care clinic or hospitalized since your last visit? NO  
 
2. Have you seen or consulted any other health care providers outside of the [address] [place] [address] since your last visit (Include any pap smears or colon screening)? NO    
 
VORB: Administer FLU./Mono Gandara DO 
/Kameron Garcia , JUSTIN Patient received FLU vaccine 0.5ml in Right deltoid. Tolerated well. No signs or symptoms of distress noted. Most current VIS given and consent signed. she was observed for 10 min post injection. There were no reactions observed.

## 2018-11-05 DIAGNOSIS — I10 ESSENTIAL HYPERTENSION: ICD-10-CM

## 2018-11-05 RX ORDER — LOSARTAN POTASSIUM AND HYDROCHLOROTHIAZIDE 25; 100 MG/1; MG/1
TABLET ORAL
Qty: 30 TAB | Refills: 3 | Status: SHIPPED | OUTPATIENT
Start: 2018-11-05 | End: 2019-03-11 | Stop reason: SDUPTHER

## 2019-01-11 LAB
25(OH)D3 SERPL-MCNC: 25 NG/ML (ref 32–100)
AVG GLU, 10930: 118 MG/DL (ref 91–123)
HBA1C MFR BLD HPLC: 5.8 % (ref 4.8–5.9)

## 2019-01-15 ENCOUNTER — OFFICE VISIT (OUTPATIENT)
Dept: FAMILY MEDICINE CLINIC | Age: 59
End: 2019-01-15

## 2019-01-15 VITALS
WEIGHT: 189.6 LBS | HEART RATE: 73 BPM | TEMPERATURE: 98.5 F | SYSTOLIC BLOOD PRESSURE: 112 MMHG | RESPIRATION RATE: 18 BRPM | HEIGHT: 61 IN | DIASTOLIC BLOOD PRESSURE: 51 MMHG | OXYGEN SATURATION: 98 % | BODY MASS INDEX: 35.8 KG/M2

## 2019-01-15 DIAGNOSIS — E55.9 HYPOVITAMINOSIS D: ICD-10-CM

## 2019-01-15 DIAGNOSIS — E66.9 OBESITY (BMI 30-39.9): ICD-10-CM

## 2019-01-15 DIAGNOSIS — E78.5 DYSLIPIDEMIA: ICD-10-CM

## 2019-01-15 DIAGNOSIS — E11.65 TYPE 2 DIABETES MELLITUS WITH HYPERGLYCEMIA, WITHOUT LONG-TERM CURRENT USE OF INSULIN (HCC): ICD-10-CM

## 2019-01-15 DIAGNOSIS — I10 ESSENTIAL HYPERTENSION: Primary | ICD-10-CM

## 2019-01-15 DIAGNOSIS — R05.9 COUGH: ICD-10-CM

## 2019-01-15 RX ORDER — ERGOCALCIFEROL 1.25 MG/1
50000 CAPSULE ORAL
Qty: 8 CAP | Refills: 3 | Status: SHIPPED | OUTPATIENT
Start: 2019-01-15 | End: 2020-06-26

## 2019-01-15 RX ORDER — CODEINE PHOSPHATE AND GUAIFENESIN 10; 100 MG/5ML; MG/5ML
10 SOLUTION ORAL
Qty: 240 ML | Refills: 0 | Status: SHIPPED | OUTPATIENT
Start: 2019-01-15 | End: 2019-01-29

## 2019-01-15 NOTE — PATIENT INSTRUCTIONS
Learning About the [facility] Diet  What is the Mediterranean diet? The Mediterranean diet is a style of eating rather than a diet plan. It features foods eaten in [place], [place], [place], and other countries along the [facility]. It emphasizes eating foods like fish, fruits, vegetables, beans, high-fiber breads and whole grains, nuts, and olive oil. This style of eating includes limited red meat, cheese, and sweets. Why choose the Mediterranean diet? A Mediterranean-style diet may improve heart health. It contains more fat than other heart-healthy diets. But the fats are mainly from nuts, unsaturated oils (such as fish oils and olive oil), and certain nut or seed oils (such as canola, soybean, or flaxseed oil). These fats may help protect the heart and blood vessels. How can you get started on the Mediterranean diet? Here are some things you can do to switch to a more Mediterranean way of eating. What to eat  · Eat a variety of fruits and vegetables each day, such as grapes, blueberries, tomatoes, broccoli, peppers, figs, olives, spinach, eggplant, beans, lentils, and chickpeas. · Eat a variety of whole-grain foods each day, such as oats, brown rice, and whole wheat bread, pasta, and couscous. · Eat fish at least 2 times a week. Try tuna, salmon, mackerel, lake trout, herring, or sardines. · Eat moderate amounts of low-fat dairy products, such as milk, cheese, or yogurt. · Eat moderate amounts of poultry and eggs. · Choose healthy (unsaturated) fats, such as nuts, olive oil, and certain nut or seed oils like canola, soybean, and flaxseed. · Limit unhealthy (saturated) fats, such as butter, palm oil, and coconut oil. And limit fats found in animal products, such as meat and dairy products made with whole milk. Try to eat red meat only a few times a month in very small amounts. · Limit sweets and desserts to only a few times a week.  This includes sugar-sweetened drinks like soda. The Mediterranean diet may also include red wine with your meal--1 glass each day for women and up to 2 glasses a day for men. Tips for eating at home  · Use herbs, spices, garlic, lemon zest, and citrus juice instead of salt to add flavor to foods. · Add avocado slices to your sandwich instead of sutton. · Have fish for lunch or dinner instead of red meat. Brush the fish with olive oil, and broil or grill it. · Sprinkle your salad with seeds or nuts instead of cheese. · Cook with olive or canola oil instead of butter or oils that are high in saturated fat. · Switch from 2% milk or whole milk to 1% or fat-free milk. · Dip raw vegetables in a vinaigrette dressing or hummus instead of dips made from mayonnaise or sour cream.  · Have a piece of fruit for dessert instead of a piece of cake. Try baked apples, or have some dried fruit. Tips for eating out  · Try broiled, grilled, baked, or poached fish instead of having it fried or breaded. · Ask your  to have your meals prepared with olive oil instead of butter. · Order dishes made with marinara sauce or sauces made from olive oil. Avoid sauces made from cream or mayonnaise. · Choose whole-grain breads, whole wheat pasta and pizza crust, brown rice, beans, and lentils. · Cut back on butter or margarine on bread. Instead, you can dip your bread in a small amount of olive oil. · Ask for a side salad or grilled vegetables instead of french fries or chips. Where can you learn more? Go to http://killian-heather.info/. Enter [phone] in the search box to learn more about \"Learning About the Mediterranean Diet. \"  Current as of: March 29, 2018  Content Version: 11.8  © 7861-2158 Healthwise, [employer]. Care instructions adapted under license by [employer] (which disclaims liability or warranty for this information).  If you have questions about a medical condition or this instruction, always ask your healthcare professional. [address] any warranty or liability for your use of this information. DASH Diet: Care Instructions  Your Care Instructions    The DASH diet is an eating plan that can help lower your blood pressure. DASH stands for Dietary Approaches to Stop Hypertension. Hypertension is high blood pressure. The DASH diet focuses on eating foods that are high in calcium, potassium, and magnesium. These nutrients can lower blood pressure. The foods that are highest in these nutrients are fruits, vegetables, low-fat dairy products, nuts, seeds, and legumes. But taking calcium, potassium, and magnesium supplements instead of eating foods that are high in those nutrients does not have the same effect. The DASH diet also includes whole grains, fish, and poultry. The DASH diet is one of several lifestyle changes your doctor may recommend to lower your high blood pressure. Your doctor may also want you to decrease the amount of sodium in your diet. Lowering sodium while following the DASH diet can lower blood pressure even further than just the DASH diet alone. Follow-up care is a key part of your treatment and safety. Be sure to make and go to all appointments, and call your doctor if you are having problems. It's also a good idea to know your test results and keep a list of the medicines you take. How can you care for yourself at home? Following the DASH diet  · Eat 4 to 5 servings of fruit each day. A serving is 1 medium-sized piece of fruit, ½ cup chopped or canned fruit, 1/4 cup dried fruit, or 4 ounces (½ cup) of fruit juice. Choose fruit more often than fruit juice. · Eat 4 to 5 servings of vegetables each day. A serving is 1 cup of lettuce or raw leafy vegetables, ½ cup of chopped or cooked vegetables, or 4 ounces (½ cup) of vegetable juice. Choose vegetables more often than vegetable juice. · Get 2 to 3 servings of low-fat and fat-free dairy each day.  A serving is 8 ounces of milk, 1 cup of yogurt, or 1 ½ ounces of cheese. · Eat 6 to 8 servings of grains each day. A serving is 1 slice of bread, 1 ounce of dry cereal, or ½ cup of cooked rice, pasta, or cooked cereal. Try to choose whole-grain products as much as possible. · Limit lean meat, poultry, and fish to 2 servings each day. A serving is 3 ounces, about the size of a deck of cards. · Eat 4 to 5 servings of nuts, seeds, and legumes (cooked dried beans, lentils, and split peas) each week. A serving is 1/3 cup of nuts, 2 tablespoons of seeds, or ½ cup of cooked beans or peas. · Limit fats and oils to 2 to 3 servings each day. A serving is 1 teaspoon of vegetable oil or 2 tablespoons of salad dressing. · Limit sweets and added sugars to 5 servings or less a week. A serving is 1 tablespoon jelly or jam, ½ cup sorbet, or 1 cup of lemonade. · Eat less than 2,300 milligrams (mg) of sodium a day. If you limit your sodium to 1,500 mg a day, you can lower your blood pressure even more. Tips for success  · Start small. Do not try to make dramatic changes to your diet all at once. You might feel that you are missing out on your favorite foods and then be more likely to not follow the plan. Make small changes, and stick with them. Once those changes become habit, add a few more changes. · Try some of the following:  ? Make it a goal to eat a fruit or vegetable at every meal and at snacks. This will make it easy to get the recommended amount of fruits and vegetables each day. ? Try yogurt topped with fruit and nuts for a snack or healthy dessert. ? Add lettuce, tomato, cucumber, and onion to sandwiches. ? Combine a ready-made pizza crust with low-fat mozzarella cheese and lots of vegetable toppings. Try using tomatoes, squash, spinach, broccoli, carrots, cauliflower, and onions. ? Have a variety of cut-up vegetables with a low-fat dip as an appetizer instead of chips and dip. ? Sprinkle sunflower seeds or chopped almonds over salads. Or try adding chopped walnuts or almonds to cooked vegetables. ? Try some vegetarian meals using beans and peas. Add garbanzo or kidney beans to salads. Make burritos and tacos with mashed [place] beans or black beans. Where can you learn more? Go to http://killian-heather.info/. Enter V626 in the search box to learn more about \"DASH Diet: Care Instructions. \"  Current as of: December 6, 2017  Content Version: 11.8  © 6408-4965 [employer]. Care instructions adapted under license by [employer] (which disclaims liability or warranty for this information). If you have questions about a medical condition or this instruction, always ask your healthcare professional. [address] any warranty or liability for your use of this information. High Cholesterol: Care Instructions  Your Care Instructions    Cholesterol is a type of fat in your blood. It is needed for many body functions, such as making new cells. Cholesterol is made by your body. It also comes from food you eat. High cholesterol means that you have too much of the fat in your blood. This raises your risk of a heart attack and stroke. LDL and HDL are part of your total cholesterol. LDL is the \"bad\" cholesterol. High LDL can raise your risk for heart disease, heart attack, and stroke. HDL is the \"good\" cholesterol. It helps clear bad cholesterol from the body. High HDL is linked with a lower risk of heart disease, heart attack, and stroke. Your cholesterol levels help your doctor find out your risk for having a heart attack or stroke. You and your doctor can talk about whether you need to lower your risk and what treatment is best for you. A heart-healthy lifestyle along with medicines can help lower your cholesterol and your risk. The way you choose to lower your risk will depend on how high your risk is for heart attack and stroke.  It will also depend on how you feel about taking medicines. Follow-up care is a key part of your treatment and safety. Be sure to make and go to all appointments, and call your doctor if you are having problems. It's also a good idea to know your test results and keep a list of the medicines you take. How can you care for yourself at home? · Eat a variety of foods every day. Good choices include fruits, vegetables, whole grains (like oatmeal), dried beans and peas, nuts and seeds, soy products (like tofu), and fat-free or low-fat dairy products. · Replace butter, margarine, and hydrogenated or partially hydrogenated oils with olive and canola oils. (Canola oil margarine without trans fat is fine.)  · Replace red meat with fish, poultry, and soy protein (like tofu). · Limit processed and packaged foods like chips, crackers, and cookies. · Bake, broil, or steam foods. Don't arzola them. · Be physically active. Get at least 30 minutes of exercise on most days of the week. Walking is a good choice. You also may want to do other activities, such as running, swimming, cycling, or playing tennis or team sports. · Stay at a healthy weight or lose weight by making the changes in eating and physical activity listed above. Losing just a small amount of weight, even 5 to 10 pounds, can reduce your risk for having a heart attack or stroke. · Do not smoke. When should you call for help? Watch closely for changes in your health, and be sure to contact your doctor if:    · You need help making lifestyle changes.     · You have questions about your medicine. Where can you learn more? Go to http://killian-heather.info/. Enter D547 in the search box to learn more about \"High Cholesterol: Care Instructions. \"  Current as of: December 6, 2017  Content Version: 11.8  © 1344-5884 Healthwise, [employer]. Care instructions adapted under license by [employer] (which disclaims liability or warranty for this information).  If you have questions about a medical condition or this instruction, always ask your healthcare professional. [place] [address] any warranty or liability for your use of this information.

## 2019-01-15 NOTE — PROGRESS NOTES
Keke Maddox is a  66 y.o. female presents today for office visit for routine follow up. 1. Have you been to the ER, urgent care clinic or hospitalized since your last visit? NO    2. Have you seen or consulted any other health care providers outside of the [address] [place] [address] since your last visit (Include any pap smears or colon screening)? NO

## 2019-01-15 NOTE — PROGRESS NOTES
Chief Complaint   Patient presents with    Results     labs       HPI:  Patient is a 66year old obese  female with medical problems listed below presents today for follow up of Hypertension, DM 2, Hypovitaminosis D, etc. She endorsed dry cough. Otherwise, she has been feeling well and voices no other complaints today. She is complaint with her medications with no adverse effects reported. She ate more during the holidays and has gained 2 pounds in the last several months. Past Medical History:   Diagnosis Date    AR (allergic rhinitis)     Arthritis     Heart murmur     Hypertension     Hypovitaminosis D 3/3/2014       No Known Allergies      Current Outpatient Medications   Medication Sig Dispense Refill    amLODIPine (NORVASC) 5 mg tablet TAKE 1 TABLET BY MOUTH EVERY DAY 30 Tab 3    losartan-hydroCHLOROthiazide (HYZAAR) 100-25 mg per tablet TAKE 1 TABLET BY MOUTH EVERY DAY 30 Tab 3    SEBEX 2-2 % shampoo USE SHAMPOO WEEKLY AS DIRECTED  5    lidocaine (LIDOCAINE VISCOUS) 2 % solution Put 10 mL in mouth and swish and spit out QAC and at bedtime 200 mL 0    fluticasone (FLONASE) 50 mcg/actuation nasal spray 2 Sprays by Both Nostrils route daily as needed for Rhinitis. 1 Bottle 3    amoxicillin 500 mg tab Take 500 mg by mouth two (2) times a day. 20 Tab 0    ergocalciferol (VITAMIN D2) 50,000 unit capsule Take 1 Cap by mouth every seven (7) days.  8 Cap 5          ROS:  Constitutional: Negative for fever, chills, or fatigue  Neurological: Negative for headache, dizziness, visual disturbance, or loss of conciousness  Respiratory: Negative for SOB, hemoptysis, or wheezing  Cardiovascular: Negative for chest pain, palpitation, or leg swelling  Gastrointestinal: Negative for abdominal pain, nausea, vomiting, diarrhea, blood in stool, melena, or heartburn  Musculoskeletal: Negative for falls        Physical Exam:  Visit Vitals  Visit Vitals  /51   Pulse 73   Temp 98.5 °F (36.9 °C) (Oral)   Resp 18   Ht 5' 1\" (1.549 m)   Wt 189 lb 9.6 oz (86 kg)   SpO2 98%   BMI 35.82 kg/m²       General: Obese black female, a & o x 3, afebrile, well-nourished, interacting appropriately, in no acute distress  Skin: warm and dry, no rashes , no bruises  Neck: supple, symmetrical, no thyromegaly  Respiratory: symmetrical chest expansion, lung sounds clear bilaterally, good air entry  Cardiovascular: normal S1S2, regular rate and rhythm, no murmurs  Abdomen: non-distended, normoactive bowel sounds x 4 quadrants, soft, non-tender to palpation  Musculoskeletal: normal ROM on all joints, no swelling or deformity, no perilumbar tenderness, steady gait  Psychiatry: appropriate mood and affect  Extremity: No edema noted        Assessment/Plan:    ICD-10-CM ICD-9-CM    1. Essential hypertension I10 401.9 Controlled  Continue current meds and she was counseled on low salt diet. CBC WITH AUTOMATED DIFF      METABOLIC PANEL, COMPREHENSIVE      TSH 3RD GENERATION   2. Dyslipidemia E78.5 272.4 Counseled on low fat diet  LIPID PANEL   3. Hypovitaminosis D E55.9 268.9 Recent Vit D is low at 25  ergocalciferol (VITAMIN D2) 50,000 unit capsule Q week sent to pharmacy. 4. Type 2 diabetes mellitus with hyperglycemia, without long-term current use of insulin (HCC) E11.65 250.00 Controlled with recent HBA1C at 5.8 and she was counseled on diabetic diet. HEMOGLOBIN A1C W/O EAG     790.29    5. Obesity (BMI 30-39. 9) E66.9 278.00 I have reviewed/discussed the above normal BMI with the patient. I have recommended the following interventions: dietary management education, guidance, and counseling, encourage exercise and monitor weight . .     6.  Cough R05 786.2 guaiFENesin-codeine (CHERATUSSIN AC) 100-10 mg/5 mL solution         Orders Placed This Encounter    CBC WITH AUTOMATED DIFF     Standing Status:   Future     Standing Expiration Date:   1/10/2020    HEMOGLOBIN A1C W/O EAG     Standing Status:   Future     Standing Expiration Date:   1/10/2020    LIPID PANEL     Standing Status:   Future     Standing Expiration Date:   5/67/2867    METABOLIC PANEL, COMPREHENSIVE     Standing Status:   Future     Standing Expiration Date:   1/10/2020    TSH 3RD GENERATION     Standing Status:   Future     Standing Expiration Date:   1/10/2020    ergocalciferol (VITAMIN D2) 50,000 unit capsule     Sig: Take 1 Cap by mouth every seven (7) days. Dispense:  8 Cap     Refill:  3    guaiFENesin-codeine (CHERATUSSIN AC) 100-10 mg/5 mL solution     Sig: Take 10 mL by mouth three (3) times daily as needed for Cough for up to 14 days. Max Daily Amount: 30 mL. Dispense:  240 mL     Refill:  0         Recent labs reviewed with pt        Additional Notes: Discussed today's diagnosis, treatment plans. Discussed medication indications and side effects. Weight Management: Counseled  After Visit Summary: Discussed provided printed patient instructions. Answered questions accordingly. Follow-up Disposition: In 3 months with labs 1 week prior. Keith Solitario DO, MPH  Internal Medicine        .

## 2019-01-16 DIAGNOSIS — E11.65 TYPE 2 DIABETES MELLITUS WITH HYPERGLYCEMIA, WITHOUT LONG-TERM CURRENT USE OF INSULIN (HCC): ICD-10-CM

## 2019-01-16 DIAGNOSIS — E55.9 HYPOVITAMINOSIS D: ICD-10-CM

## 2019-03-11 DIAGNOSIS — I10 ESSENTIAL HYPERTENSION: ICD-10-CM

## 2019-03-12 RX ORDER — LOSARTAN POTASSIUM AND HYDROCHLOROTHIAZIDE 25; 100 MG/1; MG/1
TABLET ORAL
Qty: 30 TAB | Refills: 1 | Status: SHIPPED | OUTPATIENT
Start: 2019-03-12 | End: 2019-04-05 | Stop reason: SDUPTHER

## 2019-04-05 ENCOUNTER — OFFICE VISIT (OUTPATIENT)
Dept: FAMILY MEDICINE CLINIC | Age: 59
End: 2019-04-05

## 2019-04-05 VITALS
DIASTOLIC BLOOD PRESSURE: 82 MMHG | WEIGHT: 191.8 LBS | RESPIRATION RATE: 16 BRPM | OXYGEN SATURATION: 98 % | TEMPERATURE: 98.1 F | BODY MASS INDEX: 36.21 KG/M2 | HEIGHT: 61 IN | HEART RATE: 61 BPM | SYSTOLIC BLOOD PRESSURE: 136 MMHG

## 2019-04-05 DIAGNOSIS — E55.9 VITAMIN D DEFICIENCY: ICD-10-CM

## 2019-04-05 DIAGNOSIS — R52 PAIN OF MULTIPLE SITES: ICD-10-CM

## 2019-04-05 DIAGNOSIS — E11.9 WELL CONTROLLED DIABETES MELLITUS (HCC): ICD-10-CM

## 2019-04-05 DIAGNOSIS — Z23 ENCOUNTER FOR IMMUNIZATION: ICD-10-CM

## 2019-04-05 DIAGNOSIS — I10 ESSENTIAL HYPERTENSION: Primary | ICD-10-CM

## 2019-04-05 DIAGNOSIS — R05.9 COUGH: ICD-10-CM

## 2019-04-05 DIAGNOSIS — K21.9 GASTROESOPHAGEAL REFLUX DISEASE WITHOUT ESOPHAGITIS: ICD-10-CM

## 2019-04-05 RX ORDER — AMLODIPINE BESYLATE 5 MG/1
5 TABLET ORAL DAILY
Qty: 90 TAB | Refills: 1 | Status: SHIPPED | OUTPATIENT
Start: 2019-04-05 | End: 2019-12-23 | Stop reason: SDUPTHER

## 2019-04-05 RX ORDER — LOSARTAN POTASSIUM AND HYDROCHLOROTHIAZIDE 25; 100 MG/1; MG/1
TABLET ORAL
Qty: 90 TAB | Refills: 1 | Status: SHIPPED | OUTPATIENT
Start: 2019-04-05 | End: 2019-05-14 | Stop reason: ALTCHOICE

## 2019-04-05 RX ORDER — NAPROXEN 500 MG/1
500 TABLET ORAL
Qty: 60 TAB | Refills: 1 | Status: SHIPPED | OUTPATIENT
Start: 2019-04-05 | End: 2019-10-04 | Stop reason: SDUPTHER

## 2019-04-05 RX ORDER — RANITIDINE 150 MG/1
150 TABLET, FILM COATED ORAL 2 TIMES DAILY
Qty: 60 TAB | Refills: 1 | Status: SHIPPED | OUTPATIENT
Start: 2019-04-05 | End: 2020-06-26

## 2019-04-05 RX ORDER — TIZANIDINE 4 MG/1
TABLET ORAL
Refills: 0 | COMMUNITY
Start: 2019-02-13 | End: 2019-06-05

## 2019-04-05 NOTE — PROGRESS NOTES
Pneumovax 23, 0.5 ml given IM in right deltoid. Lot # [id number], exp date 06/09/2020. Patient tolerated injection well. No adverse reaction noted.

## 2019-04-05 NOTE — PROGRESS NOTES
1. Have you been to the ER, urgent care clinic since your last visit? Hospitalized since your last visit? No 
 
2. Have you seen or consulted any other health care providers outside of the [address] [place] [address] since your last visit? Include any pap smears or colon screening. OB/GYN for pap smear/annual - [address] LOV: 3/14/19. [address] [facility] LOV: last week

## 2019-04-05 NOTE — PROGRESS NOTES
HISTORY OF PRESENT ILLNESS Terrie Kayser is a 66 y.o. female. HPI: new patient to me. Prior pcp left the practice. H/o hypertension. On medication. Asymptomatic. Complaint with medication. Vitals stable. Visit Vitals /82 (BP 1 Location: Left arm, BP Patient Position: Sitting) Pulse 61 Temp 98.1 °F (36.7 °C) (Oral) Resp 16 Ht 5' 1\" (1.549 m) Wt 191 lb 12.8 oz (87 kg) SpO2 98% BMI 36.24 kg/m² Review medication list, vitals, problem list,allergies. Review past./ personal/ family / surgical history. Denies any headache, dizziness, no chest pain or trouble breathing, no arm or leg weakness. No nausea or vomiting, no weight or appetite changes, no mood changes . No urine or bowel complains, no palpitation, no diaphoresis. No abdominal pain. No cold or cough. No leg swelling. No fever. No sleep trouble. Also c/o dry cough since last few months. No wheezing. No weight or appetite changes. No sob. No fever. C/o seasonal allergies. Not taking any medication. Also mentioned that lately having GERD symptoms more after eating. No abdominal pain. No nausea or vomiting. No chest pain. No urinary or bowel complains. Not taking any medication. H/o diabetes. Not on any medication. Well managed with diet modification and exercise. discussed high BMI. Discussed diet modification, calorie count and exercise. Discussed importance of weight loss. agree to do exercise and life style modification. Diet and exercise hand out given in AVS. She has been following rheumatology for multiple site joint pain. She was diagnosed with osteoarthritis. Will obtain notes and follow their recommendations. H/o vitamin d deficiency. On supplement. Denies any unusual fatigue. Will recheck labs. Review prior labs with her. Lab Results Component Value Date/Time  WBC 5.1 10/11/2018 08:12 PM  
 HGB 12.0 10/11/2018 08:12 PM  
 HCT 38.3 10/11/2018 08:12 PM  
 PLATELET 532 83/42/7610 08:12 PM  
 MCV 84 10/11/2018 08:12 PM  
 
Lab Results Component Value Date/Time Sodium 138 10/11/2018 08:12 PM  
 Potassium 3.7 10/11/2018 08:12 PM  
 Chloride 100 10/11/2018 08:12 PM  
 CO2 25 10/11/2018 08:12 PM  
 Anion gap 13.0 10/11/2018 08:12 PM  
 Glucose 95 10/11/2018 08:12 PM  
 BUN 15 10/11/2018 08:12 PM  
 Creatinine 0.8 10/11/2018 08:12 PM  
 Calcium 9.3 10/11/2018 08:12 PM  
 Bilirubin, total 0.3 10/11/2018 08:12 PM  
 AST (SGOT) 11 10/11/2018 08:12 PM  
 Alk. phosphatase 29 10/11/2018 08:12 PM  
 Protein, total 6.3 (L) 10/11/2018 08:12 PM  
 Albumin 4.0 10/11/2018 08:12 PM  
 Globulin 2.3 10/11/2018 08:12 PM  
 A-G Ratio 1.7 10/11/2018 08:12 PM  
 ALT (SGPT) 11 10/11/2018 08:12 PM  
 
Lab Results Component Value Date/Time Cholesterol, total 136 07/12/2018 07:27 AM  
 HDL Cholesterol 54 07/12/2018 07:27 AM  
 LDL, calculated 70 07/12/2018 07:27 AM  
 VLDL, calculated 12 07/12/2018 07:27 AM  
 Triglyceride 60 07/12/2018 07:27 AM  
 
Lab Results Component Value Date/Time TSH 2.56 10/11/2018 08:12 PM  
 
Lab Results Component Value Date/Time Hemoglobin A1c 5.8 01/10/2019 08:25 AM  
 Hemoglobin A1c (POC) 5.5 02/24/2015 09:58 AM  
 
Lab Results Component Value Date/Time Microalb/Creat ratio (ug/mg creat.)  10/11/2018 08:12 PM  
   Comment:  
   ** Unable to calculate Microablumin/Creatinine Ratio due to low Microalbumin Lab Results Component Value Date/Time VITAMIN D, 25-HYDROXY 25.0 (L) 01/10/2019 08:25 AM  
   
 
ROS: Denies any headache, dizziness, no chest pain or trouble breathing, no arm or leg weakness. No nausea or vomiting, no weight or appetite changes, no mood changes . No urine or bowel complains, no palpitation, no diaphoresis. No abdominal pain. No cold or cough. No leg swelling. No fever. No sleep trouble. Physical Exam  
Constitutional: She is oriented to person, place, and time. No distress. Neck: No thyromegaly present. Cardiovascular: Normal rate, regular rhythm and normal heart sounds. Pulmonary/Chest: CTA Abdominal: Soft. Bowel sounds are normal. There is no tenderness. Musculoskeletal: She exhibits no edema. Lymphadenopathy:  
  She has no cervical adenopathy. Neurological: She is oriented to person, place, and time. Psychiatric: Her behavior is normal.  
 
 
ASSESSMENT and PLAN 
  ICD-10-CM ICD-9-CM 1. Essential hypertension:stable at this time. Low salt diet. Exercise as tolerated. Will continue current plan with current dose of medication. I10 401.9 amLODIPine (NORVASC) 5 mg tablet  
   losartan-hydroCHLOROthiazide (HYZAAR) 100-25 mg per tablet 2. Well controlled diabetes mellitus ([address] E11.9 250.00 CBC W/O DIFF  
   METABOLIC PANEL, COMPREHENSIVE  
   HEMOGLOBIN A1C WITH EAG  
   LIPID PANEL 3. Vitamin D deficiency: on supplement. Recheck labs. E55.9 268.9 VITAMIN D, 25 HYDROXY 4. Pain of multiple sites; symptomatic treatment. Advised to take it with food and given zantac. R52 780.96 naproxen (NAPROSYN) 500 mg tablet  
 follwoing rheumatologist, dr. Marty Sun 5. BMI 36.0-36.9,adult: discussed high BMI. Discussed diet modification, calorie count and exercise. Discussed importance of weight loss. agree to do exercise and life style modification. Diet and exercise hand out given in AVS. Z68.36 V85.36   
6. Cough; could be multiple reason, seasonal allergies, GERD. Given singulair and zantac. F/u next visit. Avoid spicy and fried food. R05 786.2 raNITIdine (ZANTAC) 150 mg tablet 7. Gastroesophageal reflux disease without esophagitis: symptomatic treatment. Avoid spicy and fried food. Reevaluate next visit. K21.9 530.81 raNITIdine (ZANTAC) 150 mg tablet 8. Encounter for immunization Z23 V03.89 PNEUMOCOCCAL POLYSACCHARIDE VACCINE, 23-VALENT, ADULT OR IMMUNOSUPPRESSED PT DOSE, Pt understood and agree with the plan ' Review hM Follow-up and Dispositions · Return in about 1 month (around 5/5/2019).

## 2019-04-05 NOTE — PATIENT INSTRUCTIONS
Learning About Diabetes Food Guidelines Your Care Instructions Meal planning is important to manage diabetes. It helps keep your blood sugar at a target level (which you set with your doctor). You don't have to eat special foods. You can eat what your family eats, including sweets once in a while. But you do have to pay attention to how often you eat and how much you eat of certain foods. You may want to work with a dietitian or a certified diabetes educator (CDE) to help you plan meals and snacks. A dietitian or CDE can also help you lose weight if that is one of your goals. What should you know about eating carbs? Managing the amount of carbohydrate (carbs) you eat is an important part of healthy meals when you have diabetes. Carbohydrate is found in many foods. · Learn which foods have carbs. And learn the amounts of carbs in different foods. ? Bread, cereal, pasta, and rice have about 15 grams of carbs in a serving. A serving is 1 slice of bread (1 ounce), ½ cup of cooked cereal, or 1/3 cup of cooked pasta or rice. ? Fruits have 15 grams of carbs in a serving. A serving is 1 small fresh fruit, such as an apple or orange; ½ of a banana; ½ cup of cooked or canned fruit; ½ cup of fruit juice; 1 cup of melon or raspberries; or 2 tablespoons of dried fruit. ? Milk and no-sugar-added yogurt have 15 grams of carbs in a serving. A serving is 1 cup of milk or 2/3 cup of no-sugar-added yogurt. ? Starchy vegetables have 15 grams of carbs in a serving. A serving is ½ cup of mashed potatoes or sweet potato; 1 cup winter squash; ½ of a small baked potato; ½ cup of cooked beans; or ½ cup cooked corn or green peas. · Learn how much carbs to eat each day and at each meal. A dietitian or CDE can teach you how to keep track of the amount of carbs you eat. This is called carbohydrate counting.  
· If you are not sure how to count carbohydrate grams, use the Plate Method to plan meals. It is a good, quick way to make sure that you have a balanced meal. It also helps you spread carbs throughout the day. ? Divide your plate by types of foods. Put non-starchy vegetables on half the plate, meat or other protein food on one-quarter of the plate, and a grain or starchy vegetable in the final quarter of the plate. To this you can add a small piece of fruit and 1 cup of milk or yogurt, depending on how many carbs you are supposed to eat at a meal. 
· Try to eat about the same amount of carbs at each meal. Do not \"save up\" your daily allowance of carbs to eat at one meal. 
· Proteins have very little or no carbs per serving. Examples of proteins are beef, chicken, turkey, fish, eggs, tofu, cheese, cottage cheese, and peanut butter. A serving size of meat is 3 ounces, which is about the size of a deck of cards. Examples of meat substitute serving sizes (equal to 1 ounce of meat) are 1/4 cup of cottage cheese, 1 egg, 1 tablespoon of peanut butter, and ½ cup of tofu. How can you eat out and still eat healthy? · Learn to estimate the serving sizes of foods that have carbohydrate. If you measure food at home, it will be easier to estimate the amount in a serving of restaurant food. · If the meal you order has too much carbohydrate (such as potatoes, corn, or baked beans), ask to have a low-carbohydrate food instead. Ask for a salad or green vegetables. · If you use insulin, check your blood sugar before and after eating out to help you plan how much to eat in the future. · If you eat more carbohydrate at a meal than you had planned, take a walk or do other exercise. This will help lower your blood sugar. What else should you know? · Limit saturated fat, such as the fat from meat and dairy products. This is a healthy choice because people who have diabetes are at higher risk of heart disease.  So choose lean cuts of meat and nonfat or low-fat dairy products. Use olive or canola oil instead of butter or shortening when cooking. · Don't skip meals. Your blood sugar may drop too low if you skip meals and take insulin or certain medicines for diabetes. · Check with your doctor before you drink alcohol. Alcohol can cause your blood sugar to drop too low. Alcohol can also cause a bad reaction if you take certain diabetes medicines. Follow-up care is a key part of your treatment and safety. Be sure to make and go to all appointments, and call your doctor if you are having problems. It's also a good idea to know your test results and keep a list of the medicines you take. Where can you learn more? Go to http://killian-heather.info/. Enter C134 in the search box to learn more about \"Learning About Diabetes Food Guidelines. \" Current as of: July 25, 2018 Content Version: 11.9 © 7050-6865 [employer]. Care instructions adapted under license by [employer] (which disclaims liability or warranty for this information). If you have questions about a medical condition or this instruction, always ask your healthcare professional. [address] any warranty or liability for your use of this information. Low Sodium Diet (2,000 Milligram): Care Instructions Your Care Instructions Too much sodium causes your body to hold on to extra water. This can raise your blood pressure and force your heart and kidneys to work harder. In very serious cases, this could cause you to be put in the hospital. It might even be life-threatening. By limiting sodium, you will feel better and lower your risk of serious problems. The most common source of sodium is salt. People get most of the salt in their diet from canned, prepared, and packaged foods. Fast food and restaurant meals also are very high in sodium. Your doctor will probably limit your sodium to less than 2,000 milligrams (mg) a day.  This limit counts all the sodium in prepared and packaged foods and any salt you add to your food. Follow-up care is a key part of your treatment and safety. Be sure to make and go to all appointments, and call your doctor if you are having problems. It's also a good idea to know your test results and keep a list of the medicines you take. How can you care for yourself at home? Read food labels · Read labels on cans and food packages. The labels tell you how much sodium is in each serving. Make sure that you look at the serving size. If you eat more than the serving size, you have eaten more sodium. · Food labels also tell you the Percent Daily Value for sodium. Choose products with low Percent Daily Values for sodium. · Be aware that sodium can come in forms other than salt, including monosodium glutamate (MSG), sodium citrate, and sodium bicarbonate (baking soda). MSG is often added to Asian food. When you eat out, you can sometimes ask for food without MSG or added salt. Buy low-sodium foods · Buy foods that are labeled \"unsalted\" (no salt added), \"sodium-free\" (less than 5 mg of sodium per serving), or \"low-sodium\" (less than 140 mg of sodium per serving). Foods labeled \"reduced-sodium\" and \"light sodium\" may still have too much sodium. Be sure to read the label to see how much sodium you are getting. · Buy fresh vegetables, or frozen vegetables without added sauces. Buy low-sodium versions of canned vegetables, soups, and other canned goods. Prepare low-sodium meals · Cut back on the amount of salt you use in cooking. This will help you adjust to the taste. Do not add salt after cooking. One teaspoon of salt has about 2,300 mg of sodium. · Take the salt shaker off the table. · Flavor your food with garlic, lemon juice, onion, vinegar, herbs, and spices. Do not use soy sauce, lite soy sauce, steak sauce, onion salt, garlic salt, celery salt, mustard, or ketchup on your food. · Use low-sodium salad dressings, sauces, and ketchup. Or make your own salad dressings and sauces without adding salt. · Use less salt (or none) when recipes call for it. You can often use half the salt a recipe calls for without losing flavor. Other foods such as rice, pasta, and grains do not need added salt. · Rinse canned vegetables, and cook them in fresh water. This removes somebut not allof the salt. · Avoid water that is naturally high in sodium or that has been treated with water softeners, which add sodium. Call your local water company to find out the sodium content of your water supply. If you buy bottled water, read the label and choose a sodium-free brand. Avoid high-sodium foods · Avoid eating: 
? Smoked, cured, salted, and canned meat, fish, and poultry. ? Ham, sutton, hot dogs, and luncheon meats. ? Regular, hard, and processed cheese and regular peanut butter. ? Crackers with salted tops, and other salted snack foods such as pretzels, chips, and salted popcorn. ? Frozen prepared meals, unless labeled low-sodium. ? Canned and dried soups, broths, and bouillon, unless labeled sodium-free or low-sodium. ? Canned vegetables, unless labeled sodium-free or low-sodium. ? Western [employer] fries, pizza, tacos, and other fast foods. ? Pickles, olives, ketchup, and other condiments, especially soy sauce, unless labeled sodium-free or low-sodium. Where can you learn more? Go to http://killian-heather.info/. Enter O826 in the search box to learn more about \"Low Sodium Diet (2,000 Milligram): Care Instructions. \" Current as of: March 28, 2018 Content Version: 11.9 © 1818-4788 [employer]. Care instructions adapted under license by [employer] (which disclaims liability or warranty for this information).  If you have questions about a medical condition or this instruction, always ask your healthcare professional. [employer] disclaims any warranty or liability for your use of this information. Learning About Physical Activity What is physical activity? Physical activity is any kind of activity that gets your body moving. The types of physical activity that can help you get fit and stay healthy include: · Aerobic or \"cardio\" activities that make your heart beat faster and make you breathe harder, such as brisk walking, riding a bike, or running. Aerobic activities strengthen your heart and lungs and build up your endurance. · Strength training activities that make your muscles work against, or \"resist,\" something, such as lifting weights or doing push-ups. These activities help tone and strengthen your muscles. · Stretches that allow you to move your joints and muscles through their full range of motion. Stretching helps you be more flexible and avoid injury. What are the benefits of physical activity? Being active is one of the best things you can do to get fit and stay healthy. It helps you to: · Feel stronger and have more energy to do all the things you like to do. · Focus better at school or work and perform better in sports. · Feel, think, and sleep better. · Reach and stay at a healthy weight. · Lose fat and build lean muscle. · Lower your risk for serious health problems. · Keep your bones, muscles, and joints strong. Being fit lets you do more physical activity. And it lets you work out harder without as much effort. How can you make physical activity part of your life? Get at least 30 minutes of exercise on most days of the week. Walking is a good choice. You also may want to do other activities, such as running, swimming, cycling, or playing tennis or team sports. Pick activities that you likeones that make your heart beat faster, your muscles stronger, and your muscles and joints more flexible. If you find more than one thing you like doing, do them all.  You don't have to do the same thing every day. Get your heart pumping every day. Any activity that makes your heart beat faster and keeps it at that rate for a while counts. Here are some great ways to get your heart beating faster: · Go for a brisk walk, run, or bike ride. · Go for a hike or swim. · Go in-line skating. · Play a game of touch football, basketball, or soccer. · Ride a bike. · Play tennis or racquetball. · Climb stairs. Even some household chores can be aerobicjust do them at a faster pace. Vacuuming, raking or mowing the lawn, sweeping the garage, and washing and waxing the car all can help get your heart rate up. Strengthen your muscles during the week. You don't have to lift heavy weights or grow big, bulky muscles to get stronger. Doing a few simple activities that make your muscles work against, or \"resist,\" something can help you get stronger. For example, you can: · Do push-ups or sit-ups, which use your own body weight as resistance. · Lift weights or dumbbells or use stretch bands at home or in a gym or community center. Stretch your muscles often. Stretching will help you as you become more active. It can help you stay flexible, loosen tight muscles, and avoid injury. It can also help improve your balance and posture and can be a great way to relax. Be sure to stretch the muscles you'll be using when you work out. It's best to warm your muscles slightly before you stretch them. Walk or do some other light aerobic activity for a few minutes, and then start stretching. When you stretch your muscles: · Do it slowly. Stretching is not about going fast or making sudden movements. · Don't push or bounce during a stretch. · Hold each stretch for at least 15 to 30 seconds, if you can. You should feel a stretch in the muscle, but not pain. · Breathe out as you do the stretch. Then breathe in as you hold the stretch. Don't hold your breath. If you're worried about how more activity might affect your health, have a checkup before you start. Follow any special advice your doctor gives you for getting a smart start. Where can you learn more? Go to http://killian-heather.info/. Enter Q609 in the search box to learn more about \"Learning About Physical Activity. \" Current as of: August 19, 2018 Content Version: 11.9 © 3496-2824 [employer] Care instructions adapted under license by [employer] (which disclaims liability or warranty for this information). If you have questions about a medical condition or this instruction, always ask your healthcare professional. [address] any warranty or liability for your use of this information. Preventing Osteoporosis: Care Instructions Your Care Instructions Osteoporosis means the bones are weak and thin enough that they can break easily. The older you are, the more likely you are to get osteoporosis. But with plenty of calcium, vitamin D, and exercise, you can help prevent osteoporosis. The preteen and teen years are a key time for bone building. With the help of calcium, vitamin D, and exercise in those early years and beyond, the bones reach their peak density and strength by age 27. After age 27, your bones naturally start to thin and weaken. The stronger your bones are at around age 27, the lower your risk for osteoporosis. But no matter what your age and risk are, your bones still need calcium, vitamin D, and exercise to stay strong. Also avoid smoking, and limit alcohol. Smoking and heavy alcohol use can make your bones thinner. Talk to your doctor about any special risks you might have, such as having a close relative with osteoporosis or taking a medicine that can weaken bones. Your doctor can tell you the best ways to protect your bones from thinning. Follow-up care is a key part of your treatment and safety.  Be sure to make and go to all appointments, and call your doctor if you are having problems. It's also a good idea to know your test results and keep a list of the medicines you take. How can you care for yourself at home? · Get enough calcium and vitamin D. The [employer] recommends adults younger than age 46 need 1,000 mg of calcium and 600 IU of vitamin D each day. Women ages 46 to 79 need 1,200 mg of calcium and 600 IU of vitamin D each day. Men ages 46 to 79 need 1,000 mg of calcium and 600 IU of vitamin D each day. Adults 71 and older need 1,200 mg of calcium and 800 IU of vitamin D each day. ? Eat foods rich in calcium, like yogurt, cheese, milk, and dark green vegetables. ? Eat foods rich in vitamin D, like eggs, fatty fish, cereal, and fortified milk. ? Get some sunshine. Your body uses sunshine to make its own vitamin D. The safest time to be out in the sun is before 10 a.m. or after 3 p.m. Avoid getting sunburned. Sunburn can increase your risk of skin cancer. ? Talk to your doctor about taking a calcium plus vitamin D supplement. Ask about what type of calcium is right for you, and how much to take at a time. Adults ages 23 to 48 should not get more than 2,500 mg of calcium and 4,000 IU of vitamin D each day, whether it is from supplements and/or food. Adults ages 46 and older should not get more than 2,000 mg of calcium and 4,000 IU of vitamin D each day from supplements and/or food. · Get regular bone-building exercise. Weight-bearing and resistance exercises keep bones healthy by working the muscles and bones against gravity. Start out at an exercise level that feels right for you. Add a little at a time until you can do the following: ? Do 30 minutes of weight-bearing exercise on most days of the week. Walking, jogging, stair climbing, and dancing are good choices. ? Do resistance exercises with weights or elastic bands 2 to 3 days a week. · Limit alcohol. Drink no more than 1 alcohol drink a day if you are a woman. Drink no more than 2 alcohol drinks a day if you are a man. · Do not smoke. Smoking can make bones thin faster. If you need help quitting, talk to your doctor about stop-smoking programs and medicines. These can increase your chances of quitting for good. When should you call for help? Watch closely for changes in your health, and be sure to contact your doctor if you have any problems. Where can you learn more? Go to http://killian-heather.info/. Enter V232 in the search box to learn more about \"Preventing Osteoporosis: Care Instructions. \" Current as of: March 15, 2018 Content Version: 11.9 © 4478-0280 [employer]. Care instructions adapted under license by [employer] (which disclaims liability or warranty for this information). If you have questions about a medical condition or this instruction, always ask your healthcare professional. [place] [address] any warranty or liability for your use of this information. Vaccine Information Statement Pneumococcal Polysaccharide Vaccine: What You Need to Know Many Vaccine Information Statements are available in Greek and other languages. See www.immunize.org/vis. Hojas de información Sobre Vacunas están disponibles en español y en muchos otros idiomas. Visite Tc.si. 1. Why get vaccinated? Vaccination can protect older adults (and some children and younger adults) from pneumococcal disease. Pneumococcal disease is caused by bacteria that can spread from person to person through close contact. It can cause ear infections, and it can also lead to more serious infections of the: 
 Lungs (pneumonia),  Blood (bacteremia), and 
 Covering of the brain and spinal cord (meningitis).  Meningitis can cause deafness and brain damage, and it can be fatal.   
 
 Anyone can get pneumococcal disease, but children under 3years of age, people with certain medical conditions, adults over 72years of age, and cigarette smokers are at the highest risk. About 18,000 older adults die each year from pneumococcal disease in the [place]. Treatment of pneumococcal infections with penicillin and other drugs used to be more effective. But some strains of the disease have become resistant to these drugs. This makes prevention of the disease, through vaccination, even more important. 2. Pneumococcal polysaccharide vaccine (PPSV23) Pneumococcal polysaccharide vaccine (PPSV23) protects against 23 types of pneumococcal bacteria. It will not prevent all pneumococcal disease. PPSV23 is recommended for:  All adults 72years of age and older,  Anyone 2 through 59years of age with certain long-term health problems, 
 Anyone 2 through 59years of age with a weakened immune system, 
 Adults 23 through 59years of age who smoke cigarettes or have asthma. Most people need only one dose of PPSV. A second dose is recommended for certain high-risk groups. People 72 and older should get a dose even if they have gotten one or more doses of the vaccine before they turned 65. Your healthcare provider can give you more information about these recommendations. Most healthy adults develop protection within 2 to 3 weeks of getting the shot. 3. Some people should not get this vaccine  Anyone who has had a life-threatening allergic reaction to PPSV should not get another dose.  Anyone who has a severe allergy to any component of PPSV should not receive it. Tell your provider if you have any severe allergies.  Anyone who is moderately or severely ill when the shot is scheduled may be asked to wait until they recover before getting the vaccine. Someone with a mild illness can usually be vaccinated.  Children less than 3years of age should not receive this vaccine.  There is no evidence that PPSV is harmful to either a pregnant woman or to her fetus. However, as a precaution, women who need the vaccine should be vaccinated before becoming pregnant, if possible. 4. Risks of a vaccine reaction With any medicine, including vaccines, there is a chance of side effects. These are usually mild and go away on their own, but serious reactions are also possible. About half of people who get PPSV have mild side effects, such as redness or pain where the shot is given, which go away within about two days. Less than 1 out of 100 people develop a fever, muscle aches, or more severe local reactions. Problems that could happen after any vaccine:  People sometimes faint after a medical procedure, including vaccination. Sitting or lying down for about 15 minutes can help prevent fainting, and injuries caused by a fall. Tell your doctor if you feel dizzy, or have vision changes or ringing in the ears.  Some people get severe pain in the shoulder and have difficulty moving the arm where a shot was given. This happens very rarely.  Any medication can cause a severe allergic reaction. Such reactions from a vaccine are very rare, estimated at about 1 in a million doses, and would happen within a few minutes to a few hours after the vaccination. As with any medicine, there is a very remote chance of a vaccine causing a serious injury or death. The safety of vaccines is always being monitored. For more information, visit: www.cdc.gov/vaccinesafety/  
 
5. What if there is a serious reaction? What should I look for? Look for anything that concerns you, such as signs of a severe allergic reaction, very high fever, or unusual behavior.  
 
Signs of a severe allergic reaction can include hives, swelling of the face and throat, difficulty breathing, a fast heartbeat, dizziness, and weakness. These would usually start a few minutes to a few hours after the vaccination. What should I do? If you think it is a severe allergic reaction or other emergency that cant wait, call 9-1-1 or get to the nearest hospital. Otherwise, call your doctor. Afterward, the reaction should be reported to the Vaccine Adverse Event Reporting System (VAERS). Your doctor might file this report, or you can do it yourself through the VAERS web site at www.vaers. [facility].gov, or by calling 7-520.192.5686. VAERS does not give medical advice. 6. How can I learn more?  Ask your doctor. He or she can give you the vaccine package insert or suggest other sources of information.  Call your local or state health department.  Contact the Centers for Disease Control and Prevention (CDC): 
- Call 5-681.632.5129 (1-800-CDC-INFO) or 
- Visit CDCs website at www.cdc.gov/vaccines Vaccine Information Statement PPSV  
04/24/2015 Department of Health and [employer] Centers for Disease Control and Prevention Office Use Only

## 2019-04-15 DIAGNOSIS — I10 ESSENTIAL HYPERTENSION: ICD-10-CM

## 2019-04-15 DIAGNOSIS — E78.5 DYSLIPIDEMIA: ICD-10-CM

## 2019-04-15 DIAGNOSIS — E11.65 TYPE 2 DIABETES MELLITUS WITH HYPERGLYCEMIA, WITHOUT LONG-TERM CURRENT USE OF INSULIN (HCC): ICD-10-CM

## 2019-04-19 ENCOUNTER — OFFICE VISIT (OUTPATIENT)
Dept: FAMILY MEDICINE CLINIC | Age: 59
End: 2019-04-19

## 2019-04-19 VITALS
SYSTOLIC BLOOD PRESSURE: 118 MMHG | WEIGHT: 186 LBS | TEMPERATURE: 98.1 F | DIASTOLIC BLOOD PRESSURE: 72 MMHG | HEIGHT: 61 IN | RESPIRATION RATE: 16 BRPM | HEART RATE: 59 BPM | BODY MASS INDEX: 35.12 KG/M2 | OXYGEN SATURATION: 98 %

## 2019-04-19 DIAGNOSIS — J04.0 LARYNGITIS: Primary | ICD-10-CM

## 2019-04-19 RX ORDER — CETIRIZINE HYDROCHLORIDE 5 MG/1
5 TABLET ORAL
Qty: 30 TAB | Refills: 0 | Status: SHIPPED | OUTPATIENT
Start: 2019-04-19 | End: 2022-05-11 | Stop reason: SDUPTHER

## 2019-04-19 NOTE — PATIENT INSTRUCTIONS
Laryngitis: Care Instructions  Your Care Instructions    Laryngitis is an inflammation of the voice box (larynx) that causes your voice to become raspy or hoarse. It can be short-lived or long-lasting. Most of the time, laryngitis comes on quickly and lasts as long as 2 weeks. It is caused by overuse, irritation, or infection of the vocal cords inside the larynx. Some of the most common causes are a cold, the flu, or allergies. Loud talking, shouting, cheering, or singing also can cause laryngitis. Stomach acid that backs up into the throat also can make you lose your voice. Resting your voice and taking other steps at home can help you get your voice back. Follow-up care is a key part of your treatment and safety. Be sure to make and go to all appointments, and call your doctor if you are having problems. It's also a good idea to know your test results and keep a list of the medicines you take. How can you care for yourself at home? · Follow your doctor's directions for treating the condition that caused you to lose your voice. If your doctor prescribed antibiotics, take them as directed. Do not stop taking them just because you feel better. You need to take the full course of antibiotics. · Before you use cough and cold medicines, check the label. They may not be safe for young children or for people with certain health problems. · Try to keep stomach acid from backing up into your throat. Do not eat just before bedtime. Reduce the amount of coffee and alcohol you drink, and eat healthy foods. Taking over-the-counter acid reducers can help when these steps are not enough. In some cases, you may need prescription medicine. · Rest your voice. You do not have to stop speaking, but use your voice as little as possible. Speak softly but do not whisper; whispering can bother your larynx more than speaking softly. Avoid talking on the telephone or trying to speak loudly. · Try not to clear your throat.  This can cause more irritation of your larynx. Take an over-the-counter cough suppressant (if your doctor recommends it) if you have a dry cough that does not produce mucus. · Do not smoke or allow others to smoke around you. If you need help quitting, talk to your doctor about stop-smoking programs and medicines. These can increase your chances of quitting for good. · Use a humidifier or vaporizer to add moisture to your bedroom. Humidity helps to thin the mucus in the nasal membranes that causes stuffiness or postnasal drip. Follow the directions for cleaning the machine. · Drink plenty of fluids, enough so that your urine is light yellow or clear like water. If you have kidney, heart, or liver disease and have to limit fluids, talk with your doctor before you increase the amount of fluids you drink. · Use saline (saltwater) nasal washes to help keep your nasal passages open and wash out mucus and bacteria. You can buy saline nose drops at a grocery store or drugstore. Or, you can make your own at home by mixing ½ teaspoon salt, 1 cup water (at room temperature), and ½ teaspoon baking soda. If you make your own, fill a bulb syringe with the solution, insert the tip into your nostril, and squeeze gently. Choco DohertyChris your nose. When should you call for help? Call 911 anytime you think you may need emergency care. For example, call if:    · You have trouble breathing.    Call your doctor now or seek immediate medical care if:    · You have new or worse pain.     · You have trouble swallowing.    Watch closely for changes in your health, and be sure to contact your doctor if:    · You do not get better as expected. Where can you learn more? Go to http://killian-heather.info/. Enter A464 in the search box to learn more about \"Laryngitis: Care Instructions. \"  Current as of: March 27, 2018  Content Version: 11.9  © 4039-1699 [employer].  Care instructions adapted under license by [employer] (which disclaims liability or warranty for this information). If you have questions about a medical condition or this instruction, always ask your healthcare professional. [address] any warranty or liability for your use of this information.

## 2019-04-19 NOTE — PROGRESS NOTES
HISTORY OF PRESENT ILLNESS  Ana Christine is a 66 y.o. female. HPI: here with c/o hoarseness of voice since yesterday. Feeling post nasal drip. Mild cough and very little sputum. Not sure what color. No fever. No chest congestion. No wheezing. No fever. No nausea or vomiting. No urinary or bowel complains. No abdominal pain. No sore throat. No headache or dizziness. No sinus congestion or nasal discharge. Visit Vitals  /72 (BP 1 Location: Left arm, BP Patient Position: Sitting)   Pulse (!) 59   Temp 98.1 °F (36.7 °C) (Oral)   Resp 16   Ht 5' 1\" (1.549 m)   Wt 186 lb (84.4 kg)   SpO2 98%   BMI 35.14 kg/m²     Review medication list, vitals, problem list,allergies. ROS: see HPI     Physical Exam   Constitutional: She is oriented to person, place, and time. No distress. HENT:   Throat: no erythema, no tonsillar enlargement or exudate    Cardiovascular: Normal heart sounds. Pulmonary/Chest: No respiratory distress. She has no wheezes. Lymphadenopathy:     She has no cervical adenopathy. Neurological: She is oriented to person, place, and time. ASSESSMENT and PLAN    ICD-10-CM ICD-9-CM    1. Laryngitis: symptomatic treatment. Salt water gargle. Continue nasal spray and she is on naproxen to take as needed. If no improvement in a week or get worse come back sooner. J04.0 464.00 cetirizine (ZYRTEC) 5 mg tablet   Pt understood and agree with the plan     Follow-up and Dispositions    · Return in about 1 week (around 4/26/2019), or if symptoms worsen or fail to improve.

## 2019-04-19 NOTE — PROGRESS NOTES
Cheikh Carver is a 66 y.o. female presents in office for    Chief Complaint   Patient presents with    Laryngitis     yesterday    Allergies        Health Maintenance Due   Topic Date Due    Shingrix Vaccine Age 50> (1 of 2) 04/23/2012       1. Have you been to the ER, urgent care clinic since your last visit? Hospitalized since your last visit? No    2. Have you seen or consulted any other health care providers outside of the [address] [place] [address] since your last visit? Include any pap smears or colon screening.  No     Learning Assessment 4/5/2019   PRIMARY LEARNER Patient   HIGHEST LEVEL OF EDUCATION - PRIMARY LEARNER  > 4 YEARS OF COLLEGE   BARRIERS PRIMARY LEARNER NONE   CO-LEARNER CAREGIVER No   PRIMARY LANGUAGE ENGLISH    NEED No   LEARNER PREFERENCE PRIMARY DEMONSTRATION   LEARNING SPECIAL TOPICS No   ANSWERED BY patient   RELATIONSHIP SELF

## 2019-05-14 RX ORDER — TELMISARTAN AND HYDROCHLORTHIAZIDE 80; 25 MG/1; MG/1
1 TABLET ORAL DAILY
Qty: 90 TAB | Refills: 0 | Status: SHIPPED | OUTPATIENT
Start: 2019-05-14 | End: 2019-06-05 | Stop reason: ALTCHOICE

## 2019-05-21 ENCOUNTER — HOSPITAL ENCOUNTER (OUTPATIENT)
Dept: LAB | Age: 59
Discharge: HOME | End: 2019-05-21

## 2019-05-21 LAB — SENTARA SPECIMEN COL,SENBCF: NORMAL

## 2019-05-21 PROCEDURE — 99001 SPECIMEN HANDLING PT-LAB: CPT

## 2019-05-22 LAB
25(OH)D3 SERPL-MCNC: 42.8 NG/ML (ref 32–100)
A-G RATIO,AGRAT: 1.7 RATIO (ref 1.1–2.6)
ALBUMIN SERPL-MCNC: 4.1 G/DL (ref 3.5–5)
ALP SERPL-CCNC: 28 U/L (ref 25–115)
ALT SERPL-CCNC: 24 U/L (ref 5–40)
ANION GAP SERPL CALC-SCNC: 12 MMOL/L
AST SERPL W P-5'-P-CCNC: 13 U/L (ref 10–37)
AVG GLU, 10930: 125 MG/DL (ref 91–123)
BILIRUB SERPL-MCNC: 0.3 MG/DL (ref 0.2–1.2)
BUN SERPL-MCNC: 17 MG/DL (ref 6–22)
CALCIUM SERPL-MCNC: 9.7 MG/DL (ref 8.4–10.5)
CHLORIDE SERPL-SCNC: 103 MMOL/L (ref 98–110)
CHOLEST SERPL-MCNC: 150 MG/DL (ref 110–200)
CO2 SERPL-SCNC: 27 MMOL/L (ref 20–32)
CREAT SERPL-MCNC: 0.8 MG/DL (ref 0.5–1.2)
ERYTHROCYTE [DISTWIDTH] IN BLOOD BY AUTOMATED COUNT: 15.2 % (ref 10–15.5)
GFRAA, 66117: >60
GFRNA, 66118: >60
GLOBULIN,GLOB: 2.4 G/DL (ref 2–4)
GLUCOSE SERPL-MCNC: 95 MG/DL (ref 70–99)
HBA1C MFR BLD HPLC: 6 % (ref 4.8–5.9)
HCT VFR BLD AUTO: 40.5 % (ref 35.1–48)
HDLC SERPL-MCNC: 3.1 MG/DL (ref 0–5)
HDLC SERPL-MCNC: 49 MG/DL (ref 40–59)
HGB BLD-MCNC: 12.1 G/DL (ref 11.7–16)
LDLC SERPL CALC-MCNC: 90 MG/DL (ref 50–99)
MCH RBC QN AUTO: 26 PG (ref 26–34)
MCHC RBC AUTO-ENTMCNC: 30 G/DL (ref 31–36)
MCV RBC AUTO: 88 FL (ref 80–95)
PLATELET # BLD AUTO: 381 K/UL (ref 140–440)
PMV BLD AUTO: 9.7 FL (ref 9–13)
POTASSIUM SERPL-SCNC: 3.8 MMOL/L (ref 3.5–5.5)
PROT SERPL-MCNC: 6.5 G/DL (ref 6.4–8.3)
RBC # BLD AUTO: 4.62 M/UL (ref 3.8–5.2)
SODIUM SERPL-SCNC: 142 MMOL/L (ref 133–145)
TRIGL SERPL-MCNC: 54 MG/DL (ref 40–149)
VLDLC SERPL CALC-MCNC: 11 MG/DL (ref 8–30)
WBC # BLD AUTO: 5.6 K/UL (ref 4–11)

## 2019-05-22 NOTE — PROGRESS NOTES
Let pt know that vitamin d showed improvement. Mild elevated HBA1c compare to prior result. Still in prediabetic range. For now diet modification. Send hand out if needed. Further discussion on follow up visit.

## 2019-06-05 ENCOUNTER — OFFICE VISIT (OUTPATIENT)
Dept: FAMILY MEDICINE CLINIC | Age: 59
End: 2019-06-05

## 2019-06-05 VITALS
BODY MASS INDEX: 35.12 KG/M2 | WEIGHT: 186 LBS | DIASTOLIC BLOOD PRESSURE: 82 MMHG | TEMPERATURE: 98.2 F | HEIGHT: 61 IN | SYSTOLIC BLOOD PRESSURE: 126 MMHG | OXYGEN SATURATION: 98 % | HEART RATE: 55 BPM | RESPIRATION RATE: 16 BRPM

## 2019-06-05 DIAGNOSIS — E11.9 WELL CONTROLLED DIABETES MELLITUS (HCC): ICD-10-CM

## 2019-06-05 DIAGNOSIS — I10 ESSENTIAL HYPERTENSION: Primary | ICD-10-CM

## 2019-06-05 DIAGNOSIS — R52 PAIN OF MULTIPLE SITES: ICD-10-CM

## 2019-06-05 DIAGNOSIS — K21.9 GASTROESOPHAGEAL REFLUX DISEASE WITHOUT ESOPHAGITIS: ICD-10-CM

## 2019-06-05 RX ORDER — LOSARTAN POTASSIUM 25 MG/1
TABLET ORAL DAILY
COMMUNITY
End: 2019-08-22 | Stop reason: SDUPTHER

## 2019-06-05 NOTE — PROGRESS NOTES
HISTORY OF PRESENT ILLNESS  Krystina Castellanos is a 64 y.o. female. HPI: Here for lab result discussion. H/o diabetes. Off medication. Managed by diet and exercise. Current HBA1C around 6. Discussed also restarting metformin once a day. She would like to go with life style modification. Will f/u next visit. Denies any headache, dizziness, no chest pain or trouble breathing, no arm or leg weakness. No nausea or vomiting, no weight or appetite changes, no mood changes . No urine or bowel complains, no palpitation, no diaphoresis. No abdominal pain. No cold or cough. No leg swelling. No fever. No sleep trouble. Visit Vitals  /82 (BP 1 Location: Left arm, BP Patient Position: Sitting)   Pulse (!) 55   Temp 98.2 °F (36.8 °C) (Oral)   Resp 16   Ht 5' 1\" (1.549 m)   Wt 186 lb (84.4 kg)   SpO2 98%   BMI 35.14 kg/m²     Review medication list, vitals, problem list,allergies. H/o hypertension. Well controlled. Complaint with medication. No side effects. Asymptomatic. Also review labs. Lab Results   Component Value Date/Time    WBC 5.6 05/21/2019 08:18 AM    HGB 12.1 05/21/2019 08:18 AM    HCT 40.5 05/21/2019 08:18 AM    PLATELET 637 23/51/0482 08:18 AM    MCV 88 05/21/2019 08:18 AM     Lab Results   Component Value Date/Time    Sodium 142 05/21/2019 08:18 AM    Potassium 3.8 05/21/2019 08:18 AM    Chloride 103 05/21/2019 08:18 AM    CO2 27 05/21/2019 08:18 AM    Anion gap 12.0 05/21/2019 08:18 AM    Glucose 95 05/21/2019 08:18 AM    BUN 17 05/21/2019 08:18 AM    Creatinine 0.8 05/21/2019 08:18 AM    Calcium 9.7 05/21/2019 08:18 AM    Bilirubin, total 0.3 05/21/2019 08:18 AM    AST (SGOT) 13 05/21/2019 08:18 AM    Alk.  phosphatase 28 05/21/2019 08:18 AM    Protein, total 6.5 05/21/2019 08:18 AM    Albumin 4.1 05/21/2019 08:18 AM    Globulin 2.4 05/21/2019 08:18 AM    A-G Ratio 1.7 05/21/2019 08:18 AM    ALT (SGPT) 24 05/21/2019 08:18 AM     \  Lab Results   Component Value Date/Time    Cholesterol, total 150 05/21/2019 08:18 AM    HDL Cholesterol 49 05/21/2019 08:18 AM    LDL, calculated 90 05/21/2019 08:18 AM    VLDL, calculated 11 05/21/2019 08:18 AM    Triglyceride 54 05/21/2019 08:18 AM     Lab Results   Component Value Date/Time    TSH 2.56 10/11/2018 08:12 PM     Lab Results   Component Value Date/Time    Hemoglobin A1c 6.0 (H) 05/21/2019 08:18 AM    Hemoglobin A1c (POC) 5.5 02/24/2015 09:58 AM     Lab Results   Component Value Date/Time    VITAMIN D, 25-HYDROXY 42.8 05/21/2019 08:18 AM       Lab Results   Component Value Date/Time    Microalb/Creat ratio (ug/mg creat.)  10/11/2018 08:12 PM      Comment:      ** Unable to calculate Microablumin/Creatinine Ratio due to low Microalbumin   also GERD. Stable. Asymptomatic. No cough . No nausea or vomiting. No chest pain or abdominal pain. Pain over multiple site. For now stable. Observe. ROS: Denies any headache, dizziness, no chest pain or trouble breathing, no arm or leg weakness. No nausea or vomiting, no weight or appetite changes, no mood changes . No urine or bowel complains, no palpitation, no diaphoresis. No abdominal pain. No cold or cough. No leg swelling. No fever. No sleep trouble. Physical Exam   Constitutional: She is oriented to person, place, and time. No distress. Neck: No thyromegaly present. Cardiovascular: Normal rate, regular rhythm and normal heart sounds. Pulmonary/Chest:   CTA   Abdominal: Soft. Bowel sounds are normal. There is no tenderness. Musculoskeletal: She exhibits no edema. Lymphadenopathy:     She has no cervical adenopathy. Neurological: She is oriented to person, place, and time. Psychiatric: Her behavior is normal.       ASSESSMENT and PLAN    ICD-10-CM ICD-9-CM    1. Essential hypertension: well controlled. Continue current dose of medication and low salt diet. Exercise as tolerated. I10 401.9    2. Well controlled diabetes mellitus ([facility] [address]): HBA1C around 6. Working on life style modification.  Will continue current plan. Also discussed metformin once a day consider as prevention but she will continue life style modification. Labs before next visit. E11.9 250.00 HEMOGLOBIN A1C WITH EAG    not on any medication. on diet modification. 3. Pain of multiple sites: fairly stable. Will observe. R52 780.96        4. Gastroesophageal reflux disease without esophagitis: stable. Avoid spicy and fried food. K21.9 530.81    Pt understood and agree with the plan   Resolved laryngitis which she was here back in April . Follow-up and Dispositions    · Return in about 4 months (around 10/5/2019).

## 2019-06-05 NOTE — PROGRESS NOTES
Chief Complaint   Patient presents with    Diabetes    Hypertension    GERD    Other     Pain of multiple sites    Results     1. Have you been to the ER, urgent care clinic since your last visit? Hospitalized since your last visit? No    2. Have you seen or consulted any other health care providers outside of the [address] [place] [address] since your last visit? Include any pap smears or colon screening.  No

## 2019-06-05 NOTE — PATIENT INSTRUCTIONS
Learning About Diabetes Food Guidelines  Your Care Instructions    Meal planning is important to manage diabetes. It helps keep your blood sugar at a target level (which you set with your doctor). You don't have to eat special foods. You can eat what your family eats, including sweets once in a while. But you do have to pay attention to how often you eat and how much you eat of certain foods. You may want to work with a dietitian or a certified diabetes educator (CDE) to help you plan meals and snacks. A dietitian or CDE can also help you lose weight if that is one of your goals. What should you know about eating carbs? Managing the amount of carbohydrate (carbs) you eat is an important part of healthy meals when you have diabetes. Carbohydrate is found in many foods. · Learn which foods have carbs. And learn the amounts of carbs in different foods. ? Bread, cereal, pasta, and rice have about 15 grams of carbs in a serving. A serving is 1 slice of bread (1 ounce), ½ cup of cooked cereal, or 1/3 cup of cooked pasta or rice. ? Fruits have 15 grams of carbs in a serving. A serving is 1 small fresh fruit, such as an apple or orange; ½ of a banana; ½ cup of cooked or canned fruit; ½ cup of fruit juice; 1 cup of melon or raspberries; or 2 tablespoons of dried fruit. ? Milk and no-sugar-added yogurt have 15 grams of carbs in a serving. A serving is 1 cup of milk or 2/3 cup of no-sugar-added yogurt. ? Starchy vegetables have 15 grams of carbs in a serving. A serving is ½ cup of mashed potatoes or sweet potato; 1 cup winter squash; ½ of a small baked potato; ½ cup of cooked beans; or ½ cup cooked corn or green peas. · Learn how much carbs to eat each day and at each meal. A dietitian or CDE can teach you how to keep track of the amount of carbs you eat. This is called carbohydrate counting. · If you are not sure how to count carbohydrate grams, use the Plate Method to plan meals.  It is a good, quick way to make sure that you have a balanced meal. It also helps you spread carbs throughout the day. ? Divide your plate by types of foods. Put non-starchy vegetables on half the plate, meat or other protein food on one-quarter of the plate, and a grain or starchy vegetable in the final quarter of the plate. To this you can add a small piece of fruit and 1 cup of milk or yogurt, depending on how many carbs you are supposed to eat at a meal.  · Try to eat about the same amount of carbs at each meal. Do not \"save up\" your daily allowance of carbs to eat at one meal.  · Proteins have very little or no carbs per serving. Examples of proteins are beef, chicken, turkey, fish, eggs, tofu, cheese, cottage cheese, and peanut butter. A serving size of meat is 3 ounces, which is about the size of a deck of cards. Examples of meat substitute serving sizes (equal to 1 ounce of meat) are 1/4 cup of cottage cheese, 1 egg, 1 tablespoon of peanut butter, and ½ cup of tofu. How can you eat out and still eat healthy? · Learn to estimate the serving sizes of foods that have carbohydrate. If you measure food at home, it will be easier to estimate the amount in a serving of restaurant food. · If the meal you order has too much carbohydrate (such as potatoes, corn, or baked beans), ask to have a low-carbohydrate food instead. Ask for a salad or green vegetables. · If you use insulin, check your blood sugar before and after eating out to help you plan how much to eat in the future. · If you eat more carbohydrate at a meal than you had planned, take a walk or do other exercise. This will help lower your blood sugar. What else should you know? · Limit saturated fat, such as the fat from meat and dairy products. This is a healthy choice because people who have diabetes are at higher risk of heart disease. So choose lean cuts of meat and nonfat or low-fat dairy products.  Use olive or canola oil instead of butter or shortening when cooking. · Don't skip meals. Your blood sugar may drop too low if you skip meals and take insulin or certain medicines for diabetes. · Check with your doctor before you drink alcohol. Alcohol can cause your blood sugar to drop too low. Alcohol can also cause a bad reaction if you take certain diabetes medicines. Follow-up care is a key part of your treatment and safety. Be sure to make and go to all appointments, and call your doctor if you are having problems. It's also a good idea to know your test results and keep a list of the medicines you take. Where can you learn more? Go to http://killian-heather.info/. Enter E553 in the search box to learn more about \"Learning About Diabetes Food Guidelines. \"  Current as of: July 25, 2018  Content Version: 11.9  © 5431-5467 [employer]. Care instructions adapted under license by [employer] (which disclaims liability or warranty for this information). If you have questions about a medical condition or this instruction, always ask your healthcare professional. [place] [address] any warranty or liability for your use of this information. Low Sodium Diet (2,000 Milligram): Care Instructions  Your Care Instructions    Too much sodium causes your body to hold on to extra water. This can raise your blood pressure and force your heart and kidneys to work harder. In very serious cases, this could cause you to be put in the hospital. It might even be life-threatening. By limiting sodium, you will feel better and lower your risk of serious problems. The most common source of sodium is salt. People get most of the salt in their diet from canned, prepared, and packaged foods. Fast food and restaurant meals also are very high in sodium. Your doctor will probably limit your sodium to less than 2,000 milligrams (mg) a day.  This limit counts all the sodium in prepared and packaged foods and any salt you add to your food.  Follow-up care is a key part of your treatment and safety. Be sure to make and go to all appointments, and call your doctor if you are having problems. It's also a good idea to know your test results and keep a list of the medicines you take. How can you care for yourself at home? Read food labels  · Read labels on cans and food packages. The labels tell you how much sodium is in each serving. Make sure that you look at the serving size. If you eat more than the serving size, you have eaten more sodium. · Food labels also tell you the Percent Daily Value for sodium. Choose products with low Percent Daily Values for sodium. · Be aware that sodium can come in forms other than salt, including monosodium glutamate (MSG), sodium citrate, and sodium bicarbonate (baking soda). MSG is often added to Asian food. When you eat out, you can sometimes ask for food without MSG or added salt. Buy low-sodium foods  · Buy foods that are labeled \"unsalted\" (no salt added), \"sodium-free\" (less than 5 mg of sodium per serving), or \"low-sodium\" (less than 140 mg of sodium per serving). Foods labeled \"reduced-sodium\" and \"light sodium\" may still have too much sodium. Be sure to read the label to see how much sodium you are getting. · Buy fresh vegetables, or frozen vegetables without added sauces. Buy low-sodium versions of canned vegetables, soups, and other canned goods. Prepare low-sodium meals  · Cut back on the amount of salt you use in cooking. This will help you adjust to the taste. Do not add salt after cooking. One teaspoon of salt has about 2,300 mg of sodium. · Take the salt shaker off the table. · Flavor your food with garlic, lemon juice, onion, vinegar, herbs, and spices. Do not use soy sauce, lite soy sauce, steak sauce, onion salt, garlic salt, celery salt, mustard, or ketchup on your food. · Use low-sodium salad dressings, sauces, and ketchup.  Or make your own salad dressings and sauces without adding salt.  · Use less salt (or none) when recipes call for it. You can often use half the salt a recipe calls for without losing flavor. Other foods such as rice, pasta, and grains do not need added salt. · Rinse canned vegetables, and cook them in fresh water. This removes some--but not all--of the salt. · Avoid water that is naturally high in sodium or that has been treated with water softeners, which add sodium. Call your local water company to find out the sodium content of your water supply. If you buy bottled water, read the label and choose a sodium-free brand. Avoid high-sodium foods  · Avoid eating:  ? Smoked, cured, salted, and canned meat, fish, and poultry. ? Ham, sutton, hot dogs, and luncheon meats. ? Regular, hard, and processed cheese and regular peanut butter. ? Crackers with salted tops, and other salted snack foods such as pretzels, chips, and salted popcorn. ? Frozen prepared meals, unless labeled low-sodium. ? Canned and dried soups, broths, and bouillon, unless labeled sodium-free or low-sodium. ? Canned vegetables, unless labeled sodium-free or low-sodium. ? Western [employer] fries, pizza, tacos, and other fast foods. ? Pickles, olives, ketchup, and other condiments, especially soy sauce, unless labeled sodium-free or low-sodium. Where can you learn more? Go to http://killian-heather.info/. Enter P472 in the search box to learn more about \"Low Sodium Diet (2,000 Milligram): Care Instructions. \"  Current as of: March 28, 2018  Content Version: 11.9  © 2905-0796 [employer]. Care instructions adapted under license by [employer] (which disclaims liability or warranty for this information). If you have questions about a medical condition or this instruction, always ask your healthcare professional. [address] any warranty or liability for your use of this information.

## 2019-08-22 RX ORDER — LOSARTAN POTASSIUM 25 MG/1
25 TABLET ORAL DAILY
Qty: 90 TAB | Refills: 0 | Status: SHIPPED | OUTPATIENT
Start: 2019-08-22 | End: 2019-10-04 | Stop reason: ALTCHOICE

## 2019-09-05 DIAGNOSIS — E11.9 WELL CONTROLLED DIABETES MELLITUS (HCC): ICD-10-CM

## 2019-09-25 PROBLEM — Z23 NEED FOR PNEUMOCOCCAL VACCINATION: Status: RESOLVED | Noted: 2017-01-19 | Resolved: 2019-09-25

## 2019-10-03 ENCOUNTER — HOSPITAL ENCOUNTER (OUTPATIENT)
Dept: LAB | Age: 59
Discharge: HOME | End: 2019-10-03

## 2019-10-03 LAB — SENTARA SPECIMEN COL,SENBCF: NORMAL

## 2019-10-03 PROCEDURE — 99001 SPECIMEN HANDLING PT-LAB: CPT

## 2019-10-04 ENCOUNTER — HOSPITAL ENCOUNTER (OUTPATIENT)
Dept: LAB | Age: 59
Discharge: HOME | End: 2019-10-04

## 2019-10-04 ENCOUNTER — HOSPITAL ENCOUNTER (OUTPATIENT)
Dept: GENERAL RADIOLOGY | Age: 59
Discharge: HOME | End: 2019-10-04
Payer: COMMERCIAL

## 2019-10-04 ENCOUNTER — OFFICE VISIT (OUTPATIENT)
Dept: FAMILY MEDICINE CLINIC | Age: 59
End: 2019-10-04

## 2019-10-04 VITALS
DIASTOLIC BLOOD PRESSURE: 60 MMHG | HEIGHT: 61 IN | TEMPERATURE: 98.1 F | SYSTOLIC BLOOD PRESSURE: 128 MMHG | OXYGEN SATURATION: 99 % | BODY MASS INDEX: 35.34 KG/M2 | HEART RATE: 60 BPM | WEIGHT: 187.2 LBS | RESPIRATION RATE: 16 BRPM

## 2019-10-04 DIAGNOSIS — D35.2 PITUITARY MICROADENOMA (HCC): ICD-10-CM

## 2019-10-04 DIAGNOSIS — R52 PAIN OF MULTIPLE SITES: ICD-10-CM

## 2019-10-04 DIAGNOSIS — M25.552 PAIN OF LEFT HIP JOINT: ICD-10-CM

## 2019-10-04 DIAGNOSIS — Z23 ENCOUNTER FOR IMMUNIZATION: ICD-10-CM

## 2019-10-04 DIAGNOSIS — I10 ESSENTIAL HYPERTENSION: ICD-10-CM

## 2019-10-04 DIAGNOSIS — J30.9 ALLERGIC RHINITIS: ICD-10-CM

## 2019-10-04 DIAGNOSIS — E11.9 WELL CONTROLLED DIABETES MELLITUS (HCC): Primary | ICD-10-CM

## 2019-10-04 LAB
AVG GLU, 10930: 117 MG/DL (ref 91–123)
HBA1C MFR BLD HPLC: 5.7 % (ref 4.8–5.9)
SENTARA SPECIMEN COL,SENBCF: NORMAL

## 2019-10-04 PROCEDURE — 73502 X-RAY EXAM HIP UNI 2-3 VIEWS: CPT

## 2019-10-04 PROCEDURE — 99001 SPECIMEN HANDLING PT-LAB: CPT

## 2019-10-04 RX ORDER — FLUTICASONE PROPIONATE 50 MCG
2 SPRAY, SUSPENSION (ML) NASAL
Qty: 1 BOTTLE | Refills: 3 | Status: SHIPPED | OUTPATIENT
Start: 2019-10-04 | End: 2019-10-04 | Stop reason: SDUPTHER

## 2019-10-04 RX ORDER — MOMETASONE FUROATE 1 MG/G
CREAM TOPICAL
Refills: 2 | COMMUNITY
Start: 2019-09-24 | End: 2021-10-11 | Stop reason: SDUPTHER

## 2019-10-04 RX ORDER — TELMISARTAN AND HYDROCHLORTHIAZIDE 80; 25 MG/1; MG/1
1 TABLET ORAL DAILY
Qty: 90 TAB | Refills: 1 | Status: SHIPPED | OUTPATIENT
Start: 2019-10-04 | End: 2020-03-25 | Stop reason: SDUPTHER

## 2019-10-04 RX ORDER — NAPROXEN 500 MG/1
500 TABLET ORAL
Qty: 60 TAB | Refills: 1 | Status: SHIPPED | OUTPATIENT
Start: 2019-10-04 | End: 2019-11-06 | Stop reason: SDUPTHER

## 2019-10-04 NOTE — PROGRESS NOTES
HISTORY OF PRESENT ILLNESS  Marina Hughes is a 64 y.o. female. HPI: Here for follow up. Couple of concern. Recently established care. H/o diabetes. Well controlled on diet modification. Recent HBA1C 5.7. Also h/o seasonal allergies. Using Flonase as needed. Stable at this time. Upon asking mentioned that she does have on and off frontal headaches and mentioned that she has h/o pituitary adenoma, diagnosed years ago and had MRI done . reveiw in the chart and been done in 2007. Said done at [facility]. She used to see endocrinologist and has not seen them since few years. No vision changes. Result as below. JMR [id number] -  BRAIN W/WO CONTRAST   -  MAR  2 2007   RESULTS:   PROCEDURE:   MRI OF THE BRAIN TARGETED TO THE PITUITARY GLAND. TECHNIQUE:    MULTIPLANAR AXIAL  SAGITTAL  AND CORONAL IMAGES WERE   SUBMITTED FOR INTERPRETATION UTILIZING VARIOUS T1- AND T2-WEIGHTING. IV   CONTRAST WAS UNEVENTFULLY ADMINISTERED. CLINICAL INDICATION:   ICD-9 CODES:  253.1 .6. COMPARISON:   THERE WERE NO COMPARISON IMAGES AVAILABLE FOR REVIEW AT THE   TIME OF DICTATION. FINDINGS:   SYMMETRIC SIGNAL INTENSITY ABOUT THE PAROTID GLANDS. [address]. SYMMETRIC SIGNAL INTENSITY ABOUT THE ORBITS. INTRACRANIAL VESSEL SIGNAL VOID WAS MAINTAINED. NORMAL POSTERIOR FOSSA   AND BRAINSTEM. NORMAL SIGNAL ABOUT THE THALAMI AND BASAL GANGLIA. NO   SHIFT OF THE MIDLINE STRUCTURES. NORMAL VENTRICULAR SYSTEM SIZE AND   CONFIGURATION. NORMAL GRAY AND WHITE MATTER SIGNAL. CLEAR EXTRA-AXIAL   SPACE. THE SMALL FIELD-OF-VIEW IMAGES OF THE PITUITARY GLAND DEMONSTRATED THE   PRESENCE OF A 6.7 X 6.3 MM  HETEROGENEOUSLY-ENHANCING  OVAL LESION IN THE   MIDLINE OF THE POSTERIOR ASPECT OF THE PITUITARY GLAND. POSSIBLE   CONCORDANT WITH THE CLINICAL HISTORY OF PITUITARY MICROADENOMA. COMPARISON WITH PRIOR STUDIES WOULD BE OF HELPFUL. THE INFUNDIBULUM WAS   MIDLINE. NORMAL OPTIC CHIASM.   THE CRANIOCERVICAL JUNCTION WAS INTACT. HETEROGENEOUS SIGNAL WITHIN THE CLIVUS. IMPRESSION:   PROBABLE PITUITARY MICROADENOMA  AS NOTED ABOVE. IF THERE ARE COMPARISON   IMAGES AVAILABLE FOR REVIEW  AN ADDENDUM TO THIS DICTATION CAN BE   RENDERED AS TO THE STABILITY OF THIS LESION. IF NO SUCH STUDY EXISTS    CONSIDER SHORT-TERM INTERVAL FOLLOWUP AS INDICATED CLINICALLY. WM/DN   INTERPRETING PHYSICIAN: Carolin Cabrales M.D.   ELECTRONICALLY SIGNED  BY / DATE: Carolin Cabrales M.D. [facility]  3 2007    Also h/o multiple site pain. No joint swelling or redness. Used to follow Dr. Miguel Ortega but said has not gone back to them since last couple of years. Taking NSAID as needed. Generalize fatigue. She was told does not have any connective tissue disease or RA. Currently having left side hip pain and radiation of pain to left lower ext. Not using any support to ambulate. Said not able to sleep over her left side. Moderate intensity of pain. No back pain. H/o hip bursitis and used to get injections with rheumatologist.   Denies any chest pain or sob. No cough or cold. No palpitation or diaphoresis. No ext weakness, no vision changes. No urinary or bowel complains. Lab Results   Component Value Date/Time    WBC 5.6 05/21/2019 08:18 AM    HGB 12.1 05/21/2019 08:18 AM    HCT 40.5 05/21/2019 08:18 AM    PLATELET 181 06/39/5507 08:18 AM    MCV 88 05/21/2019 08:18 AM     Lab Results   Component Value Date/Time    Sodium 142 05/21/2019 08:18 AM    Potassium 3.8 05/21/2019 08:18 AM    Chloride 103 05/21/2019 08:18 AM    CO2 27 05/21/2019 08:18 AM    Anion gap 12.0 05/21/2019 08:18 AM    Glucose 95 05/21/2019 08:18 AM    BUN 17 05/21/2019 08:18 AM    Creatinine 0.8 05/21/2019 08:18 AM    Calcium 9.7 05/21/2019 08:18 AM    Bilirubin, total 0.3 05/21/2019 08:18 AM    AST (SGOT) 13 05/21/2019 08:18 AM    Alk.  phosphatase 28 05/21/2019 08:18 AM    Protein, total 6.5 05/21/2019 08:18 AM    Albumin 4.1 05/21/2019 08:18 AM    Globulin 2.4 05/21/2019 08:18 AM    A-G Ratio 1.7 05/21/2019 08:18 AM    ALT (SGPT) 24 05/21/2019 08:18 AM     Lab Results   Component Value Date/Time    Cholesterol, total 150 05/21/2019 08:18 AM    HDL Cholesterol 49 05/21/2019 08:18 AM    LDL, calculated 90 05/21/2019 08:18 AM    VLDL, calculated 11 05/21/2019 08:18 AM    Triglyceride 54 05/21/2019 08:18 AM     Lab Results   Component Value Date/Time    TSH 2.56 10/11/2018 08:12 PM     Lab Results   Component Value Date/Time    Hemoglobin A1c 5.7 10/03/2019 09:04 AM    Hemoglobin A1c (POC) 5.5 02/24/2015 09:58 AM     Lab Results   Component Value Date/Time    VITAMIN D, 25-HYDROXY 42.8 05/21/2019 08:18 AM       Lab Results   Component Value Date/Time    FSH 90.6 03/16/2010 03:13 PM    Luteinizing hormone 48.5 03/16/2010 03:13 PM     Lab Results   Component Value Date/Time    Sed rate (ESR) 7 03/11/2014 09:05 AM         ROS: see HPI     Physical Exam   Constitutional: She is oriented to person, place, and time. No distress. Cardiovascular: Normal heart sounds. Pulmonary/Chest: No respiratory distress. She has no wheezes. Abdominal: Soft. There is no tenderness. Musculoskeletal: She exhibits no edema. Generalize tenderness over left hip/ trochanteric area. ROM discomfort with any movement. No swelling or redness. Neurological: She is oriented to person, place, and time. Psychiatric: Her behavior is normal.       ASSESSMENT and PLAN    ICD-10-CM ICD-9-CM    1. Well controlled diabetes mellitus ([facility] [address]): on diet modification. Off medication. Continue healthy life style. E11.9 250.00    2. Allergic rhinitis J30.9 477.9 fluticasone propionate (FLONASE) 50 mcg/actuation nasal spray   3. Pain of multiple sites: now advised to make a follow up appt. With specialist.  Ewelina Connors 780.96 naproxen (NAPROSYN) 500 mg tablet    follwoing rheumatologist, dr. Jen Chatterjee   mainly hip pain. 4. Encounter for immunization Z23 V03.89 INFLUENZA VIRUS VAC QUAD,SPLIT,PRESV FREE SYRINGE IM   5. Essential hypertension: well controlled. Continue current dose of medication and low salt diet. Exercise as tolerated. I10 401.9 telmisartan-hydroCHLOROthiazide (MICARDIS HCT) 80-25 mg per tablet   6. Pituitary microadenoma [facility]): for now repeat MRI as done in 2007. Also on and off headache. Checking prolactin level as well.  D35.2 227.3 MRI BRAIN W WO CONT      PROLACTIN   7. Pain of left hip joint: obtain x-ray. Symptomatic treatment. Also sending to ortho for further eval.  M25.552 719.45 REFERRAL TO ORTHOPEDICS      XR HIP LT W OR WO PELV 2-3 VWS   Pt understood and agree with the plan   Review hM   Follow-up and Dispositions    · Return in about 1 month (around 11/4/2019).

## 2019-10-04 NOTE — PATIENT INSTRUCTIONS
Vaccine Information Statement    Influenza (Flu) Vaccine (Inactivated or Recombinant): What You Need to Know    Many Vaccine Information Statements are available in Thai and other languages. See www.immunize.org/vis  Hojas de información sobre vacunas están disponibles en español y en muchos otros idiomas. Visite www.immunize.org/vis    1. Why get vaccinated? Influenza vaccine can prevent influenza (flu). Flu is a contagious disease that spreads around the [facility] every year, usually between October and May. Anyone can get the flu, but it is more dangerous for some people. Infants and young children, people 72years of age and older, pregnant women, and people with certain health conditions or a weakened immune system are at greatest risk of flu complications. Pneumonia, bronchitis, sinus infections and ear infections are examples of flu-related complications. If you have a medical condition, such as heart disease, cancer or diabetes, flu can make it worse. Flu can cause fever and chills, sore throat, muscle aches, fatigue, cough, headache, and runny or stuffy nose. Some people may have vomiting and diarrhea, though this is more common in children than adults. Each year thousands of people in the [facility] die from flu, and many more are hospitalized. Flu vaccine prevents millions of illnesses and flu-related visits to the doctor each year. 2. Influenza vaccines     CDC recommends everyone 10months of age and older get vaccinated every flu season. Children 6 months through 6years of age may need 2 doses during a single flu season. Everyone else needs only 1 dose each flu season. It takes about 2 weeks for protection to develop after vaccination. There are many flu viruses, and they are always changing. Each year a new flu vaccine is made to protect against three or four viruses that are likely to cause disease in the upcoming flu season.  Even when the vaccine doesnt exactly match these viruses, it may still provide some protection. Influenza vaccine does not cause flu. Influenza vaccine may be given at the same time as other vaccines. 3. Talk with your health care provider    Tell your vaccine provider if the person getting the vaccine:   Has had an allergic reaction after a previous dose of influenza vaccine, or has any severe, life-threatening allergies.  Has ever had Guillain-Barré Syndrome (also called GBS). In some cases, your health care provider may decide to postpone influenza vaccination to a future visit. People with minor illnesses, such as a cold, may be vaccinated. People who are moderately or severely ill should usually wait until they recover before getting influenza vaccine. Your health care provider can give you more information. 4. Risks of a reaction     Soreness, redness, and swelling where shot is given, fever, muscle aches, and headache can happen after influenza vaccine.  There may be a very small increased risk of Guillain-Barré Syndrome (GBS) after inactivated influenza vaccine (the flu shot). [facility] children who get the flu shot along with pneumococcal vaccine (PCV13), and/or DTaP vaccine at the same time might be slightly more likely to have a seizure caused by fever. Tell your health care provider if a child who is getting flu vaccine has ever had a seizure. People sometimes faint after medical procedures, including vaccination. Tell your provider if you feel dizzy or have vision changes or ringing in the ears. As with any medicine, there is a very remote chance of a vaccine causing a severe allergic reaction, other serious injury, or death. 5. What if there is a serious problem? An allergic reaction could occur after the vaccinated person leaves the clinic.  If you see signs of a severe allergic reaction (hives, swelling of the face and throat, difficulty breathing, a fast heartbeat, dizziness, or weakness), call 9-1-1 and get the person to the nearest hospital.    For other signs that concern you, call your health care provider. Adverse reactions should be reported to the Vaccine Adverse Event Reporting System (VAERS). Your health care provider will usually file this report, or you can do it yourself. Visit the VAERS website at www.vaers. [facility].gov or call 9-253.946.2801. VAERS is only for reporting reactions, and VAERS staff do not give medical advice. 6. The National Vaccine Injury Compensation Program    The [facility] Vaccine Injury Compensation Program (VICP) is a federal program that was created to compensate people who may have been injured by certain vaccines. Visit the VICP website at www.[facility].gov/vaccinecompensation or call 3-381.255.6929 to learn about the program and about filing a claim. There is a time limit to file a claim for compensation. 7. How can I learn more?  Ask your health care provider.  Call your local or state health department.  Contact the Centers for Disease Control and Prevention (CDC):  - Call 4-357.415.6137 (2-160-GRH-INFO) or  - Visit CDCs influenza website at www.cdc.gov/flu    Vaccine Information Statement (Interim)  Inactivated Influenza Vaccine   8/15/2019  42 MISTY Daniel [id number]-05   Department of Health and Human Services  Centers for Disease Control and Prevention    Office Use Only      Nutrition Tips for Diabetes: After Your Visit  Your Care Instructions  A healthy diet is important to manage diabetes. It helps you lose weight (if you need to) and keep it off. It gives you the nutrition and energy your body needs and helps prevent heart disease. But a diet for diabetes does not mean that you have to eat special foods. You can eat what your family eats, including occasional sweets and other favorites. But you do have to pay attention to how often you eat and how much you eat of certain foods.  The right plan for you will give you meals that help you keep your blood sugar at healthy levels. Try to eat a variety of foods and to spread carbohydrate throughout the day. Carbohydrate raises blood sugar higher and more quickly than any other nutrient does. Carbohydrate is found in sugar, breads and cereals, fruit, starchy vegetables such as potatoes and corn, and milk and yogurt. You may want to work with a dietitian or diabetes educator to help you plan meals and snacks. A dietitian or diabetes educator also can help you lose weight if that is one of your goals. The following tips can help you enjoy your meals and stay healthy. Follow-up care is a key part of your treatment and safety. Be sure to make and go to all appointments, and call your doctor if you are having problems. Its also a good idea to know your test results and keep a list of the medicines you take. How can you care for yourself at home? · Learn which foods have carbohydrate and how much carbohydrate to eat. A dietitian or diabetes educator can help you learn to keep track of how much carbohydrate you eat. · Spread carbohydrate throughout the day. Eat some carbohydrate at all meals, but do not eat too much at any one time. · Plan meals to include food from all the food groups. These are the food groups and some example portion sizes:  ¨ Grains: 1 slice of bread (1 ounce), ½ cup of cooked cereal, and 1/3 cup of cooked pasta or rice. These have about 15 grams of carbohydrate in a serving. Choose whole grains such as whole wheat bread or crackers, oatmeal, and brown rice more often than refined grains. ¨ Fruit: 1 small fresh fruit, such as an apple or orange; ½ of a banana; ½ cup of chopped, cooked, or canned fruit; ½ cup of fruit juice; 1 cup of melon or raspberries; and 2 tablespoons of dried fruit. These have about 15 grams of carbohydrate in a serving. ¨ Dairy: 1 cup of nonfat or low-fat milk and 2/3 cup of plain yogurt. These have about 15 grams of carbohydrate in a serving.   ¨ Protein foods: Beef, chicken, turkey, fish, eggs, tofu, cheese, cottage cheese, and peanut butter. A serving size of meat is 3 ounces, which is about the size of a deck of cards. Examples of meat substitute serving sizes (equal to 1 ounce of meat) are 1/4 cup of cottage cheese, 1 egg, 1 tablespoon of peanut butter, and ½ cup of tofu. These have very little or no carbohydrate per serving. ¨ Vegetables: Starchy vegetables such as ½ cup of cooked dried beans, peas, potatoes, or corn have about 15 grams of carbohydrate. Nonstarchy vegetables have very little carbohydrate, such as 1 cup of raw leafy vegetables (such as spinach), ½ cup of other vegetables (cooked or chopped), and 3/4 cup of vegetable juice. · Use the plate format to plan meals. It is a good, quick way to make sure that you have a balanced meal. It also helps you spread carbohydrate throughout the day. You divide your plate by types of foods. Put vegetables on half the plate, meat or meat substitutes on one-quarter of the plate, and a grain or starchy vegetable (such as brown rice or a potato) in the final quarter of the plate. To this you can add a small piece of fruit and 1 cup of milk or yogurt, depending on how much carbohydrate you are supposed to eat at a meal.  · Talk to your dietitian or diabetes educator about ways to add limited amounts of sweets into your meal plan. You can eat these foods now and then, as long as you include the amount of carbohydrate they have in your daily carbohydrate allowance. · If you drink alcohol, limit it to no more than 1 drink a day for women and 2 drinks a day for men. If you are pregnant, no amount of alcohol is known to be safe. · Protein, fat, and fiber do not raise blood sugar as much as carbohydrate does. If you eat a lot of these nutrients in a meal, your blood sugar will rise more slowly than it would otherwise. · Limit saturated fats, such as those from meat and dairy products.  Try to replace it with monounsaturated fat, such as olive oil. This is a healthier choice because people who have diabetes are at higher-than-average risk of heart disease. But use a modest amount of olive oil. A tablespoon of olive oil has 14 grams of fat and 120 calories. · Exercise lowers blood sugar. If you take insulin by shots or pump, you can use less than you would if you were not exercising. Keep in mind that timing matters. If you exercise within 1 hour after a meal, your body may need less insulin for that meal than it would if you exercised 3 hours after the meal. Test your blood sugar to find out how exercise affects your need for insulin. · Exercise on most days of the week. Aim for at least 30 minutes. Exercise helps you stay at a healthy weight and helps your body use insulin. Walking is an easy way to get exercise. Gradually increase the amount you walk every day. You also may want to swim, bike, or do other activities. When you eat out  · Learn to estimate the serving sizes of foods that have carbohydrate. If you measure food at home, it will be easier to estimate the amount in a serving of restaurant food. · If the meal you order has too much carbohydrate (such as potatoes, corn, or baked beans), ask to have a low-carbohydrate food instead. Ask for a salad or green vegetables. · If you use insulin, check your blood sugar before and after eating out to help you plan how much to eat in the future. · If you eat more carbohydrate at a meal than you had planned, take a walk or do other exercise. This will help lower your blood sugar. Where can you learn more? Go to Stream [employer].be  Enter F089 in the search box to learn more about \"Nutrition Tips for Diabetes: After Your Visit. \"   © 7710-9236 [employer]wise, Incorporated. Care instructions adapted under license by [facility] (which disclaims liability or warranty for this information).  This care instruction is for use with your licensed healthcare professional. If you have questions about a medical condition or this instruction, always ask your healthcare professional. [address] any warranty or liability for your use of this information. Content Version: 52.9.047300; Current as of: June 4, 2014                 Learning About a Prolactinoma  What is a prolactinoma? A prolactinoma is a tumor on the pituitary gland that makes too much of the hormone prolactin. This type of tumor is benign, which means it's not cancer. The pituitary (say \"tsf-EHH-bi-tair-ee\") gland--at the base of the brain--makes prolactin. After a woman is pregnant, this hormone causes the breasts to make milk. But a prolactinoma also makes prolactin. This means that your body can have too much of the hormone. It's not normal for women who aren't pregnant or nursing to have a high level of prolactin. For both men and women, too much of this hormone can make the breasts produce milk. It also can cause low sex drive and infertility. What are the symptoms? You might not have any symptoms from a prolactinoma. But in some men and women, their breasts may produce milk. In women, an increase in prolactin can lower the level of estrogen. That can cause infertility, menstrual changes, and less desire to have sex. In men, it can lower the level of testosterone. That can cause erection problems and less desire to have sex. The tumor may cause a headache. And sometimes the tumor presses on the optic nerves, which are near the pituitary gland. This might cause vision problems. How is it diagnosed? A blood test will show if you have too much prolactin in your blood. Your doctor also may do an MRI test. It can show if you have the tumor and how big it is. How is it treated? Sometimes, no treatment is needed. If you have symptoms, your doctor may treat you with dopamine agonists. This medicine can shrink the tumor.  It also may bring the level of prolactin back to normal. You may need to take this medicine for 2 years or more. During and after treatment, you will get routine tests to check your hormone levels. In some cases, surgery is done to remove the tumor. This may happen if you can't take the medicine or it doesn't work. Surgery also could be done if the tumor grows or causes problems like headaches or vision problems. Follow-up care is a key part of your treatment and safety. Be sure to make and go to all appointments, and call your doctor if you are having problems. It's also a good idea to know your test results and keep a list of the medicines you take. Where can you learn more? Go to http://killian-heather.info/. Enter P110 in the search box to learn more about \"Learning About a Prolactinoma. \"  Current as of: December 19, 2018  Content Version: 12.2  © 8861-9037 [employer]. Care instructions adapted under license by [facility] (which disclaims liability or warranty for this information). If you have questions about a medical condition or this instruction, always ask your healthcare professional. [address] any warranty or liability for your use of this information.

## 2019-10-04 NOTE — PROGRESS NOTES
1. Have you been to the ER, urgent care clinic since your last visit? Hospitalized since your last visit? No    2. Have you seen or consulted any other health care providers outside of the [address] [place] [address] since your last visit? Include any pap smears or colon screening.  Dermatology - [facility] LOV: 9/19/19    Chief Complaint   Patient presents with    Hypertension    Diabetes    GERD    Other     multiple site pain    Medication Evaluation     wants to discuss Losartan since recent recall of medication      Dm foot exam - not completed

## 2019-10-04 NOTE — PROGRESS NOTES
Flulaval 0.5 ml given IM in right deltoid. Lot # [id number], exp date 06/25/2020. Patient tolerated injection well. No adverse reaction noted.

## 2019-10-05 LAB — PROLACTIN,PROL: 20.7 NG/ML (ref 4.8–23.3)

## 2019-10-07 DIAGNOSIS — D35.2 PITUITARY MICROADENOMA (HCC): Primary | ICD-10-CM

## 2019-10-07 NOTE — PROGRESS NOTES
Sending to endocrinology as prolactin is higher in normal range and pending MRI. Need to have endo in put. Thanks.

## 2019-10-07 NOTE — PROGRESS NOTES
Sent pt to ortho. Please obtain sooner appt. Let pt know that chronic arthritis and some calcification at the joint and will take ortho input.

## 2019-10-08 NOTE — PROGRESS NOTES
Contacted patient and verified identity using name and date of birth (2- identifiers)  Spoke with patient and she verbalized understanding of need for ortho appointment for chronic arthritis and calcifications at the joint. Has appt with Dr. Megan Summers 10/11/19 @10:30 [address] [place] [address] office.

## 2019-10-08 NOTE — PROGRESS NOTES
Contacted patient and verified identity using name and date of birth (2- identifiers)  Spoke with patient and she verbalized understanding of improvement in DM testing.  Further discussion at follow-up visit

## 2019-10-08 NOTE — PROGRESS NOTES
Contacted patient and verified identity using name and date of birth (2- identifiers)  SPoke with patient and she verbalized understanding of need for endocrine evaluation due to higher than normal prolactin levels and has pending MRI.

## 2019-10-11 ENCOUNTER — OFFICE VISIT (OUTPATIENT)
Dept: ORTHOPEDIC SURGERY | Age: 59
End: 2019-10-11

## 2019-10-11 VITALS
OXYGEN SATURATION: 97 % | BODY MASS INDEX: 35.16 KG/M2 | TEMPERATURE: 97.1 F | WEIGHT: 186.2 LBS | SYSTOLIC BLOOD PRESSURE: 130 MMHG | DIASTOLIC BLOOD PRESSURE: 73 MMHG | RESPIRATION RATE: 16 BRPM | HEIGHT: 61 IN | HEART RATE: 62 BPM

## 2019-10-11 DIAGNOSIS — M70.61 TROCHANTERIC BURSITIS, RIGHT HIP: Primary | ICD-10-CM

## 2019-10-11 DIAGNOSIS — M25.552 LEFT HIP PAIN: ICD-10-CM

## 2019-10-11 NOTE — PROGRESS NOTES
1. Have you been to the ER, urgent care clinic since your last visit? Hospitalized since your last visit? No    2. Have you seen or consulted any other health care providers outside of the [address] [place] [address] since your last visit? Include any pap smears or colon screening.  No

## 2019-10-11 NOTE — PROGRESS NOTES
Patient: David Sweeney                MRN: [id number]       SSN: [id number]  YOB: 1960        AGE: 64 y.o. SEX: female  Body mass index is 35.18 kg/m². PCP: Robert Garcia MD  10/11/19    HISTORY:  I had the pleasure of reviewing the patient today. She remembers injuring her hip about 5 years ago after the gym, on the left hip, laterally based, causing some weakness for about a month or so. She mainly recovered. She has been having pain off and on for the last 6 months or so on the left hip. It hurts to roll over on it at night. Not too much in the way of groin pain. Her back is reasonably quiescent although she has had some back problems in the past.  She denies fevers or chills. No long term steroid use or heavy alcohol consumption. Otherwise she has been feeling well. In fact, a 12-point review of systems was performed today. Pertinent positives noted. All other systems were reviewed and are essentially negative. PHYSICAL EXAMINATION:  On the examination today, she gets up a little bit slowly but her gait pattern is normal.  She walks well without antalgic or Trendelenburg gait. Both hips actually rotate fairly well. She has some tenderness over the trochanter. She has tenderness actually more proximally in the insertion of the tendons. With her permission, the ischial tuberosity was not particularly tender. A detailed neurological exam in both lower extremities is essentially normal.  No foot drop. Straight leg raise is negative. No cyanosis, peripheral edema or clubbing . RADIOGRAPHS:  Review of the x-rays, she has had some trauma to the trochanter. It is slightly ratty looking and perhaps mild heterotopic ossification or myositis ossificans associated with this. The joint itself actually looks quite good with only mild arthritis. IMPRESSION:  My overall impression is previous traumatized abductor mechanism.        PLAN:  I would like to get a better look at the tendons and the insertion prior to starting any therapy or treatment for her. She may benefit from an injection and/or therapy at some point, but I would like to get an MRI first to really have a look at the tendon. CC:  Amalia Chen MD               REVIEW OF SYSTEMS:      CON: negative for weight loss, fever  EYE: negative for double vision  ENT: negative for hoarseness  RS:   negative for Tb  GI:    negative for blood in stool  :  negative for blood in urine  Other systems reviewed and noted below. Past Medical History:   Diagnosis Date    AR (allergic rhinitis)     Arthritis     Headache     Heart murmur     History of blood transfusion     Hypertension     Hypovitaminosis D 3/3/2014       Family History   Problem Relation Age of Onset    Hypertension Mother     Depression Mother     Hypertension Father     Headache Father     Diabetes Father    Nona Acrsantosh Arthritis-rheumatoid Father     Heart Attack Father     Heart Failure Father     Hypertension Sister     Diabetes Sister     Cancer Sister 47        breast cancer    Heart Failure Sister     Arthritis Sister     Hypertension Brother     Attention Deficit Disorder Son     Asthma Granddaughter        Current Outpatient Medications   Medication Sig Dispense Refill    naproxen (NAPROSYN) 500 mg tablet Take 1 Tab by mouth two (2) times daily as needed (for joint pain). 60 Tab 1    mometasone (ELOCON) 0.1 % topical cream APPLY 1 APPLICATION BID TO AFFECTED AREAS OF THE HEAD AND NECK  2    OTHER Rx scalp oil - Fluocinolone Acetonide Body Oil      telmisartan-hydroCHLOROthiazide (MICARDIS HCT) 80-25 mg per tablet Take 1 Tab by mouth daily. 90 Tab 1    fluticasone propionate (FLONASE) 50 mcg/actuation nasal spray USE 2 SPRAYS IN EACH NOSTRIL DAILY AS NEEDED FOR RHINITIS 1 Bottle 1    cetirizine (ZYRTEC) 5 mg tablet Take 1 Tab by mouth daily as needed for Allergies.  30 Tab 0    amLODIPine (NORVASC) 5 mg tablet Take 1 Tab by mouth daily. 90 Tab 1    raNITIdine (ZANTAC) 150 mg tablet Take 1 Tab by mouth two (2) times a day. 60 Tab 1    ergocalciferol (VITAMIN D2) 50,000 unit capsule Take 1 Cap by mouth every seven (7) days. 8 Cap 3    SEBEX 2-2 % shampoo USE SHAMPOO WEEKLY AS DIRECTED  5       No Known Allergies    Past Surgical History:   Procedure Laterality Date    HX  SECTION      HX COLONOSCOPY  2012    normal per patient (by Dr. King Stein)   [facility]ha  HYSTERECTOMY      s/p vaginal hysterectomy        Social History     Socioeconomic History    Marital status:      Spouse name: Not on file    Number of children: Not on file    Years of education: Not on file    Highest education level: Not on file   Occupational History    Not on file   Social Needs    Financial resource strain: Not on file    Food insecurity:     Worry: Not on file     Inability: Not on file    Transportation needs:     Medical: Not on file     Non-medical: Not on file   Tobacco Use    Smoking status: Never Smoker    Smokeless tobacco: Never Used   Substance and Sexual Activity    Alcohol use:  Yes     Alcohol/week: 0.8 standard drinks     Types: 1 Standard drinks or equivalent per week     Frequency: Monthly or less     Drinks per session: 1 or 2     Binge frequency: Never    Drug use: No    Sexual activity: Yes     Partners: Male     Birth control/protection: Surgical     Comment: hysterectomy   Lifestyle    Physical activity:     Days per week: Not on file     Minutes per session: Not on file    Stress: Not on file   Relationships    Social connections:     Talks on phone: Not on file     Gets together: Not on file     Attends Sabianism service: Not on file     Active member of club or organization: Not on file     Attends meetings of clubs or organizations: Not on file     Relationship status: Not on file    Intimate partner violence:     Fear of current or ex partner: Not on file     Emotionally abused: Not on file     Physically abused: Not on file     Forced sexual activity: Not on file   Other Topics Concern    Not on file   Social History Narrative    Not on file       Visit Vitals  /73   Pulse 62   Temp 97.1 °F (36.2 °C) (Oral)   Resp 16   Ht 5' 1\" (1.549 m)   Wt 186 lb 3.2 oz (84.5 kg)   SpO2 97%   BMI 35.18 kg/m²         PHYSICAL EXAMINATION:  GENERAL: Alert and oriented x3, in no acute distress, well-developed, well-nourished, afebrile. HEART: No JVD. EYES: No scleral icterus   NECK: No significant lymphadenopathy   LUNGS: No respiratory compromise or indrawing  ABDOMEN: Soft, non-tender, non-distended. Electronically signed by:  Alexis Bowens MD

## 2019-10-24 ENCOUNTER — HOSPITAL ENCOUNTER (OUTPATIENT)
Age: 59
Discharge: HOME | End: 2019-10-24
Attending: ORTHOPAEDIC SURGERY
Payer: COMMERCIAL

## 2019-10-24 ENCOUNTER — HOSPITAL ENCOUNTER (OUTPATIENT)
Age: 59
Discharge: HOME | End: 2019-10-24
Attending: FAMILY MEDICINE
Payer: COMMERCIAL

## 2019-10-24 DIAGNOSIS — M25.552 LEFT HIP PAIN: ICD-10-CM

## 2019-10-24 DIAGNOSIS — D35.2 PITUITARY MICROADENOMA (HCC): ICD-10-CM

## 2019-10-24 LAB — CREAT UR-MCNC: 0.9 MG/DL (ref 0.6–1.3)

## 2019-10-24 PROCEDURE — 70553 MRI BRAIN STEM W/O & W/DYE: CPT

## 2019-10-24 PROCEDURE — 74011250636 HC RX REV CODE- 250/636: Performed by: FAMILY MEDICINE

## 2019-10-24 PROCEDURE — A9577 INJ MULTIHANCE: HCPCS | Performed by: FAMILY MEDICINE

## 2019-10-24 PROCEDURE — 82565 ASSAY OF CREATININE: CPT

## 2019-10-24 PROCEDURE — 73721 MRI JNT OF LWR EXTRE W/O DYE: CPT

## 2019-10-24 RX ADMIN — GADOBENATE DIMEGLUMINE 8.5 ML: 529 INJECTION, SOLUTION INTRAVENOUS at 19:00

## 2019-10-31 NOTE — PROGRESS NOTES
Contacted patient and verified identity using name and date of birth (2- identifiers)  Spoke with patient and she verbalized understanding of no pituitary adenoma per MRI. Further discussion at follow-up.

## 2019-11-01 ENCOUNTER — OFFICE VISIT (OUTPATIENT)
Dept: ORTHOPEDIC SURGERY | Age: 59
End: 2019-11-01

## 2019-11-01 VITALS
DIASTOLIC BLOOD PRESSURE: 75 MMHG | WEIGHT: 190.8 LBS | HEIGHT: 61 IN | SYSTOLIC BLOOD PRESSURE: 147 MMHG | HEART RATE: 58 BPM | BODY MASS INDEX: 36.02 KG/M2

## 2019-11-01 DIAGNOSIS — M70.62 TROCHANTERIC BURSITIS OF LEFT HIP: Primary | ICD-10-CM

## 2019-11-01 DIAGNOSIS — S76.011A TEAR OF RIGHT GLUTEUS MEDIUS TENDON: ICD-10-CM

## 2019-11-01 DIAGNOSIS — M19.90 INFLAMMATORY ARTHROPATHY: ICD-10-CM

## 2019-11-01 DIAGNOSIS — M16.12 PRIMARY OSTEOARTHRITIS OF LEFT HIP: ICD-10-CM

## 2019-11-01 RX ORDER — BETAMETHASONE SODIUM PHOSPHATE AND BETAMETHASONE ACETATE 3; 3 MG/ML; MG/ML
6 INJECTION, SUSPENSION INTRA-ARTICULAR; INTRALESIONAL; INTRAMUSCULAR; SOFT TISSUE ONCE
Qty: 1 ML | Refills: 0
Start: 2019-11-01 | End: 2019-11-01

## 2019-11-01 NOTE — PROGRESS NOTES
Patient: Connee Nyhan                MRN: [id number]       SSN: [id number] YOB: 1960        AGE: 64 y.o. SEX: female Body mass index is 36.05 kg/m². PCP: Marisa Santacruz MD 
11/01/19 Dictation on: 11/01/2019 12:06 PM by: Annie Reveles [[id number]] REVIEW OF SYSTEMS:   
 
CON: negative for weight loss, fever EYE: negative for double vision ENT: negative for hoarseness RS:   negative for Tb 
GI:    negative for blood in stool :  negative for blood in urine Other systems reviewed and noted below. Past Medical History:  
Diagnosis Date  AR (allergic rhinitis)  Arthritis  Diabetes ([address]  Headache   
 Heart murmur  History of blood transfusion  Hypertension  Hypovitaminosis D 3/3/2014 Family History Problem Relation Age of Onset  Hypertension Mother  Depression Mother  Hypertension Father  Headache Father  Diabetes Father  Arthritis-rheumatoid Father  Heart Attack Father  Heart Failure Father  Hypertension Sister  Diabetes Sister  Cancer Sister 47  
     breast cancer  Heart Failure Sister  Arthritis Sister  Hypertension Brother  Attention Deficit Disorder Son  Asthma Granddaughter Current Outpatient Medications Medication Sig Dispense Refill  betamethasone (CELESTONE SOLUSPAN) 6 mg/mL injection 1 mL by Intra artICUlar route once for 1 dose. 1 mL 0  
 naproxen (NAPROSYN) 500 mg tablet Take 1 Tab by mouth two (2) times daily as needed (for joint pain). 60 Tab 1  
 mometasone (ELOCON) 0.1 % topical cream APPLY 1 APPLICATION BID TO AFFECTED AREAS OF THE HEAD AND NECK  2  
 OTHER Rx scalp oil - Fluocinolone Acetonide Body Oil  telmisartan-hydroCHLOROthiazide (MICARDIS HCT) 80-25 mg per tablet Take 1 Tab by mouth daily.  90 Tab 1  
 fluticasone propionate (FLONASE) 50 mcg/actuation nasal spray USE 2 SPRAYS IN EACH NOSTRIL DAILY AS NEEDED FOR RHINITIS 1 Bottle 1  
 cetirizine (ZYRTEC) 5 mg tablet Take 1 Tab by mouth daily as needed for Allergies. 30 Tab 0  
 amLODIPine (NORVASC) 5 mg tablet Take 1 Tab by mouth daily. 90 Tab 1  
 SEBEX 2-2 % shampoo USE SHAMPOO WEEKLY AS DIRECTED  5  
 raNITIdine (ZANTAC) 150 mg tablet Take 1 Tab by mouth two (2) times a day. 60 Tab 1  
 ergocalciferol (VITAMIN D2) 50,000 unit capsule Take 1 Cap by mouth every seven (7) days. 8 Cap 3 No Known Allergies Past Surgical History:  
Procedure Laterality Date [address]  HX COLONOSCOPY  2012  
 normal per patient (by Dr. Otho Boeck)  HX HYSTERECTOMY  2006  
 s/p vaginal hysterectomy Social History Socioeconomic History  Marital status:  Spouse name: Not on file  Number of children: Not on file  Years of education: Not on file  Highest education level: Not on file Occupational History  Not on file Social Needs  Financial resource strain: Not on file  Food insecurity:  
  Worry: Not on file Inability: Not on file  Transportation needs:  
  Medical: Not on file Non-medical: Not on file Tobacco Use  Smoking status: Never Smoker  Smokeless tobacco: Never Used Substance and Sexual Activity  Alcohol use: Yes Alcohol/week: 0.8 standard drinks Types: 1 Standard drinks or equivalent per week Frequency: Monthly or less Drinks per session: 1 or 2 Binge frequency: Never  Drug use: No  
 Sexual activity: Yes  
  Partners: Male Birth control/protection: Surgical  
  Comment: hysterectomy Lifestyle  Physical activity:  
  Days per week: Not on file Minutes per session: Not on file  Stress: Not on file Relationships  Social connections:  
  Talks on phone: Not on file Gets together: Not on file Attends Church service: Not on file Active member of club or organization: Not on file Attends meetings of clubs or organizations: Not on file Relationship status: Not on file  Intimate partner violence:  
  Fear of current or ex partner: Not on file Emotionally abused: Not on file Physically abused: Not on file Forced sexual activity: Not on file Other Topics Concern  Not on file Social History Narrative  Not on file Visit Vitals /75 Pulse (!) 58 Ht 5' 1\" (1.549 m) Wt 190 lb 12.8 oz (86.5 kg) BMI 36.05 kg/m² PHYSICAL EXAMINATION: 
GENERAL: Alert and oriented x3, in no acute distress, well-developed, well-nourished, afebrile. HEART: No JVD. EYES: No scleral icterus NECK: No significant lymphadenopathy LUNGS: No respiratory compromise or indrawing ABDOMEN: Soft, non-tender, non-distended. Electronically signed by:  Jason Cordova MD

## 2019-11-01 NOTE — Clinical Note
Patient: Eduarda Viramontes                MRN: [id number]       SSN: [id number] YOB: 1960        AGE: 64 y.o. SEX: female Body mass index is 36.05 kg/m². PCP: Nidia Lew MD 
11/01/19 HISTORY:  I had the pleasure of reviewing the patient today. She is a very nice lady, and is a very understanding lady as we ran late today. Having said this, she had trauma to her hip a number of years ago and reports moderate, laterally based pain. 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 We did discuss the role of surgery and it is possible to debride this area and re-attach it, but the results are certainly not one hundred percent, and sometimes the scar tissue can be significant as well, associated with this. Therefore, I favor nonoperative measures at the hospital.  She is otherwise a very healthy lady and denies fevers, chills, night sweats or weight loss. A 12-point review of systems was performed today and pertinent positives were noted. All other systems were reviewed and were negative. PHYSICAL EXAMINATION:  On examination today, she actually walks quite well. There is really no antalgic or Trendelenburg gait. Both feet are warm and well perfused. The hip rotates fairly well, just a touch of stiffness on both hips with internal rotation but not severely so. IMAGING:  Review of her MRI confirms a moderate tear with bursitis and heterotopic ossifications associated with this in the tendon. RECOMMENDATIONS:  We are going to try with an injection and see how she gets along with it. That would be my number one recommendation initially, is nonoperative measures. It is possible to do surgery for this but we would consider that as a last resort.    
 
PROCEDURE:  Under aseptic conditions, after informed written consent with timeout, the left trochanteric bursa was injected with 1 mL Celestone preparation, 6 mg, well tolerated. PLAN:  She is going to return to see us in about 6 weeks' time. Hopefully this will be quite efficacious for her. CC:  Scar Deluca MD 
 
 
 
 
 
 
 
 
REVIEW OF SYSTEMS:   
 
CON: negative for weight loss, fever EYE: negative for double vision ENT: negative for hoarseness RS:   negative for Tb 
GI:    negative for blood in stool :  negative for blood in urine Other systems reviewed and noted below. Past Medical History:  
Diagnosis Date  AR (allergic rhinitis)  Arthritis  Diabetes ([address]  Headache   
 Heart murmur  History of blood transfusion  Hypertension  Hypovitaminosis D 3/3/2014 Family History Problem Relation Age of Onset  Hypertension Mother  Depression Mother  Hypertension Father  Headache Father  Diabetes Father  Arthritis-rheumatoid Father  Heart Attack Father  Heart Failure Father  Hypertension Sister  Diabetes Sister  Cancer Sister 47  
     breast cancer  Heart Failure Sister  Arthritis Sister  Hypertension Brother  Attention Deficit Disorder Son  Asthma Granddaughter Current Outpatient Medications Medication Sig Dispense Refill  betamethasone (CELESTONE SOLUSPAN) 6 mg/mL injection 1 mL by Intra artICUlar route once for 1 dose. 1 mL 0  
 naproxen (NAPROSYN) 500 mg tablet Take 1 Tab by mouth two (2) times daily as needed (for joint pain). 60 Tab 1  
 mometasone (ELOCON) 0.1 % topical cream APPLY 1 APPLICATION BID TO AFFECTED AREAS OF THE HEAD AND NECK  2  
 OTHER Rx scalp oil - Fluocinolone Acetonide Body Oil  telmisartan-hydroCHLOROthiazide (MICARDIS HCT) 80-25 mg per tablet Take 1 Tab by mouth daily.  90 Tab 1  
 fluticasone propionate (FLONASE) 50 mcg/actuation nasal spray USE 2 SPRAYS IN EACH NOSTRIL DAILY AS NEEDED FOR RHINITIS 1 Bottle 1  
  cetirizine (ZYRTEC) 5 mg tablet Take 1 Tab by mouth daily as needed for Allergies. 30 Tab 0  
 amLODIPine (NORVASC) 5 mg tablet Take 1 Tab by mouth daily. 90 Tab 1  
 SEBEX 2-2 % shampoo USE SHAMPOO WEEKLY AS DIRECTED  5  
 raNITIdine (ZANTAC) 150 mg tablet Take 1 Tab by mouth two (2) times a day. 60 Tab 1  
 ergocalciferol (VITAMIN D2) 50,000 unit capsule Take 1 Cap by mouth every seven (7) days. 8 Cap 3 No Known Allergies Past Surgical History:  
Procedure Laterality Date [address]  HX COLONOSCOPY  2012  
 normal per patient (by Dr. Suzanne Mendosa)  HX HYSTERECTOMY  2006  
 s/p vaginal hysterectomy Social History Socioeconomic History  Marital status:  Spouse name: Not on file  Number of children: Not on file  Years of education: Not on file  Highest education level: Not on file Occupational History  Not on file Social Needs  Financial resource strain: Not on file  Food insecurity:  
  Worry: Not on file Inability: Not on file  Transportation needs:  
  Medical: Not on file Non-medical: Not on file Tobacco Use  Smoking status: Never Smoker  Smokeless tobacco: Never Used Substance and Sexual Activity  Alcohol use: Yes Alcohol/week: 0.8 standard drinks Types: 1 Standard drinks or equivalent per week Frequency: Monthly or less Drinks per session: 1 or 2 Binge frequency: Never  Drug use: No  
 Sexual activity: Yes  
  Partners: Male Birth control/protection: Surgical  
  Comment: hysterectomy Lifestyle  Physical activity:  
  Days per week: Not on file Minutes per session: Not on file  Stress: Not on file Relationships  Social connections:  
  Talks on phone: Not on file Gets together: Not on file Attends Orthodoxy service: Not on file Active member of club or organization: Not on file Attends meetings of clubs or organizations: Not on file Relationship status: Not on file  Intimate partner violence:  
  Fear of current or ex partner: Not on file Emotionally abused: Not on file Physically abused: Not on file Forced sexual activity: Not on file Other Topics Concern  Not on file Social History Narrative  Not on file Visit Vitals /75 Pulse (!) 58 Ht 5' 1\" (1.549 m) Wt 190 lb 12.8 oz (86.5 kg) BMI 36.05 kg/m² PHYSICAL EXAMINATION: 
GENERAL: Alert and oriented x3, in no acute distress, well-developed, well-nourished, afebrile. HEART: No JVD. EYES: No scleral icterus NECK: No significant lymphadenopathy LUNGS: No respiratory compromise or indrawing ABDOMEN: Soft, non-tender, non-distended. Electronically signed by:  Margareth Kinsey MD

## 2019-11-03 NOTE — PROGRESS NOTES
HISTORY:  I had the pleasure of reviewing the patient today. She is a very nice lady, and is a very understanding lady as we ran late today. Having said this, she had trauma to her hip a number of years ago and reports moderate, laterally based pain. 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 We did discuss the role of surgery and it is possible to debride this area and re-attach it, but the results are certainly not one hundred percent, and sometimes the scar tissue can be significant as well, associated with this. Therefore, I favor nonoperative measures at the hospital.  She is otherwise a very healthy lady and denies fevers, chills, night sweats or weight loss. A 12-point review of systems was performed today and pertinent positives were noted. All other systems were reviewed and were negative. PHYSICAL EXAMINATION:  On examination today, she actually walks quite well. There is really no antalgic or Trendelenburg gait. Both feet are warm and well perfused. The hip rotates fairly well, just a touch of stiffness on both hips with internal rotation but not severely so. IMAGING:  Review of her MRI confirms a moderate tear with bursitis and heterotopic ossifications associated with this in the tendon. RECOMMENDATIONS:  We are going to try with an injection and see how she gets along with it. That would be my number one recommendation initially, is nonoperative measures. It is possible to do surgery for this but we would consider that as a last resort. PROCEDURE:  Under aseptic conditions, after informed written consent with timeout, the left trochanteric bursa was injected with 1 mL Celestone preparation, 6 mg, well tolerated. PLAN:  She is going to return to see us in about 6 weeks' time. Hopefully this will be quite efficacious for her.    
 
CC:  Drew Quiles MD

## 2019-11-06 DIAGNOSIS — R52 PAIN OF MULTIPLE SITES: ICD-10-CM

## 2019-11-06 RX ORDER — NAPROXEN 500 MG/1
500 TABLET ORAL
Qty: 60 TAB | Refills: 1 | Status: SHIPPED | OUTPATIENT
Start: 2019-11-06 | End: 2019-12-12 | Stop reason: SDUPTHER

## 2019-11-06 NOTE — TELEPHONE ENCOUNTER
This pharmacy faxed over request for the following prescriptions to be filled:    Medication requested :   Requested Prescriptions     Pending Prescriptions Disp Refills    naproxen (NAPROSYN) 500 mg tablet 60 Tab 1     Sig: Take 1 Tab by mouth two (2) times daily as needed (for joint pain).      PCP: Wilfrido Terrazas or Print: Megan  Mail order or Local pharmacy [phone]    Scheduled appointment if not seen by current providers in office: Hernán DENG:[id number]

## 2019-11-25 ENCOUNTER — OFFICE VISIT (OUTPATIENT)
Dept: FAMILY MEDICINE CLINIC | Age: 59
End: 2019-11-25

## 2019-11-25 VITALS
BODY MASS INDEX: 35.87 KG/M2 | WEIGHT: 190 LBS | HEART RATE: 65 BPM | RESPIRATION RATE: 16 BRPM | OXYGEN SATURATION: 97 % | SYSTOLIC BLOOD PRESSURE: 132 MMHG | HEIGHT: 61 IN | DIASTOLIC BLOOD PRESSURE: 82 MMHG | TEMPERATURE: 99 F

## 2019-11-25 DIAGNOSIS — M25.552 PAIN OF LEFT HIP JOINT: ICD-10-CM

## 2019-11-25 DIAGNOSIS — I10 ESSENTIAL HYPERTENSION: ICD-10-CM

## 2019-11-25 DIAGNOSIS — D35.2 PITUITARY MICROADENOMA (HCC): Primary | ICD-10-CM

## 2019-11-25 DIAGNOSIS — E11.9 WELL CONTROLLED DIABETES MELLITUS (HCC): ICD-10-CM

## 2019-11-25 NOTE — PATIENT INSTRUCTIONS
Nutrition Tips for Diabetes: After Your Visit  Your Care Instructions  A healthy diet is important to manage diabetes. It helps you lose weight (if you need to) and keep it off. It gives you the nutrition and energy your body needs and helps prevent heart disease. But a diet for diabetes does not mean that you have to eat special foods. You can eat what your family eats, including occasional sweets and other favorites. But you do have to pay attention to how often you eat and how much you eat of certain foods. The right plan for you will give you meals that help you keep your blood sugar at healthy levels. Try to eat a variety of foods and to spread carbohydrate throughout the day. Carbohydrate raises blood sugar higher and more quickly than any other nutrient does. Carbohydrate is found in sugar, breads and cereals, fruit, starchy vegetables such as potatoes and corn, and milk and yogurt. You may want to work with a dietitian or diabetes educator to help you plan meals and snacks. A dietitian or diabetes educator also can help you lose weight if that is one of your goals. The following tips can help you enjoy your meals and stay healthy. Follow-up care is a key part of your treatment and safety. Be sure to make and go to all appointments, and call your doctor if you are having problems. Its also a good idea to know your test results and keep a list of the medicines you take. How can you care for yourself at home? · Learn which foods have carbohydrate and how much carbohydrate to eat. A dietitian or diabetes educator can help you learn to keep track of how much carbohydrate you eat. · Spread carbohydrate throughout the day. Eat some carbohydrate at all meals, but do not eat too much at any one time. · Plan meals to include food from all the food groups.  These are the food groups and some example portion sizes:  ¨ Grains: 1 slice of bread (1 ounce), ½ cup of cooked cereal, and 1/3 cup of cooked pasta or rice. These have about 15 grams of carbohydrate in a serving. Choose whole grains such as whole wheat bread or crackers, oatmeal, and brown rice more often than refined grains. ¨ Fruit: 1 small fresh fruit, such as an apple or orange; ½ of a banana; ½ cup of chopped, cooked, or canned fruit; ½ cup of fruit juice; 1 cup of melon or raspberries; and 2 tablespoons of dried fruit. These have about 15 grams of carbohydrate in a serving. ¨ Dairy: 1 cup of nonfat or low-fat milk and 2/3 cup of plain yogurt. These have about 15 grams of carbohydrate in a serving. ¨ Protein foods: Beef, chicken, turkey, fish, eggs, tofu, cheese, cottage cheese, and peanut butter. A serving size of meat is 3 ounces, which is about the size of a deck of cards. Examples of meat substitute serving sizes (equal to 1 ounce of meat) are 1/4 cup of cottage cheese, 1 egg, 1 tablespoon of peanut butter, and ½ cup of tofu. These have very little or no carbohydrate per serving. ¨ Vegetables: Starchy vegetables such as ½ cup of cooked dried beans, peas, potatoes, or corn have about 15 grams of carbohydrate. Nonstarchy vegetables have very little carbohydrate, such as 1 cup of raw leafy vegetables (such as spinach), ½ cup of other vegetables (cooked or chopped), and 3/4 cup of vegetable juice. · Use the plate format to plan meals. It is a good, quick way to make sure that you have a balanced meal. It also helps you spread carbohydrate throughout the day. You divide your plate by types of foods. Put vegetables on half the plate, meat or meat substitutes on one-quarter of the plate, and a grain or starchy vegetable (such as brown rice or a potato) in the final quarter of the plate. To this you can add a small piece of fruit and 1 cup of milk or yogurt, depending on how much carbohydrate you are supposed to eat at a meal.  · Talk to your dietitian or diabetes educator about ways to add limited amounts of sweets into your meal plan.  You can eat these foods now and then, as long as you include the amount of carbohydrate they have in your daily carbohydrate allowance. · If you drink alcohol, limit it to no more than 1 drink a day for women and 2 drinks a day for men. If you are pregnant, no amount of alcohol is known to be safe. · Protein, fat, and fiber do not raise blood sugar as much as carbohydrate does. If you eat a lot of these nutrients in a meal, your blood sugar will rise more slowly than it would otherwise. · Limit saturated fats, such as those from meat and dairy products. Try to replace it with monounsaturated fat, such as olive oil. This is a healthier choice because people who have diabetes are at higher-than-average risk of heart disease. But use a modest amount of olive oil. A tablespoon of olive oil has 14 grams of fat and 120 calories. · Exercise lowers blood sugar. If you take insulin by shots or pump, you can use less than you would if you were not exercising. Keep in mind that timing matters. If you exercise within 1 hour after a meal, your body may need less insulin for that meal than it would if you exercised 3 hours after the meal. Test your blood sugar to find out how exercise affects your need for insulin. · Exercise on most days of the week. Aim for at least 30 minutes. Exercise helps you stay at a healthy weight and helps your body use insulin. Walking is an easy way to get exercise. Gradually increase the amount you walk every day. You also may want to swim, bike, or do other activities. When you eat out  · Learn to estimate the serving sizes of foods that have carbohydrate. If you measure food at home, it will be easier to estimate the amount in a serving of restaurant food. · If the meal you order has too much carbohydrate (such as potatoes, corn, or baked beans), ask to have a low-carbohydrate food instead. Ask for a salad or green vegetables.   · If you use insulin, check your blood sugar before and after eating out to help you plan how much to eat in the future. · If you eat more carbohydrate at a meal than you had planned, take a walk or do other exercise. This will help lower your blood sugar. Where can you learn more? Go to Double Encore.be  Enter S626 in the search box to learn more about \"Nutrition Tips for Diabetes: After Your Visit. \"   © 1288-8003 Health[employer], [employer]. Care instructions adapted under license by [facility] (which disclaims liability or warranty for this information). This care instruction is for use with your licensed healthcare professional. If you have questions about a medical condition or this instruction, always ask your healthcare professional. [address] any warranty or liability for your use of this information. Content Version: 79.3.685356; Current as of: June 4, 2014                 Low Sodium Diet (2,000 Milligram): Care Instructions  Your Care Instructions    Too much sodium causes your body to hold on to extra water. This can raise your blood pressure and force your heart and kidneys to work harder. In very serious cases, this could cause you to be put in the hospital. It might even be life-threatening. By limiting sodium, you will feel better and lower your risk of serious problems. The most common source of sodium is salt. People get most of the salt in their diet from canned, prepared, and packaged foods. Fast food and restaurant meals also are very high in sodium. Your doctor will probably limit your sodium to less than 2,000 milligrams (mg) a day. This limit counts all the sodium in prepared and packaged foods and any salt you add to your food. Follow-up care is a key part of your treatment and safety. Be sure to make and go to all appointments, and call your doctor if you are having problems. It's also a good idea to know your test results and keep a list of the medicines you take. How can you care for yourself at home?   Read food labels  · Read labels on cans and food packages. The labels tell you how much sodium is in each serving. Make sure that you look at the serving size. If you eat more than the serving size, you have eaten more sodium. · Food labels also tell you the Percent Daily Value for sodium. Choose products with low Percent Daily Values for sodium. · Be aware that sodium can come in forms other than salt, including monosodium glutamate (MSG), sodium citrate, and sodium bicarbonate (baking soda). MSG is often added to Asian food. When you eat out, you can sometimes ask for food without MSG or added salt. Buy low-sodium foods  · Buy foods that are labeled \"unsalted\" (no salt added), \"sodium-free\" (less than 5 mg of sodium per serving), or \"low-sodium\" (less than 140 mg of sodium per serving). Foods labeled \"reduced-sodium\" and \"light sodium\" may still have too much sodium. Be sure to read the label to see how much sodium you are getting. · Buy fresh vegetables, or frozen vegetables without added sauces. Buy low-sodium versions of canned vegetables, soups, and other canned goods. Prepare low-sodium meals  · Cut back on the amount of salt you use in cooking. This will help you adjust to the taste. Do not add salt after cooking. One teaspoon of salt has about 2,300 mg of sodium. · Take the salt shaker off the table. · Flavor your food with garlic, lemon juice, onion, vinegar, herbs, and spices. Do not use soy sauce, lite soy sauce, steak sauce, onion salt, garlic salt, celery salt, mustard, or ketchup on your food. · Use low-sodium salad dressings, sauces, and ketchup. Or make your own salad dressings and sauces without adding salt. · Use less salt (or none) when recipes call for it. You can often use half the salt a recipe calls for without losing flavor. Other foods such as rice, pasta, and grains do not need added salt. · Rinse canned vegetables, and cook them in fresh water.  This removes somebut not allof the salt.  · Avoid water that is naturally high in sodium or that has been treated with water softeners, which add sodium. Call your local water company to find out the sodium content of your water supply. If you buy bottled water, read the label and choose a sodium-free brand. Avoid high-sodium foods  · Avoid eating:  ? Smoked, cured, salted, and canned meat, fish, and poultry. ? Ham, sutton, hot dogs, and luncheon meats. ? Regular, hard, and processed cheese and regular peanut butter. ? Crackers with salted tops, and other salted snack foods such as pretzels, chips, and salted popcorn. ? Frozen prepared meals, unless labeled low-sodium. ? Canned and dried soups, broths, and bouillon, unless labeled sodium-free or low-sodium. ? Canned vegetables, unless labeled sodium-free or low-sodium. ? Western Zaynab fries, pizza, tacos, and other fast foods. ? Pickles, olives, ketchup, and other condiments, especially soy sauce, unless labeled sodium-free or low-sodium. Where can you learn more? Go to http://killian-heather.info/. Enter A065 in the search box to learn more about \"Low Sodium Diet (2,000 Milligram): Care Instructions. \"  Current as of: November 7, 2018  Content Version: 12.2  © 8752-8816 [employer]. Care instructions adapted under license by [employer] (which disclaims liability or warranty for this information). If you have questions about a medical condition or this instruction, always ask your healthcare professional. [place] [address] any warranty or liability for your use of this information.

## 2019-11-25 NOTE — PROGRESS NOTES
Chief Complaint   Patient presents with    Diabetes    Hypertension    Allergies    Results     1. Have you been to the ER, urgent care clinic since your last visit? Hospitalized since your last visit? No    2. Have you seen or consulted any other health care providers outside of the [address] [place] [address] since your last visit? Include any pap smears or colon screening.  No

## 2019-11-25 NOTE — PROGRESS NOTES
HISTORY OF PRESENT ILLNESS  Pamella Rojas is a 64 y.o. female. HPI: here for follow up on brain MRI. She has h/o pituitary microadenoma. Noted per MRi in 2007. Repeated MRI recently  per radiology no microadenoma noted. Her pituitary level also wnl. She artur any headache or vision changes. Does have yearly eye exam.  Denies any  dizziness, no chest pain or trouble breathing, no arm or leg weakness. No nausea or vomiting, no weight or appetite changes, no mood changes . No urine or bowel complains, no palpitation, no diaphoresis. No abdominal pain. No cold or cough. No leg swelling. No fever. No sleep trouble. Visit Vitals  /82 (BP 1 Location: Left arm, BP Patient Position: Sitting)   Pulse 65   Temp 99 °F (37.2 °C) (Oral)   Resp 16   Ht 5' 1\" (1.549 m)   Wt 190 lb (86.2 kg)   SpO2 97%   BMI 35.90 kg/m²     Review medication list, vitals, problem list,allergies. Sitting comfortable and not in an acute distress. Review labs. Lab Results   Component Value Date/Time    FSH 90.6 03/16/2010 03:13 PM    Luteinizing hormone 48.5 03/16/2010 03:13 PM    Prolactin 20.7 10/04/2019 04:42 PM     Lab Results   Component Value Date/Time    WBC 5.6 05/21/2019 08:18 AM    HGB 12.1 05/21/2019 08:18 AM    HCT 40.5 05/21/2019 08:18 AM    PLATELET 275 63/35/7003 08:18 AM    MCV 88 05/21/2019 08:18 AM     Lab Results   Component Value Date/Time    Sodium 142 05/21/2019 08:18 AM    Potassium 3.8 05/21/2019 08:18 AM    Chloride 103 05/21/2019 08:18 AM    CO2 27 05/21/2019 08:18 AM    Anion gap 12.0 05/21/2019 08:18 AM    Glucose 95 05/21/2019 08:18 AM    BUN 17 05/21/2019 08:18 AM    Creatinine 0.8 05/21/2019 08:18 AM    Calcium 9.7 05/21/2019 08:18 AM    Bilirubin, total 0.3 05/21/2019 08:18 AM    AST (SGOT) 13 05/21/2019 08:18 AM    Alk.  phosphatase 28 05/21/2019 08:18 AM    Protein, total 6.5 05/21/2019 08:18 AM    Albumin 4.1 05/21/2019 08:18 AM    Globulin 2.4 05/21/2019 08:18 AM    A-G Ratio 1.7 05/21/2019 08:18 AM    ALT (SGPT) 24 05/21/2019 08:18 AM     Lab Results   Component Value Date/Time    Cholesterol, total 150 05/21/2019 08:18 AM    HDL Cholesterol 49 05/21/2019 08:18 AM    LDL, calculated 90 05/21/2019 08:18 AM    VLDL, calculated 11 05/21/2019 08:18 AM    Triglyceride 54 05/21/2019 08:18 AM     Lab Results   Component Value Date/Time    TSH 2.56 10/11/2018 08:12 PM     Lab Results   Component Value Date/Time    Hemoglobin A1c 5.7 10/03/2019 09:04 AM    Hemoglobin A1c (POC) 5.5 02/24/2015 09:58 AM     Lab Results   Component Value Date/Time    VITAMIN D, 25-HYDROXY 42.8 05/21/2019 08:18 AM       Lab Results   Component Value Date/Time    Microalb/Creat ratio (ug/mg creat.)  10/11/2018 08:12 PM      Comment:      ** Unable to calculate Microablumin/Creatinine Ratio due to low Microalbumin     Also h/o hypertension. Compliant with medication. Vitals stable. Asymptomatic. Also h/o diabetes. Well controlled. No signs or symptoms of hypoglycemia. Well controlled on life style modification. Chronic left hip pain. Used to follow Dr. Jarrod Bryant. Now following Dr. Be Franz as well. Recently had joint injection and it has been better. ROS: see HPI     Physical Exam  Neck:      Comments: No palpable enlarge thyroid gland. Cardiovascular:      Rate and Rhythm: Normal rate. Pulmonary:      Effort: Pulmonary effort is normal.      Breath sounds: No wheezing. Abdominal:      General: Bowel sounds are normal.      Palpations: Abdomen is soft. Tenderness: There is no tenderness. Musculoskeletal:      Comments: Foot exam: no callus or open skin area,  Monofilament test normal.  Peripheral pulsations of dorsalis pedis palpable both lower ext. Lymphadenopathy:      Cervical: No cervical adenopathy. Neurological:      Mental Status: She is alert and oriented to person, place, and time. ASSESSMENT and PLAN    ICD-10-CM ICD-9-CM    1. Pituitary microadenoma ([address]): asymptomatic.  Also repeat MRI showed no note of microadenoma. She had seen endocrinology years ago and agree to take their recommendations on it. She wanted to have f/u with Dr. Audrey Kingston  D35.2 227.3    2. Well controlled diabetes mellitus ([address] continue life style modification. Prediabetic range HBA1C.  E11.9 250.00 MICROALBUMIN, UR, RAND W/ MICROALB/CREAT RATIO   3. Essential hypertension: well controlled. Continue current dose of medication and low salt diet. Exercise as tolerated. I10 401.9    4. Pain of left hip joint: for now recently seen Dr. Keith Smith and had joint injection. Felt some improvement post injection. Will be following Dr. Keith Smith. No leg swelling. M25.552 719.45     seen Dr. Keith Smith. following Dr. David Walker. on sypmtomatic treatment. Pt understood and agree with the plan   Review hM   Reminded her to get shingrix. Follow-up and Dispositions    · Return in about 4 months (around 3/25/2020).

## 2019-12-11 DIAGNOSIS — R52 PAIN OF MULTIPLE SITES: ICD-10-CM

## 2019-12-12 RX ORDER — NAPROXEN 500 MG/1
TABLET ORAL
Qty: 60 TAB | Refills: 0 | Status: SHIPPED | OUTPATIENT
Start: 2019-12-12 | End: 2020-02-03 | Stop reason: SDUPTHER

## 2019-12-23 DIAGNOSIS — I10 ESSENTIAL HYPERTENSION: ICD-10-CM

## 2019-12-23 RX ORDER — AMLODIPINE BESYLATE 5 MG/1
5 TABLET ORAL DAILY
Qty: 90 TAB | Refills: 0 | Status: SHIPPED | OUTPATIENT
Start: 2019-12-23 | End: 2020-03-17

## 2019-12-23 NOTE — TELEPHONE ENCOUNTER
LOV 11/25/2019  F/U  03/25/2019  Has enough for 2 days. Pharmacy was sending refill request to the prior PCP.

## 2020-01-13 ENCOUNTER — PATIENT OUTREACH (OUTPATIENT)
Dept: FAMILY MEDICINE CLINIC | Age: 60
End: 2020-01-13

## 2020-01-13 NOTE — PROGRESS NOTES
NN health screenings:    Negative mammogram found in Care Everywhere dated April of 2019. 2012 colonoscopy report on its way from Dr. Thayne Lanes office with surveillance recommendation of 10 yrs. Pap smear completed last year by JALIL BUTLER [facility] on its way to Dr. Shaun Rosales for updating of HM.

## 2020-02-03 DIAGNOSIS — R52 PAIN OF MULTIPLE SITES: ICD-10-CM

## 2020-02-03 NOTE — TELEPHONE ENCOUNTER
This pharmacy faxed over request for the following prescriptions to be filled:    Medication requested :   Requested Prescriptions     Pending Prescriptions Disp Refills    naproxen (NAPROSYN) 500 mg tablet 60 Tab 0     PCP: Wilfrido Terrazas or Print: [facility]   Mail order or Local pharmacy [id number]    Scheduled appointment if not seen by current providers in office: LOV 11/25/2019 f/u 3/25/2020

## 2020-02-04 RX ORDER — NAPROXEN 500 MG/1
500 TABLET ORAL
Qty: 60 TAB | Refills: 0 | Status: SHIPPED | OUTPATIENT
Start: 2020-02-04 | End: 2020-06-26

## 2020-03-02 ENCOUNTER — OFFICE VISIT (OUTPATIENT)
Dept: ORTHOPEDIC SURGERY | Facility: CLINIC | Age: 60
End: 2020-03-02

## 2020-03-02 VITALS
HEIGHT: 61 IN | SYSTOLIC BLOOD PRESSURE: 142 MMHG | BODY MASS INDEX: 37 KG/M2 | OXYGEN SATURATION: 97 % | WEIGHT: 196 LBS | DIASTOLIC BLOOD PRESSURE: 79 MMHG | HEART RATE: 69 BPM

## 2020-03-02 DIAGNOSIS — M16.12 PRIMARY OSTEOARTHRITIS OF LEFT HIP: ICD-10-CM

## 2020-03-02 DIAGNOSIS — M70.62 TROCHANTERIC BURSITIS OF LEFT HIP: ICD-10-CM

## 2020-03-02 DIAGNOSIS — M17.11 ARTHRITIS OF RIGHT KNEE: Primary | ICD-10-CM

## 2020-03-02 DIAGNOSIS — M25.561 RIGHT KNEE PAIN, UNSPECIFIED CHRONICITY: ICD-10-CM

## 2020-03-02 RX ORDER — MELOXICAM 15 MG/1
15 TABLET ORAL DAILY
Qty: 30 TAB | Refills: 2 | Status: SHIPPED | OUTPATIENT
Start: 2020-03-02 | End: 2020-06-01

## 2020-03-02 RX ORDER — DICLOFENAC SODIUM 10 MG/G
4 GEL TOPICAL 4 TIMES DAILY
Qty: 100 G | Refills: 4 | Status: CANCELLED | OUTPATIENT
Start: 2020-03-02

## 2020-03-02 RX ORDER — BETAMETHASONE SODIUM PHOSPHATE AND BETAMETHASONE ACETATE 3; 3 MG/ML; MG/ML
6 INJECTION, SUSPENSION INTRA-ARTICULAR; INTRALESIONAL; INTRAMUSCULAR; SOFT TISSUE ONCE
Qty: 1 ML | Refills: 0
Start: 2020-03-02 | End: 2020-03-02

## 2020-03-02 NOTE — PROGRESS NOTES
[address] [facility], [address] [place] [address] [address]           Patient: Anahy Gaviria                MRN: [id number]       SSN: [id number]  YOB: 1960        AGE: 64 y.o. SEX: female  Body mass index is 37.03 kg/m². PCP: Frederick Lopez MD  03/02/20      This office note has been dictated. REVIEW OF SYSTEMS:  Constitutional: Negative for fever, chills, weight loss and malaise/fatigue. HENT: Negative. Eyes: Negative. Respiratory: Negative. Cardiovascular: Negative. Gastrointestinal: No bowel incontinence or constipation. Genitourinary: No bladder incontinence or saddle anesthesia. Skin: Negative. Neurological: Negative. Endo/Heme/Allergies: Negative. Psychiatric/Behavioral: Negative. Musculoskeletal: As per HPI above. Past Medical History:   Diagnosis Date    AR (allergic rhinitis)     Arthritis     Diabetes [facility]     Headache     Heart murmur     History of blood transfusion     Hypertension     Hypovitaminosis D 3/3/2014         Current Outpatient Medications:     naproxen (NAPROSYN) 500 mg tablet, Take 1 Tab by mouth two (2) times daily as needed for Pain., Disp: 60 Tab, Rfl: 0    amLODIPine (NORVASC) 5 mg tablet, Take 1 Tab by mouth daily. , Disp: 90 Tab, Rfl: 0    mometasone (ELOCON) 0.1 % topical cream, APPLY 1 APPLICATION BID TO AFFECTED AREAS OF THE HEAD AND NECK, Disp: , Rfl: 2    OTHER, Rx scalp oil - Fluocinolone Acetonide Body Oil, Disp: , Rfl:     telmisartan-hydroCHLOROthiazide (MICARDIS HCT) 80-25 mg per tablet, Take 1 Tab by mouth daily. , Disp: 90 Tab, Rfl: 1    fluticasone propionate (FLONASE) 50 mcg/actuation nasal spray, USE 2 SPRAYS IN EACH NOSTRIL DAILY AS NEEDED FOR RHINITIS, Disp: 1 Bottle, Rfl: 1    cetirizine (ZYRTEC) 5 mg tablet, Take 1 Tab by mouth daily as needed for Allergies. , Disp: 30 Tab, Rfl: 0    raNITIdine (ZANTAC) 150 mg tablet, Take 1 Tab by mouth two (2) times a day., Disp: 60 Tab, Rfl: 1    ergocalciferol (VITAMIN D2) 50,000 unit capsule, Take 1 Cap by mouth every seven (7) days. , Disp: 8 Cap, Rfl: 3    SEBEX 2-2 % shampoo, USE SHAMPOO WEEKLY AS DIRECTED, Disp: , Rfl: 5    No Known Allergies    Social History     Socioeconomic History    Marital status:      Spouse name: Not on file    Number of children: Not on file    Years of education: Not on file    Highest education level: Not on file   Occupational History    Not on file   Social Needs    Financial resource strain: Not on file    Food insecurity:     Worry: Not on file     Inability: Not on file    Transportation needs:     Medical: Not on file     Non-medical: Not on file   Tobacco Use    Smoking status: Never Smoker    Smokeless tobacco: Never Used   Substance and Sexual Activity    Alcohol use:  Yes     Alcohol/week: 0.8 standard drinks     Types: 1 Standard drinks or equivalent per week     Frequency: Monthly or less     Drinks per session: 1 or 2     Binge frequency: Never    Drug use: No    Sexual activity: Yes     Partners: Male     Birth control/protection: Surgical     Comment: hysterectomy   Lifestyle    Physical activity:     Days per week: Not on file     Minutes per session: Not on file    Stress: Not on file   Relationships    Social connections:     Talks on phone: Not on file     Gets together: Not on file     Attends Anglican service: Not on file     Active member of club or organization: Not on file     Attends meetings of clubs or organizations: Not on file     Relationship status: Not on file    Intimate partner violence:     Fear of current or ex partner: Not on file     Emotionally abused: Not on file     Physically abused: Not on file     Forced sexual activity: Not on file   Other Topics Concern    Not on file   Social History Narrative    Not on file       Past Surgical History:   Procedure Laterality Date    [address]    HX COLONOSCOPY  2012    normal per patient (by Dr. Laila Stack)   [address] [place] [address] HYSTERECTOMY  2006    s/p vaginal hysterectomy          Roula Zhang date of birth 1960    Patient presents today for evaluation and advice regarding right knee pain. She has approximately 1 month history of right knee pain which has been worsening. She denies injury. No fevers or chills no night sweats no weight loss. She has no mechanical symptoms currently no locking or giving way. She did go to urgent care was treated Voltaren for a week did make the knee feel better. She has had no fevers or chills systemic changes to report. Upon examination general alert and x3 no acute distress well-developed well-nourished normal affect afebrile. NCAT EOMI neck supple trachea midline no JVD present    Pain-free range of motion of the hips no pain of a patient showed a bursa negative calf tenderness and Homans no signs of DVT present bilaterally. Examination of the right knee reveals skin intact there is no erythema ecchymosis no warmth no signs of infection or cellulitis present. She does have pain to palpation to the medial joint line as well patellofemoral grind range of motion with normal limits ligamentously stable. Radiographs obtained in office today 3/2/2020 [address] location AP tunnel lateral skyline shows moderate advanced arthritis to the medial patellofemoral articulation no acute bony abnormalities noted. Assessment right knee moderate advanced arthritis    Plan at this point we will going to move forward with a cortisone injection for the right knee. After informed consent under aseptic conditions after timeout performed with ultrasonic assistance the right knee was prepped with Betadine and 6 mg of Celestone was injected without complications patient tolerated injection well. She is instructed on postinjection care.     We will plan on seeing her back in 4 weeks time for reevaluation and  the efficacy of the cortisone injection. In the meantime we will get a series of viscosupplementation preapproved by insurance to try to maximize her nonoperative treatment. We may consider an MRI of the right knee for further evaluation of meniscal pathology depending on the results of the cortisone injection. We will start her on Mobic 15 mg once a day with food she is instructed on use as well as usual precautions.           JR Jordan CALABRESE PA-C, ATC

## 2020-03-11 ENCOUNTER — PATIENT OUTREACH (OUTPATIENT)
Dept: FAMILY MEDICINE CLINIC | Age: 60
End: 2020-03-11

## 2020-03-14 DIAGNOSIS — I10 ESSENTIAL HYPERTENSION: ICD-10-CM

## 2020-03-16 RX ORDER — AMLODIPINE BESYLATE 5 MG/1
TABLET ORAL
Qty: 90 TAB | Refills: 0 | OUTPATIENT
Start: 2020-03-16

## 2020-03-17 RX ORDER — AMLODIPINE BESYLATE 5 MG/1
5 TABLET ORAL DAILY
Qty: 90 TAB | Refills: 1 | Status: SHIPPED | OUTPATIENT
Start: 2020-03-17 | End: 2020-03-25 | Stop reason: SDUPTHER

## 2020-03-25 ENCOUNTER — HOSPITAL ENCOUNTER (OUTPATIENT)
Dept: LAB | Age: 60
Discharge: HOME | End: 2020-03-25

## 2020-03-25 ENCOUNTER — VIRTUAL VISIT (OUTPATIENT)
Dept: FAMILY MEDICINE CLINIC | Age: 60
End: 2020-03-25

## 2020-03-25 DIAGNOSIS — E78.5 DYSLIPIDEMIA: ICD-10-CM

## 2020-03-25 DIAGNOSIS — M25.552 PAIN OF LEFT HIP JOINT: ICD-10-CM

## 2020-03-25 DIAGNOSIS — I10 ESSENTIAL HYPERTENSION: ICD-10-CM

## 2020-03-25 DIAGNOSIS — D35.2 PITUITARY MICROADENOMA (HCC): ICD-10-CM

## 2020-03-25 DIAGNOSIS — Z12.39 SCREENING FOR BREAST CANCER: ICD-10-CM

## 2020-03-25 DIAGNOSIS — L02.92 BOIL: ICD-10-CM

## 2020-03-25 DIAGNOSIS — E11.9 WELL CONTROLLED DIABETES MELLITUS (HCC): Primary | ICD-10-CM

## 2020-03-25 DIAGNOSIS — M25.561 RIGHT KNEE PAIN, UNSPECIFIED CHRONICITY: ICD-10-CM

## 2020-03-25 LAB
CREATININE, URINE: 273 MG/DL
MICROALB/CREAT RATIO, 140286: 8 (ref 0–30)
MICROALBUMIN,URINE RANDOM 140054: 21.8 MG/L (ref 0.1–17)
SENTARA SPECIMEN COL,SENBCF: NORMAL
SENTARA SPECIMEN COL,SENBCF: NORMAL

## 2020-03-25 PROCEDURE — 99001 SPECIMEN HANDLING PT-LAB: CPT

## 2020-03-25 RX ORDER — AMLODIPINE BESYLATE 5 MG/1
5 TABLET ORAL DAILY
Qty: 90 TAB | Refills: 1 | Status: SHIPPED | OUTPATIENT
Start: 2020-03-25 | End: 2020-08-03

## 2020-03-25 RX ORDER — BACITRACIN ZINC 500 UNIT/G
OINTMENT (GRAM) TOPICAL 2 TIMES DAILY
Qty: 15 G | Refills: 0 | Status: SHIPPED | OUTPATIENT
Start: 2020-03-25 | End: 2020-06-26 | Stop reason: ALTCHOICE

## 2020-03-25 RX ORDER — TELMISARTAN AND HYDROCHLORTHIAZIDE 80; 25 MG/1; MG/1
1 TABLET ORAL DAILY
Qty: 90 TAB | Refills: 1 | Status: SHIPPED | OUTPATIENT
Start: 2020-03-25 | End: 2020-08-04 | Stop reason: SDUPTHER

## 2020-03-25 NOTE — PATIENT INSTRUCTIONS
Keep area clean over boil. Use bacitracin as needed. Low salt. Keep blood pressure log. If it goes above 140/90 call office. Follow diabetic diet. Low carb and high protein. Stay active and walking/ exercise as tolerated. Schedule your mammogram after April 11th 2020 which will be a full year from your last mammogram.  
Need to complete labs in 3 months. If needed we can mail it to you.

## 2020-03-25 NOTE — PROGRESS NOTES
Jennifer William is a 64 y.o. female who was seen by synchronous (real-time) audio-video technology on 3/25/2020. She is aware that this patient-initiated Telehealth encounter is a billable service, with coverage as determined by her insurance carrier. She is aware that she may receive a bill and has provided verbal consent to proceed: Yes       I was in the office while conducting this encounter. Assessment & Plan:   Diagnoses and all orders for this visit:    1. Well controlled diabetes mellitus ([address]): Asymptomatic. Last A1c 5.7. We will try to read the labs in a drawn blood today and if not she is aware we will mail the order at her home which she should complete before next visit. Diet modification discussed. Exercise as tolerated. She is on ARB . Will follow next visit    2. Pituitary microadenoma [facility]): Following endocrinologist.  Recent MRI unchanged. Per patient has seen endocrinologist.  Prolactin level was within normal limit. She is asymptomatic at this time. Will observe and follow endocrinologist recommendations. 3. Dyslipidemia: On statin. No side effects. Discussed the diet modification and exercise as tolerated. 4. Pain of left hip joint: Seen Dr. Carlito Bertrand. Currently much much improved and stable after joint injection. Will observe for now and symptomatic OTC medication Tylenol as needed. 5. Essential hypertension:well controlled. Continue current dose of medication and low salt diet. Exercise as tolerated. 6. Rt knee pain: Recently seen Dr. Judi Eagle. Post knee joint injection. Pain has been much better. No swelling or redness. No trouble ambulation. Taking symptomatic medication as needed. Ice application. 7. Boil:  on and off. Given bacitracin to use as needed. Advised to keep the area clean. Patient understood and agreed with above plan. Review health maintenance. Review from care everywhere last mammogram was done in April 2019.   Given the new order again.  She would like to continue it at [facility]. Advised her to schedule the appointment after April 11 as it will be full year then. Follow-up in 3 months. CPT Codes 89061-31639 for Established Patients may apply to this Telehealth Visit      Subjective:   Dagmar Sandoval was seen for follow-up. History of hypertension. Asymptomatic. Said she has a machine at home. Checking on and off. Fairly stable. Encouraged her to continue the blood pressure log. If it is staying higher than 140/90 she should call the office. Taking medicine with compliance. No side effects. Denies any headache, dizziness, no chest pain or trouble breathing, no arm or leg weakness. No nausea or vomiting, no weight or appetite changes, no mood changes . No urine or bowel complains, no palpitation, no diaphoresis. No abdominal pain. No cold or cough. No leg swelling. No fever. No sleep trouble. Also history of diabetes. Said she went to do the labs today from orthopedic and her micro albumin we will try to add A1c, CMP and lipid panel to her drawing blood. She did fasting. She is working on home exercise and walking as tolerated. She is working on a diet modification. Had some weight gain since December due to holidays and less compliant with the diet. She is working on the lifestyle modification now. Compliant with the medication. No side effects. She is currently not on a diabetic medication and its mainly managed by lifestyle modification. We will recheck the labs and follow-up after that. History of pituitary microadenoma. Following endocrinologist.  Denies any headache or dizziness. Blood pressure been fairly okay per patient home log. No vision change. Recent MRI showed unchanged. Per patient seen endocrinology and was told prolactin level was within normal limit. Will be following endocrinologist recommendations. Mention on and off boils.   Advised the symptomatic treatment to keep the area clean. Also will send the bacitracin ointment. Dyslipidemia. Again lifestyle modification discussed. Not on any medications. Review labs. Lab Results   Component Value Date/Time    WBC 5.6 05/21/2019 08:18 AM    HGB 12.1 05/21/2019 08:18 AM    HCT 40.5 05/21/2019 08:18 AM    PLATELET 618 12/58/1222 08:18 AM    MCV 88 05/21/2019 08:18 AM     Lab Results   Component Value Date/Time    Sodium 142 05/21/2019 08:18 AM    Potassium 3.8 05/21/2019 08:18 AM    Chloride 103 05/21/2019 08:18 AM    CO2 27 05/21/2019 08:18 AM    Anion gap 12.0 05/21/2019 08:18 AM    Glucose 95 05/21/2019 08:18 AM    BUN 17 05/21/2019 08:18 AM    Creatinine 0.8 05/21/2019 08:18 AM    Calcium 9.7 05/21/2019 08:18 AM    Bilirubin, total 0.3 05/21/2019 08:18 AM    AST (SGOT) 13 05/21/2019 08:18 AM    Alk. phosphatase 28 05/21/2019 08:18 AM    Protein, total 6.5 05/21/2019 08:18 AM    Albumin 4.1 05/21/2019 08:18 AM    Globulin 2.4 05/21/2019 08:18 AM    A-G Ratio 1.7 05/21/2019 08:18 AM    ALT (SGPT) 24 05/21/2019 08:18 AM     Lab Results   Component Value Date/Time    Cholesterol, total 150 05/21/2019 08:18 AM    HDL Cholesterol 49 05/21/2019 08:18 AM    LDL, calculated 90 05/21/2019 08:18 AM    VLDL, calculated 11 05/21/2019 08:18 AM    Triglyceride 54 05/21/2019 08:18 AM     Lab Results   Component Value Date/Time    TSH 2.56 10/11/2018 08:12 PM     Lab Results   Component Value Date/Time    Hemoglobin A1c 5.7 10/03/2019 09:04 AM    Hemoglobin A1c (POC) 5.5 02/24/2015 09:58 AM     Lab Results   Component Value Date/Time    Microalb/Creat ratio (ug/mg creat.)  10/11/2018 08:12 PM      Comment:      ** Unable to calculate Microablumin/Creatinine Ratio due to low Microalbumin     Lab Results   Component Value Date/Time    FSH 90.6 03/16/2010 03:13 PM    Luteinizing hormone 48.5 03/16/2010 03:13 PM    Prolactin 20.7 10/04/2019 04:42 PM     Last mammogram done in April 2019. Reviewed records from  care everywhere.   Also she is following  [place] for her routine GYN. She is overdue for an appointment. Discussed the importance of the pelvic exam.  She will make a follow-up appointment. Prior to Admission medications    Medication Sig Start Date End Date Taking? Authorizing Provider   amLODIPine (NORVASC) 5 mg tablet Take 1 Tab by mouth daily. 3/17/20   Beverley Oconnell MD   meloxicam (MOBIC) 15 mg tablet Take 1 Tab by mouth daily. 3/2/20   Deward Habermann, PA-C   naproxen (NAPROSYN) 500 mg tablet Take 1 Tab by mouth two (2) times daily as needed for Pain. 2/4/20   Beverley Oconnell MD   mometasone (ELOCON) 0.1 % topical cream APPLY 1 APPLICATION BID TO AFFECTED AREAS OF THE HEAD AND NECK 9/24/19   Provider, Historical   OTHER Rx scalp oil - Fluocinolone Acetonide Body Oil    Provider, Historical   telmisartan-hydroCHLOROthiazide (MICARDIS HCT) 80-25 mg per tablet Take 1 Tab by mouth daily. 10/4/19   Beverley Oconnell MD   fluticasone propionate (FLONASE) 50 mcg/actuation nasal spray USE 2 SPRAYS IN EACH NOSTRIL DAILY AS NEEDED FOR RHINITIS 10/4/19   Beverley Oconnell MD   cetirizine (ZYRTEC) 5 mg tablet Take 1 Tab by mouth daily as needed for Allergies. 4/19/19   Beverley Oconnell MD   raNITIdine (ZANTAC) 150 mg tablet Take 1 Tab by mouth two (2) times a day. 4/5/19   Beverley Oconnell MD   ergocalciferol (VITAMIN D2) 50,000 unit capsule Take 1 Cap by mouth every seven (7) days.  1/15/19   Augustine Sorenson DO   SEBEX 2-2 % shampoo USE SHAMPOO WEEKLY AS DIRECTED 12/5/17   Provider, Historical     No Known Allergies    Patient Active Problem List    Diagnosis Date Noted    Pituitary microadenoma ([facility] 10/04/2019    Well controlled diabetes mellitus ([facility] 10/04/2019    Pain of multiple sites 10/04/2019    Severe obesity (BMI 35.0-39.9) 07/19/2018    Dyslipidemia 03/14/2018    Encounter for screening mammogram for breast cancer 12/12/2017    DDD (degenerative disc disease), thoracic 02/14/2017    Acute right-sided thoracic back pain 02/07/2017    Sinus congestion 01/19/2017    Hyperglycemia due to type 2 diabetes mellitus ([facility] 10/13/2016    Bursitis of left hip 10/13/2016    Nausea 05/25/2016    Constipation 05/25/2016    Knee pain 04/07/2016    Obesity (BMI 30.0-34.9) 10/08/2015    History of screening mammography 10/08/2015    Type II diabetes mellitus ([facility] [address] 10/08/2015    Need for hepatitis C screening test 10/08/2015    Encounter for long-term (current) use of medications 04/07/2015    Obesity 02/24/2015    Hypovitaminosis D 03/03/2014    Elevated CPK 03/03/2014    Hypertension     Arthritis     AR (allergic rhinitis)      Current Outpatient Medications   Medication Sig Dispense Refill    bacitracin zinc (BACITRACIN) ointment Apply  to affected area two (2) times a day. 15 g 0    amLODIPine (NORVASC) 5 mg tablet Take 1 Tab by mouth daily. 90 Tab 1    telmisartan-hydroCHLOROthiazide (MICARDIS HCT) 80-25 mg per tablet Take 1 Tab by mouth daily. 90 Tab 1    meloxicam (MOBIC) 15 mg tablet Take 1 Tab by mouth daily. 30 Tab 2    naproxen (NAPROSYN) 500 mg tablet Take 1 Tab by mouth two (2) times daily as needed for Pain. 60 Tab 0    mometasone (ELOCON) 0.1 % topical cream APPLY 1 APPLICATION BID TO AFFECTED AREAS OF THE HEAD AND NECK  2    OTHER Rx scalp oil - Fluocinolone Acetonide Body Oil      fluticasone propionate (FLONASE) 50 mcg/actuation nasal spray USE 2 SPRAYS IN EACH NOSTRIL DAILY AS NEEDED FOR RHINITIS 1 Bottle 1    cetirizine (ZYRTEC) 5 mg tablet Take 1 Tab by mouth daily as needed for Allergies. 30 Tab 0    raNITIdine (ZANTAC) 150 mg tablet Take 1 Tab by mouth two (2) times a day. 60 Tab 1    ergocalciferol (VITAMIN D2) 50,000 unit capsule Take 1 Cap by mouth every seven (7) days.  8 Cap 3    SEBEX 2-2 % shampoo USE SHAMPOO WEEKLY AS DIRECTED  5     No Known Allergies  Past Medical History:   Diagnosis Date    AR (allergic rhinitis)     Arthritis     Diabetes (HCC)     Headache     Heart murmur     History of blood transfusion     Hypertension     Hypovitaminosis D 3/3/2014          ROS: See HPI      Objective:     General: alert, cooperative, no distress   Mental  status: mental status: alert, oriented to person, place, and time, normal mood, behavior, speech, dress, motor activity, and thought processes   Resp: resp: normal effort and no respiratory distress   Neuro: neuro: no gross deficits   Skin: skin: Boil over inner thigh and pubic area. No open skin or discharge. Mild tender on touch. no discoloration or lesions of concern on visible areas     Due to this being a TeleHealth evaluation, many elements of the physical examination are unable to be assessed. We discussed the expected course, resolution and complications of the diagnosis(es) in detail. Medication risks, benefits, costs, interactions, and alternatives were discussed as indicated. I advised her to contact the office if her condition worsens, changes or fails to improve as anticipated. She expressed understanding with the diagnosis(es) and plan. Pursuant to the emergency declaration under the [facility] waiver authority and the [employer] Act, this Virtual  Visit was conducted, with patient's consent, to reduce the patient's risk of exposure to COVID-19 and provide continuity of care for an established patient. Services were provided through a video synchronous discussion virtually to substitute for in-person clinic visit.     Charanjit Fisher MD

## 2020-03-26 LAB
ABSOLUTE LYMPHOCYTE COUNT, 10803: 1.7 K/UL (ref 1–4.8)
ANTI-DNA (DS) AB QN, 1189: 2 IU/ML
AVG GLU, 10930: 127 MG/DL (ref 91–123)
BASOPHILS # BLD: 0 K/UL (ref 0–0.2)
BASOPHILS NFR BLD: 0 % (ref 0–2)
C-REACTIVE PROTEIN, QT, 006627: 0.9 MG/DL (ref 0–0.5)
CENTROMERE B ANTIBODY, 601143: <0.2 AI
CHOLEST SERPL-MCNC: 146 MG/DL (ref 110–200)
CHROMATIN ANTIBODY: 0.4 AI
ENA SS-A AB SER-ACNC: <0.2 AI
ENA SS-B AB SER-ACNC: <0.2 AI
EOSINOPHIL # BLD: 0.1 K/UL (ref 0–0.5)
EOSINOPHIL NFR BLD: 2 % (ref 0–6)
ERYTHROCYTE [DISTWIDTH] IN BLOOD BY AUTOMATED COUNT: 14 % (ref 10–15.5)
GRANULOCYTES,GRANS: 60 % (ref 40–75)
HBA1C MFR BLD HPLC: 6 % (ref 4.8–5.6)
HCT VFR BLD AUTO: 38.2 % (ref 35.1–48)
HDLC SERPL-MCNC: 3 MG/DL (ref 0–5)
HDLC SERPL-MCNC: 49 MG/DL
HGB BLD-MCNC: 11.4 G/DL (ref 11.7–16)
JO1 ANTIBODY, 8107: <0.2 AI
LDL/HDL RATIO,LDHD: 1.8
LDLC SERPL CALC-MCNC: 86 MG/DL (ref 50–99)
LYMPHOCYTES, LYMLT: 33 % (ref 20–45)
MAMMOGRAPHY, EXTERNAL: NORMAL
MAMMOGRAPHY, EXTERNAL: NORMAL
MCH RBC QN AUTO: 26 PG (ref 26–34)
MCHC RBC AUTO-ENTMCNC: 30 G/DL (ref 31–36)
MCV RBC AUTO: 88 FL (ref 81–99)
MONOCYTES # BLD: 0.3 K/UL (ref 0.1–1)
MONOCYTES NFR BLD: 5 % (ref 3–12)
NEUTROPHILS # BLD AUTO: 3.2 K/UL (ref 1.8–7.7)
NON-HDL CHOLESTEROL, 011976: 97 MG/DL
PLATELET # BLD AUTO: 357 K/UL (ref 140–440)
PMV BLD AUTO: 9.5 FL (ref 9–13)
RBC # BLD AUTO: 4.35 M/UL (ref 3.8–5.2)
RHEUMATOID FACTOR QUANT, IMMUNOTURBIDIMETRIC: 22 IU/ML (ref 0–20)
RNP ABS, 016354: <0.2 AI
SCLERODERMA AB (SCL-70), 601116: 0.4 AI
SED RATE (ESR): 14 MM/HR (ref 0–30)
SMITH ABS, 016362: <0.2 AI
TRIGL SERPL-MCNC: 58 MG/DL (ref 40–149)
URATE SERPL-MCNC: 4.2 MG/DL (ref 2.2–7.7)
VLDLC SERPL CALC-MCNC: 12 MG/DL (ref 8–30)
WBC # BLD AUTO: 5.3 K/UL (ref 4–11)

## 2020-03-27 NOTE — PROGRESS NOTES
Mild elevated protein in urine but she is on one of the blood pressure medication which helps with it. Further discussion on follow up visit. Please remind her to do other fasting labs before next visit as it was not able to add on to current labs.

## 2020-03-27 NOTE — PROGRESS NOTES
Spoke with patient (all identifiers verified) to advise mild elevated protein in urine but she is on one of the blood pressure medication which helps with it. Further discussion on follow up visit. Patient was reminded she has an upcoming visit with Dr. Dayton Mortimer on 6/26/2020 at 2:30PM. Patient aware to do other fasting labs before next visit as it was not able to add on to current labs. Patient was asked to do labs one week prior to scheduled visit in order for results to be available at follow-up. Patient verbalized understanding and requested lab order be mailed to her home.  Lab order has been mailed per patient request.

## 2020-03-27 NOTE — PROGRESS NOTES
Left a voice message asking patient to return call to [facility] regarding lab results, [phone] option 0. Ask for Orville Bingham.

## 2020-04-03 ENCOUNTER — TELEPHONE (OUTPATIENT)
Dept: FAMILY MEDICINE CLINIC | Age: 60
End: 2020-04-03

## 2020-04-03 NOTE — TELEPHONE ENCOUNTER
Spoke with pt in detail regarding what are the risk factors for on going pandamic. Asthma, copd, immunocompromised, smoker , diabetes etc.  She is working for city/  . She is working from home and has to go once a week. Asking for a letter as she is at high risk for ongoing situation. Done letter.      Andres Bragg

## 2020-04-03 NOTE — TELEPHONE ENCOUNTER
Patient called in with concerns about being at greater risk for catching the virus. She states she is working from home but goes into her work place once weekly. Patient states she has multiple diagnosis that puts her at greater risk. Patient can be reached at [phone]

## 2020-04-16 NOTE — TELEPHONE ENCOUNTER
Pt needs another letter with DX that makes her a high risk for the COVID 19. Please advise. She would like to know if it can be sent so that she can get it through her my chart.

## 2020-05-31 DIAGNOSIS — M17.11 ARTHRITIS OF RIGHT KNEE: ICD-10-CM

## 2020-05-31 DIAGNOSIS — M25.561 RIGHT KNEE PAIN, UNSPECIFIED CHRONICITY: ICD-10-CM

## 2020-06-01 RX ORDER — MELOXICAM 15 MG/1
TABLET ORAL
Qty: 30 TAB | Refills: 2 | Status: SHIPPED | OUTPATIENT
Start: 2020-06-01 | End: 2020-09-09 | Stop reason: ALTCHOICE

## 2020-06-18 ENCOUNTER — HOSPITAL ENCOUNTER (OUTPATIENT)
Dept: LAB | Age: 60
Discharge: HOME | End: 2020-06-18

## 2020-06-18 LAB — SENTARA SPECIMEN COL,SENBCF: NORMAL

## 2020-06-18 PROCEDURE — 99001 SPECIMEN HANDLING PT-LAB: CPT

## 2020-06-19 LAB
A-G RATIO,AGRAT: 2.2 RATIO (ref 1.1–2.6)
ALBUMIN SERPL-MCNC: 4.3 G/DL (ref 3.5–5)
ALP SERPL-CCNC: 28 U/L (ref 25–115)
ALT SERPL-CCNC: 12 U/L (ref 5–40)
ANION GAP SERPL CALC-SCNC: 13.7 MMOL/L
AST SERPL W P-5'-P-CCNC: 14 U/L (ref 10–37)
AVG GLU, 10930: 129 MG/DL (ref 91–123)
BILIRUB SERPL-MCNC: 0.2 MG/DL (ref 0.2–1.2)
BUN SERPL-MCNC: 19 MG/DL (ref 6–22)
CALCIUM SERPL-MCNC: 9.3 MG/DL (ref 8.4–10.5)
CHLORIDE SERPL-SCNC: 103 MMOL/L (ref 98–110)
CHOLEST SERPL-MCNC: 161 MG/DL (ref 110–200)
CO2 SERPL-SCNC: 25 MMOL/L (ref 20–32)
CREAT SERPL-MCNC: 0.8 MG/DL (ref 0.5–1.2)
GFRAA, 66117: >60
GFRNA, 66118: >60
GLOBULIN,GLOB: 2 G/DL (ref 2–4)
GLUCOSE SERPL-MCNC: 103 MG/DL (ref 70–99)
HBA1C MFR BLD HPLC: 6.1 % (ref 4.8–5.6)
HDLC SERPL-MCNC: 3.5 MG/DL (ref 0–5)
HDLC SERPL-MCNC: 46 MG/DL
LDL/HDL RATIO,LDHD: 2.3
LDLC SERPL CALC-MCNC: 107 MG/DL (ref 50–99)
NON-HDL CHOLESTEROL, 011976: 115 MG/DL
POTASSIUM SERPL-SCNC: 3.9 MMOL/L (ref 3.5–5.5)
PROT SERPL-MCNC: 6.3 G/DL (ref 6.4–8.3)
SODIUM SERPL-SCNC: 142 MMOL/L (ref 133–145)
TRIGL SERPL-MCNC: 42 MG/DL (ref 40–149)
VLDLC SERPL CALC-MCNC: 8 MG/DL (ref 8–30)

## 2020-06-22 ENCOUNTER — TELEPHONE (OUTPATIENT)
Dept: FAMILY MEDICINE CLINIC | Age: 60
End: 2020-06-22

## 2020-06-22 NOTE — PROGRESS NOTES
Diabetes test went up little bit but still in prediabetic range. Continue life style modification. It went up from 6 to 6.1. further discussion on follow up visit. RECEIVING UNIT ED HANDOFF REVIEW    ED Nurse Handoff Report was reviewed by: Carlene Larson RN on December 10, 2019 at 3:58 PM

## 2020-06-24 NOTE — PROGRESS NOTES
Patient identified with 2 identifiers (name and ). Patient aware of lab results. Diabetes test went up little bit but still in prediabetic range. Continue life style modification. It went up from 6 to 6.1. further discussion on follow up visit.

## 2020-06-26 ENCOUNTER — VIRTUAL VISIT (OUTPATIENT)
Dept: FAMILY MEDICINE CLINIC | Age: 60
End: 2020-06-26

## 2020-06-26 DIAGNOSIS — E11.9 WELL CONTROLLED DIABETES MELLITUS (HCC): Primary | ICD-10-CM

## 2020-06-26 DIAGNOSIS — K21.9 GASTROESOPHAGEAL REFLUX DISEASE WITHOUT ESOPHAGITIS: ICD-10-CM

## 2020-06-26 DIAGNOSIS — D35.2 PITUITARY MICROADENOMA (HCC): ICD-10-CM

## 2020-06-26 DIAGNOSIS — I10 ESSENTIAL HYPERTENSION: ICD-10-CM

## 2020-06-26 DIAGNOSIS — F41.9 ANXIETY: ICD-10-CM

## 2020-06-26 DIAGNOSIS — M25.50 PAIN, JOINT, MULTIPLE SITES: ICD-10-CM

## 2020-06-26 DIAGNOSIS — E55.9 VITAMIN D DEFICIENCY: ICD-10-CM

## 2020-06-26 RX ORDER — ESCITALOPRAM OXALATE 10 MG/1
10 TABLET ORAL DAILY
Qty: 30 TAB | Refills: 2 | Status: SHIPPED | OUTPATIENT
Start: 2020-06-26 | End: 2020-09-09

## 2020-06-26 RX ORDER — PANTOPRAZOLE SODIUM 20 MG/1
20 TABLET, DELAYED RELEASE ORAL DAILY
Qty: 30 TAB | Refills: 2 | Status: SHIPPED | OUTPATIENT
Start: 2020-06-26 | End: 2021-10-11

## 2020-06-26 NOTE — PROGRESS NOTES
Ilene Smyth is a 64 y.o. female who was seen by synchronous (real-time) audio-video technology on 6/26/2020. Consent: Ilene Smyth, who was seen by synchronous (real-time) audio-video technology, and/or her healthcare decision maker, is aware that this patient-initiated, Telehealth encounter on 6/26/2020 is a billable service, with coverage as determined by her insurance carrier. She is aware that she may receive a bill and has provided verbal consent to proceed: Yes. Assessment & Plan:   Diagnoses and all orders for this visit:    Well controlled diabetes mellitus ([facility] [address]): A1C around 6. 1. went up but in prediabetic range. Diet modification. Not on any medication. Will observe. Essential hypertension: well controlled. Continue current dose of medication and low salt diet. Exercise as tolerated. Gastroesophageal reflux disease without esophagitis: d/c ranitidine. Given Protonix. Diet modification. Also on NSAID so advised to take it 30 minutes before meal.   -     pantoprazole (PROTONIX) 20 mg tablet; Take 1 Tab by mouth daily. , Normal, Disp-30 Tab, R-2    Vitamin D deficiency: not taking any supplement. For now advised to start over-the-counter 2000 units daily    Pituitary [facility]): Seen Endo. Recent MRI in no change. Prolactin level within normal limit. Will observe and follow specialist recommendation    Pain, joint, multiple sites: Over the knee, hip. On and off. Taking Mobic every day. Advised to alternate with Tylenol. Discussed the side effects of NSAIDs. For now also advised to take PPI instead of ranitidine  Comments:  rt knee, both hip     Anxiety: More due to the current pandemic situation. Said years ago she used to be on Lexapro. For now will restart the low-dose of medication. Discussed the medication side effects. Follow-up next visit  -     escitalopram oxalate (LEXAPRO) 10 mg tablet; Take 1 Tab by mouth daily. , Normal, Disp-30 Tab, R-2    Complaint of left ear tingling on and off since last few days. No earache. No recent cold and cough. No headache or dizziness. No trouble hearing. No ringing in ear. Will observe little bit longer and if symptoms persist we will do the further work-up with the ENT referral.  Patient understood and agree with the plan    Reviewed the health maintenance. Reminded her to talk to the pharmacist regarding Shingrix vaccine. Also advised her to schedule the mammogram appointment. Also advised and reminded her to schedule the eye exam.  [id number]  Subjective:   Alba Kang is a 64 y.o. female who was seen for No chief complaint on file. Done visit with [facility]. History of diabetes. Not on any medication. Diet modification. Reviewed the labs. Recent A1c went up but still in prediabetic range around 6.1. Checking blood sugar at home. No hypo-or hyperglycemic symptoms. Sitting comfortable during the virtual visit. Did not appear in any acute distress. History of hypertension. Checking blood pressure at home. Today was 124/70 and heart rate was 70. Lately complaining of feeling more anxious. She has history of anxiety on and off but not on any medication currently. Said years ago she used to be on Lexapro. Currently no panic attacks but feeling anxious most of the time due to pandemic situation. Sleep is fair. No appetite or weight changes. No thoughts of hurting herself or someone else. Agreed to start the Lexapro. Denies any headache, dizziness, no chest pain or trouble breathing, no arm or leg weakness. No nausea or vomiting, no weight or appetite changes. No urine or bowel complains, no palpitation, no diaphoresis. No abdominal pain. No cold or cough. No leg swelling. No fever. No sleep trouble. History of vitamin D deficiency. She is not taking any supplement at this time. History of GERD. Was on ranitidine which was discontinue due to drug recall.   For now discussed to take Protonix. Diet modification. She is taking Mobic almost every day at this time. As of multiple site joint pain on and off. Chronic hip and back pain. Chronic knee pain. Currently having a struggle with the knee pain. Mild to moderate in intensity. Discussed the side effects of NSAIDs and advised to take the Tylenol alternated with it. Patient understood it. Reviewed labs. Lab Results   Component Value Date/Time    WBC 5.3 03/25/2020 08:34 AM    HGB 11.4 (L) 03/25/2020 08:34 AM    HCT 38.2 03/25/2020 08:34 AM    PLATELET 035 32/15/6813 08:34 AM    MCV 88 03/25/2020 08:34 AM     Lab Results   Component Value Date/Time    Sodium 142 06/18/2020 11:05 AM    Potassium 3.9 06/18/2020 11:05 AM    Chloride 103 06/18/2020 11:05 AM    CO2 25 06/18/2020 11:05 AM    Anion gap 13.7 06/18/2020 11:05 AM    Glucose 103 (H) 06/18/2020 11:05 AM    BUN 19 06/18/2020 11:05 AM    Creatinine 0.8 06/18/2020 11:05 AM    Calcium 9.3 06/18/2020 11:05 AM    Bilirubin, total 0.2 06/18/2020 11:05 AM    Alk.  phosphatase 28 06/18/2020 11:05 AM    Protein, total 6.3 (L) 06/18/2020 11:05 AM    Albumin 4.3 06/18/2020 11:05 AM    Globulin 2.0 06/18/2020 11:05 AM    A-G Ratio 2.2 06/18/2020 11:05 AM    ALT (SGPT) 12 06/18/2020 11:05 AM    AST (SGOT) 14 06/18/2020 11:05 AM     Lab Results   Component Value Date/Time    Cholesterol, total 161 06/18/2020 11:05 AM    HDL Cholesterol 46 06/18/2020 11:05 AM    LDL, calculated 107 (H) 06/18/2020 11:05 AM    VLDL, calculated 8 06/18/2020 11:05 AM    Triglyceride 42 06/18/2020 11:05 AM     Lab Results   Component Value Date/Time    TSH 2.56 10/11/2018 08:12 PM     Lab Results   Component Value Date/Time    Hemoglobin A1c 6.1 (H) 06/18/2020 11:05 AM    Hemoglobin A1c (POC) 5.5 02/24/2015 09:58 AM     Lab Results   Component Value Date/Time    VITAMIN D, 25-HYDROXY 42.8 05/21/2019 08:18 AM       Lab Results   Component Value Date/Time    Microalb/Creat ratio (ug/mg creat.) 8.0 03/25/2020 10:00 AM             Prior to Admission medications    Medication Sig Start Date End Date Taking? Authorizing Provider   meloxicam (MOBIC) 15 mg tablet TAKE 1 TABLET BY MOUTH EVERY DAY 6/1/20   Eduardo Coley PA-C   bacitracin zinc (BACITRACIN) ointment Apply  to affected area two (2) times a day. 3/25/20   Fela Aguilar MD   amLODIPine (NORVASC) 5 mg tablet Take 1 Tab by mouth daily. 3/25/20   Fela Aguilar MD   telmisartan-hydroCHLOROthiazide (MICARDIS HCT) 80-25 mg per tablet Take 1 Tab by mouth daily. 3/25/20   Fela Aguilar MD   naproxen (NAPROSYN) 500 mg tablet Take 1 Tab by mouth two (2) times daily as needed for Pain. 2/4/20   Fela Aguilar MD   mometasone (ELOCON) 0.1 % topical cream APPLY 1 APPLICATION BID TO AFFECTED AREAS OF THE HEAD AND NECK 9/24/19   Provider, Historical   OTHER Rx scalp oil - Fluocinolone Acetonide Body Oil    Provider, Historical   fluticasone propionate (FLONASE) 50 mcg/actuation nasal spray USE 2 SPRAYS IN EACH NOSTRIL DAILY AS NEEDED FOR RHINITIS 10/4/19   Fela Aguilar MD   cetirizine (ZYRTEC) 5 mg tablet Take 1 Tab by mouth daily as needed for Allergies. 4/19/19   Fela Aguilar MD   raNITIdine (ZANTAC) 150 mg tablet Take 1 Tab by mouth two (2) times a day. 4/5/19   Fela Aguilar MD   ergocalciferol (VITAMIN D2) 50,000 unit capsule Take 1 Cap by mouth every seven (7) days.  1/15/19   Tracie Rodriguez DO   SEBEX 2-2 % shampoo USE SHAMPOO WEEKLY AS DIRECTED 12/5/17   Provider, Historical     No Known Allergies    Patient Active Problem List    Diagnosis Date Noted    Pituitary microadenoma ([address] 10/04/2019    Well controlled diabetes mellitus ([address] 10/04/2019    Pain of multiple sites 10/04/2019    Severe obesity (BMI 35.0-39.9) 07/19/2018    Dyslipidemia 03/14/2018    Encounter for screening mammogram for breast cancer 12/12/2017    DDD (degenerative disc disease), thoracic 02/14/2017    Acute right-sided thoracic back pain 02/07/2017  Sinus congestion 01/19/2017    Hyperglycemia due to type 2 diabetes mellitus ([facility] [address] 10/13/2016    Bursitis of left hip 10/13/2016    Nausea 05/25/2016    Constipation 05/25/2016    Knee pain 04/07/2016    Obesity (BMI 30.0-34.9) 10/08/2015    History of screening mammography 10/08/2015    Type II diabetes mellitus ([facility] [address] 10/08/2015    Need for hepatitis C screening test 10/08/2015    Encounter for long-term (current) use of medications 04/07/2015    Obesity 02/24/2015    Hypovitaminosis D 03/03/2014    Elevated CPK 03/03/2014    Hypertension     Arthritis     AR (allergic rhinitis)      Current Outpatient Medications   Medication Sig Dispense Refill    escitalopram oxalate (LEXAPRO) 10 mg tablet Take 1 Tab by mouth daily. 30 Tab 2    pantoprazole (PROTONIX) 20 mg tablet Take 1 Tab by mouth daily. 30 Tab 2    meloxicam (MOBIC) 15 mg tablet TAKE 1 TABLET BY MOUTH EVERY DAY 30 Tab 2    amLODIPine (NORVASC) 5 mg tablet Take 1 Tab by mouth daily. 90 Tab 1    telmisartan-hydroCHLOROthiazide (MICARDIS HCT) 80-25 mg per tablet Take 1 Tab by mouth daily. 90 Tab 1    mometasone (ELOCON) 0.1 % topical cream APPLY 1 APPLICATION BID TO AFFECTED AREAS OF THE HEAD AND NECK  2    fluticasone propionate (FLONASE) 50 mcg/actuation nasal spray USE 2 SPRAYS IN EACH NOSTRIL DAILY AS NEEDED FOR RHINITIS 1 Bottle 1    SEBEX 2-2 % shampoo USE SHAMPOO WEEKLY AS DIRECTED  5    OTHER Rx scalp oil - Fluocinolone Acetonide Body Oil      cetirizine (ZYRTEC) 5 mg tablet Take 1 Tab by mouth daily as needed for Allergies. 30 Tab 0     No Known Allergies  Past Medical History:   Diagnosis Date    AR (allergic rhinitis)     Arthritis     Diabetes ([facility] [address]     Headache     Heart murmur     History of blood transfusion     Hypertension     Hypovitaminosis D 3/3/2014     Social History     Tobacco Use    Smoking status: Never Smoker    Smokeless tobacco: Never Used   Substance Use Topics    Alcohol use:  Yes Alcohol/week: 0.8 standard drinks     Types: 1 Standard drinks or equivalent per week     Frequency: Monthly or less     Drinks per session: 1 or 2     Binge frequency: Never       ROS: See HPI      Objective: There were no vitals taken for this visit. General: alert, cooperative, no distress   Mental  status: normal mood, behavior, speech, dress, motor activity, and thought processes, able to follow commands   HENT: NCAT   Neck: no visualized mass   Resp: no respiratory distress   Neuro: no gross deficits   Skin: no discoloration or lesions of concern on visible areas   Psychiatric: normal affect, consistent with stated mood, no evidence of hallucinations     Additional exam findings: We discussed the expected course, resolution and complications of the diagnosis(es) in detail. Medication risks, benefits, costs, interactions, and alternatives were discussed as indicated. I advised her to contact the office if her condition worsens, changes or fails to improve as anticipated. She expressed understanding with the diagnosis(es) and plan. Ernie Domínguez is a 64 y.o. female who was evaluated by a video visit encounter for concerns as above. Patient identification was verified prior to start of the visit. A caregiver was present when appropriate. Due to this being a TeleHealth encounter (During [facility]- public health emergency), evaluation of the following organ systems was limited: Vitals/Constitutional/EENT/Resp/CV/GI//MS/Neuro/Skin/Heme-Lymph-Imm. Pursuant to the emergency declaration under the [facility], [facility] waiver authority and the [employer] General Act, this Virtual  Visit was conducted, with patient's (and/or legal guardian's) consent, to reduce the patient's risk of exposure to COVID-19 and provide necessary medical care.      Services were provided through a video synchronous discussion virtually to substitute for in-person clinic visit. Patient and provider were located at their individual homes.       Elvia Gutierrez MD

## 2020-08-02 DIAGNOSIS — I10 ESSENTIAL HYPERTENSION: ICD-10-CM

## 2020-08-03 RX ORDER — AMLODIPINE BESYLATE 5 MG/1
TABLET ORAL
Qty: 90 TAB | Refills: 1 | Status: SHIPPED | OUTPATIENT
Start: 2020-08-03 | End: 2021-04-06 | Stop reason: SDUPTHER

## 2020-08-04 DIAGNOSIS — I10 ESSENTIAL HYPERTENSION: ICD-10-CM

## 2020-08-04 RX ORDER — TELMISARTAN AND HYDROCHLORTHIAZIDE 80; 25 MG/1; MG/1
1 TABLET ORAL DAILY
Qty: 90 TAB | Refills: 1 | Status: SHIPPED | OUTPATIENT
Start: 2020-08-04 | End: 2021-04-06 | Stop reason: SDUPTHER

## 2020-08-04 NOTE — TELEPHONE ENCOUNTER
This pharmacy faxed over request for the following prescriptions to be filled:    Medication requested :   Requested Prescriptions     Pending Prescriptions Disp Refills    telmisartan-hydroCHLOROthiazide (MICARDIS HCT) 80-25 mg per tablet 90 Tab 1     Sig: Take 1 Tab by mouth daily. PCP: Wilfrido Terrazas or Print: [facility]  Mail order or Local pharmacy [address] [facility] [place] .  Due 8/26/2020    Scheduled appointment if not seen by current providers in office: LOV 6/26/2020 LMOV to schedule appt due 8/26/2020

## 2020-09-09 ENCOUNTER — VIRTUAL VISIT (OUTPATIENT)
Dept: FAMILY MEDICINE CLINIC | Age: 60
End: 2020-09-09

## 2020-09-09 DIAGNOSIS — E11.9 WELL CONTROLLED DIABETES MELLITUS (HCC): ICD-10-CM

## 2020-09-09 DIAGNOSIS — F41.9 ANXIETY: ICD-10-CM

## 2020-09-09 DIAGNOSIS — R52 PAIN OF MULTIPLE SITES: ICD-10-CM

## 2020-09-09 DIAGNOSIS — K21.9 GASTROESOPHAGEAL REFLUX DISEASE WITHOUT ESOPHAGITIS: Primary | ICD-10-CM

## 2020-09-09 RX ORDER — NAPROXEN 250 MG/1
TABLET ORAL 2 TIMES DAILY WITH MEALS
COMMUNITY
End: 2021-02-09 | Stop reason: ALTCHOICE

## 2020-09-09 NOTE — PROGRESS NOTES
Desire Jane is a 64 y.o. female who was seen by synchronous (real-time) audio-video technology on 9/9/2020 for Follow-up        Assessment & Plan:   Diagnoses and all orders for this visit:    Gastroesophageal reflux disease without esophagitis: Has been stable with the current dose of PPI. Diet modification. Will observe. She has stopped the Mobic as it was worsening the GERD symptom. Now she is taking naproxen over-the-counter. Discussed that it is also NSAID. Take it as needed only. Anxiety: Anu Bock she is feeling better with the anxiety. She was getting Lexapro last visit but has decided not to take it. Feeling better now. Will observe and follow-up next visit. Well controlled diabetes mellitus ([address]): Last A1c in prediabetic range. She is working on healthy lifestyle with the diet modification and exercise. Will observe follow-up next visit    Pain of multiple sites: She has stopped taking Mobic as it was worsening her GERD symptom. Now symptoms fairly stable with the current dose of naproxen over-the-counter. Explained to her that it is also class of NSAID. Advised to take the medication with the food. Will observe. Comments:  not taking mobic / taking naproxen. advised to take it with food. also advised to avoid NSAIDs and take tyelnol     Patient understood and agree with above plan. She has received the first dose of Shingrix from the pharmacy at [place]. Nurse to advised to obtain the records  She had an eye exam done with Dr. Modesta Can. We will obtain the records that it was done last week. [id number]  Subjective:     Done visit through my chart  Today here for follow-up. Last visit she was feeling anxiety. Started her on Lexapro. She has decided not to take medication. Currently not taking any medication or going for any counseling. Said feeling better. No panic attacks.   During the virtual visit she was sitting comfortable and did not appear in any acute distress. Currently GERD symptom has been stable. Said Mobic was making it worse which she was taking for multiple side pain. Now she has been taking her PPI with compliance and that has improved the symptoms. Also taking naproxen which I have discussed with her that it is also a same group of medication which called NSAIDs. Take it only as needed. Denies any headache, dizziness, no chest pain or trouble breathing, no arm or leg weakness. No nausea or vomiting, no weight or appetite changes, no mood changes . No urine or bowel complains, no palpitation, no diaphoresis. No abdominal pain. No cold or cough. No leg swelling. No fever. No sleep trouble. History of diabetes. A1c in prediabetic range. Asymptomatic at this time. No hypo-or hyperglycemic symptoms. Will continue current plan. Lab Results   Component Value Date/Time    Sodium 142 06/18/2020 11:05 AM    Potassium 3.9 06/18/2020 11:05 AM    Chloride 103 06/18/2020 11:05 AM    CO2 25 06/18/2020 11:05 AM    Anion gap 13.7 06/18/2020 11:05 AM    Glucose 103 (H) 06/18/2020 11:05 AM    BUN 19 06/18/2020 11:05 AM    Creatinine 0.8 06/18/2020 11:05 AM    Calcium 9.3 06/18/2020 11:05 AM    Bilirubin, total 0.2 06/18/2020 11:05 AM    Alk.  phosphatase 28 06/18/2020 11:05 AM    Protein, total 6.3 (L) 06/18/2020 11:05 AM    Albumin 4.3 06/18/2020 11:05 AM    Globulin 2.0 06/18/2020 11:05 AM    A-G Ratio 2.2 06/18/2020 11:05 AM    ALT (SGPT) 12 06/18/2020 11:05 AM    AST (SGOT) 14 06/18/2020 11:05 AM     Lab Results   Component Value Date/Time    Hemoglobin A1c 6.1 (H) 06/18/2020 11:05 AM    Hemoglobin A1c (POC) 5.5 02/24/2015 09:58 AM     Lab Results   Component Value Date/Time    WBC 5.3 03/25/2020 08:34 AM    HGB 11.4 (L) 03/25/2020 08:34 AM    HCT 38.2 03/25/2020 08:34 AM    PLATELET 276 08/96/7345 08:34 AM    MCV 88 03/25/2020 08:34 AM     Lab Results   Component Value Date/Time    Cholesterol, total 161 06/18/2020 11:05 AM    HDL Cholesterol 46 06/18/2020 11:05 AM    LDL, calculated 107 (H) 06/18/2020 11:05 AM    VLDL, calculated 8 06/18/2020 11:05 AM    Triglyceride 42 06/18/2020 11:05 AM     Lab Results   Component Value Date/Time    Microalb/Creat ratio (ug/mg creat.) 8.0 03/25/2020 10:00 AM     Lab Results   Component Value Date/Time    VITAMIN D, 25-HYDROXY 42.8 05/21/2019 08:18 AM         Prior to Admission medications    Medication Sig Start Date End Date Taking? Authorizing Provider   amLODIPine (NORVASC) 5 mg tablet TAKE 1 TABLET BY MOUTH DAILY 8/3/20  Yes Don Bauer MD   mometasone (ELOCON) 0.1 % topical cream APPLY 1 APPLICATION BID TO AFFECTED AREAS OF THE HEAD AND NECK 9/24/19  Yes Provider, Historical   fluticasone propionate (FLONASE) 50 mcg/actuation nasal spray USE 2 SPRAYS IN EACH NOSTRIL DAILY AS NEEDED FOR RHINITIS 10/4/19  Yes Don Bauer MD   cetirizine (ZYRTEC) 5 mg tablet Take 1 Tab by mouth daily as needed for Allergies. 4/19/19  Yes Don Bauer MD   telmisartan-hydroCHLOROthiazide (MICARDIS HCT) 80-25 mg per tablet Take 1 Tab by mouth daily. 8/4/20   Don Bauer MD   pantoprazole (PROTONIX) 20 mg tablet Take 1 Tab by mouth daily. 6/26/20   Don Bauer MD   escitalopram oxalate (LEXAPRO) 10 mg tablet Take 1 Tab by mouth daily.  6/26/20 9/9/20  Don Bauer MD   meloxicam (MOBIC) 15 mg tablet TAKE 1 TABLET BY MOUTH EVERY DAY 6/1/20 9/9/20  Camila Osullivan PA-C   OTHER Rx scalp oil - Fluocinolone Acetonide Body Oil    Provider, Historical   SEBEX 2-2 % shampoo USE SHAMPOO WEEKLY AS DIRECTED 12/5/17   Provider, Historical     Patient Active Problem List    Diagnosis Date Noted    Pituitary microadenoma ([facility] [address] 10/04/2019    Well controlled diabetes mellitus ([facility] [address] 10/04/2019    Pain of multiple sites 10/04/2019    Severe obesity (BMI 35.0-39.9) 07/19/2018    Dyslipidemia 03/14/2018    Encounter for screening mammogram for breast cancer 12/12/2017    DDD (degenerative disc disease), thoracic 02/14/2017  Acute right-sided thoracic back pain 02/07/2017    Sinus congestion 01/19/2017    Hyperglycemia due to type 2 diabetes mellitus ([facility] [address] 10/13/2016    Bursitis of left hip 10/13/2016    Nausea 05/25/2016    Constipation 05/25/2016    Knee pain 04/07/2016    Obesity (BMI 30.0-34.9) 10/08/2015    History of screening mammography 10/08/2015    Type II diabetes mellitus ([facility] [address] 10/08/2015    Need for hepatitis C screening test 10/08/2015    Encounter for long-term (current) use of medications 04/07/2015    Obesity 02/24/2015    Hypovitaminosis D 03/03/2014    Elevated CPK 03/03/2014    Hypertension     Arthritis     AR (allergic rhinitis)      Current Outpatient Medications   Medication Sig Dispense Refill    naproxen (NAPROSYN) 250 mg tablet Take  by mouth two (2) times daily (with meals).  amLODIPine (NORVASC) 5 mg tablet TAKE 1 TABLET BY MOUTH DAILY 90 Tab 1    mometasone (ELOCON) 0.1 % topical cream APPLY 1 APPLICATION BID TO AFFECTED AREAS OF THE HEAD AND NECK  2    fluticasone propionate (FLONASE) 50 mcg/actuation nasal spray USE 2 SPRAYS IN EACH NOSTRIL DAILY AS NEEDED FOR RHINITIS 1 Bottle 1    cetirizine (ZYRTEC) 5 mg tablet Take 1 Tab by mouth daily as needed for Allergies. 30 Tab 0    telmisartan-hydroCHLOROthiazide (MICARDIS HCT) 80-25 mg per tablet Take 1 Tab by mouth daily. 90 Tab 1    pantoprazole (PROTONIX) 20 mg tablet Take 1 Tab by mouth daily. 30 Tab 2    OTHER Rx scalp oil - Fluocinolone Acetonide Body Oil      SEBEX 2-2 % shampoo USE SHAMPOO WEEKLY AS DIRECTED  5     No Known Allergies  Social History     Tobacco Use    Smoking status: Never Smoker    Smokeless tobacco: Never Used   Substance Use Topics    Alcohol use:  Yes     Alcohol/week: 0.8 standard drinks     Types: 1 Standard drinks or equivalent per week     Frequency: Monthly or less     Drinks per session: 1 or 2     Binge frequency: Never       ROS: see HPI     Objective:     Patient-Reported Vitals 9/9/2020 Patient-Reported Weight 194   Patient-Reported Height 5'1   Patient-Reported Pulse 55   Patient-Reported Systolic  014   Patient-Reported Diastolic 71      General: alert, cooperative, no distress   Mental  status: normal mood, behavior, speech, dress, motor activity, and thought processes, able to follow commands   HENT: NCAT   Neck: no visualized mass   Resp: no respiratory distress   Neuro: no gross deficits   Skin: no discoloration or lesions of concern on visible areas   Psychiatric: normal affect, consistent with stated mood, no evidence of hallucinations     Additional exam findings: We discussed the expected course, resolution and complications of the diagnosis(es) in detail. Medication risks, benefits, costs, interactions, and alternatives were discussed as indicated. I advised her to contact the office if her condition worsens, changes or fails to improve as anticipated. She expressed understanding with the diagnosis(es) and plan. Suleiman Steve, who was evaluated through a patient-initiated, synchronous (real-time) audio-video encounter, and/or her healthcare decision maker, is aware that it is a billable service, with coverage as determined by her insurance carrier. She provided verbal consent to proceed: Yes, and patient identification was verified. It was conducted pursuant to the emergency declaration under the [facility], [address] [place] [address] authority and the [employer] General Act. A caregiver was present when appropriate. Ability to conduct physical exam was limited. I was in the office. The patient was at home.       Deon Stanton MD

## 2020-10-07 ENCOUNTER — CLINICAL SUPPORT (OUTPATIENT)
Dept: FAMILY MEDICINE CLINIC | Age: 60
End: 2020-10-07
Payer: COMMERCIAL

## 2020-10-07 DIAGNOSIS — Z23 ENCOUNTER FOR IMMUNIZATION: Primary | ICD-10-CM

## 2020-10-07 PROCEDURE — 90686 IIV4 VACC NO PRSV 0.5 ML IM: CPT | Performed by: FAMILY MEDICINE

## 2020-10-07 PROCEDURE — 90471 IMMUNIZATION ADMIN: CPT | Performed by: FAMILY MEDICINE

## 2020-10-07 NOTE — PROGRESS NOTES
Chief Complaint   Patient presents with    Immunization/Injection     Flu vaccine     Patient presents for flu vaccine. Consent obtained, Tolerated procedure well at right deltoid. Patient remained in office 10 minutes post vaccine. No side effects noted.      Lot # [id number] [facility]  exp 006/30/21  [employer]: [id number]

## 2020-11-05 ENCOUNTER — HOSPITAL ENCOUNTER (OUTPATIENT)
Dept: MAMMOGRAPHY | Age: 60
Discharge: HOME | End: 2020-11-05
Attending: FAMILY MEDICINE
Payer: COMMERCIAL

## 2020-11-05 DIAGNOSIS — Z12.39 SCREENING FOR BREAST CANCER: ICD-10-CM

## 2020-11-05 PROCEDURE — 77063 BREAST TOMOSYNTHESIS BI: CPT

## 2021-02-09 ENCOUNTER — VIRTUAL VISIT (OUTPATIENT)
Dept: FAMILY MEDICINE CLINIC | Age: 61
End: 2021-02-09
Payer: COMMERCIAL

## 2021-02-09 DIAGNOSIS — R52 PAIN OF MULTIPLE SITES: ICD-10-CM

## 2021-02-09 DIAGNOSIS — I10 ESSENTIAL HYPERTENSION: ICD-10-CM

## 2021-02-09 DIAGNOSIS — E11.9 WELL CONTROLLED DIABETES MELLITUS (HCC): ICD-10-CM

## 2021-02-09 DIAGNOSIS — E11.9 WELL CONTROLLED DIABETES MELLITUS (HCC): Primary | ICD-10-CM

## 2021-02-09 DIAGNOSIS — E78.5 DYSLIPIDEMIA: ICD-10-CM

## 2021-02-09 DIAGNOSIS — E55.9 VITAMIN D DEFICIENCY: ICD-10-CM

## 2021-02-09 PROCEDURE — 99214 OFFICE O/P EST MOD 30 MIN: CPT | Performed by: FAMILY MEDICINE

## 2021-02-09 RX ORDER — NAPROXEN SODIUM 220 MG
220 TABLET ORAL 2 TIMES DAILY WITH MEALS
COMMUNITY
End: 2022-05-11

## 2021-02-09 NOTE — PROGRESS NOTES
Reggie Segura is a 64 y.o. female who was seen by synchronous (real-time) audio-video technology on 2/9/2021 for Diabetes, GERD, Other (multiple site pain ), and Hypertension        Assessment & Plan:   Diagnoses and all orders for this visit:    Well controlled diabetes mellitus ([address]): Last A1c in prediabetic range. Not on any medication. Working on lifestyle modification. Will recheck labs before next visit. Continue current plan  -     METABOLIC PANEL, COMPREHENSIVE; Future  -     HEMOGLOBIN A1C WITH EAG; Future  -     MICROALBUMIN, UR, RAND W/ MICROALB/CREAT RATIO; Future  -     TSH 3RD GENERATION; Future  -     LIPID PANEL; Future    Pain of multiple sites: Multiple side pain. Fairly stable at this time. Taking Advil as needed. Will observe    Dyslipidemia: Working on lifestyle modification/diet modification. Exercise as tolerated. Used to walk but due to weather has been limited. We will follow-up next visit    Essential hypertension: Been checking vitals at home. Asymptomatic. We will continue current plan    Vitamin D deficiency: On OTC supplement. Will recheck labs  -     VITAMIN D, 25 HYDROXY; Future    Patient understood and agreed with the plan  Review health maintenance  Up-to-date with shingles vaccine, mammogram.      Please note that this dictation was completed with [employer], the computer voice recognition software. Quite often unanticipated grammatical, syntax, homophones, and other interpretive errors are inadvertently transcribed by the computer software. Please disregard these errors. Please excuse any errors that have escaped final proofreading. [id number]  Subjective:   Done visit through my chart  Here for follow-up  History of hypertension. Taking medication with compliance. No side effects. Asymptomatic during the visit. During the visit sitting comfortable and did not appear in any acute distress.   Denies any headache, dizziness, no chest pain or trouble breathing, no arm or leg weakness. No nausea or vomiting, no weight or appetite changes, no mood changes . No urine or bowel complains, no palpitation, no diaphoresis. No abdominal pain. No cold or cough. No leg swelling. No fever. No sleep trouble. Well-controlled diabetes. Not on any medication. No hyper or hypoglycemic symptoms. Last A1c was in prediabetic range. Dyslipidemia. Not on any medication. Lifestyle modification. Does have a multiple side pain currently no joint swelling. Taking Advil as needed. Review prior labs. Lab Results   Component Value Date/Time    WBC 5.3 03/25/2020 08:34 AM    HGB 11.4 (L) 03/25/2020 08:34 AM    HCT 38.2 03/25/2020 08:34 AM    PLATELET 574 34/69/7939 08:34 AM    MCV 88 03/25/2020 08:34 AM     Lab Results   Component Value Date/Time    Sodium 142 06/18/2020 11:05 AM    Potassium 3.9 06/18/2020 11:05 AM    Chloride 103 06/18/2020 11:05 AM    CO2 25 06/18/2020 11:05 AM    Anion gap 13.7 06/18/2020 11:05 AM    Glucose 103 (H) 06/18/2020 11:05 AM    BUN 19 06/18/2020 11:05 AM    Creatinine 0.8 06/18/2020 11:05 AM    Calcium 9.3 06/18/2020 11:05 AM    Bilirubin, total 0.2 06/18/2020 11:05 AM    Alk.  phosphatase 28 06/18/2020 11:05 AM    Protein, total 6.3 (L) 06/18/2020 11:05 AM    Albumin 4.3 06/18/2020 11:05 AM    Globulin 2.0 06/18/2020 11:05 AM    A-G Ratio 2.2 06/18/2020 11:05 AM    ALT (SGPT) 12 06/18/2020 11:05 AM    AST (SGOT) 14 06/18/2020 11:05 AM     Lab Results   Component Value Date/Time    Cholesterol, total 161 06/18/2020 11:05 AM    HDL Cholesterol 46 06/18/2020 11:05 AM    LDL, calculated 107 (H) 06/18/2020 11:05 AM    VLDL, calculated 8 06/18/2020 11:05 AM    Triglyceride 42 06/18/2020 11:05 AM     Lab Results   Component Value Date/Time    TSH 2.56 10/11/2018 08:12 PM     Lab Results   Component Value Date/Time    Hemoglobin A1c 6.1 (H) 06/18/2020 11:05 AM    Hemoglobin A1c (POC) 5.5 02/24/2015 09:58 AM     Lab Results   Component Value Date/Time    VITAMIN D, 25-HYDROXY 42.8 05/21/2019 08:18 AM       Lab Results   Component Value Date/Time    Microalb/Creat ratio (ug/mg creat.) 8.0 03/25/2020 10:00 AM         Prior to Admission medications    Medication Sig Start Date End Date Taking? Authorizing Provider   naproxen sodium (Aleve) 220 mg tablet Take 220 mg by mouth two (2) times daily (with meals). Yes Provider, Historical   telmisartan-hydroCHLOROthiazide (MICARDIS HCT) 80-25 mg per tablet Take 1 Tab by mouth daily. 8/4/20  Yes Jermaine You MD   amLODIPine (NORVASC) 5 mg tablet TAKE 1 TABLET BY MOUTH DAILY 8/3/20  Yes Jermaine You MD   pantoprazole (PROTONIX) 20 mg tablet Take 1 Tab by mouth daily. 6/26/20  Yes Jermaine You MD   mometasone (ELOCON) 0.1 % topical cream APPLY 1 APPLICATION BID TO AFFECTED AREAS OF THE HEAD AND NECK 9/24/19  Yes Provider, Historical   OTHER Rx scalp oil - Fluocinolone Acetonide Body Oil   Yes Provider, Historical   SEBEX 2-2 % shampoo USE SHAMPOO WEEKLY AS DIRECTED 12/5/17  Yes Provider, Historical   naproxen (NAPROSYN) 250 mg tablet Take  by mouth two (2) times daily (with meals). 2/9/21  Provider, Historical   fluticasone propionate (FLONASE) 50 mcg/actuation nasal spray USE 2 SPRAYS IN EACH NOSTRIL DAILY AS NEEDED FOR RHINITIS 10/4/19   Jermaine You MD   cetirizine (ZYRTEC) 5 mg tablet Take 1 Tab by mouth daily as needed for Allergies.  4/19/19   Jermaine You MD     Patient Active Problem List    Diagnosis Date Noted    Pituitary microadenoma [facility]) 10/04/2019    Well controlled diabetes mellitus ([facility] 10/04/2019    Pain of multiple sites 10/04/2019    Severe obesity (BMI 35.0-39.9) 07/19/2018    Dyslipidemia 03/14/2018    Encounter for screening mammogram for breast cancer 12/12/2017    DDD (degenerative disc disease), thoracic 02/14/2017    Acute right-sided thoracic back pain 02/07/2017    Sinus congestion 01/19/2017    Hyperglycemia due to type 2 diabetes mellitus ([facility] 10/13/2016    Bursitis of left hip 10/13/2016    Nausea 05/25/2016    Constipation 05/25/2016    Knee pain 04/07/2016    Obesity (BMI 30.0-34.9) 10/08/2015    History of screening mammography 10/08/2015    Type II diabetes mellitus ([facility] 10/08/2015    Need for hepatitis C screening test 10/08/2015    Encounter for long-term (current) use of medications 04/07/2015    Obesity 02/24/2015    Hypovitaminosis D 03/03/2014    Elevated CPK 03/03/2014    Hypertension     Arthritis     AR (allergic rhinitis)      Current Outpatient Medications   Medication Sig Dispense Refill    naproxen sodium (Aleve) 220 mg tablet Take 220 mg by mouth two (2) times daily (with meals).  telmisartan-hydroCHLOROthiazide (MICARDIS HCT) 80-25 mg per tablet Take 1 Tab by mouth daily. 90 Tab 1    amLODIPine (NORVASC) 5 mg tablet TAKE 1 TABLET BY MOUTH DAILY 90 Tab 1    pantoprazole (PROTONIX) 20 mg tablet Take 1 Tab by mouth daily. 30 Tab 2    mometasone (ELOCON) 0.1 % topical cream APPLY 1 APPLICATION BID TO AFFECTED AREAS OF THE HEAD AND NECK  2    OTHER Rx scalp oil - Fluocinolone Acetonide Body Oil      SEBEX 2-2 % shampoo USE SHAMPOO WEEKLY AS DIRECTED  5    fluticasone propionate (FLONASE) 50 mcg/actuation nasal spray USE 2 SPRAYS IN EACH NOSTRIL DAILY AS NEEDED FOR RHINITIS 1 Bottle 1    cetirizine (ZYRTEC) 5 mg tablet Take 1 Tab by mouth daily as needed for Allergies. 30 Tab 0     No Known Allergies  Past Medical History:   Diagnosis Date    AR (allergic rhinitis)     Arthritis     Diabetes ([facility]     Headache     Heart murmur     History of blood transfusion     Hypertension     Hypovitaminosis D 3/3/2014     Social History     Tobacco Use    Smoking status: Never Smoker    Smokeless tobacco: Never Used   Substance Use Topics    Alcohol use:  Yes     Alcohol/week: 0.8 standard drinks     Types: 1 Standard drinks or equivalent per week     Frequency: Monthly or less     Drinks per session: 1 or 2     Binge frequency: Never       ROS: see HPI    Objective:     Patient-Reported Vitals 2/9/2021   Patient-Reported Weight 197lbs   Patient-Reported Height -   Patient-Reported Pulse 56   Patient-Reported Systolic  053   Patient-Reported Diastolic 64      General: alert, cooperative, no distress   Mental  status: normal mood, behavior, speech, dress, motor activity, and thought processes, able to follow commands   HENT: NCAT   Neck: no visualized mass   Resp: no respiratory distress   Neuro: no gross deficits   Skin: no discoloration or lesions of concern on visible areas   Psychiatric: normal affect, consistent with stated mood, no evidence of hallucinations     Additional exam findings: We discussed the expected course, resolution and complications of the diagnosis(es) in detail. Medication risks, benefits, costs, interactions, and alternatives were discussed as indicated. I advised her to contact the office if her condition worsens, changes or fails to improve as anticipated. She expressed understanding with the diagnosis(es) and plan. Lyle Molina, who was evaluated through a patient-initiated, synchronous (real-time) audio-video encounter, and/or her healthcare decision maker, is aware that it is a billable service, with coverage as determined by her insurance carrier. She provided verbal consent to proceed: Yes, and patient identification was verified. It was conducted pursuant to the emergency declaration under the [address] [place] [address] authority and the [employer] General Act. A caregiver was present when appropriate. Ability to conduct physical exam was limited. I was in the office. The patient was at home.       Tram Larry MD

## 2021-04-06 DIAGNOSIS — I10 ESSENTIAL HYPERTENSION: ICD-10-CM

## 2021-04-06 RX ORDER — TELMISARTAN AND HYDROCHLORTHIAZIDE 80; 25 MG/1; MG/1
TABLET ORAL
Qty: 90 TAB | Refills: 1 | Status: SHIPPED | OUTPATIENT
Start: 2021-04-06 | End: 2021-10-11

## 2021-04-06 RX ORDER — AMLODIPINE BESYLATE 5 MG/1
TABLET ORAL
Qty: 90 TAB | Refills: 1 | Status: SHIPPED | OUTPATIENT
Start: 2021-04-06 | End: 2021-10-11 | Stop reason: SDUPTHER

## 2021-05-04 ENCOUNTER — HOSPITAL ENCOUNTER (OUTPATIENT)
Dept: LAB | Age: 61
Discharge: HOME | End: 2021-05-04

## 2021-05-04 LAB — SENTARA SPECIMEN COL,SENBCF: NORMAL

## 2021-05-04 PROCEDURE — 99001 SPECIMEN HANDLING PT-LAB: CPT

## 2021-05-05 LAB
25(OH)D3 SERPL-MCNC: 43.9 NG/ML (ref 32–100)
A-G RATIO,AGRAT: 1.8 RATIO (ref 1.1–2.6)
ALBUMIN SERPL-MCNC: 4.1 G/DL (ref 3.5–5)
ALP SERPL-CCNC: 30 U/L (ref 25–115)
ALT SERPL-CCNC: 28 U/L (ref 5–40)
ANION GAP SERPL CALC-SCNC: 11 MMOL/L (ref 3–15)
AST SERPL W P-5'-P-CCNC: 20 U/L (ref 10–37)
AVG GLU, 10930: 124 MG/DL (ref 91–123)
BILIRUB SERPL-MCNC: 0.3 MG/DL (ref 0.2–1.2)
BUN SERPL-MCNC: 17 MG/DL (ref 6–22)
CALCIUM SERPL-MCNC: 9 MG/DL (ref 8.4–10.5)
CHLORIDE SERPL-SCNC: 104 MMOL/L (ref 98–110)
CHOLEST SERPL-MCNC: 167 MG/DL (ref 110–200)
CO2 SERPL-SCNC: 27 MMOL/L (ref 20–32)
CREAT SERPL-MCNC: 0.8 MG/DL (ref 0.5–1.2)
CREATININE, URINE: 162 MG/DL
GFRAA, 66117: >60
GFRNA, 66118: >60
GLOBULIN,GLOB: 2.3 G/DL (ref 2–4)
GLUCOSE SERPL-MCNC: 100 MG/DL (ref 70–99)
HBA1C MFR BLD HPLC: 6 % (ref 4.8–5.6)
HDLC SERPL-MCNC: 3.2 MG/DL (ref 0–5)
HDLC SERPL-MCNC: 52 MG/DL
LDL/HDL RATIO,LDHD: 2
LDLC SERPL CALC-MCNC: 104 MG/DL (ref 50–99)
MICROALB/CREAT RATIO, 140286: NORMAL
MICROALBUMIN,URINE RANDOM 140054: <12 MG/L (ref 0.1–17)
NON-HDL CHOLESTEROL, 011976: 115 MG/DL
POTASSIUM SERPL-SCNC: 4 MMOL/L (ref 3.5–5.5)
PROT SERPL-MCNC: 6.4 G/DL (ref 6.4–8.3)
SODIUM SERPL-SCNC: 142 MMOL/L (ref 133–145)
TRIGL SERPL-MCNC: 59 MG/DL (ref 40–149)
TSH SERPL DL<=0.005 MIU/L-ACNC: 2.64 MCU/ML (ref 0.27–4.2)
VLDLC SERPL CALC-MCNC: 12 MG/DL (ref 8–30)

## 2021-05-05 NOTE — PROGRESS NOTES
Labs fairly stable. A1C improved little but still in prediabetic range. Will discuss further on follow up visit.

## 2021-05-11 ENCOUNTER — OFFICE VISIT (OUTPATIENT)
Dept: FAMILY MEDICINE CLINIC | Age: 61
End: 2021-05-11
Payer: COMMERCIAL

## 2021-05-11 VITALS
OXYGEN SATURATION: 98 % | BODY MASS INDEX: 37.38 KG/M2 | TEMPERATURE: 98.1 F | SYSTOLIC BLOOD PRESSURE: 158 MMHG | DIASTOLIC BLOOD PRESSURE: 100 MMHG | HEIGHT: 61 IN | RESPIRATION RATE: 16 BRPM | WEIGHT: 198 LBS | HEART RATE: 60 BPM

## 2021-05-11 DIAGNOSIS — M19.90 ARTHRITIS: ICD-10-CM

## 2021-05-11 DIAGNOSIS — E11.9 WELL CONTROLLED DIABETES MELLITUS (HCC): Primary | ICD-10-CM

## 2021-05-11 DIAGNOSIS — R52 PAIN OF MULTIPLE SITES: ICD-10-CM

## 2021-05-11 DIAGNOSIS — L98.9 SKIN PROBLEM: ICD-10-CM

## 2021-05-11 DIAGNOSIS — I10 ESSENTIAL HYPERTENSION: ICD-10-CM

## 2021-05-11 DIAGNOSIS — D35.2 PITUITARY MICROADENOMA (HCC): ICD-10-CM

## 2021-05-11 PROCEDURE — 99213 OFFICE O/P EST LOW 20 MIN: CPT | Performed by: FAMILY MEDICINE

## 2021-05-11 NOTE — PROGRESS NOTES
Chief Complaint   Patient presents with    Diabetes    Hypertension    Cholesterol Problem    Results     1. Have you been to the ER, urgent care clinic since your last visit? Hospitalized since your last visit? No    2. Have you seen or consulted any other health care providers outside of the [address] [place] [address] since your last visit? Include any pap smears or colon screening.  No

## 2021-05-11 NOTE — PROGRESS NOTES
HISTORY OF PRESENT ILLNESS  Bhargav Marie is a 63 y.o. female. HPI: Here for follow-up  Elevated blood pressure. Repeat was elevated as well. She has missed her blood pressure medication since 2 days. She is asymptomatic. Sitting comfortable without any acute distress. Usually very compliant with medication. Was overly busy and forgot to take them for 2 days. Denies any headache, dizziness, no chest pain or trouble breathing, no arm or leg weakness. No nausea or vomiting, no weight or appetite changes, no mood changes . No urine or bowel complains, no palpitation, no diaphoresis. No abdominal pain. No cold or cough. No leg swelling. No fever. No sleep trouble. History of diabetes. Well controlled. Continue current plan. No hyper or hypoglycemic symptoms. No side effects of medication. At the multiple site and arthritic pain which has been stable at this time. No concern. No joint swelling or redness. Visit Vitals  BP (!) 158/100 (BP 1 Location: Left upper arm, BP Patient Position: Sitting, BP Cuff Size: Adult)   Pulse 60   Temp 98.1 °F (36.7 °C) (Oral)   Resp 16   Ht 5' 1\" (1.549 m)   Wt 198 lb (89.8 kg)   SpO2 98%   BMI 37.41 kg/m²     Review medication list, vitals, problem list,allergies. Reviewed recent labs. Lab Results   Component Value Date/Time    WBC 5.3 03/25/2020 08:34 AM    HGB 11.4 (L) 03/25/2020 08:34 AM    HCT 38.2 03/25/2020 08:34 AM    PLATELET 172 04/62/7688 08:34 AM    MCV 88 03/25/2020 08:34 AM     Lab Results   Component Value Date/Time    Sodium 142 05/04/2021 09:06 AM    Potassium 4.0 05/04/2021 09:06 AM    Chloride 104 05/04/2021 09:06 AM    CO2 27 05/04/2021 09:06 AM    Anion gap 11.0 05/04/2021 09:06 AM    Glucose 100 (H) 05/04/2021 09:06 AM    BUN 17 05/04/2021 09:06 AM    Creatinine 0.8 05/04/2021 09:06 AM    Calcium 9.0 05/04/2021 09:06 AM    Bilirubin, total 0.3 05/04/2021 09:06 AM    Alk.  phosphatase 30 05/04/2021 09:06 AM    Protein, total 6.4 05/04/2021 09:06 AM    Albumin 4.1 05/04/2021 09:06 AM    Globulin 2.3 05/04/2021 09:06 AM    A-G Ratio 1.8 05/04/2021 09:06 AM    ALT (SGPT) 28 05/04/2021 09:06 AM    AST (SGOT) 20 05/04/2021 09:06 AM     Lab Results   Component Value Date/Time    Cholesterol, total 167 05/04/2021 09:06 AM    HDL Cholesterol 52 05/04/2021 09:06 AM    LDL, calculated 104 (H) 05/04/2021 09:06 AM    VLDL, calculated 12 05/04/2021 09:06 AM    Triglyceride 59 05/04/2021 09:06 AM     Lab Results   Component Value Date/Time    TSH 2.64 05/04/2021 09:06 AM     Lab Results   Component Value Date/Time    Hemoglobin A1c 6.0 (H) 05/04/2021 09:06 AM    Hemoglobin A1c (POC) 5.5 02/24/2015 09:58 AM     Lab Results   Component Value Date/Time    VITAMIN D, 25-HYDROXY 43.9 05/04/2021 09:06 AM     Lab Results   Component Value Date/Time    Microalb/Creat ratio (ug/mg creat.)  05/04/2021 09:06 AM      Comment:      ** Unable to calculate Microablumin/Creatinine Ratio due to low Microalbumin         ROS: See HPI    Physical Exam  Constitutional:       General: She is not in acute distress. Neck:      Musculoskeletal: Neck supple. Cardiovascular:      Rate and Rhythm: Normal rate and regular rhythm. Heart sounds: Normal heart sounds. Abdominal:      General: Bowel sounds are normal.      Palpations: Abdomen is soft. Tenderness: There is no abdominal tenderness. Musculoskeletal:         General: No swelling. Comments: No callus, no open skin area, peripheral pulsations of dorsalis pedis palpable bilaterally. Monofilament test normal.   Peripheral sensations of dorsalis pedis palpable bilaterally. Neurological:      Mental Status: She is oriented to person, place, and time. Psychiatric:         Behavior: Behavior normal.         ASSESSMENT and PLAN    ICD-10-CM ICD-9-CM    1. Well controlled diabetes mellitus ([facility] [address]): A1c around 6 today. Discussed the continue lifestyle modification. E11.9 250.00 HM DIABETES FOOT EXAM   2.  Pituitary microadenoma [facility]): Was sent to Endo. Will try to obtain the records. D35.2 227.3    3. Pain of multiple sites: Fairly stable at this time. No joint swelling or pain at this time R52 780.96    4. Essential hypertension: Elevated. She has not taken her medication since 2 days. Discussed the importance of compliance. Low-salt diet. Follow-up in 2 weeks for nurse visit and 1 month with me. If still elevated will adjust the medication. I10 401.9    5. Arthritis: Fairly stable M19.90 716.90    6. Skin problem: On and off boils. Advised to apply alcohol or over-the-counter Neosporin. If it gets to be come to the office as a possible need for antibiotic L98.9 709.9     bils , between thigh, under breast area. Patient understood agree with the plan  Review health maintenance  She had her Covid shot. She will provide the information  Follow-up and Dispositions    · Return in about 1 month (around 6/11/2021) for 2 wks for nurse visit . Delsa Severance Please note that this dictation was completed with [employer], the computer voice recognition software. Quite often unanticipated grammatical, syntax, homophones, and other interpretive errors are inadvertently transcribed by the computer software. Please disregard these errors. Please excuse any errors that have escaped final proofreading.

## 2021-05-11 NOTE — PATIENT INSTRUCTIONS
Low Sodium Diet (2,000 Milligram): Care Instructions Overview Limiting sodium can be an important part of managing some health problems. The most common source of sodium is salt. People get most of the salt in their diet from canned, prepared, and packaged foods. Fast food and restaurant meals also are very high in sodium. Your doctor will probably limit your sodium to less than 2,000 milligrams (mg) a day. This limit counts all the sodium in prepared and packaged foods and any salt you add to your food. Follow-up care is a key part of your treatment and safety. Be sure to make and go to all appointments, and call your doctor if you are having problems. It's also a good idea to know your test results and keep a list of the medicines you take. How can you care for yourself at home? Read food labels · Read labels on cans and food packages. The labels tell you how much sodium is in each serving. Make sure that you look at the serving size. If you eat more than the serving size, you have eaten more sodium. · Food labels also tell you the Percent Daily Value for sodium. Choose products with low Percent Daily Values for sodium. · Be aware that sodium can come in forms other than salt, including monosodium glutamate (MSG), sodium citrate, and sodium bicarbonate (baking soda). MSG is often added to Asian food. When you eat out, you can sometimes ask for food without MSG or added salt. Buy low-sodium foods · Buy foods that are labeled \"unsalted\" (no salt added), \"sodium-free\" (less than 5 mg of sodium per serving), or \"low-sodium\" (140 mg or less of sodium per serving). Foods labeled \"reduced-sodium\" and \"light sodium\" may still have too much sodium. Be sure to read the label to see how much sodium you are getting. · Buy fresh vegetables, or frozen vegetables without added sauces. Buy low-sodium versions of canned vegetables, soups, and other canned goods. Prepare low-sodium meals · Cut back on the amount of salt you use in cooking. This will help you adjust to the taste. Do not add salt after cooking. One teaspoon of salt has about 2,300 mg of sodium. · Take the salt shaker off the table. · Flavor your food with garlic, lemon juice, onion, vinegar, herbs, and spices. Do not use soy sauce, lite soy sauce, steak sauce, onion salt, garlic salt, celery salt, or ketchup on your food. · Use low-sodium salad dressings, sauces, and ketchup. Or make your own salad dressings and sauces without adding salt. · Use less salt (or none) when recipes call for it. You can often use half the salt a recipe calls for without losing flavor. Other foods such as rice, pasta, and grains do not need added salt. · Rinse canned vegetables, and cook them in fresh water. This removes somebut not allof the salt. · Avoid water that is naturally high in sodium or that has been treated with water softeners, which add sodium. If you buy bottled water, read the label and choose a sodium-free brand. Avoid high-sodium foods · Avoid eating: 
? Smoked, cured, salted, and canned meat, fish, and poultry. ? Ham, sutton, hot dogs, and luncheon meats. ? Regular, hard, and processed cheese and regular peanut butter. ? Crackers with salted tops, and other salted snack foods such as pretzels, chips, and salted popcorn. ? Frozen prepared meals, unless labeled low-sodium. ? Canned and dried soups, broths, and bouillon, unless labeled sodium-free or low-sodium. ? Canned vegetables, unless labeled sodium-free or low-sodium. ? Western [place] fries, pizza, tacos, and other fast foods. ? Pickles, olives, ketchup, and other condiments, especially soy sauce, unless labeled sodium-free or low-sodium. Where can you learn more? Go to http://www.gray.com/ Enter B770 in the search box to learn more about \"Low Sodium Diet (2,000 Milligram): Care Instructions. \" Current as of: December 17, 2020               Content Version: 12.8 © 6108-6761 Healthwise, Incorporated. Care instructions adapted under license by [employer] (which disclaims liability or warranty for this information). If you have questions about a medical condition or this instruction, always ask your healthcare professional. [address] any warranty or liability for your use of this information. Nutrition Tips for Diabetes: After Your Visit Your Care Instructions A healthy diet is important to manage diabetes. It helps you lose weight (if you need to) and keep it off. It gives you the nutrition and energy your body needs and helps prevent heart disease. But a diet for diabetes does not mean that you have to eat special foods. You can eat what your family eats, including occasional sweets and other favorites. But you do have to pay attention to how often you eat and how much you eat of certain foods. The right plan for you will give you meals that help you keep your blood sugar at healthy levels. Try to eat a variety of foods and to spread carbohydrate throughout the day. Carbohydrate raises blood sugar higher and more quickly than any other nutrient does. Carbohydrate is found in sugar, breads and cereals, fruit, starchy vegetables such as potatoes and corn, and milk and yogurt. You may want to work with a dietitian or diabetes educator to help you plan meals and snacks. A dietitian or diabetes educator also can help you lose weight if that is one of your goals. The following tips can help you enjoy your meals and stay healthy. Follow-up care is a key part of your treatment and safety. Be sure to make and go to all appointments, and call your doctor if you are having problems. Its also a good idea to know your test results and keep a list of the medicines you take. How can you care for yourself at home? · Learn which foods have carbohydrate and how much carbohydrate to eat.  A dietitian or diabetes educator can help you learn to keep track of how much carbohydrate you eat. · Spread carbohydrate throughout the day. Eat some carbohydrate at all meals, but do not eat too much at any one time. · Plan meals to include food from all the food groups. These are the food groups and some example portion sizes: ¨ Grains: 1 slice of bread (1 ounce), ½ cup of cooked cereal, and 1/3 cup of cooked pasta or rice. These have about 15 grams of carbohydrate in a serving. Choose whole grains such as whole wheat bread or crackers, oatmeal, and brown rice more often than refined grains. ¨ Fruit: 1 small fresh fruit, such as an apple or orange; ½ of a banana; ½ cup of chopped, cooked, or canned fruit; ½ cup of fruit juice; 1 cup of melon or raspberries; and 2 tablespoons of dried fruit. These have about 15 grams of carbohydrate in a serving. ¨ Dairy: 1 cup of nonfat or low-fat milk and 2/3 cup of plain yogurt. These have about 15 grams of carbohydrate in a serving. ¨ Protein foods: Beef, chicken, turkey, fish, eggs, tofu, cheese, cottage cheese, and peanut butter. A serving size of meat is 3 ounces, which is about the size of a deck of cards. Examples of meat substitute serving sizes (equal to 1 ounce of meat) are 1/4 cup of cottage cheese, 1 egg, 1 tablespoon of peanut butter, and ½ cup of tofu. These have very little or no carbohydrate per serving. ¨ Vegetables: Starchy vegetables such as ½ cup of cooked dried beans, peas, potatoes, or corn have about 15 grams of carbohydrate. Nonstarchy vegetables have very little carbohydrate, such as 1 cup of raw leafy vegetables (such as spinach), ½ cup of other vegetables (cooked or chopped), and 3/4 cup of vegetable juice. · Use the plate format to plan meals. It is a good, quick way to make sure that you have a balanced meal. It also helps you spread carbohydrate throughout the day. You divide your plate by types of foods.  Put vegetables on half the plate, meat or meat substitutes on one-quarter of the plate, and a grain or starchy vegetable (such as brown rice or a potato) in the final quarter of the plate. To this you can add a small piece of fruit and 1 cup of milk or yogurt, depending on how much carbohydrate you are supposed to eat at a meal. 
· Talk to your dietitian or diabetes educator about ways to add limited amounts of sweets into your meal plan. You can eat these foods now and then, as long as you include the amount of carbohydrate they have in your daily carbohydrate allowance. · If you drink alcohol, limit it to no more than 1 drink a day for women and 2 drinks a day for men. If you are pregnant, no amount of alcohol is known to be safe. · Protein, fat, and fiber do not raise blood sugar as much as carbohydrate does. If you eat a lot of these nutrients in a meal, your blood sugar will rise more slowly than it would otherwise. · Limit saturated fats, such as those from meat and dairy products. Try to replace it with monounsaturated fat, such as olive oil. This is a healthier choice because people who have diabetes are at higher-than-average risk of heart disease. But use a modest amount of olive oil. A tablespoon of olive oil has 14 grams of fat and 120 calories. · Exercise lowers blood sugar. If you take insulin by shots or pump, you can use less than you would if you were not exercising. Keep in mind that timing matters. If you exercise within 1 hour after a meal, your body may need less insulin for that meal than it would if you exercised 3 hours after the meal. Test your blood sugar to find out how exercise affects your need for insulin. · Exercise on most days of the week. Aim for at least 30 minutes. Exercise helps you stay at a healthy weight and helps your body use insulin. Walking is an easy way to get exercise. Gradually increase the amount you walk every day. You also may want to swim, bike, or do other activities. When you eat out · Learn to estimate the serving sizes of foods that have carbohydrate. If you measure food at home, it will be easier to estimate the amount in a serving of restaurant food. · If the meal you order has too much carbohydrate (such as potatoes, corn, or baked beans), ask to have a low-carbohydrate food instead. Ask for a salad or green vegetables. · If you use insulin, check your blood sugar before and after eating out to help you plan how much to eat in the future. · If you eat more carbohydrate at a meal than you had planned, take a walk or do other exercise. This will help lower your blood sugar. Where can you learn more? Go to [employer] Enter J799 in the search box to learn more about \"Nutrition Tips for Diabetes: After Your Visit. \"  
© 9767-2796 Healthwise, Incorporated. Care instructions adapted under license by [facility] (which disclaims liability or warranty for this information). This care instruction is for use with your licensed healthcare professional. If you have questions about a medical condition or this instruction, always ask your healthcare professional. [place] [address] any warranty or liability for your use of this information. Content Version: 87.7.012225; Current as of: June 4, 2014

## 2021-05-25 ENCOUNTER — CLINICAL SUPPORT (OUTPATIENT)
Dept: FAMILY MEDICINE CLINIC | Age: 61
End: 2021-05-25

## 2021-05-25 VITALS — DIASTOLIC BLOOD PRESSURE: 80 MMHG | HEART RATE: 61 BPM | SYSTOLIC BLOOD PRESSURE: 136 MMHG

## 2021-05-25 DIAGNOSIS — I10 ESSENTIAL HYPERTENSION: Primary | ICD-10-CM

## 2021-05-25 NOTE — PROGRESS NOTES
Chief Complaint   Patient presents with    Blood Pressure Check     Patient presents for blood pressure check per Dr. Pari Ness. Patient reports to being compliant with current medications for blood pressure to include: Amlodipine 5 mg daily; Micardis/HCT 80/25 mg daily. Blood pressure in office was 136/80 and patient indicated that she took the medication approximately 1 hour before coming in for visit. Per Dr. Pari Ness patient advised to continue with current plan of care to include low salt diet and medications. She is to follow-up as scheduled. Patient voiced understanding.

## 2021-06-11 ENCOUNTER — OFFICE VISIT (OUTPATIENT)
Dept: FAMILY MEDICINE CLINIC | Age: 61
End: 2021-06-11
Payer: COMMERCIAL

## 2021-06-11 VITALS
HEIGHT: 61 IN | RESPIRATION RATE: 16 BRPM | TEMPERATURE: 98 F | HEART RATE: 60 BPM | BODY MASS INDEX: 37.57 KG/M2 | OXYGEN SATURATION: 98 % | SYSTOLIC BLOOD PRESSURE: 132 MMHG | DIASTOLIC BLOOD PRESSURE: 80 MMHG | WEIGHT: 199 LBS

## 2021-06-11 DIAGNOSIS — I10 ESSENTIAL HYPERTENSION: Primary | ICD-10-CM

## 2021-06-11 PROCEDURE — 99213 OFFICE O/P EST LOW 20 MIN: CPT | Performed by: FAMILY MEDICINE

## 2021-06-11 NOTE — PROGRESS NOTES
HISTORY OF PRESENT ILLNESS  Isabela Campbell is a 63 y.o. female. HPI: Here for follow up on high blood pressure. Last visit she has not taken blood pressure medicine since couple of days. It was elevated. Today vitals been stable. Sitting comfortable without any acute distress. Been compliant with medication and also working on a diet modification and lifestyle modification. Currently no side effects of medication. Denies any headache, dizziness, no chest pain or trouble breathing, no arm or leg weakness. No nausea or vomiting, no weight or appetite changes, no mood changes . No urine or bowel complains, no palpitation, no diaphoresis. No abdominal pain. No cold or cough. No leg swelling. No fever. No sleep trouble. Visit Vitals  /80 (BP 1 Location: Left arm, BP Patient Position: Sitting, BP Cuff Size: Adult)   Pulse 60   Temp 98 °F (36.7 °C) (Oral)   Resp 16   Ht 5' 1\" (1.549 m)   Wt 199 lb (90.3 kg)   SpO2 98%   BMI 37.60 kg/m²     Review medication list, vitals, problem list,allergies. Lab Results   Component Value Date/Time    WBC 5.3 03/25/2020 08:34 AM    HGB 11.4 (L) 03/25/2020 08:34 AM    HCT 38.2 03/25/2020 08:34 AM    PLATELET 801 85/99/6553 08:34 AM    MCV 88 03/25/2020 08:34 AM     Lab Results   Component Value Date/Time    Sodium 142 05/04/2021 09:06 AM    Potassium 4.0 05/04/2021 09:06 AM    Chloride 104 05/04/2021 09:06 AM    CO2 27 05/04/2021 09:06 AM    Anion gap 11.0 05/04/2021 09:06 AM    Glucose 100 (H) 05/04/2021 09:06 AM    BUN 17 05/04/2021 09:06 AM    Creatinine 0.8 05/04/2021 09:06 AM    Calcium 9.0 05/04/2021 09:06 AM    Bilirubin, total 0.3 05/04/2021 09:06 AM    Alk.  phosphatase 30 05/04/2021 09:06 AM    Protein, total 6.4 05/04/2021 09:06 AM    Albumin 4.1 05/04/2021 09:06 AM    Globulin 2.3 05/04/2021 09:06 AM    A-G Ratio 1.8 05/04/2021 09:06 AM    ALT (SGPT) 28 05/04/2021 09:06 AM    AST (SGOT) 20 05/04/2021 09:06 AM     Lab Results   Component Value Date/Time Cholesterol, total 167 05/04/2021 09:06 AM    HDL Cholesterol 52 05/04/2021 09:06 AM    LDL, calculated 104 (H) 05/04/2021 09:06 AM    VLDL, calculated 12 05/04/2021 09:06 AM    Triglyceride 59 05/04/2021 09:06 AM     Lab Results   Component Value Date/Time    TSH 2.64 05/04/2021 09:06 AM     Lab Results   Component Value Date/Time    Hemoglobin A1c 6.0 (H) 05/04/2021 09:06 AM    Hemoglobin A1c (POC) 5.5 02/24/2015 09:58 AM     Lab Results   Component Value Date/Time    VITAMIN D, 25-HYDROXY 43.9 05/04/2021 09:06 AM       Lab Results   Component Value Date/Time    Microalb/Creat ratio (ug/mg creat.)  05/04/2021 09:06 AM      Comment:      ** Unable to calculate Microablumin/Creatinine Ratio due to low Microalbumin         ROS: See HPI    Physical Exam  Cardiovascular:      Rate and Rhythm: Normal rate. Pulmonary:      Effort: Pulmonary effort is normal. No respiratory distress. Breath sounds: No wheezing. Musculoskeletal:         General: No swelling. Neurological:      Mental Status: She is alert and oriented to person, place, and time. Psychiatric:         Behavior: Behavior normal.         ASSESSMENT and PLAN    ICD-10-CM ICD-9-CM    1. Essential hypertension : Today vitals been stable. Being compliant with medication. We will continue current plan. Low-salt diet and lifestyle modification been discussed I10 401.9    Pt understood and agree with the plan     Follow-up and Dispositions    · Return in about 4 months (around 10/11/2021). Please note that this dictation was completed with [employer], the [employer] voice recognition software. Quite often unanticipated grammatical, syntax, homophones, and other interpretive errors are inadvertently transcribed by the computer software. Please disregard these errors. Please excuse any errors that have escaped final proofreading.

## 2021-06-11 NOTE — PATIENT INSTRUCTIONS
Low Sodium Diet (2,000 Milligram): Care Instructions  Overview     Limiting sodium can be an important part of managing some health problems. The most common source of sodium is salt. People get most of the salt in their diet from canned, prepared, and packaged foods. Fast food and restaurant meals also are very high in sodium. Your doctor will probably limit your sodium to less than 2,000 milligrams (mg) a day. This limit counts all the sodium in prepared and packaged foods and any salt you add to your food. Follow-up care is a key part of your treatment and safety. Be sure to make and go to all appointments, and call your doctor if you are having problems. It's also a good idea to know your test results and keep a list of the medicines you take. How can you care for yourself at home? Read food labels  · Read labels on cans and food packages. The labels tell you how much sodium is in each serving. Make sure that you look at the serving size. If you eat more than the serving size, you have eaten more sodium. · Food labels also tell you the Percent Daily Value for sodium. Choose products with low Percent Daily Values for sodium. · Be aware that sodium can come in forms other than salt, including monosodium glutamate (MSG), sodium citrate, and sodium bicarbonate (baking soda). MSG is often added to Asian food. When you eat out, you can sometimes ask for food without MSG or added salt. Buy low-sodium foods  · Buy foods that are labeled \"unsalted\" (no salt added), \"sodium-free\" (less than 5 mg of sodium per serving), or \"low-sodium\" (140 mg or less of sodium per serving). Foods labeled \"reduced-sodium\" and \"light sodium\" may still have too much sodium. Be sure to read the label to see how much sodium you are getting. · Buy fresh vegetables, or frozen vegetables without added sauces. Buy low-sodium versions of canned vegetables, soups, and other canned goods.   Prepare low-sodium meals  · Cut back on the amount of salt you use in cooking. This will help you adjust to the taste. Do not add salt after cooking. One teaspoon of salt has about 2,300 mg of sodium. · Take the salt shaker off the table. · Flavor your food with garlic, lemon juice, onion, vinegar, herbs, and spices. Do not use soy sauce, lite soy sauce, steak sauce, onion salt, garlic salt, celery salt, or ketchup on your food. · Use low-sodium salad dressings, sauces, and ketchup. Or make your own salad dressings and sauces without adding salt. · Use less salt (or none) when recipes call for it. You can often use half the salt a recipe calls for without losing flavor. Other foods such as rice, pasta, and grains do not need added salt. · Rinse canned vegetables, and cook them in fresh water. This removes somebut not allof the salt. · Avoid water that is naturally high in sodium or that has been treated with water softeners, which add sodium. If you buy bottled water, read the label and choose a sodium-free brand. Avoid high-sodium foods  · Avoid eating:  ? Smoked, cured, salted, and canned meat, fish, and poultry. ? Ham, sutton, hot dogs, and luncheon meats. ? Regular, hard, and processed cheese and regular peanut butter. ? Crackers with salted tops, and other salted snack foods such as pretzels, chips, and salted popcorn. ? Frozen prepared meals, unless labeled low-sodium. ? Canned and dried soups, broths, and bouillon, unless labeled sodium-free or low-sodium. ? Canned vegetables, unless labeled sodium-free or low-sodium. ? [place] fries, pizza, tacos, and other fast foods. ? Pickles, olives, ketchup, and other condiments, especially soy sauce, unless labeled sodium-free or low-sodium. Where can you learn more? Go to http://www.gray.com/  Enter V843 in the search box to learn more about \"Low Sodium Diet (2,000 Milligram): Care Instructions. \"  Current as of: December 17, 2020               Content Version: 12.8  © 9869-9588 Healthwise, Incorporated. Care instructions adapted under license by [employer] (which disclaims liability or warranty for this information). If you have questions about a medical condition or this instruction, always ask your healthcare professional. [address] any warranty or liability for your use of this information. Nutrition Tips for Diabetes: After Your Visit  Your Care Instructions  A healthy diet is important to manage diabetes. It helps you lose weight (if you need to) and keep it off. It gives you the nutrition and energy your body needs and helps prevent heart disease. But a diet for diabetes does not mean that you have to eat special foods. You can eat what your family eats, including occasional sweets and other favorites. But you do have to pay attention to how often you eat and how much you eat of certain foods. The right plan for you will give you meals that help you keep your blood sugar at healthy levels. Try to eat a variety of foods and to spread carbohydrate throughout the day. Carbohydrate raises blood sugar higher and more quickly than any other nutrient does. Carbohydrate is found in sugar, breads and cereals, fruit, starchy vegetables such as potatoes and corn, and milk and yogurt. You may want to work with a dietitian or diabetes educator to help you plan meals and snacks. A dietitian or diabetes educator also can help you lose weight if that is one of your goals. The following tips can help you enjoy your meals and stay healthy. Follow-up care is a key part of your treatment and safety. Be sure to make and go to all appointments, and call your doctor if you are having problems. Its also a good idea to know your test results and keep a list of the medicines you take. How can you care for yourself at home? · Learn which foods have carbohydrate and how much carbohydrate to eat.  A dietitian or diabetes educator can help you learn to keep track of how much carbohydrate you eat. · Spread carbohydrate throughout the day. Eat some carbohydrate at all meals, but do not eat too much at any one time. · Plan meals to include food from all the food groups. These are the food groups and some example portion sizes:  ¨ Grains: 1 slice of bread (1 ounce), ½ cup of cooked cereal, and 1/3 cup of cooked pasta or rice. These have about 15 grams of carbohydrate in a serving. Choose whole grains such as whole wheat bread or crackers, oatmeal, and brown rice more often than refined grains. ¨ Fruit: 1 small fresh fruit, such as an apple or orange; ½ of a banana; ½ cup of chopped, cooked, or canned fruit; ½ cup of fruit juice; 1 cup of melon or raspberries; and 2 tablespoons of dried fruit. These have about 15 grams of carbohydrate in a serving. ¨ Dairy: 1 cup of nonfat or low-fat milk and 2/3 cup of plain yogurt. These have about 15 grams of carbohydrate in a serving. ¨ Protein foods: Beef, chicken, turkey, fish, eggs, tofu, cheese, cottage cheese, and peanut butter. A serving size of meat is 3 ounces, which is about the size of a deck of cards. Examples of meat substitute serving sizes (equal to 1 ounce of meat) are 1/4 cup of cottage cheese, 1 egg, 1 tablespoon of peanut butter, and ½ cup of tofu. These have very little or no carbohydrate per serving. ¨ Vegetables: Starchy vegetables such as ½ cup of cooked dried beans, peas, potatoes, or corn have about 15 grams of carbohydrate. Nonstarchy vegetables have very little carbohydrate, such as 1 cup of raw leafy vegetables (such as spinach), ½ cup of other vegetables (cooked or chopped), and 3/4 cup of vegetable juice. · Use the plate format to plan meals. It is a good, quick way to make sure that you have a balanced meal. It also helps you spread carbohydrate throughout the day. You divide your plate by types of foods.  Put vegetables on half the plate, meat or meat substitutes on one-quarter of the plate, and a grain or starchy vegetable (such as brown rice or a potato) in the final quarter of the plate. To this you can add a small piece of fruit and 1 cup of milk or yogurt, depending on how much carbohydrate you are supposed to eat at a meal.  · Talk to your dietitian or diabetes educator about ways to add limited amounts of sweets into your meal plan. You can eat these foods now and then, as long as you include the amount of carbohydrate they have in your daily carbohydrate allowance. · If you drink alcohol, limit it to no more than 1 drink a day for women and 2 drinks a day for men. If you are pregnant, no amount of alcohol is known to be safe. · Protein, fat, and fiber do not raise blood sugar as much as carbohydrate does. If you eat a lot of these nutrients in a meal, your blood sugar will rise more slowly than it would otherwise. · Limit saturated fats, such as those from meat and dairy products. Try to replace it with monounsaturated fat, such as olive oil. This is a healthier choice because people who have diabetes are at higher-than-average risk of heart disease. But use a modest amount of olive oil. A tablespoon of olive oil has 14 grams of fat and 120 calories. · Exercise lowers blood sugar. If you take insulin by shots or pump, you can use less than you would if you were not exercising. Keep in mind that timing matters. If you exercise within 1 hour after a meal, your body may need less insulin for that meal than it would if you exercised 3 hours after the meal. Test your blood sugar to find out how exercise affects your need for insulin. · Exercise on most days of the week. Aim for at least 30 minutes. Exercise helps you stay at a healthy weight and helps your body use insulin. Walking is an easy way to get exercise. Gradually increase the amount you walk every day. You also may want to swim, bike, or do other activities.   When you eat out  · Learn to estimate the serving sizes of foods that have carbohydrate. If you measure food at home, it will be easier to estimate the amount in a serving of restaurant food. · If the meal you order has too much carbohydrate (such as potatoes, corn, or baked beans), ask to have a low-carbohydrate food instead. Ask for a salad or green vegetables. · If you use insulin, check your blood sugar before and after eating out to help you plan how much to eat in the future. · If you eat more carbohydrate at a meal than you had planned, take a walk or do other exercise. This will help lower your blood sugar. Where can you learn more? Go to [employer]  Enter S575 in the search box to learn more about \"Nutrition Tips for Diabetes: After Your Visit. \"   © 4598-3980 Healthwise, Incorporated. Care instructions adapted under license by [facility] [facility] (which disclaims liability or warranty for this information). This care instruction is for use with your licensed healthcare professional. If you have questions about a medical condition or this instruction, always ask your healthcare professional. [place] [address] any warranty or liability for your use of this information.   Content Version: 86.3.046528; Current as of: June 4, 2014

## 2021-06-11 NOTE — PROGRESS NOTES
Chief Complaint   Patient presents with    Diabetes    Hypertension    Arthritis     1. Have you been to the ER, urgent care clinic since your last visit? Hospitalized since your last visit? No    2. Have you seen or consulted any other health care providers outside of the [address] [place] [address] since your last visit? Include any pap smears or colon screening.  No

## 2021-09-20 ENCOUNTER — TELEPHONE (OUTPATIENT)
Dept: FAMILY MEDICINE CLINIC | Age: 61
End: 2021-09-20

## 2021-09-20 NOTE — TELEPHONE ENCOUNTER
Pt called stating she had a covid test done last Tuesday and received her results over the weekend that she tested positive. Pt states she needs a note stating when she can return to work to give to her employer. Pt states she is working remotely but needs something in writing stating when she can return to the building. Pt is also vaccinated. Please advise.

## 2021-09-21 NOTE — TELEPHONE ENCOUNTER
Spoke with ptSujey MARTIN. She was exposed with her daughter week before Wednesday and she got tested week after Tuesday , she was asymptomatic. She came positive. Advised to [id number] days after exposure and 10 days of positive test as she is still asymptomatic. She is fully vaccinated. Will write a letter.

## 2021-10-10 DIAGNOSIS — I10 ESSENTIAL HYPERTENSION: ICD-10-CM

## 2021-10-11 ENCOUNTER — OFFICE VISIT (OUTPATIENT)
Dept: FAMILY MEDICINE CLINIC | Age: 61
End: 2021-10-11
Payer: COMMERCIAL

## 2021-10-11 VITALS
DIASTOLIC BLOOD PRESSURE: 72 MMHG | WEIGHT: 203.8 LBS | TEMPERATURE: 97 F | OXYGEN SATURATION: 98 % | BODY MASS INDEX: 38.51 KG/M2 | SYSTOLIC BLOOD PRESSURE: 110 MMHG | RESPIRATION RATE: 16 BRPM | HEART RATE: 68 BPM

## 2021-10-11 DIAGNOSIS — I10 ESSENTIAL HYPERTENSION: ICD-10-CM

## 2021-10-11 DIAGNOSIS — R51.9 CHRONIC DAILY HEADACHE: ICD-10-CM

## 2021-10-11 DIAGNOSIS — R52 PAIN OF MULTIPLE SITES: ICD-10-CM

## 2021-10-11 DIAGNOSIS — Z12.31 ENCOUNTER FOR SCREENING MAMMOGRAM FOR MALIGNANT NEOPLASM OF BREAST: ICD-10-CM

## 2021-10-11 DIAGNOSIS — E11.9 WELL CONTROLLED DIABETES MELLITUS (HCC): Primary | ICD-10-CM

## 2021-10-11 LAB — HBA1C MFR BLD HPLC: 6.1 %

## 2021-10-11 PROCEDURE — 99214 OFFICE O/P EST MOD 30 MIN: CPT | Performed by: FAMILY MEDICINE

## 2021-10-11 PROCEDURE — 83036 HEMOGLOBIN GLYCOSYLATED A1C: CPT | Performed by: FAMILY MEDICINE

## 2021-10-11 RX ORDER — MOMETASONE FUROATE 1 MG/G
CREAM TOPICAL DAILY
Qty: 30 G | Refills: 0 | Status: SHIPPED | OUTPATIENT
Start: 2021-10-11

## 2021-10-11 RX ORDER — TELMISARTAN AND HYDROCHLORTHIAZIDE 80; 25 MG/1; MG/1
TABLET ORAL
Qty: 90 TABLET | Refills: 1 | Status: SHIPPED | OUTPATIENT
Start: 2021-10-11 | End: 2022-04-22

## 2021-10-11 RX ORDER — AMLODIPINE BESYLATE 5 MG/1
5 TABLET ORAL DAILY
Qty: 90 TABLET | Refills: 1 | Status: SHIPPED | OUTPATIENT
Start: 2021-10-11 | End: 2022-04-22

## 2021-10-11 NOTE — PROGRESS NOTES
1. Have you been to the ER, urgent care clinic since your last visit? Hospitalized since your last visit? No    2. Have you seen or consulted any other health care providers outside of the [address] [place] [address] since your last visit? Include any pap smears or colon screening.  No      Chief Complaint   Patient presents with    Diabetes    Hypertension

## 2021-10-11 NOTE — PROGRESS NOTES
HISTORY OF PRESENT ILLNESS  Donnie Ventura is a 63 y.o. female. HPI: Here for follow-up. Hypertension. Taking medication with compliance. Today vitals been stable. Asymptomatic. During visit denies any headache, dizziness, no chest pain or trouble breathing, no arm or leg weakness. No nausea or vomiting, no weight or appetite changes, no mood changes . No urine or bowel complains, no palpitation, no diaphoresis. No abdominal pain. No cold or cough. No leg swelling. No fever. No sleep trouble. History of diabetes. Current A1c in prediabetic range. No hyper or hypoglycemic symptoms. Working on lifestyle modification and diet modification. Taking medications with compliance and no side effects. Discussed high BMI. Discussed importance of weight loss. She is working on routine exercise/biking/walking. Also working on her diet modification. She had multiple side pain. Currently it is fairly stable. Having a frequent headache on and off. Mainly at nighttime. Lately every day. Taking symptomatic treatment and it does help. Vitals been stable. Prior history of pituitary microadenoma. Last MRI done in 2019 showed no evidence of pituitary microadenoma. Used to follow Dr. Angelika Power. Starting headache almost every day will consider neurology evaluation. Patient agrees with it  MRI Results (most recent):  Results from [place] encounter on 10/24/19    MRI BRAIN W WO CONT    Narrative  MRI Pituitary with and without contrast    HISTORY: Prior history of pituitary adenoma with new onset of frontal headaches. COMPARISON: None available    TECHNIQUE:Pituitary gland scanned with coronal and sagittal T1W, postcontrast  coronal and sagittal T1W, and coronal T2W. Please note that the entire brain  was not scanned. CONTRAST: 8.5 cc MultiHance administered intravenously. FINDINGS: Pituitary gland is normal in size and morphology. No hypoenhancing or  contour deforming lesions detected. The infundibulum is midline with normal  thickness and enhancement. Optic apparatus and parasellar regions are  unremarkable. Imaged portions of the brain are unremarkable. Impression  IMPRESSION:    Pituitary adenoma is not identified. Visit Vitals  /72 (BP 1 Location: Left upper arm, BP Patient Position: Sitting, BP Cuff Size: Adult)   Pulse 68   Temp 97 °F (36.1 °C)   Resp 16   Wt 203 lb 12.8 oz (92.4 kg)   SpO2 98%   BMI 38.51 kg/m²     Review medication list, vitals, problem list,allergies. Lab Results   Component Value Date/Time    WBC 5.3 03/25/2020 08:34 AM    HGB 11.4 (L) 03/25/2020 08:34 AM    HCT 38.2 03/25/2020 08:34 AM    PLATELET 441 75/08/3196 08:34 AM    MCV 88 03/25/2020 08:34 AM     Lab Results   Component Value Date/Time    Sodium 142 05/04/2021 09:06 AM    Potassium 4.0 05/04/2021 09:06 AM    Chloride 104 05/04/2021 09:06 AM    CO2 27 05/04/2021 09:06 AM    Anion gap 11.0 05/04/2021 09:06 AM    Glucose 100 (H) 05/04/2021 09:06 AM    BUN 17 05/04/2021 09:06 AM    Creatinine 0.8 05/04/2021 09:06 AM    Calcium 9.0 05/04/2021 09:06 AM    Bilirubin, total 0.3 05/04/2021 09:06 AM    Alk.  phosphatase 30 05/04/2021 09:06 AM    Protein, total 6.4 05/04/2021 09:06 AM    Albumin 4.1 05/04/2021 09:06 AM    Globulin 2.3 05/04/2021 09:06 AM    A-G Ratio 1.8 05/04/2021 09:06 AM    ALT (SGPT) 28 05/04/2021 09:06 AM    AST (SGOT) 20 05/04/2021 09:06 AM     Lab Results   Component Value Date/Time    Cholesterol, total 167 05/04/2021 09:06 AM    HDL Cholesterol 52 05/04/2021 09:06 AM    LDL, calculated 104 (H) 05/04/2021 09:06 AM    VLDL, calculated 12 05/04/2021 09:06 AM    Triglyceride 59 05/04/2021 09:06 AM     Lab Results   Component Value Date/Time    TSH 2.64 05/04/2021 09:06 AM     Lab Results   Component Value Date/Time    Hemoglobin A1c 6.0 (H) 05/04/2021 09:06 AM    Hemoglobin A1c (POC) 5.5 02/24/2015 09:58 AM     Lab Results   Component Value Date/Time    VITAMIN D, 25-HYDROXY 43.9 05/04/2021 09:06 AM       Lab Results   Component Value Date/Time    Microalb/Creat ratio (ug/mg creat.)  05/04/2021 09:06 AM      Comment:      ** Unable to calculate Microablumin/Creatinine Ratio due to low Microalbumin         ROS: See HPI    Physical Exam  Constitutional:       General: She is not in acute distress. Cardiovascular:      Rate and Rhythm: Normal rate and regular rhythm. Heart sounds: Normal heart sounds. Abdominal:      General: Bowel sounds are normal.      Palpations: Abdomen is soft. Tenderness: There is no abdominal tenderness. Musculoskeletal:      Cervical back: Neck supple. Neurological:      Mental Status: She is oriented to person, place, and time. Psychiatric:         Behavior: Behavior normal.         ASSESSMENT and PLAN    ICD-10-CM ICD-9-CM    1. Well controlled diabetes mellitus ([address]): A1c in prediabetic range. We will continue current plan. She is already working on diet modification and exercise E11.9 250.00 AMB POC HEMOGLOBIN A1C   2. Essential hypertension:well controlled. Continue current dose of medication and low salt diet. Exercise as tolerated. I10 401.9 amLODIPine (NORVASC) 5 mg tablet   3. Pain of multiple sites: Fairly stable. Taking symptomatic treatment over-the-counter R52 780.96    4. Chronic daily headache: Sending to neurology. See HPI R51.9 784.0 REFERRAL TO NEUROLOGY   5. BMI 38.0-38.9,adult: She is working on lifestyle modification. Exercise and diet modification Z68.38 V85.38    6. Encounter for screening mammogram for malignant neoplasm of breast  Z12.31 V76.12 PAT MAMMO BI SCREENING INCL CAD   Patient understood and agreed with the plan  Flu shot she will schedule an appointment at the office tomorrow we will get it from local pharmacy  Ordered mammogram.  She will schedule an appointment  Follow-up and Dispositions    · Return in about 4 months (around 2/11/2022).      Please note that this dictation was completed with elana Heard computer voice recognition software. Quite often unanticipated grammatical, syntax, homophones, and other interpretive errors are inadvertently transcribed by the computer software. Please disregard these errors. Please excuse any errors that have escaped final proofreading.

## 2021-10-11 NOTE — PATIENT INSTRUCTIONS
Low Sodium Diet (2,000 Milligram): Care Instructions  Overview     Limiting sodium can be an important part of managing some health problems. The most common source of sodium is salt. People get most of the salt in their diet from canned, prepared, and packaged foods. Fast food and restaurant meals also are very high in sodium. Your doctor will probably limit your sodium to less than 2,000 milligrams (mg) a day. This limit counts all the sodium in prepared and packaged foods and any salt you add to your food. Follow-up care is a key part of your treatment and safety. Be sure to make and go to all appointments, and call your doctor if you are having problems. It's also a good idea to know your test results and keep a list of the medicines you take. How can you care for yourself at home? Read food labels  · Read labels on cans and food packages. The labels tell you how much sodium is in each serving. Make sure that you look at the serving size. If you eat more than the serving size, you have eaten more sodium. · Food labels also tell you the Percent Daily Value for sodium. Choose products with low Percent Daily Values for sodium. · Be aware that sodium can come in forms other than salt, including monosodium glutamate (MSG), sodium citrate, and sodium bicarbonate (baking soda). MSG is often added to Asian food. When you eat out, you can sometimes ask for food without MSG or added salt. Buy low-sodium foods  · Buy foods that are labeled \"unsalted\" (no salt added), \"sodium-free\" (less than 5 mg of sodium per serving), or \"low-sodium\" (140 mg or less of sodium per serving). Foods labeled \"reduced-sodium\" and \"light sodium\" may still have too much sodium. Be sure to read the label to see how much sodium you are getting. · Buy fresh vegetables, or frozen vegetables without added sauces. Buy low-sodium versions of canned vegetables, soups, and other canned goods.   Prepare low-sodium meals  · Cut back on the amount of salt you use in cooking. This will help you adjust to the taste. Do not add salt after cooking. One teaspoon of salt has about 2,300 mg of sodium. · Take the salt shaker off the table. · Flavor your food with garlic, lemon juice, onion, vinegar, herbs, and spices. Do not use soy sauce, lite soy sauce, steak sauce, onion salt, garlic salt, celery salt, or ketchup on your food. · Use low-sodium salad dressings, sauces, and ketchup. Or make your own salad dressings and sauces without adding salt. · Use less salt (or none) when recipes call for it. You can often use half the salt a recipe calls for without losing flavor. Other foods such as rice, pasta, and grains do not need added salt. · Rinse canned vegetables, and cook them in fresh water. This removes somebut not allof the salt. · Avoid water that is naturally high in sodium or that has been treated with water softeners, which add sodium. If you buy bottled water, read the label and choose a sodium-free brand. Avoid high-sodium foods  · Avoid eating:  ? Smoked, cured, salted, and canned meat, fish, and poultry. ? Ham, sutton, hot dogs, and luncheon meats. ? Regular, hard, and processed cheese and regular peanut butter. ? Crackers with salted tops, and other salted snack foods such as pretzels, chips, and salted popcorn. ? Frozen prepared meals, unless labeled low-sodium. ? Canned and dried soups, broths, and bouillon, unless labeled sodium-free or low-sodium. ? Canned vegetables, unless labeled sodium-free or low-sodium. ? Western [employer] fries, pizza, tacos, and other fast foods. ? Pickles, olives, ketchup, and other condiments, especially soy sauce, unless labeled sodium-free or low-sodium. Where can you learn more? Go to http://www.gray.com/  Enter V843 in the search box to learn more about \"Low Sodium Diet (2,000 Milligram): Care Instructions. \"  Current as of: December 17, 2020               Content Version: 13.0  © 4017-6225 Healthwise, [employer]. Care instructions adapted under license by [employer] (which disclaims liability or warranty for this information). If you have questions about a medical condition or this instruction, always ask your healthcare professional. [address] any warranty or liability for your use of this information. Prediabetes: Care Instructions  Overview     Prediabetes is a warning sign that you're at risk for getting type 2 diabetes. It means that your blood sugar is higher than it should be. But it's not high enough to be diabetes. The food you eat naturally turns into sugar. Your body uses the sugar for energy. Normally, an organ called the pancreas makes insulin. And insulin allows the sugar in your blood to get into your body's cells. But sometimes the body can't use insulin the right way. So the sugar stays in your blood instead. This is called insulin resistance. The buildup of sugar in your blood means you have prediabetes. The good news is that you may be able to prevent or delay diabetes. Making small lifestyle changes, like getting active and changing your eating habits, may help you get your blood sugar back to normal. You can work with your doctor to make a treatment plan. Follow-up care is a key part of your treatment and safety. Be sure to make and go to all appointments, and call your doctor if you are having problems. It's also a good idea to know your test results and keep a list of the medicines you take. How can you care for yourself at home? · Watch your weight. A healthy weight helps your body use insulin properly. · Limit the amount of calories, sweets, and unhealthy fat you eat. Ask your doctor if you should see a dietitian. A registered dietitian can help you create meal plans that fit your lifestyle. · Get at least 30 minutes of exercise on most days of the week. Exercise helps control your blood sugar.  It also helps you maintain a healthy weight. Walking is a good choice. You also may want to do other activities, such as running, swimming, cycling, or playing tennis or team sports. · Do not smoke. Smoking can make prediabetes worse. If you need help quitting, talk to your doctor about stop-smoking programs and medicines. These can increase your chances of quitting for good. · If your doctor prescribed medicines, take them exactly as prescribed. Call your doctor if you think you are having a problem with your medicine. You will get more details on the specific medicines your doctor prescribes. When should you call for help? Watch closely for changes in your health, and be sure to contact your doctor if:    · You have any symptoms of diabetes. These may include:  ? Being thirsty more often. ? Urinating more. ? Being hungrier. ? Losing weight. ? Being very tired. ? Having blurry vision.     · You have a wound that will not heal.     · You have an infection that will not go away.     · You have problems with your blood pressure.     · You want more information about diabetes and how you can keep from getting it. Where can you learn more? Go to http://www.gray.com/  Enter I222 in the search box to learn more about \"Prediabetes: Care Instructions. \"  Current as of: August 31, 2020               Content Version: 13.0  © 2006-2021 Healthwise[employer]. Care instructions adapted under license by [employer] (which disclaims liability or warranty for this information). If you have questions about a medical condition or this instruction, always ask your healthcare professional. [place] [address] any warranty or liability for your use of this information. A Healthy Lifestyle: Care Instructions  Your Care Instructions     A healthy lifestyle can help you feel good, stay at a healthy weight, and have plenty of energy for both work and play.  A healthy lifestyle is something you can share with your whole family. A healthy lifestyle also can lower your risk for serious health problems, such as high blood pressure, heart disease, and diabetes. You can follow a few steps listed below to improve your health and the health of your family. Follow-up care is a key part of your treatment and safety. Be sure to make and go to all appointments, and call your doctor if you are having problems. It's also a good idea to know your test results and keep a list of the medicines you take. How can you care for yourself at home? · Do not eat too much sugar, fat, or fast foods. You can still have dessert and treats now and then. The goal is moderation. · Start small to improve your eating habits. Pay attention to portion sizes, drink less juice and soda pop, and eat more fruits and vegetables. ? Eat a healthy amount of food. A 3-ounce serving of meat, for example, is about the size of a deck of cards. Fill the rest of your plate with vegetables and whole grains. ? Limit the amount of soda and sports drinks you have every day. Drink more water when you are thirsty. ? Eat plenty of fruits and vegetables every day. Have an apple or some carrot sticks as an afternoon snack instead of a candy bar. Try to have fruits and/or vegetables at every meal.  · Make exercise part of your daily routine. You may want to start with simple activities, such as walking, bicycling, or slow swimming. Try to be active 30 to 60 minutes every day. You do not need to do all 30 to 60 minutes all at once. For example, you can exercise 3 times a day for 10 or 20 minutes. Moderate exercise is safe for most people, but it is always a good idea to talk to your doctor before starting an exercise program.  · Keep moving. Chiquis Melah the lawn, work in the garden, or [employer]. Take the stairs instead of the elevator at work. · If you smoke, quit.  People who smoke have an increased risk for heart attack, stroke, cancer, and other lung illnesses. Quitting is hard, but there are ways to boost your chance of quitting tobacco for good. ? Use nicotine gum, patches, or lozenges. ? Ask your doctor about stop-smoking programs and medicines. ? Keep trying. In addition to reducing your risk of diseases in the future, you will notice some benefits soon after you stop using tobacco. If you have shortness of breath or asthma symptoms, they will likely get better within a few weeks after you quit. · Limit how much alcohol you drink. Moderate amounts of alcohol (up to 2 drinks a day for men, 1 drink a day for women) are okay. But drinking too much can lead to liver problems, high blood pressure, and other health problems. Family health  If you have a family, there are many things you can do together to improve your health. · Eat meals together as a family as often as possible. · Eat healthy foods. This includes fruits, vegetables, lean meats and dairy, and whole grains. · Include your family in your fitness plan. Most people think of activities such as jogging or tennis as the way to fitness, but there are many ways you and your family can be more active. Anything that makes you breathe hard and gets your heart pumping is exercise. Here are some tips:  ? Walk to do errands or to take your child to school or the bus.  ? Go for a family bike ride after dinner instead of watching TV. Where can you learn more? Go to http://www.gray.com/  Enter L379 in the search box to learn more about \"A Healthy Lifestyle: Care Instructions. \"  Current as of: June 16, 2021               Content Version: 13.0  © 2006-2021 Healthwise, Incorporated. Care instructions adapted under license by [employer] (which disclaims liability or warranty for this information).  If you have questions about a medical condition or this instruction, always ask your healthcare professional. [employer] disclaims any warranty or liability for your use of this information.

## 2021-11-08 ENCOUNTER — HOSPITAL ENCOUNTER (OUTPATIENT)
Dept: MAMMOGRAPHY | Age: 61
Discharge: HOME | End: 2021-11-08
Attending: FAMILY MEDICINE

## 2021-11-08 DIAGNOSIS — Z12.31 ENCOUNTER FOR SCREENING MAMMOGRAM FOR MALIGNANT NEOPLASM OF BREAST: ICD-10-CM

## 2021-12-13 ENCOUNTER — OFFICE VISIT (OUTPATIENT)
Dept: NEUROLOGY | Age: 61
End: 2021-12-13
Payer: COMMERCIAL

## 2021-12-13 ENCOUNTER — HOSPITAL ENCOUNTER (OUTPATIENT)
Dept: MAMMOGRAPHY | Age: 61
Discharge: HOME | End: 2021-12-13
Attending: FAMILY MEDICINE
Payer: COMMERCIAL

## 2021-12-13 VITALS
SYSTOLIC BLOOD PRESSURE: 152 MMHG | HEART RATE: 67 BPM | WEIGHT: 201 LBS | BODY MASS INDEX: 37.95 KG/M2 | DIASTOLIC BLOOD PRESSURE: 80 MMHG | OXYGEN SATURATION: 98 % | RESPIRATION RATE: 18 BRPM | HEIGHT: 61 IN

## 2021-12-13 DIAGNOSIS — Z12.31 ENCOUNTER FOR SCREENING MAMMOGRAM FOR MALIGNANT NEOPLASM OF BREAST: ICD-10-CM

## 2021-12-13 DIAGNOSIS — G44.40 ANALGESIC OVERUSE HEADACHE: Primary | ICD-10-CM

## 2021-12-13 DIAGNOSIS — T39.95XA ANALGESIC OVERUSE HEADACHE: Primary | ICD-10-CM

## 2021-12-13 DIAGNOSIS — R51.9 HEADACHE DISORDER: ICD-10-CM

## 2021-12-13 DIAGNOSIS — R06.83 SNORING: ICD-10-CM

## 2021-12-13 DIAGNOSIS — I10 ELEVATED BLOOD PRESSURE READING WITH DIAGNOSIS OF HYPERTENSION: ICD-10-CM

## 2021-12-13 PROCEDURE — 99204 OFFICE O/P NEW MOD 45 MIN: CPT | Performed by: NURSE PRACTITIONER

## 2021-12-13 PROCEDURE — 77063 BREAST TOMOSYNTHESIS BI: CPT

## 2021-12-13 RX ORDER — AMITRIPTYLINE HYDROCHLORIDE 10 MG/1
TABLET, FILM COATED ORAL
Qty: 60 TABLET | Refills: 2 | Status: SHIPPED | OUTPATIENT
Start: 2021-12-13 | End: 2022-02-07 | Stop reason: SDUPTHER

## 2021-12-13 NOTE — PROGRESS NOTES
[facility]  [address] [facility] [address], [address], [place], [address]  [address] [place], [address]  Office:  [phone]  Fax: [phone]    Referring: Krysta Durham MD    Chief Complaint   Patient presents with    Headache       HPI: This is a 65-year-old female who presents for new patient evaluation with chief complaint of headaches. Referred by her primary care provider for history of nighttime headaches with mention of history of pituitary adenoma. Per documentation no findings of pituitary adenoma on MRI of the brain dated back to 2019. She tells me she saw neurology many years ago at [facility] [place].  No records in office today. In regards to headaches, she endorses onset of headache starting the day after her son was born. She says he is now 44years old. At one point headache stopped. She said she then had her daughter in 26. Headaches stopped after the birth of her daughter. At some point they return. She tells me currently she is experiencing headaches at night. She said she will start to go to bed around 10 PM and at 11 PM she will need to take something for headaches. She can then wake up between 2 and 3 AM with a headache. Headache located frontal across the forehead. Describes his pain as an ache. She will try to lie down and rest.  She says this will not help. She will reach for naproxen. She is taking this nightly. If she does not have access to naproxen she will take aspirin. She said that when she takes something headaches can last 30 minutes. If she has not had access to medication they have lasted longer in the past.  Headaches associated with light sensitivity. She describes in the past waking up in the morning with a headache and being sensitive to light. Denies nausea, vomiting, visual disturbance, loss of vision, chest pain, shortness of breath, lightheadedness, or passing out.   She has never been on any headache specific medications. Positive snoring. She tells me she has never had a sleep study. Positive history of anxiety. She was prescribed medication in the past but did not take this. She describes situational anxiety secondary to the loss of her third child in pregnancy and other situational events such as the loss of a loved one. Does not report SI/HI. Denies tobacco use. Denies family history of headaches. She is prediabetic. She is hoping not to be placed on medication for this. She has hypertension and reports not taking her blood pressure medication this morning. She works as a counselor. Denies routinely calling out of work secondary to headaches. Last head imaging was an MRI of the brain dated from 2019. Per documentation review previous MRI of the brain earlier to that was in 2007. No other concerns at this time. Social History     Socioeconomic History    Marital status:      Spouse name: Not on file    Number of children: Not on file    Years of education: Not on file    Highest education level: Not on file   Occupational History    Not on file   Tobacco Use    Smoking status: Never Smoker    Smokeless tobacco: Never Used   Substance and Sexual Activity    Alcohol use: Yes     Alcohol/week: 0.8 standard drinks     Types: 1 Standard drinks or equivalent per week    Drug use: No    Sexual activity: Yes     Partners: Male     Birth control/protection: Surgical     Comment: hysterectomy   Other Topics Concern    Not on file   Social History Narrative    Not on file     Social Determinants of Health     Financial Resource Strain:     Difficulty of Paying Living Expenses: Not on file   Food Insecurity:     Worried About Running Out of Food in the Last Year: Not on file    [place] of Food in the Last Year: Not on file   Transportation Needs:     Lack of Transportation (Medical): Not on file    Lack of Transportation (Non-Medical):  Not on file   Physical Activity:     Days of Exercise per Week: Not on file    Minutes of Exercise per Session: Not on file   Stress:     Feeling of Stress : Not on file   Social Connections:     Frequency of Communication with Friends and Family: Not on file    Frequency of Social Gatherings with Friends and Family: Not on file    Attends Bahai Services: Not on file    Active Member of Clubs or Organizations: Not on file    Attends Club or Organization Meetings: Not on file    Marital Status: Not on file   Intimate Partner Violence:     Fear of Current or Ex-Partner: Not on file    Emotionally Abused: Not on file    Physically Abused: Not on file    Sexually Abused: Not on file   Housing Stability:     Unable to Pay for Housing in the Last Year: Not on file    Number of [place] in the Last Year: Not on file    Unstable Housing in the Last Year: Not on file       Family History   Problem Relation Age of Onset    Hypertension Mother     Depression Mother     Hypertension Father     Headache Father     Diabetes Father     Arthritis-rheumatoid Father     Heart Attack Father     Heart Failure Father     Hypertension Sister     Diabetes Sister     Cancer Sister 47        breast cancer    Heart Failure Sister     Arthritis Sister     Hypertension Brother     Attention Deficit Disorder Son     Asthma Granddaughter        Current Outpatient Medications   Medication Sig Dispense Refill    amitriptyline (ELAVIL) 10 mg tablet Take 10 mg at bedtime for two weeks then increase to 20 mg at bedtime. 60 Tablet 2    telmisartan-hydroCHLOROthiazide (MICARDIS HCT) 80-25 mg per tablet TAKE 1 TABLET BY MOUTH DAILY 90 Tablet 1    mometasone (ELOCON) 0.1 % topical cream Apply  to affected area daily. 30 g 0    amLODIPine (NORVASC) 5 mg tablet Take 1 Tablet by mouth daily. 90 Tablet 1    naproxen sodium (Aleve) 220 mg tablet Take 220 mg by mouth two (2) times daily (with meals).       fluticasone propionate (FLONASE) 50 mcg/actuation nasal spray USE 2 SPRAYS IN EACH NOSTRIL DAILY AS NEEDED FOR RHINITIS 1 Bottle 1    cetirizine (ZYRTEC) 5 mg tablet Take 1 Tab by mouth daily as needed for Allergies. 27 Tab 0       Past Medical History:   Diagnosis Date    AR (allergic rhinitis)     Arthritis     Diabetes ([address]     Headache     Heart murmur     History of blood transfusion     Hypertension     Hypovitaminosis D 3/3/2014       Past Surgical History:   Procedure Laterality Date    HX  SECTION      HX COLONOSCOPY      normal per patient (by Dr. Pamela Hatfield)    HX HYSTERECTOMY      s/p vaginal hysterectomy        No Known Allergies    Patient Active Problem List   Diagnosis Code    Hypertension I10    Arthritis M19.90    AR (allergic rhinitis) J30.9    Hypovitaminosis D E55.9    Elevated CPK R74.8    Encounter for long-term (current) use of medications Z79.899    Obesity (BMI 30.0-34. 9) E66.9    History of screening mammography Z92.89    Need for hepatitis C screening test Z11.59    Knee pain M25.569    Nausea R11.0    Constipation K59.00    Hyperglycemia due to type 2 diabetes mellitus (HCC) E11.65    Bursitis of left hip M70.72    Sinus congestion R09.81    Acute right-sided thoracic back pain M54.6    DDD (degenerative disc disease), thoracic M51.34    Encounter for screening mammogram for breast cancer Z12.31    Dyslipidemia E78.5    Severe obesity (BMI 35.0-39. 9) E66.01    Pituitary microadenoma ([facility] D35.2    Well controlled diabetes mellitus ([facility] [facility] E11.9    Pain of multiple sites R52         Review of Systems:   Constitutional: no fever or chills  Skin denies rash or itching  HEENT:  Denies tinnitus, hearing loss, or visual changes  Respiratory: denies shortness of breath  Cardiovascular: denies chest pain, dyspnea on exertion  Gastrointestinal: does not report nausea or vomiting  Genitourinary: does not report dysuria or incontinence  Musculoskeletal: does not report joint pain or swelling  Endocrine: denies weight change  Hematology: denies easy bruising or bleeding   Neurological: as above in HPI      PHYSICAL EXAMINATION:      VITAL SIGNS:    Visit Vitals  BP (!) 152/80   Pulse 67   Resp 18   Ht 5' 1\" (1.549 m)   Wt 91.2 kg (201 lb)   SpO2 98%   BMI 37.98 kg/m²       GENERAL: Well developed, well nourished, in no apparent distress. HEART: RR, no murmurs heard, no carotid bruits  LUNGS:                      CTAB  EXTREMITIES: No clubbing, cyanosis, or edema is identified. Pulses 2+ and symmetrical.  HEAD:   Normocephalic, atraumatic. NEUROLOGIC EXAMINATION    MENTAL STATUS: Awake, alert, and oriented x 4. Attention and STM are grossly normal. There is no aphasia. Fund of knowledge is adequate. Mood and affect are appropriate  CRANIAL NERVES: Visual fields are full to confrontation. No fundus anomalies observed. Pupils are reactive to light and accommodation. Extraocular movements are intact and there is no nystagmus. Facial sensation is normal  Face is symmetrical.   Hearing is grossly intact. SCM/TPZ 5/5  Palate rises symmetrically. Tongue is in the midline. MOTOR:   Normal tone, bulk, and strength, 5/5 muscle strength throughout. No cogwheel rigidity or clonus present. CEREBELLAR: Finger to nose was normal.   No tremors or dysmetria    SENSORY:  Normal PP, vibration, propioception. Romberg negative    DTR's:   +2 throughout, toes downgoing     GAIT:   Normal gait, able to tandem walk          I have personally reviewed imaging studies.          Results  MRI BRAIN W WO CONT (Accession [id number]) (Order [id number])    Allergies       No Known Allergies     Exam Information    Status Exam Begun  Exam Ended    Final [99] 10/24/2019 18:32 10/24/2019  7:14 PM [id number]  7:14 PM     Result Information    Status: Final result (Exam End: 10/24/2019 19:14) Provider Status: Reviewed     MRI BRAIN W WO CONT: Result Notes     Allanmirtha Mcguire LPN   35/25/0070  8:05 PM EDT Contacted patient and verified identity using name and date of birth (2- identifiers)  Spoke with patient and she verbalized understanding of no pituitary adenoma per MRI. Further discussion at follow-up. Zarina Figueroa LPN   11/71/1134  0:15 PM EDT         Called patient and left message to please call office at convenience. Aris Meraz MD   10/25/2019  3:34 PM EDT         Let pt know that no pituitary adenoma per MRI. Further discussions on follow up visit.            MRI BRAIN W WO CONT: Patient Communication    Add Comments  Seen       Study Result    Narrative & Impression   MRI Pituitary with and without contrast     HISTORY: Prior history of pituitary adenoma with new onset of frontal headaches.     COMPARISON: None available     TECHNIQUE:Pituitary gland scanned with coronal and sagittal T1W, postcontrast  coronal and sagittal T1W, and coronal T2W. Please note that the entire brain  was not scanned.     CONTRAST: 8.5 cc MultiHance administered intravenously.     FINDINGS: Pituitary gland is normal in size and morphology. No hypoenhancing or  contour deforming lesions detected. The infundibulum is midline with normal  thickness and enhancement. Optic apparatus and parasellar regions are  unremarkable. Imaged portions of the brain are unremarkable.     IMPRESSION  IMPRESSION:     Pituitary adenoma is not identified. Impression/Plan  Maurizio Richards is a 63 y.o. female whose history and physical are consistent with headache disorder transient over the past 39 years. Now having headaches occurring at nighttime. She can wake up in the middle the night with a headache. This occurs approximately between 2 and 3 AM.  Risk factors including positive snoring and routine use of over-the-counter analgesics. Denies clear migrainous type pattern. No unilateral headache with features of pounding.   Infrequent light sensitivity and does not report sensitivity to sound, nausea, vomiting, or focal deficits. Also hypertension that is currently not controlled at 152/80 in office today. She denies taking her antihypertensive medications this morning. Does not report chest pain, shortness of breath, or lightheadedness. Her examination today is reassuringly nonfocal.  Reviewed most recent MRI of the pituitary with no findings of pituitary adenoma and the rest of the brain visualize unremarkable. Did review previous MRI of the brain dated from 2007 with findings consistent as below: \"THE SMALL FIELD-OF-VIEW IMAGES OF THE PITUITARY GLAND DEMONSTRATED THE PRESENCE OF A 6.7 X 6.3 MM HETEROGENEOUSLY-ENHANCING  OVAL LESION IN THE MIDLINE OF THE POSTERIOR ASPECT OF THE PITUITARY GLAND. POSSIBLE CONCORDANT WITH THE CLINICAL HISTORY OF PITUITARY MICROADENOMA. COMPARISON WITH PRIOR STUDIES WOULD BE OF HELPFUL. THE INFUNDIBULUM WAS MIDLINE. NORMAL OPTIC CHIASM. THE CRANIOCERVICAL JUNCTION WAS INTACT. HETEROGENEOUS SIGNAL WITHIN THE CLIVUS. \"    We discussed we can certainly reorder MRI of the pituitary in order to have 1 more comparison study in the future. Patient verbalized understanding and would like to hold off on this at this time. We further discussed analgesic overuse headache and the propensity for taking something more than twice a week for headache to drive the headache process. She does endorse taking naproxen nightly. Recommendation to wean herself off this medication and likely headaches can get worse before they get better. Due to the prolonged use of over-the-counter medications steroid taper will likely provide no additional support. We can certainly initiate a headache preventative in the form of amitriptyline 10 mg at bedtime for 2 weeks and increase to 20 mg at bedtime. Referral placed to sleep medicine provider due to chronic headache, waking up in the middle the night with headache, and positive snoring. She will continue to track headaches and follow-up in 8 weeks.   She will take her blood pressure medications when she returns home. She will continue to monitor her blood pressure and follow-up with her primary care provider as indicated. All questions addressed and patient is agreeable with plan of care. Diagnoses and all orders for this visit:    1. Analgesic overuse headache    2. Snoring  -     SLEEP MEDICINE REFERRAL    3. Headache disorder    4. Elevated blood pressure reading with diagnosis of hypertension    Other orders  -     amitriptyline (ELAVIL) 10 mg tablet; Take 10 mg at bedtime for two weeks then increase to 20 mg at bedtime. I spent 45 minutes with the patient in face-to-face consultation, with 37 minutes spent in counseling and coordination of care as described above. Signed By: Alexa Gregory NP        PLEASE NOTE:   Portions of this document may have been produced using voice recognition software. Unrecognized errors in transcription may be present.

## 2021-12-15 DIAGNOSIS — R92.8 ABNORMAL MAMMOGRAM: Primary | ICD-10-CM

## 2021-12-15 NOTE — PROGRESS NOTES
Please order left breast diagnostic mammogram and ultrasound with diagnosis of abnormal mammogram . I will co-sign the orders .

## 2021-12-15 NOTE — PROGRESS NOTES
Contacted patient and verified identity using name and date of birth (2- identifiers)  Spoke with patient and she verbalized understanding of Please order left breast diagnostic mammogram and ultrasound with diagnosis of abnormal mammogram . I will co-sign the orders .

## 2021-12-15 NOTE — PROGRESS NOTES
Called patient and left message to please call office at convenience regarding results.  Will order additional images after speaking with patient

## 2022-01-03 ENCOUNTER — HOSPITAL ENCOUNTER (OUTPATIENT)
Dept: MAMMOGRAPHY | Age: 62
Discharge: HOME | End: 2022-01-03
Attending: FAMILY MEDICINE
Payer: COMMERCIAL

## 2022-01-03 ENCOUNTER — HOSPITAL ENCOUNTER (OUTPATIENT)
Dept: ULTRASOUND IMAGING | Age: 62
Discharge: HOME | End: 2022-01-03
Attending: FAMILY MEDICINE
Payer: COMMERCIAL

## 2022-01-03 DIAGNOSIS — R92.8 ABNORMAL MAMMOGRAM: ICD-10-CM

## 2022-01-03 DIAGNOSIS — R92.8 ABNORMAL MAMMOGRAM OF LEFT BREAST: Primary | ICD-10-CM

## 2022-01-03 PROCEDURE — 76642 ULTRASOUND BREAST LIMITED: CPT

## 2022-01-03 PROCEDURE — 77061 BREAST TOMOSYNTHESIS UNI: CPT

## 2022-02-02 ENCOUNTER — VIRTUAL VISIT (OUTPATIENT)
Dept: FAMILY MEDICINE CLINIC | Age: 62
End: 2022-02-02
Payer: COMMERCIAL

## 2022-02-02 DIAGNOSIS — R07.0 THROAT DISCOMFORT: ICD-10-CM

## 2022-02-02 DIAGNOSIS — R05.9 COUGH: Primary | ICD-10-CM

## 2022-02-02 PROCEDURE — 99213 OFFICE O/P EST LOW 20 MIN: CPT | Performed by: FAMILY MEDICINE

## 2022-02-02 RX ORDER — FLUTICASONE PROPIONATE 110 UG/1
1 AEROSOL, METERED RESPIRATORY (INHALATION) EVERY 12 HOURS
Qty: 12 G | Refills: 0 | Status: SHIPPED | OUTPATIENT
Start: 2022-02-02

## 2022-02-02 NOTE — PROGRESS NOTES
Kitty Flores is a 63 y.o. female who was seen by synchronous (real-time) audio-video technology on 2/2/2022 for Cough (x 3 weeks, feels like a tickle but nothing coming up- Has not been tested for Covid- was sick in early Jan) and Other (Denies fever, nausea, vomiting &/or diarrhea)        Assessment & Plan:   Diagnoses and all orders for this visit:    Cough: Had a cold symptoms 3 weeks ago. Not been tested for COVID-19 infection. Since then dry cough. Probably post viral cough. Feeling tickly sensation in the throat. Advised bacitracin daily and Flovent for now. Cough lozenges. If symptoms persist in a week or 2 call back to the office and get seen sooner. -     fluticasone propionate (FLOVENT HFA) 110 mcg/actuation inhaler; Take 1 Puff by inhalation every twelve (12) hours. , Normal, Disp-12 g, R-0    Throat discomfort  Comments:  ticklish sensations     Patient understood agreed with the plan  Follow-up if needed otherwise keep as scheduled appointment  Please note that this dictation was completed with [employer], the [employer] voice recognition software. Quite often unanticipated grammatical, syntax, homophones, and other interpretive errors are inadvertently transcribed by the computer software. Please disregard these errors. Please excuse any errors that have escaped final proofreading. [phone]  Subjective:   Done visit through doxy  She had cold symptoms 3 weeks ago. She has not checked for Covid infection at that time. Since then she is struggling with the dry cough. Feels ticklish in the throat and then bouts of cough. No wheezing or chest congestion. No fever. Sometimes feels nausea from the cough. No vomiting. No abdominal pain. No urinary or bowel complaint. No loss of smell or test.  Has taken cough lozenges and cough syrup but nothing is helping. Has tried cetirizine couple of times but has not taken them consistently.   Has been vaccinated along with the booster for COVID-19 vaccine. During visit sitting comfortable without any acute distress. No audible wheezing. No chest pain or chest congestion. No shortness of breath. No leg swelling or any signs of volume overload. Denies any GERD symptoms. Prior to Admission medications    Medication Sig Start Date End Date Taking? Authorizing Provider   fluticasone propionate (FLOVENT HFA) 110 mcg/actuation inhaler Take 1 Puff by inhalation every twelve (12) hours. 2/2/22  Yes Dorinda Lyon MD   amitriptyline (ELAVIL) 10 mg tablet Take 10 mg at bedtime for two weeks then increase to 20 mg at bedtime. Patient taking differently: 20 mg. Take 10 mg at bedtime for two weeks then increase to 20 mg at bedtime. 12/13/21  Yes Glenroy Gottlieb NP   telmisartan-hydroCHLOROthiazide (MICARDIS HCT) 80-25 mg per tablet TAKE 1 TABLET BY MOUTH DAILY 10/11/21  Yes Dorinda Lyon MD   mometasone (ELOCON) 0.1 % topical cream Apply  to affected area daily. 10/11/21  Yes Dorinda Lyon MD   amLODIPine (NORVASC) 5 mg tablet Take 1 Tablet by mouth daily. 10/11/21  Yes Dorinda Lyon MD   naproxen sodium (Aleve) 220 mg tablet Take 220 mg by mouth two (2) times daily (with meals). Yes Provider, Historical   fluticasone propionate (FLONASE) 50 mcg/actuation nasal spray USE 2 SPRAYS IN EACH NOSTRIL DAILY AS NEEDED FOR RHINITIS 10/4/19  Yes Dorinda Lyon MD   cetirizine (ZYRTEC) 5 mg tablet Take 1 Tab by mouth daily as needed for Allergies.  4/19/19  Yes Dorinda Lyon MD     Patient Active Problem List    Diagnosis Date Noted    Pituitary microadenoma [facility]) 10/04/2019    Well controlled diabetes mellitus ([facility] [address] 10/04/2019    Pain of multiple sites 10/04/2019    Severe obesity (BMI 35.0-39.9) 07/19/2018    Dyslipidemia 03/14/2018    Encounter for screening mammogram for breast cancer 12/12/2017    DDD (degenerative disc disease), thoracic 02/14/2017    Acute right-sided thoracic back pain 02/07/2017    Sinus congestion 01/19/2017    Hyperglycemia due to type 2 diabetes mellitus ([facility] [address] 10/13/2016    Bursitis of left hip 10/13/2016    Nausea 05/25/2016    Constipation 05/25/2016    Knee pain 04/07/2016    Obesity (BMI 30.0-34.9) 10/08/2015    History of screening mammography 10/08/2015    Need for hepatitis C screening test 10/08/2015    Encounter for long-term (current) use of medications 04/07/2015    Hypovitaminosis D 03/03/2014    Elevated CPK 03/03/2014    Hypertension     Arthritis     AR (allergic rhinitis)        ROS    Objective:     Patient-Reported Vitals 2/9/2021   Patient-Reported Weight 197lbs   Patient-Reported Height -   Patient-Reported Pulse 56   Patient-Reported Systolic  478   Patient-Reported Diastolic 64      General: alert, cooperative, no distress   Mental  status: normal mood, behavior, speech, dress, motor activity, and thought processes, able to follow commands   HENT: NCAT   Neck: no visualized mass   Resp: no respiratory distress   Neuro: no gross deficits   Skin: no discoloration or lesions of concern on visible areas   Psychiatric: normal affect, consistent with stated mood, no evidence of hallucinations     Additional exam findings: We discussed the expected course, resolution and complications of the diagnosis(es) in detail. Medication risks, benefits, costs, interactions, and alternatives were discussed as indicated. I advised her to contact the office if her condition worsens, changes or fails to improve as anticipated. She expressed understanding with the diagnosis(es) and plan. Geraldo Mata, was evaluated through a synchronous (real-time) audio-video encounter. The patient (or guardian if applicable) is aware that this is a billable service, which includes applicable co-pays. Verbal consent to proceed has been obtained.  The visit was conducted pursuant to the emergency declaration under the [address] [address] waiver authority and the Coronavirus Preparedness and Response Supplemental Appropriations Act. Patient identification was verified, and a caregiver was present when appropriate. The patient was located at home in a state where the provider was licensed to provide care.     Nasima Riley MD

## 2022-02-02 NOTE — PROGRESS NOTES
Chief Complaint   Patient presents with    Cough     x 3 weeks, feels like a tickle but nothing coming up- Has not been tested for Covid- was sick in early [place]    Other     Denies fever, nausea, vomiting &/or diarrhea   '1. \"Have you been to the ER, urgent care clinic since your last visit? Hospitalized since your last visit? \" No    2. \"Have you seen or consulted any other health care providers outside of the [address] [place] [address] since your last visit? \" No     3. For patients aged 39-70: Has the patient had a colonoscopy / FIT/ Cologuard? Yes - no Care Gap present      If the patient is female:    4. For patients aged 41-77: Has the patient had a mammogram within the past 2 years? Yes - no Care Gap present      5. For patients aged 21-65: Has the patient had a pap smear?  Yes - no Care Gap present

## 2022-02-07 ENCOUNTER — VIRTUAL VISIT (OUTPATIENT)
Dept: NEUROLOGY | Age: 62
End: 2022-02-07
Payer: COMMERCIAL

## 2022-02-07 DIAGNOSIS — R51.9 HEADACHE DISORDER: Primary | ICD-10-CM

## 2022-02-07 DIAGNOSIS — G43.009 MIGRAINE WITHOUT AURA AND WITHOUT STATUS MIGRAINOSUS, NOT INTRACTABLE: ICD-10-CM

## 2022-02-07 PROCEDURE — 99214 OFFICE O/P EST MOD 30 MIN: CPT | Performed by: NURSE PRACTITIONER

## 2022-02-07 RX ORDER — AMITRIPTYLINE HYDROCHLORIDE 25 MG/1
25 TABLET, FILM COATED ORAL
Qty: 90 TABLET | Refills: 1 | Status: SHIPPED | OUTPATIENT
Start: 2022-02-07 | End: 2022-09-09

## 2022-02-07 NOTE — PROGRESS NOTES
Otis Foote is a 63 y.o. female who was seen by synchronous (real-time) audio-video technology on 2/7/2022 for Headache (follow up)        Assessment & Plan:   Diagnoses and all orders for this visit:    1. Headache disorder    2. Migraine without aura and without status migrainosus, not intractable    Other orders  -     amitriptyline (ELAVIL) 25 mg tablet; Take 1 Tablet by mouth nightly. Take 25 mg at bedtime. Patient presents for follow-up headaches. In the interim endorses decreasing use of over-the-counter analgesic to Aleve once a week. She is using co-Q10 and over-the-counter aromatherapy product with positive relief. Tolerating amitriptyline 20 mg at bedtime. Refill amitriptyline at 25 mg. New prescription provided. Discussed dosing this early on to prevent morning drowsiness. Provided information regarding sleep medicine referral. She will contact their office. Continue to track headaches and follow-up in 6 months or sooner as need be. All questions addressed and patient is agreeable with plan of care. I spent at least 30 minutes on this visit with this established patient. [id number]  Subjective: This is a 63year old female who presents for follow up headaches. Last seen in December. In the interim endorses improvement in headaches. Having 1 breakthrough a week. Tolerating Elavil 20 mg at bedtime quite well. Can have some drowsiness in the morning. Did not hear from sleep provider regarding referral as of yet. Decreased Aleve down to once a week. Using an aromatherapy product from [employer] called [employer] to help with headache management and is taking CoQ10. Denies focal deficits. No other concerns at this time. Prior to Admission medications    Medication Sig Start Date End Date Taking? Authorizing Provider   amitriptyline (ELAVIL) 25 mg tablet Take 1 Tablet by mouth nightly. Take 25 mg at bedtime.  2/7/22  Yes Fabian Gottlieb NP   fluticasone propionate (FLOVENT HFA) 110 mcg/actuation inhaler Take 1 Puff by inhalation every twelve (12) hours. 2/2/22  Yes Kathleen Geronimo MD   telmisartan-hydroCHLOROthiazide (MICARDIS HCT) 80-25 mg per tablet TAKE 1 TABLET BY MOUTH DAILY 10/11/21  Yes Kathleen Geronimo MD   mometasone (ELOCON) 0.1 % topical cream Apply  to affected area daily. 10/11/21  Yes Kathleen Geronimo MD   amLODIPine (NORVASC) 5 mg tablet Take 1 Tablet by mouth daily. 10/11/21  Yes Kathleen Geronimo MD   naproxen sodium (Aleve) 220 mg tablet Take 220 mg by mouth two (2) times daily (with meals). Yes Provider, Historical   fluticasone propionate (FLONASE) 50 mcg/actuation nasal spray USE 2 SPRAYS IN EACH NOSTRIL DAILY AS NEEDED FOR RHINITIS 10/4/19  Yes Kathleen Geronimo MD   cetirizine (ZYRTEC) 5 mg tablet Take 1 Tab by mouth daily as needed for Allergies. 4/19/19  Yes Kathleen Geronimo MD   amitriptyline (ELAVIL) 10 mg tablet Take 10 mg at bedtime for two weeks then increase to 20 mg at bedtime. Patient taking differently: 20 mg. Take 10 mg at bedtime for two weeks then increase to 20 mg at bedtime. 12/13/21 2/7/22  Renata Gutiérrze NP     Patient Active Problem List   Diagnosis Code    Hypertension I10    Arthritis M19.90    AR (allergic rhinitis) J30.9    Hypovitaminosis D E55.9    Elevated CPK R74.8    Encounter for long-term (current) use of medications Z79.899    Obesity (BMI 30.0-34. 9) E66.9    History of screening mammography Z92.89    Need for hepatitis C screening test Z11.59    Knee pain M25.569    Nausea R11.0    Constipation K59.00    Hyperglycemia due to type 2 diabetes mellitus (HCC) E11.65    Bursitis of left hip M70.72    Sinus congestion R09.81    Acute right-sided thoracic back pain M54.6    DDD (degenerative disc disease), thoracic M51.34    Encounter for screening mammogram for breast cancer Z12.31    Dyslipidemia E78.5    Severe obesity (BMI 35.0-39. 9) E66.01    Pituitary microadenoma ([address] D35.2    Well controlled diabetes mellitus ([facility] E11.9    Pain of multiple sites R52     Patient Active Problem List    Diagnosis Date Noted    Pituitary microadenoma ([facility] 10/04/2019    Well controlled diabetes mellitus ([facility] 10/04/2019    Pain of multiple sites 10/04/2019    Severe obesity (BMI 35.0-39.9) 07/19/2018    Dyslipidemia 03/14/2018    Encounter for screening mammogram for breast cancer 12/12/2017    DDD (degenerative disc disease), thoracic 02/14/2017    Acute right-sided thoracic back pain 02/07/2017    Sinus congestion 01/19/2017    Hyperglycemia due to type 2 diabetes mellitus ([facility] 10/13/2016    Bursitis of left hip 10/13/2016    Nausea 05/25/2016    Constipation 05/25/2016    Knee pain 04/07/2016    Obesity (BMI 30.0-34.9) 10/08/2015    History of screening mammography 10/08/2015    Need for hepatitis C screening test 10/08/2015    Encounter for long-term (current) use of medications 04/07/2015    Hypovitaminosis D 03/03/2014    Elevated CPK 03/03/2014    Hypertension     Arthritis     AR (allergic rhinitis)      Current Outpatient Medications   Medication Sig Dispense Refill    amitriptyline (ELAVIL) 25 mg tablet Take 1 Tablet by mouth nightly. Take 25 mg at bedtime. 90 Tablet 1    fluticasone propionate (FLOVENT HFA) 110 mcg/actuation inhaler Take 1 Puff by inhalation every twelve (12) hours. 12 g 0    telmisartan-hydroCHLOROthiazide (MICARDIS HCT) 80-25 mg per tablet TAKE 1 TABLET BY MOUTH DAILY 90 Tablet 1    mometasone (ELOCON) 0.1 % topical cream Apply  to affected area daily. 30 g 0    amLODIPine (NORVASC) 5 mg tablet Take 1 Tablet by mouth daily. 90 Tablet 1    naproxen sodium (Aleve) 220 mg tablet Take 220 mg by mouth two (2) times daily (with meals).  fluticasone propionate (FLONASE) 50 mcg/actuation nasal spray USE 2 SPRAYS IN EACH NOSTRIL DAILY AS NEEDED FOR RHINITIS 1 Bottle 1    cetirizine (ZYRTEC) 5 mg tablet Take 1 Tab by mouth daily as needed for Allergies.  30 Tab 0     No Known Allergies  Past Medical History:   Diagnosis Date    AR (allergic rhinitis)     Arthritis     Diabetes ([address]     Headache     Heart murmur     History of blood transfusion     Hypertension     Hypovitaminosis D 3/3/2014     Past Surgical History:   Procedure Laterality Date    HX  SECTION      HX COLONOSCOPY  2012    normal per patient (by Dr. Rafa Anderson)   Nieves Lobe HX HYSTERECTOMY      s/p vaginal hysterectomy      Family History   Problem Relation Age of Onset    Hypertension Mother     Depression Mother     Hypertension Father     Headache Father     Diabetes Father    Nieves Lobe Arthritis-rheumatoid Father     Heart Attack Father     Heart Failure Father     Hypertension Sister     Diabetes Sister     Cancer Sister 47        breast cancer    Heart Failure Sister     Arthritis Sister     Hypertension Brother     Attention Deficit Disorder Son     Asthma Granddaughter      Social History     Tobacco Use    Smoking status: Never Smoker    Smokeless tobacco: Never Used   Substance Use Topics    Alcohol use:  Yes     Alcohol/week: 0.8 standard drinks     Types: 1 Standard drinks or equivalent per week       Review of Systems  GENERAL: Denies fever or fatigue  CARDIAC: No CP or SOB  PULMONARY: No cough of SOB  MUSCULOSKELETAL: No new joint pain  NEURO: SEE HPI      Objective:     Patient-Reported Vitals 2022   Patient-Reported Weight 199lb   Patient-Reported Height -   Patient-Reported Pulse -   Patient-Reported Systolic  -   Patient-Reported Diastolic -      General: alert, cooperative, no distress   Mental  status: normal mood, behavior, speech, dress, motor activity, and thought processes, able to follow commands   HENT: NCAT   Neck: no visualized mass   Resp: no respiratory distress   Neuro: no gross deficits   Skin: no discoloration or lesions of concern on visible areas   Psychiatric: normal affect, consistent with stated mood, no evidence of hallucinations     Additional exam findings: This is a very pleasant 53-year-old female. She is alert in no apparent distress. Full fund of knowledge. Speech is clear. No facial asymmetry. No signs of ataxia or incoordination. We discussed the expected course, resolution and complications of the diagnosis(es) in detail. Medication risks, benefits, costs, interactions, and alternatives were discussed as indicated. I advised her to contact the office if her condition worsens, changes or fails to improve as anticipated. She expressed understanding with the diagnosis(es) and plan. Jazmin Lopez, was evaluated through a synchronous (real-time) audio-video encounter. The patient (or guardian if applicable) is aware that this is a billable service, which includes applicable co-pays. Verbal consent to proceed has been obtained. The visit was conducted pursuant to the emergency declaration under the [facility], [address] [place] [address] [facility] authority and the [employer] Act. Patient identification was verified, and a caregiver was present when appropriate. The patient was located at home in a state where the provider was licensed to provide care.     Ngozi Ballard NP

## 2022-02-07 NOTE — PROGRESS NOTES
Alejo Schwab presents today for   Chief Complaint   Patient presents with    Headache     follow up       Is someone accompanying this pt? Virtual Visit [phone]    Is the patient using any DME equipment during OV? no    Depression Screening:  3 most recent PHQ Screens 10/11/2021   Little interest or pleasure in doing things Not at all   Feeling down, depressed, irritable, or hopeless Not at all   Total Score PHQ 2 0       Learning Assessment:  Learning Assessment 4/5/2019   PRIMARY LEARNER Patient   HIGHEST LEVEL OF EDUCATION - PRIMARY LEARNER  > [facility] PRIMARY LEARNER NONE   CO-LEARNER CAREGIVER No   PRIMARY LANGUAGE ENGLISH    NEED No   LEARNER PREFERENCE PRIMARY DEMONSTRATION   LEARNING SPECIAL TOPICS No   ANSWERED BY patient   RELATIONSHIP SELF       Abuse Screening:  Abuse Screening Questionnaire 4/5/2019   Do you ever feel afraid of your partner? N   Are you in a relationship with someone who physically or mentally threatens you? N   Is it safe for you to go home? Y       Fall Risk  No flowsheet data found. Coordination of Care:  1. Have you been to the ER, urgent care clinic since your last visit? Hospitalized since your last visit? no    2. Have you seen or consulted any other health care providers outside of the [address] [place] [address] since your last visit? Include any pap smears or colon screening.  no

## 2022-02-11 ENCOUNTER — OFFICE VISIT (OUTPATIENT)
Dept: FAMILY MEDICINE CLINIC | Age: 62
End: 2022-02-11
Payer: COMMERCIAL

## 2022-02-11 VITALS
TEMPERATURE: 97.1 F | DIASTOLIC BLOOD PRESSURE: 84 MMHG | HEART RATE: 93 BPM | HEIGHT: 61 IN | OXYGEN SATURATION: 97 % | BODY MASS INDEX: 37.95 KG/M2 | WEIGHT: 201 LBS | SYSTOLIC BLOOD PRESSURE: 124 MMHG | RESPIRATION RATE: 16 BRPM

## 2022-02-11 DIAGNOSIS — G43.009 MIGRAINE WITHOUT AURA AND WITHOUT STATUS MIGRAINOSUS, NOT INTRACTABLE: ICD-10-CM

## 2022-02-11 DIAGNOSIS — E11.9 WELL CONTROLLED DIABETES MELLITUS (HCC): Primary | ICD-10-CM

## 2022-02-11 DIAGNOSIS — E55.9 HYPOVITAMINOSIS D: ICD-10-CM

## 2022-02-11 DIAGNOSIS — I10 PRIMARY HYPERTENSION: ICD-10-CM

## 2022-02-11 DIAGNOSIS — E78.5 DYSLIPIDEMIA: ICD-10-CM

## 2022-02-11 DIAGNOSIS — D35.2 PITUITARY MICROADENOMA (HCC): ICD-10-CM

## 2022-02-11 PROCEDURE — 99214 OFFICE O/P EST MOD 30 MIN: CPT | Performed by: FAMILY MEDICINE

## 2022-02-11 NOTE — PROGRESS NOTES
HISTORY OF PRESENT ILLNESS  Maria Alejandra Lozada is a 63 y.o. female. HPI: Here for follow-up. History of diabetes. No hyper or hypoglycemic symptoms. Taking medication with compliance and no side effects. During visit sitting comfortable without any acute distress  Hypertension. On medication. No side effects. Showing compliance with taking medication. Denies any headache, dizziness, no chest pain or trouble breathing, no arm or leg weakness. No nausea or vomiting, no weight or appetite changes, no mood changes . No urine or bowel complains, no palpitation, no diaphoresis. No abdominal pain. No cold or cough. No leg swelling. No fever. No sleep trouble. Taking statin. Discussed high BMI. Discussed importance of lifestyle and diet modification. She will work on more compliance with it. Migraine headaches. Seen neurology. Now taking amitriptyline 25 mg daily and it has been helping. No concern from patient. No headache or dizziness. History of pituitary microadenoma. Currently asymptomatic. MRI back in 2019 showed normal pituitary adenoma identified. Visit Vitals  /84 (BP 1 Location: Left arm, BP Patient Position: Sitting, BP Cuff Size: Adult)   Pulse 93   Temp 97.1 °F (36.2 °C) (Temporal)   Resp 16   Ht 5' 1\" (1.549 m)   Wt 201 lb (91.2 kg)   SpO2 97%   BMI 37.98 kg/m²     Review medication list, vitals, problem list,allergies.    Lab Results   Component Value Date/Time    WBC 5.3 03/25/2020 08:34 AM    HGB 11.4 (L) 03/25/2020 08:34 AM    HCT 38.2 03/25/2020 08:34 AM    PLATELET 734 93/42/6494 08:34 AM    MCV 88 03/25/2020 08:34 AM     Lab Results   Component Value Date/Time    Sodium 142 05/04/2021 09:06 AM    Potassium 4.0 05/04/2021 09:06 AM    Chloride 104 05/04/2021 09:06 AM    CO2 27 05/04/2021 09:06 AM    Anion gap 11.0 05/04/2021 09:06 AM    Glucose 100 (H) 05/04/2021 09:06 AM    BUN 17 05/04/2021 09:06 AM    Creatinine 0.8 05/04/2021 09:06 AM    Calcium 9.0 05/04/2021 09:06 AM Bilirubin, total 0.3 05/04/2021 09:06 AM    Alk. phosphatase 30 05/04/2021 09:06 AM    Protein, total 6.4 05/04/2021 09:06 AM    Albumin 4.1 05/04/2021 09:06 AM    Globulin 2.3 05/04/2021 09:06 AM    A-G Ratio 1.8 05/04/2021 09:06 AM    ALT (SGPT) 28 05/04/2021 09:06 AM    AST (SGOT) 20 05/04/2021 09:06 AM     Lab Results   Component Value Date/Time    Cholesterol, total 167 05/04/2021 09:06 AM    HDL Cholesterol 52 05/04/2021 09:06 AM    LDL, calculated 104 (H) 05/04/2021 09:06 AM    VLDL, calculated 12 05/04/2021 09:06 AM    Triglyceride 59 05/04/2021 09:06 AM     Lab Results   Component Value Date/Time    TSH 2.64 05/04/2021 09:06 AM     Lab Results   Component Value Date/Time    Hemoglobin A1c 6.0 (H) 05/04/2021 09:06 AM    Hemoglobin A1c (POC) 6.1 10/11/2021 11:17 AM     Lab Results   Component Value Date/Time    VITAMIN D, 25-HYDROXY 43.9 05/04/2021 09:06 AM       Lab Results   Component Value Date/Time    Microalb/Creat ratio (ug/mg creat.)  05/04/2021 09:06 AM      Comment:      ** Unable to calculate Microablumin/Creatinine Ratio due to low Microalbumin         Visit Vitals  /84 (BP 1 Location: Left arm, BP Patient Position: Sitting, BP Cuff Size: Adult)   Pulse 93   Temp 97.1 °F (36.2 °C) (Temporal)   Resp 16   Ht 5' 1\" (1.549 m)   Wt 201 lb (91.2 kg)   SpO2 97%   BMI 37.98 kg/m²     Review medication list, vitals, problem list,allergies.    Lab Results   Component Value Date/Time    WBC 5.3 03/25/2020 08:34 AM    HGB 11.4 (L) 03/25/2020 08:34 AM    HCT 38.2 03/25/2020 08:34 AM    PLATELET 582 79/30/2752 08:34 AM    MCV 88 03/25/2020 08:34 AM     Lab Results   Component Value Date/Time    Sodium 142 05/04/2021 09:06 AM    Potassium 4.0 05/04/2021 09:06 AM    Chloride 104 05/04/2021 09:06 AM    CO2 27 05/04/2021 09:06 AM    Anion gap 11.0 05/04/2021 09:06 AM    Glucose 100 (H) 05/04/2021 09:06 AM    BUN 17 05/04/2021 09:06 AM    Creatinine 0.8 05/04/2021 09:06 AM    Calcium 9.0 05/04/2021 09:06 AM Bilirubin, total 0.3 05/04/2021 09:06 AM    Alk. phosphatase 30 05/04/2021 09:06 AM    Protein, total 6.4 05/04/2021 09:06 AM    Albumin 4.1 05/04/2021 09:06 AM    Globulin 2.3 05/04/2021 09:06 AM    A-G Ratio 1.8 05/04/2021 09:06 AM    ALT (SGPT) 28 05/04/2021 09:06 AM    AST (SGOT) 20 05/04/2021 09:06 AM     Lab Results   Component Value Date/Time    Cholesterol, total 167 05/04/2021 09:06 AM    HDL Cholesterol 52 05/04/2021 09:06 AM    LDL, calculated 104 (H) 05/04/2021 09:06 AM    VLDL, calculated 12 05/04/2021 09:06 AM    Triglyceride 59 05/04/2021 09:06 AM     Lab Results   Component Value Date/Time    TSH 2.64 05/04/2021 09:06 AM     Lab Results   Component Value Date/Time    Hemoglobin A1c 6.0 (H) 05/04/2021 09:06 AM    Hemoglobin A1c (POC) 6.1 10/11/2021 11:17 AM     Lab Results   Component Value Date/Time    VITAMIN D, 25-HYDROXY 43.9 05/04/2021 09:06 AM       Lab Results   Component Value Date/Time    Microalb/Creat ratio (ug/mg creat.)  05/04/2021 09:06 AM      Comment:      ** Unable to calculate Microablumin/Creatinine Ratio due to low Microalbumin         ROS: See HPI  Physical Exam  Constitutional:       General: She is not in acute distress. Cardiovascular:      Rate and Rhythm: Normal rate and regular rhythm. Heart sounds: Normal heart sounds. Abdominal:      General: Bowel sounds are normal.      Palpations: Abdomen is soft. Tenderness: There is no abdominal tenderness. Musculoskeletal:         General: No swelling. Cervical back: Neck supple. Neurological:      Mental Status: She is oriented to person, place, and time. Psychiatric:         Behavior: Behavior normal.         ASSESSMENT and PLAN    ICD-10-CM ICD-9-CM    1. Well controlled diabetes mellitus ([facility]): A1c fairly controlled. Working on lifestyle modification. We will recheck labs and continue current plan.  E11.9 250.00 CBC W/O DIFF      METABOLIC PANEL, COMPREHENSIVE      TSH 3RD GENERATION      HEMOGLOBIN A1C WITH EAG      LIPID PANEL      MICROALBUMIN, UR, RAND W/ MICROALB/CREAT RATIO   2. Pituitary microadenoma [facility]): MRI in 2019 showed no evidence of pituitary adenoma. She is asymptomatic. Will observe D35.2 227.3     no evidance of microadenoma in MRI 2019    3. Hypovitaminosis D  E55.9 268.9    4. Dyslipidemia: On statin. No side effects: On supplement E78.5 272.4    5. Primary hypertension:well controlled. Continue current dose of medication and low salt diet. Exercise as tolerated. I10 401.9    6. Migraine without aura and without status migrainosus, not intractable: Has seen neurology. Currently taking amitriptyline 25 mg and headaches been stable. Will follow specialist recommendation G43.009 346.10    7. BMI 37.0-37.9, adult: Working on lifestyle and diet modification. M28.62 V85.37    Pt understood and agree with the plan   Review HM   She had taken her COVID booster  Follow-up and Dispositions    · Return in about 3 months (around 5/11/2022). Please note that this dictation was completed with [employer], the [employer] voice recognition software. Quite often unanticipated grammatical, syntax, homophones, and other interpretive errors are inadvertently transcribed by the computer software. Please disregard these errors. Please excuse any errors that have escaped final proofreading.

## 2022-02-11 NOTE — PROGRESS NOTES
Chief Complaint   Patient presents with    Diabetes    Hypertension    Headache     improved     1. \"Have you been to the ER, urgent care clinic since your last visit? Hospitalized since your last visit? \" No    2. \"Have you seen or consulted any other health care providers outside of the [address] [place] [address] since your last visit? \" No     3. For patients aged 39-70: Has the patient had a colonoscopy / FIT/ Cologuard? Yes - no Care Gap present      If the patient is female:    4. For patients aged 41-77: Has the patient had a mammogram within the past 2 years? Yes - no Care Gap present      5. For patients aged 21-65: Has the patient had a pap smear?  No- hysterectomy

## 2022-02-11 NOTE — PATIENT INSTRUCTIONS
Nutrition Tips for Diabetes: After Your Visit  Your Care Instructions  A healthy diet is important to manage diabetes. It helps you lose weight (if you need to) and keep it off. It gives you the nutrition and energy your body needs and helps prevent heart disease. But a diet for diabetes does not mean that you have to eat special foods. You can eat what your family eats, including occasional sweets and other favorites. But you do have to pay attention to how often you eat and how much you eat of certain foods. The right plan for you will give you meals that help you keep your blood sugar at healthy levels. Try to eat a variety of foods and to spread carbohydrate throughout the day. Carbohydrate raises blood sugar higher and more quickly than any other nutrient does. Carbohydrate is found in sugar, breads and cereals, fruit, starchy vegetables such as potatoes and corn, and milk and yogurt. You may want to work with a dietitian or diabetes educator to help you plan meals and snacks. A dietitian or diabetes educator also can help you lose weight if that is one of your goals. The following tips can help you enjoy your meals and stay healthy. Follow-up care is a key part of your treatment and safety. Be sure to make and go to all appointments, and call your doctor if you are having problems. Its also a good idea to know your test results and keep a list of the medicines you take. How can you care for yourself at home? · Learn which foods have carbohydrate and how much carbohydrate to eat. A dietitian or diabetes educator can help you learn to keep track of how much carbohydrate you eat. · Spread carbohydrate throughout the day. Eat some carbohydrate at all meals, but do not eat too much at any one time. · Plan meals to include food from all the food groups.  These are the food groups and some example portion sizes:  ¨ Grains: 1 slice of bread (1 ounce), ½ cup of cooked cereal, and 1/3 cup of cooked pasta or rice. These have about 15 grams of carbohydrate in a serving. Choose whole grains such as whole wheat bread or crackers, oatmeal, and brown rice more often than refined grains. ¨ Fruit: 1 small fresh fruit, such as an apple or orange; ½ of a banana; ½ cup of chopped, cooked, or canned fruit; ½ cup of fruit juice; 1 cup of melon or raspberries; and 2 tablespoons of dried fruit. These have about 15 grams of carbohydrate in a serving. ¨ Dairy: 1 cup of nonfat or low-fat milk and 2/3 cup of plain yogurt. These have about 15 grams of carbohydrate in a serving. ¨ Protein foods: Beef, chicken, turkey, fish, eggs, tofu, cheese, cottage cheese, and peanut butter. A serving size of meat is 3 ounces, which is about the size of a deck of cards. Examples of meat substitute serving sizes (equal to 1 ounce of meat) are 1/4 cup of cottage cheese, 1 egg, 1 tablespoon of peanut butter, and ½ cup of tofu. These have very little or no carbohydrate per serving. ¨ Vegetables: Starchy vegetables such as ½ cup of cooked dried beans, peas, potatoes, or corn have about 15 grams of carbohydrate. Nonstarchy vegetables have very little carbohydrate, such as 1 cup of raw leafy vegetables (such as spinach), ½ cup of other vegetables (cooked or chopped), and 3/4 cup of vegetable juice. · Use the plate format to plan meals. It is a good, quick way to make sure that you have a balanced meal. It also helps you spread carbohydrate throughout the day. You divide your plate by types of foods. Put vegetables on half the plate, meat or meat substitutes on one-quarter of the plate, and a grain or starchy vegetable (such as brown rice or a potato) in the final quarter of the plate. To this you can add a small piece of fruit and 1 cup of milk or yogurt, depending on how much carbohydrate you are supposed to eat at a meal.  · Talk to your dietitian or diabetes educator about ways to add limited amounts of sweets into your meal plan.  You can eat these foods now and then, as long as you include the amount of carbohydrate they have in your daily carbohydrate allowance. · If you drink alcohol, limit it to no more than 1 drink a day for women and 2 drinks a day for men. If you are pregnant, no amount of alcohol is known to be safe. · Protein, fat, and fiber do not raise blood sugar as much as carbohydrate does. If you eat a lot of these nutrients in a meal, your blood sugar will rise more slowly than it would otherwise. · Limit saturated fats, such as those from meat and dairy products. Try to replace it with monounsaturated fat, such as olive oil. This is a healthier choice because people who have diabetes are at higher-than-average risk of heart disease. But use a modest amount of olive oil. A tablespoon of olive oil has 14 grams of fat and 120 calories. · Exercise lowers blood sugar. If you take insulin by shots or pump, you can use less than you would if you were not exercising. Keep in mind that timing matters. If you exercise within 1 hour after a meal, your body may need less insulin for that meal than it would if you exercised 3 hours after the meal. Test your blood sugar to find out how exercise affects your need for insulin. · Exercise on most days of the week. Aim for at least 30 minutes. Exercise helps you stay at a healthy weight and helps your body use insulin. Walking is an easy way to get exercise. Gradually increase the amount you walk every day. You also may want to swim, bike, or do other activities. When you eat out  · Learn to estimate the serving sizes of foods that have carbohydrate. If you measure food at home, it will be easier to estimate the amount in a serving of restaurant food. · If the meal you order has too much carbohydrate (such as potatoes, corn, or baked beans), ask to have a low-carbohydrate food instead. Ask for a salad or green vegetables.   · If you use insulin, check your blood sugar before and after eating out to help you plan how much to eat in the future. · If you eat more carbohydrate at a meal than you had planned, take a walk or do other exercise. This will help lower your blood sugar. Where can you learn more? Go to e|tab.be  Enter B587 in the search box to learn more about \"Nutrition Tips for Diabetes: After Your Visit. \"   © 1454-0772 Healthwise, [employer]. Care instructions adapted under license by [facility] (which disclaims liability or warranty for this information). This care instruction is for use with your licensed healthcare professional. If you have questions about a medical condition or this instruction, always ask your healthcare professional. [address] any warranty or liability for your use of this information. Content Version: 57.5.621511; Current as of: June 4, 2014                 Low Sodium Diet (2,000 Milligram): Care Instructions  Overview     Limiting sodium can be an important part of managing some health problems. The most common source of sodium is salt. People get most of the salt in their diet from canned, prepared, and packaged foods. Fast food and restaurant meals also are very high in sodium. Your doctor will probably limit your sodium to less than 2,000 milligrams (mg) a day. This limit counts all the sodium in prepared and packaged foods and any salt you add to your food. Follow-up care is a key part of your treatment and safety. Be sure to make and go to all appointments, and call your doctor if you are having problems. It's also a good idea to know your test results and keep a list of the medicines you take. How can you care for yourself at home? Read food labels  · Read labels on cans and food packages. The labels tell you how much sodium is in each serving. Make sure that you look at the serving size. If you eat more than the serving size, you have eaten more sodium.   · Food labels also tell you the Percent Daily Value for sodium. Choose products with low Percent Daily Values for sodium. · Be aware that sodium can come in forms other than salt, including monosodium glutamate (MSG), sodium citrate, and sodium bicarbonate (baking soda). MSG is often added to Asian food. When you eat out, you can sometimes ask for food without MSG or added salt. Buy low-sodium foods  · Buy foods that are labeled \"unsalted\" (no salt added), \"sodium-free\" (less than 5 mg of sodium per serving), or \"low-sodium\" (140 mg or less of sodium per serving). Foods labeled \"reduced-sodium\" and \"light sodium\" may still have too much sodium. Be sure to read the label to see how much sodium you are getting. · Buy fresh vegetables, or frozen vegetables without added sauces. Buy low-sodium versions of canned vegetables, soups, and other canned goods. Prepare low-sodium meals  · Cut back on the amount of salt you use in cooking. This will help you adjust to the taste. Do not add salt after cooking. One teaspoon of salt has about 2,300 mg of sodium. · Take the salt shaker off the table. · Flavor your food with garlic, lemon juice, onion, vinegar, herbs, and spices. Do not use soy sauce, lite soy sauce, steak sauce, onion salt, garlic salt, celery salt, or ketchup on your food. · Use low-sodium salad dressings, sauces, and ketchup. Or make your own salad dressings and sauces without adding salt. · Use less salt (or none) when recipes call for it. You can often use half the salt a recipe calls for without losing flavor. Other foods such as rice, pasta, and grains do not need added salt. · Rinse canned vegetables, and cook them in fresh water. This removes somebut not allof the salt. · Avoid water that is naturally high in sodium or that has been treated with water softeners, which add sodium. If you buy bottled water, read the label and choose a sodium-free brand.   Avoid high-sodium foods  · Avoid eating:  ? Smoked, cured, salted, and canned meat, fish, and poultry. ? Ham, sutton, hot dogs, and luncheon meats. ? Regular, hard, and processed cheese and regular peanut butter. ? Crackers with salted tops, and other salted snack foods such as pretzels, chips, and salted popcorn. ? Frozen prepared meals, unless labeled low-sodium. ? Canned and dried soups, broths, and bouillon, unless labeled sodium-free or low-sodium. ? Canned vegetables, unless labeled sodium-free or low-sodium. ? Western Zaynab fries, pizza, tacos, and other fast foods. ? Pickles, olives, ketchup, and other condiments, especially soy sauce, unless labeled sodium-free or low-sodium. Where can you learn more? Go to http://www.gray.com/  Enter V843 in the search box to learn more about \"Low Sodium Diet (2,000 Milligram): Care Instructions. \"  Current as of: December 17, 2020               Content Version: 13.0  © 2006-2021 Healthwise, [facility]. Care instructions adapted under license by [employer] (which disclaims liability or warranty for this information). If you have questions about a medical condition or this instruction, always ask your healthcare professional. [place] [address] any warranty or liability for your use of this information.

## 2022-03-18 PROBLEM — Z12.31 ENCOUNTER FOR SCREENING MAMMOGRAM FOR BREAST CANCER: Status: ACTIVE | Noted: 2017-12-12

## 2022-03-19 PROBLEM — M54.6 ACUTE RIGHT-SIDED THORACIC BACK PAIN: Status: ACTIVE | Noted: 2017-02-07

## 2022-03-19 PROBLEM — D35.2 PITUITARY MICROADENOMA (HCC): Status: ACTIVE | Noted: 2019-10-04

## 2022-03-19 PROBLEM — E66.01 SEVERE OBESITY WITH BODY MASS INDEX (BMI) OF 35.0 TO 39.9 WITH SERIOUS COMORBIDITY (HCC): Status: ACTIVE | Noted: 2018-07-19

## 2022-03-19 PROBLEM — M51.34 DDD (DEGENERATIVE DISC DISEASE), THORACIC: Status: ACTIVE | Noted: 2017-02-14

## 2022-03-19 PROBLEM — R52 PAIN OF MULTIPLE SITES: Status: ACTIVE | Noted: 2019-10-04

## 2022-03-19 PROBLEM — R09.81 SINUS CONGESTION: Status: ACTIVE | Noted: 2017-01-19

## 2022-03-20 PROBLEM — E11.9 WELL CONTROLLED DIABETES MELLITUS (HCC): Status: ACTIVE | Noted: 2019-10-04

## 2022-03-20 PROBLEM — E78.5 DYSLIPIDEMIA: Status: ACTIVE | Noted: 2018-03-14

## 2022-04-07 ENCOUNTER — DOCUMENTATION ONLY (OUTPATIENT)
Dept: PULMONOLOGY | Age: 62
End: 2022-04-07

## 2022-04-07 NOTE — PROGRESS NOTES
Lf vm for pt to speak w/Ilene in regs to np sleep apt ref by Olin Felty; when/where if any evals/studies/cpap/dme?  Ask screening questions

## 2022-04-14 ENCOUNTER — OFFICE VISIT (OUTPATIENT)
Dept: PULMONOLOGY | Age: 62
End: 2022-04-14
Payer: COMMERCIAL

## 2022-04-14 VITALS
TEMPERATURE: 97.4 F | OXYGEN SATURATION: 95 % | WEIGHT: 204.4 LBS | RESPIRATION RATE: 18 BRPM | HEART RATE: 79 BPM | BODY MASS INDEX: 38.59 KG/M2 | DIASTOLIC BLOOD PRESSURE: 72 MMHG | SYSTOLIC BLOOD PRESSURE: 135 MMHG | HEIGHT: 61 IN

## 2022-04-14 DIAGNOSIS — G89.29 OTHER CHRONIC PAIN: ICD-10-CM

## 2022-04-14 DIAGNOSIS — R51.9 NONINTRACTABLE HEADACHE, UNSPECIFIED CHRONICITY PATTERN, UNSPECIFIED HEADACHE TYPE: ICD-10-CM

## 2022-04-14 DIAGNOSIS — R06.83 SNORING: Primary | ICD-10-CM

## 2022-04-14 DIAGNOSIS — I10 PRIMARY HYPERTENSION: ICD-10-CM

## 2022-04-14 DIAGNOSIS — G47.19 EXCESSIVE DAYTIME SLEEPINESS: ICD-10-CM

## 2022-04-14 DIAGNOSIS — F41.9 ANXIETY: ICD-10-CM

## 2022-04-14 DIAGNOSIS — E66.9 OBESITY (BMI 30-39.9): ICD-10-CM

## 2022-04-14 PROCEDURE — 99204 OFFICE O/P NEW MOD 45 MIN: CPT | Performed by: INTERNAL MEDICINE

## 2022-04-14 NOTE — LETTER
4/15/2022    Patient: Jonathon Jeffries   YOB: 1960   Date of Visit: 4/14/2022     Fernando Leroy MD  [facility] [address]  [facility] 250  [facility]  [address], [address] [facility] [address]  [address] [facility] [address]    Dear MD Fiorella Rodriguez NP,      Thank you for referring Ms. Hamilton Willett to [address] [place] [address] for evaluation. My notes for this consultation are attached. If you have questions, please do not hesitate to call me. I look forward to following your patient along with you.       Sincerely,    Raymond Coy, DO
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
## 2022-04-14 NOTE — PROGRESS NOTES
Abraham Frost presents today for   Chief Complaint   Patient presents with    New Patient    Snoring    Hypertension       Is someone accompanying this pt? no    Is the patient using any DME equipment during OV? no    -DME Company N/A    Have you ever had a sleep study done before? No    Depression Screening:  3 most recent PHQ Screens 4/14/2022   Little interest or pleasure in doing things Not at all   Feeling down, depressed, irritable, or hopeless Not at all   Total Score PHQ 2 0       Ruby Sleepiness Scale:  Ruby Sleepiness Scale  4/14/2022   Sitting and Reading 3   Watching TV 1   Sitting, inactive in a public place (e.g. a movie theater or meeting) 0   As a passenger in a car for an hour, without a break 0   Lying down to rest in the afternoon, when circumstances permit 0   Sitting and talking to someone 0   Sitting quietly after lunch without alcohol 0   In a car, while stopped for a few minutes in traffic 0   Ruby Sleepiness Score 4       Stop-Bang:  Stop Kandy Encarnacionus 4/14/2022   Does the patient snore loudly (louder than talking or loud enough to be heard through closed doors)? 1   Does the patient often feel tired, fatigued, or sleepy during the daytime, even after a \"good\" night's sleep? 0   Has anyone ever observed the patient stop breathing during their sleep?  0   Does the patient have or are they being treated for high blood pressure? 1   Is the patient's BMI greater than 35? 1   Is your neck circumference greater than 17 inches (Male) or 16 inches (Female)? 0   Is the patient older than 48? 1   Is the patient male? 0   PB Score 4   Has the patient been referred to Sleep Medicine? 1   Has the patient previously been diagnosed with Obstructive Sleep Apnea? 0       Neck Circumference:  16\"      Coordination of Care:  1. Have you been to the ER, urgent care clinic since your last visit? Hospitalized since your last visit? No    2.  Have you seen or consulted any other health care providers outside of the [address] [place] [address] since your last visit? Include any pap smears or colon screening. N/A    Medication list has been updated according to patient.

## 2022-04-14 NOTE — PATIENT INSTRUCTIONS
Please call our clinic back at [phone] or send a message on [employer] if you have any questions or concerns or if you are experiencing any of the following:     You and not received a follow up appointment within 30 days prior the recommended follow up time.  If you are not tolerating treatment plan   if you are experiencing any difficulties with the Durable Medical Equipment  (DME) Company you may be using or is assigned to you.  Two weeks have passed and you have not received an appointment for a scheduled procedure   Two weeks have passed since you underwent a test and/or procedure and you have not received your results. If you are using a CPAP/BIPAP, or Home Ventilator Device- Please note the following  [employer] [employer] ) company is supposed to provide you with replacement filters, tubing and masks. You can either call your DME when you need new supplies or you can arrange for an automatic shipment schedule.  Your need to be seen by our office at lat minimum of every 12 months in order to renew the prescription for these supplies.  Please make note of who your DME company is and their phone number.  Please make sure that you clean your mask and hosing on a regular basis.   Your DME can provide you with additional information regarding proper care and cleaning of your device    Thank you

## 2022-04-21 DIAGNOSIS — I10 ESSENTIAL HYPERTENSION: ICD-10-CM

## 2022-04-22 RX ORDER — AMLODIPINE BESYLATE 5 MG/1
5 TABLET ORAL DAILY
Qty: 90 TABLET | Refills: 1 | Status: SHIPPED | OUTPATIENT
Start: 2022-04-22 | End: 2022-09-09 | Stop reason: SDUPTHER

## 2022-04-22 RX ORDER — TELMISARTAN AND HYDROCHLORTHIAZIDE 80; 25 MG/1; MG/1
TABLET ORAL
Qty: 90 TABLET | Refills: 1 | Status: SHIPPED | OUTPATIENT
Start: 2022-04-22 | End: 2022-09-09 | Stop reason: SDUPTHER

## 2022-05-05 ENCOUNTER — HOSPITAL ENCOUNTER (OUTPATIENT)
Dept: SLEEP MEDICINE | Age: 62
Discharge: HOME | End: 2022-05-05
Payer: COMMERCIAL

## 2022-05-05 DIAGNOSIS — F41.9 ANXIETY: ICD-10-CM

## 2022-05-05 DIAGNOSIS — R51.9 NONINTRACTABLE HEADACHE, UNSPECIFIED CHRONICITY PATTERN, UNSPECIFIED HEADACHE TYPE: ICD-10-CM

## 2022-05-05 DIAGNOSIS — G89.29 OTHER CHRONIC PAIN: ICD-10-CM

## 2022-05-05 DIAGNOSIS — G47.19 EXCESSIVE DAYTIME SLEEPINESS: ICD-10-CM

## 2022-05-05 DIAGNOSIS — E66.9 OBESITY (BMI 30-39.9): ICD-10-CM

## 2022-05-05 DIAGNOSIS — I10 PRIMARY HYPERTENSION: ICD-10-CM

## 2022-05-05 DIAGNOSIS — R06.83 SNORING: ICD-10-CM

## 2022-05-05 PROCEDURE — 95806 SLEEP STUDY UNATT&RESP EFFT: CPT

## 2022-05-10 ENCOUNTER — DOCUMENTATION ONLY (OUTPATIENT)
Dept: PULMONOLOGY | Age: 62
End: 2022-05-10

## 2022-05-11 ENCOUNTER — OFFICE VISIT (OUTPATIENT)
Dept: FAMILY MEDICINE CLINIC | Age: 62
End: 2022-05-11
Payer: COMMERCIAL

## 2022-05-11 ENCOUNTER — HOSPITAL ENCOUNTER (OUTPATIENT)
Dept: LAB | Age: 62
Discharge: HOME | End: 2022-05-11

## 2022-05-11 VITALS
RESPIRATION RATE: 16 BRPM | HEIGHT: 61 IN | OXYGEN SATURATION: 99 % | WEIGHT: 202.8 LBS | HEART RATE: 69 BPM | DIASTOLIC BLOOD PRESSURE: 70 MMHG | TEMPERATURE: 97 F | BODY MASS INDEX: 38.29 KG/M2 | SYSTOLIC BLOOD PRESSURE: 100 MMHG

## 2022-05-11 DIAGNOSIS — M19.90 ARTHRITIS: ICD-10-CM

## 2022-05-11 DIAGNOSIS — J04.0 LARYNGITIS: ICD-10-CM

## 2022-05-11 DIAGNOSIS — R92.8 ABNORMAL MAMMOGRAM: ICD-10-CM

## 2022-05-11 DIAGNOSIS — E11.9 WELL CONTROLLED DIABETES MELLITUS (HCC): ICD-10-CM

## 2022-05-11 DIAGNOSIS — E78.5 DYSLIPIDEMIA: ICD-10-CM

## 2022-05-11 DIAGNOSIS — D35.2 PITUITARY MICROADENOMA (HCC): Primary | ICD-10-CM

## 2022-05-11 DIAGNOSIS — M79.671 PAIN IN BOTH FEET: ICD-10-CM

## 2022-05-11 DIAGNOSIS — I10 HYPERTENSION, UNSPECIFIED TYPE: ICD-10-CM

## 2022-05-11 DIAGNOSIS — M79.672 PAIN IN BOTH FEET: ICD-10-CM

## 2022-05-11 DIAGNOSIS — E55.9 HYPOVITAMINOSIS D: ICD-10-CM

## 2022-05-11 PROCEDURE — 99214 OFFICE O/P EST MOD 30 MIN: CPT | Performed by: FAMILY MEDICINE

## 2022-05-11 PROCEDURE — 99001 SPECIMEN HANDLING PT-LAB: CPT

## 2022-05-11 RX ORDER — CETIRIZINE HYDROCHLORIDE 5 MG/1
5 TABLET ORAL
Qty: 90 TABLET | Refills: 0 | Status: SHIPPED | OUTPATIENT
Start: 2022-05-11 | End: 2022-09-09 | Stop reason: SDUPTHER

## 2022-05-11 NOTE — LETTER
5/11/2022 8:22 AM    Ms. Ky Do  [facility] [id number]    Ms. Ky Do is a patient of [employer] under my care. Due to her bilateral foot pain , please allow her to wear tennis shoes at work as needed. Please call my office with any question or concern.          Sincerely,      Campbell Soto MD

## 2022-05-11 NOTE — PATIENT INSTRUCTIONS
Nutrition Tips for Diabetes: After Your Visit  Your Care Instructions  A healthy diet is important to manage diabetes. It helps you lose weight (if you need to) and keep it off. It gives you the nutrition and energy your body needs and helps prevent heart disease. But a diet for diabetes does not mean that you have to eat special foods. You can eat what your family eats, including occasional sweets and other favorites. But you do have to pay attention to how often you eat and how much you eat of certain foods. The right plan for you will give you meals that help you keep your blood sugar at healthy levels. Try to eat a variety of foods and to spread carbohydrate throughout the day. Carbohydrate raises blood sugar higher and more quickly than any other nutrient does. Carbohydrate is found in sugar, breads and cereals, fruit, starchy vegetables such as potatoes and corn, and milk and yogurt. You may want to work with a dietitian or diabetes educator to help you plan meals and snacks. A dietitian or diabetes educator also can help you lose weight if that is one of your goals. The following tips can help you enjoy your meals and stay healthy. Follow-up care is a key part of your treatment and safety. Be sure to make and go to all appointments, and call your doctor if you are having problems. Its also a good idea to know your test results and keep a list of the medicines you take. How can you care for yourself at home? · Learn which foods have carbohydrate and how much carbohydrate to eat. A dietitian or diabetes educator can help you learn to keep track of how much carbohydrate you eat. · Spread carbohydrate throughout the day. Eat some carbohydrate at all meals, but do not eat too much at any one time. · Plan meals to include food from all the food groups.  These are the food groups and some example portion sizes:  ¨ Grains: 1 slice of bread (1 ounce), ½ cup of cooked cereal, and 1/3 cup of cooked pasta or rice. These have about 15 grams of carbohydrate in a serving. Choose whole grains such as whole wheat bread or crackers, oatmeal, and brown rice more often than refined grains. ¨ Fruit: 1 small fresh fruit, such as an apple or orange; ½ of a banana; ½ cup of chopped, cooked, or canned fruit; ½ cup of fruit juice; 1 cup of melon or raspberries; and 2 tablespoons of dried fruit. These have about 15 grams of carbohydrate in a serving. ¨ Dairy: 1 cup of nonfat or low-fat milk and 2/3 cup of plain yogurt. These have about 15 grams of carbohydrate in a serving. ¨ Protein foods: Beef, chicken, turkey, fish, eggs, tofu, cheese, cottage cheese, and peanut butter. A serving size of meat is 3 ounces, which is about the size of a deck of cards. Examples of meat substitute serving sizes (equal to 1 ounce of meat) are 1/4 cup of cottage cheese, 1 egg, 1 tablespoon of peanut butter, and ½ cup of tofu. These have very little or no carbohydrate per serving. ¨ Vegetables: Starchy vegetables such as ½ cup of cooked dried beans, peas, potatoes, or corn have about 15 grams of carbohydrate. Nonstarchy vegetables have very little carbohydrate, such as 1 cup of raw leafy vegetables (such as spinach), ½ cup of other vegetables (cooked or chopped), and 3/4 cup of vegetable juice. · Use the plate format to plan meals. It is a good, quick way to make sure that you have a balanced meal. It also helps you spread carbohydrate throughout the day. You divide your plate by types of foods. Put vegetables on half the plate, meat or meat substitutes on one-quarter of the plate, and a grain or starchy vegetable (such as brown rice or a potato) in the final quarter of the plate. To this you can add a small piece of fruit and 1 cup of milk or yogurt, depending on how much carbohydrate you are supposed to eat at a meal.  · Talk to your dietitian or diabetes educator about ways to add limited amounts of sweets into your meal plan.  You can eat these foods now and then, as long as you include the amount of carbohydrate they have in your daily carbohydrate allowance. · If you drink alcohol, limit it to no more than 1 drink a day for women and 2 drinks a day for men. If you are pregnant, no amount of alcohol is known to be safe. · Protein, fat, and fiber do not raise blood sugar as much as carbohydrate does. If you eat a lot of these nutrients in a meal, your blood sugar will rise more slowly than it would otherwise. · Limit saturated fats, such as those from meat and dairy products. Try to replace it with monounsaturated fat, such as olive oil. This is a healthier choice because people who have diabetes are at higher-than-average risk of heart disease. But use a modest amount of olive oil. A tablespoon of olive oil has 14 grams of fat and 120 calories. · Exercise lowers blood sugar. If you take insulin by shots or pump, you can use less than you would if you were not exercising. Keep in mind that timing matters. If you exercise within 1 hour after a meal, your body may need less insulin for that meal than it would if you exercised 3 hours after the meal. Test your blood sugar to find out how exercise affects your need for insulin. · Exercise on most days of the week. Aim for at least 30 minutes. Exercise helps you stay at a healthy weight and helps your body use insulin. Walking is an easy way to get exercise. Gradually increase the amount you walk every day. You also may want to swim, bike, or do other activities. When you eat out  · Learn to estimate the serving sizes of foods that have carbohydrate. If you measure food at home, it will be easier to estimate the amount in a serving of restaurant food. · If the meal you order has too much carbohydrate (such as potatoes, corn, or baked beans), ask to have a low-carbohydrate food instead. Ask for a salad or green vegetables.   · If you use insulin, check your blood sugar before and after eating out to help you plan how much to eat in the future. · If you eat more carbohydrate at a meal than you had planned, take a walk or do other exercise. This will help lower your blood sugar. Where can you learn more? Go to FiTeq.be  Enter W718 in the search box to learn more about \"Nutrition Tips for Diabetes: After Your Visit. \"   © 8958-3166 Healthwise, Incorporated. Care instructions adapted under license by [employer] (which disclaims liability or warranty for this information). This care instruction is for use with your licensed healthcare professional. If you have questions about a medical condition or this instruction, always ask your healthcare professional. [place] [address] any warranty or liability for your use of this information. Content Version: 14.3.586049; Current as of: June 4, 2014                 Low Sodium Diet (2,000 Milligram): Care Instructions  Overview     Limiting sodium can be an important part of managing some health problems. The most common source of sodium is salt. People get most of the salt in their diet from canned, prepared, and packaged foods. Fast food and restaurant meals also are very high in sodium. Your doctor will probably limit your sodium to less than 2,000 milligrams (mg) a day. This limit counts all the sodium in prepared and packaged foods and any salt you add to your food. Follow-up care is a key part of your treatment and safety. Be sure to make and go to all appointments, and call your doctor if you are having problems. It's also a good idea to know your test results and keep a list of the medicines you take. How can you care for yourself at home? Read food labels  · Read labels on cans and food packages. The labels tell you how much sodium is in each serving. Make sure that you look at the serving size. If you eat more than the serving size, you have eaten more sodium.   · Food labels also tell you the Percent Daily Value for sodium. Choose products with low Percent Daily Values for sodium. · Be aware that sodium can come in forms other than salt, including monosodium glutamate (MSG), sodium citrate, and sodium bicarbonate (baking soda). MSG is often added to Asian food. When you eat out, you can sometimes ask for food without MSG or added salt. Buy low-sodium foods  · Buy foods that are labeled \"unsalted\" (no salt added), \"sodium-free\" (less than 5 mg of sodium per serving), or \"low-sodium\" (140 mg or less of sodium per serving). Foods labeled \"reduced-sodium\" and \"light sodium\" may still have too much sodium. Be sure to read the label to see how much sodium you are getting. · Buy fresh vegetables, or frozen vegetables without added sauces. Buy low-sodium versions of canned vegetables, soups, and other canned goods. Prepare low-sodium meals  · Cut back on the amount of salt you use in cooking. This will help you adjust to the taste. Do not add salt after cooking. One teaspoon of salt has about 2,300 mg of sodium. · Take the salt shaker off the table. · Flavor your food with garlic, lemon juice, onion, vinegar, herbs, and spices. Do not use soy sauce, lite soy sauce, steak sauce, onion salt, garlic salt, celery salt, or ketchup on your food. · Use low-sodium salad dressings, sauces, and ketchup. Or make your own salad dressings and sauces without adding salt. · Use less salt (or none) when recipes call for it. You can often use half the salt a recipe calls for without losing flavor. Other foods such as rice, pasta, and grains do not need added salt. · Rinse canned vegetables, and cook them in fresh water. This removes some--but not all--of the salt. · Avoid water that is naturally high in sodium or that has been treated with water softeners, which add sodium. If you buy bottled water, read the label and choose a sodium-free brand.   Avoid high-sodium foods  · Avoid eating:  ? Smoked, cured, salted, and canned meat, fish, and poultry. ? Ham, sutton, hot dogs, and luncheon meats. ? Regular, hard, and processed cheese and regular peanut butter. ? Crackers with salted tops, and other salted snack foods such as pretzels, chips, and salted popcorn. ? Frozen prepared meals, unless labeled low-sodium. ? Canned and dried soups, broths, and bouillon, unless labeled sodium-free or low-sodium. ? Canned vegetables, unless labeled sodium-free or low-sodium. ? Western [place] fries, pizza, tacos, and other fast foods. ? Pickles, olives, ketchup, and other condiments, especially soy sauce, unless labeled sodium-free or low-sodium. Where can you learn more? Go to http://www.gray.com/  Enter V843 in the search box to learn more about \"Low Sodium Diet (2,000 Milligram): Care Instructions. \"  Current as of: September 8, 2021               Content Version: 13.2  © 2006-2022 Healthwise, Incorporated. Care instructions adapted under license by [employer] (which disclaims liability or warranty for this information). If you have questions about a medical condition or this instruction, always ask your healthcare professional. [place] [address] any warranty or liability for your use of this information.

## 2022-05-11 NOTE — PROGRESS NOTES
1. Have you been to the ER, urgent care clinic since your last visit? Hospitalized since your last visit? No    2. Have you seen or consulted any other health care providers outside of the [address] [place] [address] since your last visit? Include any pap smears or colon screening.  No    Chief Complaint   Patient presents with    Diabetes    Cholesterol Problem    Hypertension    Migraine    Foot Pain     bilat foot pain - wants work letter for foot pain

## 2022-05-12 ENCOUNTER — TELEPHONE (OUTPATIENT)
Dept: FAMILY MEDICINE CLINIC | Age: 62
End: 2022-05-12

## 2022-05-12 DIAGNOSIS — E11.9 WELL CONTROLLED DIABETES MELLITUS (HCC): Primary | ICD-10-CM

## 2022-05-12 NOTE — TELEPHONE ENCOUNTER
Spoke with patient (two identifiers verified) to advise Dr. Pat Brewster has placed new lab order. I will leave order at [facility]  for pick-up. Patient verbalized understanding; stated she will probably come tomorrow morning to pick-up order and complete labs. She was reminded labs are fasting - nothing to eat or drink 8-10 hours prior except water. Patient verbalized understanding.

## 2022-05-12 NOTE — TELEPHONE ENCOUNTER
Spoke with patient (two identifiers verified) to advise we received a call from Alice Dumas Dr from [facility] stating she has to repeat her labs from 5/11/2022. [address] stated that two of her specimens were bagged with another patient. Alice Dumas Dr stated if she has any questions she can be reached at [phone]. Patient verbalized understanding. Patient is aware I will route message to Dr. Richar Saucedo to request a new lab order. I will return call to patient once the orders have been placed. She verbalized understanding. Patient was advised Dr. Richar Saucedo is out of the office today, but will return to the office tomorrow morning. Patient was told to not repeat labs until she has heard from me to confirm new orders have been placed in the system from Dr. Richar Saucedo. She verbalized understanding.

## 2022-05-12 NOTE — TELEPHONE ENCOUNTER
Kenzie Cantu from [facility] reference lab called stating the pt has to repeat her labs from 5/11/2022. Kenzie Cantu states that two of the pt's specimens were bagged with another pt. Kenzie Cantu states if you have any questions she can be reached at [phone].

## 2022-05-14 LAB
A-G RATIO,AGRAT: 1.9 RATIO (ref 1.1–2.6)
ABSOLUTE LYMPHOCYTE COUNT, 10803: 2.1 K/UL (ref 1–4.8)
ALBUMIN SERPL-MCNC: 4.3 G/DL (ref 3.5–5)
ALP SERPL-CCNC: 36 U/L (ref 40–120)
ALT SERPL-CCNC: 12 U/L (ref 5–40)
ANION GAP SERPL CALC-SCNC: 13 MMOL/L (ref 3–15)
AST SERPL W P-5'-P-CCNC: 13 U/L (ref 10–37)
AVG GLU, 10930: 142 MG/DL (ref 91–123)
BASOPHILS # BLD: 0 K/UL (ref 0–0.2)
BASOPHILS NFR BLD: 0 % (ref 0–2)
BILIRUB SERPL-MCNC: 0.3 MG/DL (ref 0.2–1.2)
BUN SERPL-MCNC: 14 MG/DL (ref 6–22)
CALCIUM SERPL-MCNC: 9.5 MG/DL (ref 8.4–10.5)
CHLORIDE SERPL-SCNC: 102 MMOL/L (ref 98–110)
CHOLEST SERPL-MCNC: 167 MG/DL (ref 110–200)
CO2 SERPL-SCNC: 26 MMOL/L (ref 20–32)
CREAT SERPL-MCNC: 0.8 MG/DL (ref 0.8–1.4)
EOSINOPHIL # BLD: 0.1 K/UL (ref 0–0.5)
EOSINOPHIL NFR BLD: 1 % (ref 0–6)
ERYTHROCYTE [DISTWIDTH] IN BLOOD BY AUTOMATED COUNT: 14.3 % (ref 10–15.5)
GFRAA, 66117: >60
GFRNA, 66118: >60
GLOBULIN,GLOB: 2.3 G/DL (ref 2–4)
GLUCOSE SERPL-MCNC: 106 MG/DL (ref 70–99)
GRANULOCYTES,GRANS: 60 % (ref 40–75)
HBA1C MFR BLD HPLC: 6.6 % (ref 4.8–5.6)
HCT VFR BLD AUTO: 37.4 % (ref 35.1–48)
HDLC SERPL-MCNC: 3.3 MG/DL (ref 0–5)
HDLC SERPL-MCNC: 50 MG/DL
HGB BLD-MCNC: 11.8 G/DL (ref 11.7–16)
LDL/HDL RATIO,LDHD: 2.1
LDLC SERPL CALC-MCNC: 107 MG/DL (ref 50–99)
LYMPHOCYTES, LYMLT: 34 % (ref 20–45)
MCH RBC QN AUTO: 26 PG (ref 26–34)
MCHC RBC AUTO-ENTMCNC: 32 G/DL (ref 31–36)
MCV RBC AUTO: 81 FL (ref 80–99)
MONOCYTES # BLD: 0.3 K/UL (ref 0.1–1)
MONOCYTES NFR BLD: 4 % (ref 3–12)
NEUTROPHILS # BLD AUTO: 3.7 K/UL (ref 1.8–7.7)
NON-HDL CHOLESTEROL, 011976: 117 MG/DL
PLATELET # BLD AUTO: 450 K/UL (ref 140–440)
PMV BLD AUTO: 9.2 FL (ref 9–13)
POTASSIUM SERPL-SCNC: 3.9 MMOL/L (ref 3.5–5.5)
PROT SERPL-MCNC: 6.6 G/DL (ref 6.2–8.1)
RBC # BLD AUTO: 4.6 M/UL (ref 3.8–5.2)
SODIUM SERPL-SCNC: 141 MMOL/L (ref 133–145)
TRIGL SERPL-MCNC: 50 MG/DL (ref 40–149)
TSH SERPL DL<=0.005 MIU/L-ACNC: 2.15 MCU/ML (ref 0.27–4.2)
VLDLC SERPL CALC-MCNC: 10 MG/DL (ref 8–30)
WBC # BLD AUTO: 6.2 K/UL (ref 4–11)

## 2022-05-15 LAB — SENTARA SPECIMEN COL,SENBCF: NORMAL

## 2022-05-16 ENCOUNTER — TELEPHONE (OUTPATIENT)
Dept: PULMONOLOGY | Age: 62
End: 2022-05-16

## 2022-05-16 LAB
CREATININE, URINE: 319 MG/DL
MICROALB/CREAT RATIO, 140286: 4.7 (ref 0–30)
MICROALBUMIN,URINE RANDOM 140054: 15 MG/L (ref 0.1–17)

## 2022-05-17 DIAGNOSIS — R79.89 ELEVATED PLATELET COUNT: Primary | ICD-10-CM

## 2022-05-17 DIAGNOSIS — R73.03 BORDERLINE DIABETES: ICD-10-CM

## 2022-05-17 NOTE — PROGRESS NOTES
Spoke with pt regarding results. Varified . Mildly elevated platelet count. Will observe and repeat in a month. Could be reason for inflammation. Also A1C went up to diabetic range. Pt wants to work on life style modification. Will repeat A1C in 3 months and advise to schedule sooner follow up in 31/2  months. Pt understood and agree with the plan.

## 2022-05-23 ENCOUNTER — TELEPHONE (OUTPATIENT)
Dept: PULMONOLOGY | Age: 62
End: 2022-05-23

## 2022-05-23 NOTE — PROGRESS NOTES
Patient contacted and study results reviewed with her. Patient notified results are negative for PB. She is encouraged to contact our office with any additional questions or concerns she may have. Patient should follow up with referring provider.

## 2022-07-05 ENCOUNTER — HOSPITAL ENCOUNTER (OUTPATIENT)
Dept: MAMMOGRAPHY | Age: 62
Discharge: HOME | End: 2022-07-05
Attending: FAMILY MEDICINE
Payer: COMMERCIAL

## 2022-07-05 ENCOUNTER — HOSPITAL ENCOUNTER (OUTPATIENT)
Dept: ULTRASOUND IMAGING | Age: 62
Discharge: HOME | End: 2022-07-05
Attending: FAMILY MEDICINE
Payer: COMMERCIAL

## 2022-07-05 DIAGNOSIS — R92.8 ABNORMAL MAMMOGRAM OF LEFT BREAST: ICD-10-CM

## 2022-07-05 PROCEDURE — 77061 BREAST TOMOSYNTHESIS UNI: CPT

## 2022-07-05 PROCEDURE — 76642 ULTRASOUND BREAST LIMITED: CPT

## 2022-07-05 NOTE — PROGRESS NOTES
Routine annual screening in December.   6-month follow-up on left breast ultrasound no evidence of malignancy

## 2022-07-07 NOTE — PROGRESS NOTES
Spoke with patient (two identifiers verified) to advise routine annual screening in December. 6-month follow-up on left breast ultrasound no evidence of malignancy. Patient verbalized understanding.

## 2022-07-07 NOTE — PROGRESS NOTES
Spoke with patient (two identifiers verified) to advise negative diagnostic mammogram.  Routine screening mammography in December 2022. Patient verbalized understanding.

## 2022-09-07 LAB
A-G RATIO,AGRAT: 2.1 RATIO (ref 1.1–2.6)
ALBUMIN SERPL-MCNC: 4.4 G/DL (ref 3.5–5)
ALP SERPL-CCNC: 36 U/L (ref 40–120)
ALT SERPL-CCNC: 20 U/L (ref 5–40)
ANION GAP SERPL CALC-SCNC: 11 MMOL/L (ref 3–15)
AST SERPL W P-5'-P-CCNC: 20 U/L (ref 10–37)
AVG GLU, 10930: 137 MG/DL (ref 91–123)
BILIRUB SERPL-MCNC: 0.4 MG/DL (ref 0.2–1.2)
BUN SERPL-MCNC: 12 MG/DL (ref 6–22)
CALCIUM SERPL-MCNC: 9.8 MG/DL (ref 8.4–10.5)
CHLORIDE SERPL-SCNC: 103 MMOL/L (ref 98–110)
CHOLEST SERPL-MCNC: 192 MG/DL (ref 110–200)
CO2 SERPL-SCNC: 26 MMOL/L (ref 20–32)
CREAT SERPL-MCNC: 0.9 MG/DL (ref 0.8–1.4)
CREATININE, URINE: 365 MG/DL
ERYTHROCYTE [DISTWIDTH] IN BLOOD BY AUTOMATED COUNT: 14.8 % (ref 10–15.5)
GLOBULIN,GLOB: 2.1 G/DL (ref 2–4)
GLOMERULAR FILTRATION RATE: >60 ML/MIN/1.73 SQ.M.
GLUCOSE SERPL-MCNC: 107 MG/DL (ref 70–99)
HBA1C MFR BLD HPLC: 6.4 % (ref 4.8–5.6)
HCT VFR BLD AUTO: 38.9 % (ref 35.1–48)
HDLC SERPL-MCNC: 3.6 MG/DL (ref 0–5)
HDLC SERPL-MCNC: 54 MG/DL
HGB BLD-MCNC: 12 G/DL (ref 11.7–16)
LDL/HDL RATIO,LDHD: 2.3
LDLC SERPL CALC-MCNC: 125 MG/DL (ref 50–99)
MCH RBC QN AUTO: 26 PG (ref 26–34)
MCHC RBC AUTO-ENTMCNC: 31 G/DL (ref 31–36)
MCV RBC AUTO: 83 FL (ref 80–99)
MICROALB/CREAT RATIO, 140286: 6.3 (ref 0–30)
MICROALBUMIN,URINE RANDOM 140054: 22.9 MG/L (ref 0.1–17)
NON-HDL CHOLESTEROL, 011976: 138 MG/DL
PLATELET # BLD AUTO: 442 K/UL (ref 140–440)
PMV BLD AUTO: 9 FL (ref 9–13)
POTASSIUM SERPL-SCNC: 4 MMOL/L (ref 3.5–5.5)
PROT SERPL-MCNC: 6.5 G/DL (ref 6.2–8.1)
RBC # BLD AUTO: 4.71 M/UL (ref 3.8–5.2)
SODIUM SERPL-SCNC: 140 MMOL/L (ref 133–145)
TRIGL SERPL-MCNC: 63 MG/DL (ref 40–149)
TSH SERPL DL<=0.005 MIU/L-ACNC: 3.26 MCU/ML (ref 0.27–4.2)
VLDLC SERPL CALC-MCNC: 13 MG/DL (ref 8–30)
WBC # BLD AUTO: 6.6 K/UL (ref 4–11)

## 2022-09-08 DIAGNOSIS — R79.89 ELEVATED PLATELET COUNT: Primary | ICD-10-CM

## 2022-09-08 NOTE — PROGRESS NOTES
A1C improved compare to before. Mildly elevated LDL. Continue life style and low carb, low fat diet modification. Elevated platelet.  Will consider hematology referral .

## 2022-09-09 ENCOUNTER — OFFICE VISIT (OUTPATIENT)
Dept: FAMILY MEDICINE CLINIC | Age: 62
End: 2022-09-09
Payer: COMMERCIAL

## 2022-09-09 VITALS
SYSTOLIC BLOOD PRESSURE: 136 MMHG | DIASTOLIC BLOOD PRESSURE: 60 MMHG | WEIGHT: 198.6 LBS | TEMPERATURE: 97.1 F | OXYGEN SATURATION: 98 % | HEART RATE: 61 BPM | RESPIRATION RATE: 16 BRPM | BODY MASS INDEX: 37.49 KG/M2 | HEIGHT: 61 IN

## 2022-09-09 DIAGNOSIS — E78.5 DYSLIPIDEMIA: ICD-10-CM

## 2022-09-09 DIAGNOSIS — J04.0 LARYNGITIS: ICD-10-CM

## 2022-09-09 DIAGNOSIS — R52 PAIN OF MULTIPLE SITES: ICD-10-CM

## 2022-09-09 DIAGNOSIS — D35.2 PITUITARY MICROADENOMA (HCC): ICD-10-CM

## 2022-09-09 DIAGNOSIS — J30.9 ALLERGIC RHINITIS: ICD-10-CM

## 2022-09-09 DIAGNOSIS — R79.89 ELEVATED PLATELET COUNT: ICD-10-CM

## 2022-09-09 DIAGNOSIS — I10 ESSENTIAL HYPERTENSION: ICD-10-CM

## 2022-09-09 DIAGNOSIS — E11.9 WELL CONTROLLED DIABETES MELLITUS (HCC): Primary | ICD-10-CM

## 2022-09-09 PROCEDURE — 99214 OFFICE O/P EST MOD 30 MIN: CPT | Performed by: FAMILY MEDICINE

## 2022-09-09 PROCEDURE — 3044F HG A1C LEVEL LT 7.0%: CPT | Performed by: FAMILY MEDICINE

## 2022-09-09 RX ORDER — TELMISARTAN AND HYDROCHLORTHIAZIDE 80; 25 MG/1; MG/1
1 TABLET ORAL DAILY
Qty: 90 TABLET | Refills: 1 | Status: SHIPPED | OUTPATIENT
Start: 2022-09-09

## 2022-09-09 RX ORDER — FLUTICASONE PROPIONATE 50 MCG
SPRAY, SUSPENSION (ML) NASAL
Qty: 1 EACH | Refills: 1 | Status: SHIPPED | OUTPATIENT
Start: 2022-09-09 | End: 2022-09-10

## 2022-09-09 RX ORDER — CETIRIZINE HYDROCHLORIDE 5 MG/1
5 TABLET ORAL
Qty: 90 TABLET | Refills: 0 | Status: SHIPPED | OUTPATIENT
Start: 2022-09-09

## 2022-09-09 RX ORDER — AMLODIPINE BESYLATE 5 MG/1
5 TABLET ORAL DAILY
Qty: 90 TABLET | Refills: 1 | Status: SHIPPED | OUTPATIENT
Start: 2022-09-09

## 2022-09-09 NOTE — PROGRESS NOTES
HISTORY OF PRESENT ILLNESS  Emperatriz Aguirre is a 63 y.o. female. HPI: Here for follow-up. Diabetes. Well-controlled. Improved A1c from before and reviewed labs during the visit. Current A1c 6.4. Continue lifestyle and diet modification and discussed high BMI and importance of weight loss. She is exercising 3 to 4 days a week. During visit sitting comfortable without any acute distress  Discussed elevated platelet count. Denies any excessive bleeding. Bruising. 3 months ago it was elevated as well. Recently went to dental procedure couple of weeks ago could be the reason for current elevated blood count. I will send to hematology and few months back it was elevated as well. Currently not on any aspirin. Will follow hematology recommendation  Pituitary macroadenoma which was not seen in MRI 2019  Seasonal allergies has been stable on symptomatic treatment. Vitals been stable. Taking medication with compliance and no side effects. Multiple side pain has been improved at this time. No joint pain or swelling. Denies any headache, dizziness, no chest pain or trouble breathing, no arm or leg weakness. No nausea or vomiting, no weight or appetite changes, no mood changes . No urine or bowel complains, no palpitation, no diaphoresis. No abdominal pain. No cold or cough. No leg swelling. No fever. No sleep trouble. Visit Vitals  /60 (BP 1 Location: Left upper arm, BP Patient Position: Sitting, BP Cuff Size: Adult)   Pulse 61   Temp 97.1 °F (36.2 °C) (Temporal)   Resp 16   Ht 5' 1\" (1.549 m)   Wt 198 lb 9.6 oz (90.1 kg)   SpO2 98%   BMI 37.53 kg/m²     Review medication list, vitals, problem list,allergies.    Lab Results   Component Value Date/Time    WBC 6.6 09/07/2022 07:33 AM    HGB 12.0 09/07/2022 07:33 AM    HCT 38.9 09/07/2022 07:33 AM    PLATELET 932 (H) 26/94/0747 07:33 AM    MCV 83 09/07/2022 07:33 AM     Lab Results   Component Value Date/Time    Sodium 140 09/07/2022 07:33 AM Potassium 4.0 09/07/2022 07:33 AM    Chloride 103 09/07/2022 07:33 AM    CO2 26 09/07/2022 07:33 AM    Anion gap 11.0 09/07/2022 07:33 AM    Glucose 107 (H) 09/07/2022 07:33 AM    BUN 12 09/07/2022 07:33 AM    Creatinine 0.9 09/07/2022 07:33 AM    Calcium 9.8 09/07/2022 07:33 AM    Bilirubin, total 0.4 09/07/2022 07:33 AM    Alk. phosphatase 36 (L) 09/07/2022 07:33 AM    Protein, total 6.5 09/07/2022 07:33 AM    Albumin 4.4 09/07/2022 07:33 AM    Globulin 2.1 09/07/2022 07:33 AM    A-G Ratio 2.1 09/07/2022 07:33 AM    ALT (SGPT) 20 09/07/2022 07:33 AM    AST (SGOT) 20 09/07/2022 07:33 AM       Lab Results   Component Value Date/Time    Cholesterol, total 192 09/07/2022 07:33 AM    HDL Cholesterol 54 09/07/2022 07:33 AM    LDL, calculated 125 (H) 09/07/2022 07:33 AM    VLDL, calculated 13 09/07/2022 07:33 AM    Triglyceride 63 09/07/2022 07:33 AM     Lab Results   Component Value Date/Time    TSH 3.26 09/07/2022 07:33 AM     Lab Results   Component Value Date/Time    Hemoglobin A1c 6.4 (H) 09/07/2022 07:33 AM    Hemoglobin A1c (POC) 6.1 10/11/2021 11:17 AM       ROS: See HPI    Physical Exam  Constitutional:       General: She is not in acute distress. Cardiovascular:      Rate and Rhythm: Normal rate and regular rhythm. Heart sounds: Normal heart sounds. Abdominal:      General: Bowel sounds are normal.      Palpations: Abdomen is soft. Tenderness: There is no abdominal tenderness. Musculoskeletal:         General: No swelling. Neurological:      Mental Status: She is oriented to person, place, and time. Psychiatric:         Behavior: Behavior normal.       ASSESSMENT and PLAN    ICD-10-CM ICD-9-CM    1. Well controlled diabetes mellitus ([address]): Improvement in A1c. Current 1 is 6.4. Encouraged to continue lifestyle and diet modification E11.9 250.00       2. Allergic rhinitis  J30.9 477.9 fluticasone propionate (FLONASE) 50 mcg/actuation nasal spray      3. Essential hypertension:well controlled. Continue current dose of medication and low salt diet. Exercise as tolerated. I10 401.9 telmisartan-hydroCHLOROthiazide (MICARDIS HCT) 80-25 mg per tablet      amLODIPine (NORVASC) 5 mg tablet      4. Laryngitis  J04.0 464.00 cetirizine (ZYRTEC) 5 mg tablet      5. Pituitary microadenoma [facility]): Not seen in MRI 2019 D35.2 227.3       6. Dyslipidemia : diet and life style modification. On statin. No side effects. E78.5 272.4       7. Pain of multiple sites :currently asymptomatic. R52 780.96       8. Elevated platelet count : 3 months ago was elevated as well. Asymptomatic. Will take hematology recommendations. R79.89 790.6     went through dental procedure but was elevated 3 months ago       Pt understood and agree with the plan     Follow-up and Dispositions    Return in about 6 months (around 3/9/2023). Please note that this dictation was completed with Between, the computer voice recognition software. Quite often unanticipated grammatical, syntax, homophones, and other interpretive errors are inadvertently transcribed by the computer software. Please disregard these errors. Please excuse any errors that have escaped final proofreading.

## 2022-09-09 NOTE — PROGRESS NOTES
1. Have you been to the ER, urgent care clinic since your last visit? Hospitalized since your last visit? No    2. Have you seen or consulted any other health care providers outside of the [address] [place] [address] since your last visit? Include any pap smears or colon screening. Tooth Extraction 8/17/22 - [facility] Dr. Johnathan Parks.       Chief Complaint   Patient presents with    Cholesterol Problem    Diabetes    Hypertension    Results

## 2022-09-10 RX ORDER — FLUTICASONE PROPIONATE 50 MCG
SPRAY, SUSPENSION (ML) NASAL
Qty: 48 G | Refills: 1 | Status: SHIPPED | OUTPATIENT
Start: 2022-09-10

## 2022-10-03 ENCOUNTER — OFFICE VISIT (OUTPATIENT)
Dept: NEUROLOGY | Age: 62
End: 2022-10-03
Payer: COMMERCIAL

## 2022-10-03 VITALS
BODY MASS INDEX: 37.76 KG/M2 | DIASTOLIC BLOOD PRESSURE: 70 MMHG | HEART RATE: 67 BPM | SYSTOLIC BLOOD PRESSURE: 118 MMHG | HEIGHT: 61 IN | WEIGHT: 200 LBS | OXYGEN SATURATION: 98 % | RESPIRATION RATE: 20 BRPM

## 2022-10-03 DIAGNOSIS — G43.009 MIGRAINE WITHOUT AURA AND WITHOUT STATUS MIGRAINOSUS, NOT INTRACTABLE: Primary | ICD-10-CM

## 2022-10-03 PROCEDURE — 99213 OFFICE O/P EST LOW 20 MIN: CPT | Performed by: NURSE PRACTITIONER

## 2022-10-03 RX ORDER — RIZATRIPTAN BENZOATE 10 MG/1
10 TABLET ORAL AS NEEDED
Qty: 12 TABLET | Refills: 5 | Status: SHIPPED | OUTPATIENT
Start: 2022-10-03

## 2022-10-03 NOTE — PROGRESS NOTES
[address] [place] [address]. [facility], [address]  Office:  [phone]  Fax: [phone]  Chief Complaint   Patient presents with    Headache     [address] patient       HPI: Reji Triplett presents in follow-up for headaches. She was seen here in December 2021 by GELA Gottlieb in evaluation for headaches. She had nighttime headaches and there was mention of pituitary adenoma. MRI brain in 2019 reported no pituitary adenoma. It was noted that she has seen neurology at [facility] before. At the initial visit it was noted that the headaches started the day after her son was born; he was now 44. She had her daughter in 26, headaches stopped after the birth of her daughter. At the initial visit here she reported experiencing headaches every night. She wakes at 2 or 3 AM with a headache. Headaches located frontal across the forehead. The pain was described as an ache. She will try to lay down and rest and it will not help. She will take naproxen. She was taking it nightly. If not naproxen, she will take aspirin. If she takes something, the headaches can last for 30 minutes. They would last longer if she does not take anything. The headaches were associated with light sensitivity. Endorsed snoring. Endorsed history of anxiety. She was started on amitriptyline. Analgesic overuse headaches was discussed. She was referred to a sleep medicine provider. She was last seen here on 2/7/2022 by GELA Gottlieb. She was continued on amitriptyline 25 mg nightly. She was awaiting the sleep specialist evaluation. She presents today in follow-up. She stopped the amitriptyline due to sexual side effects. She reports that the headaches are better. She stopped the frequent use of over-the-counter analgesics so it may have been medication overuse headache related. She places something on areas of her head which has a eucalyptus scent.   She believes the headaches may be tension and anxiety related. They feel like a migraine. Associated sensitivity to lights and sounds. She wants to be in a dark room with a cold cloth or also her eyes. It is a throbbing pain. Located across her eyes and forehead all the way across. They occur around once per week or less. They used to be every day. Denies associated nausea or vomiting, unilateral weakness or numbness or tingling, speech changes, or visual changes. If they are too intense, she will take something for it such as naproxen or extra strength Tylenol. She takes only 1 naproxen now because she had GI upset if she took 2 in the past.  Tylenol or naproxen usually helps. She will try to relax first because she finds that they may be anxiety related. She works as a domestic violence clinician. She does not want to take medications for anxiety. Denies other known triggers for her migraines. Endorses adequate hours of sleep but believes that the quality of her sleep is not as good. She had the sleep study- no sleep apnea found. She denies vision issues including peripheral vision loss. She gets regular eye exams. Denies asthma but believes she may have had COVID at 1 point so she has Flovent. She coughs now and then but no shortness of breath. BP is controlled in clinic, 118/50. Past Medical History:   Diagnosis Date    AR (allergic rhinitis)     Arthritis     Diabetes ([facility] [address]     Headache     Heart murmur     History of blood transfusion     Hypertension     Hypovitaminosis D 3/3/2014       Past Surgical History:   Procedure Laterality Date    [address]    HX COLONOSCOPY  2012    normal per patient (by Dr. Francisco J Barraza)    HX HYSTERECTOMY  2006    s/p vaginal hysterectomy        Current Outpatient Medications   Medication Sig Dispense Refill    rizatriptan (MAXALT) 10 mg tablet Take 1 Tablet by mouth as needed for Migraine. May repeat in 2 hours if needed. Max 2 doses in 24 hours.  12 Tablet 5    fluticasone propionate (FLONASE) 50 mcg/actuation nasal spray SHAKE LIQUID AND USE 2 SPRAYS IN EACH NOSTRIL DAILY AS NEEDED FOR RHINITIS 48 g 1    telmisartan-hydroCHLOROthiazide (MICARDIS HCT) 80-25 mg per tablet Take 1 Tablet by mouth daily. 90 Tablet 1    cetirizine (ZYRTEC) 5 mg tablet Take 1 Tablet by mouth daily as needed for Allergies. 90 Tablet 0    amLODIPine (NORVASC) 5 mg tablet Take 1 Tablet by mouth daily. 90 Tablet 1    fluticasone propionate (FLOVENT HFA) 110 mcg/actuation inhaler Take 1 Puff by inhalation every twelve (12) hours. 12 g 0    mometasone (ELOCON) 0.1 % topical cream Apply  to affected area daily. 30 g 0        No Known Allergies    Social History     Tobacco Use    Smoking status: Never    Smokeless tobacco: Never   Substance Use Topics    Alcohol use:  Yes     Alcohol/week: 0.8 standard drinks     Types: 1 Standard drinks or equivalent per week    Drug use: No       Family History   Problem Relation Age of Onset    Hypertension Mother     Depression Mother     Hypertension Father     Headache Father     Diabetes Father     Arthritis-rheumatoid Father     Heart Attack Father     Heart Failure Father     OSTEOARTHRITIS Father     Heart Disease Father     Hypertension Sister     Diabetes Sister     Cancer Sister 47        breast cancer    Heart Failure Sister     Arthritis Sister     Hypertension Brother     Attention Deficit Disorder Son     Asthma Granddaughter     Cancer Sister     Hypertension Sister     Diabetes Sister     Hypertension Sister     Diabetes Sister     Hypertension Sister     Hypertension Sister        Review of Systems:  GENERAL: Denies fever or fatigue  CARDIAC: No CP or SOB  PULMONARY: No cough or SOB  MUSCULOSKELETAL: No new joint pain  NEURO: SEE HPI    Physical Examination:  Visit Vitals  /70 (BP 1 Location: Left upper arm, BP Patient Position: Sitting, BP Cuff Size: Large adult)   Pulse 67   Resp 20   Ht 5' 1\" (1.549 m)   Wt 90.7 kg (200 lb) SpO2 98%   BMI 37.79 kg/m²       Alert, in NAD. Heart is regular. Oriented x3. Speech is fluent. Speech clear. EOMs are full, PERRL, VFFTC, no nystagmus. No facial asymmetry. Tongue is midline. Strength and tone are normal. No drift of the bilateral upper extremities. Fine finger movements symmetrical. FNF intact bilaterally. DTRs +2, gait symmetric and steady. Impression/Plan: This is a 59-year-old left-handed female who presents in follow-up for migraines. She was previously followed here by GELA Gottlieb. She was experiencing regular nighttime headaches. She stopped the frequent use of naproxen. Headaches are occurring less frequently. She had been placed on amitriptyline but stopped it secondary to side effects. Headaches are occurring around once per week or less. If needed she may treat them with over-the-counter analgesics which are usually effective. Will provide with Maxalt for more severe migraines. We will hold off on preventative for now and will reassess if headaches are becoming more severe or frequent. Discussed administration and potential side effects of the medication. Encouraged regular sleep, eating regularly, staying hydrated, and exercise. Follow up in 6 months, sooner if needed. Diagnoses and all orders for this visit:    1. Migraine without aura and without status migrainosus, not intractable  -     rizatriptan (MAXALT) 10 mg tablet; Take 1 Tablet by mouth as needed for Migraine. May repeat in 2 hours if needed. Max 2 doses in 24 hours. Total time 23 minutes with 15 minutes spent in counseling. Signed By: [place] NP        PLEASE NOTE:   Portions of this document may have been produced using voice recognition software. Unrecognized errors in transcription may be present.

## 2022-11-29 ENCOUNTER — OFFICE VISIT (OUTPATIENT)
Dept: ONCOLOGY | Age: 62
End: 2022-11-29
Payer: COMMERCIAL

## 2022-11-29 VITALS
SYSTOLIC BLOOD PRESSURE: 135 MMHG | HEART RATE: 57 BPM | OXYGEN SATURATION: 96 % | DIASTOLIC BLOOD PRESSURE: 69 MMHG | BODY MASS INDEX: 38.14 KG/M2 | RESPIRATION RATE: 16 BRPM | HEIGHT: 61 IN | WEIGHT: 202 LBS

## 2022-11-29 DIAGNOSIS — D75.839 THROMBOCYTOSIS: Primary | ICD-10-CM

## 2022-11-29 PROCEDURE — 3078F DIAST BP <80 MM HG: CPT | Performed by: INTERNAL MEDICINE

## 2022-11-29 PROCEDURE — 99203 OFFICE O/P NEW LOW 30 MIN: CPT | Performed by: INTERNAL MEDICINE

## 2022-11-29 PROCEDURE — 3074F SYST BP LT 130 MM HG: CPT | Performed by: INTERNAL MEDICINE

## 2022-11-29 NOTE — PROGRESS NOTES
Hematology/Oncology Consultation Note      Date: 2022    Name: Reji Triplett  : 1960        Radha Rausch MD         Subjective:     Chief complaint: Thrombocytosis    History of Present Illness:   Ms. Peewee Singh is a most pleasant 63y.o. year old female who was seen for consultation of thrombocytosis. The patient has a past medical history significant of diabetes, arthritis, hypertension, allergic rhinitis, anemia with history of blood transfusion. 2022 CBC reported hemoglobin 12, hematocrit 38.9%, platelet 236,523, WBC 6.6. The patient reported doing well overall. The patient otherwise has no other complaints. Denied fever, chills, night sweat, unintentional weight loss, skin lumps or bumps, acute bleeding or bruising issues. Denied headache, acute vision change, dizziness, chest pain, worsen shortness of breath, palpitation, productive cough, nausea, vomiting, abdominal pain, altered bowel habits, dysuria, worsen bone pain or back pain, new focal numbness or weakness. Past Medical History, Family History, and Social History:    Past Medical History:   Diagnosis Date    AR (allergic rhinitis)     Arthritis     Diabetes ([facility] [address]     Headache     Heart murmur     History of blood transfusion     Hypertension     Hypovitaminosis D 3/3/2014     Past Surgical History:   Procedure Laterality Date     [facility]    HX COLONOSCOPY      normal per patient (by Dr. Graham Caruso)    HX HYSTERECTOMY  2006    s/p vaginal hysterectomy      Social History     Socioeconomic History    Marital status:      Spouse name: Not on file    Number of children: Not on file    Years of education: Not on file    Highest education level: Not on file   Occupational History    Not on file   Tobacco Use    Smoking status: Never    Smokeless tobacco: Never   Substance and Sexual Activity    Alcohol use:  Yes     Alcohol/week: 0.8 standard drinks     Types: 1 Standard drinks or equivalent per week Drug use: No    Sexual activity: Yes     Partners: Male     Birth control/protection: None     Comment: Had hysterectomy   Other Topics Concern    Not on file   Social History Narrative    Not on file     Social Determinants of Health     Financial Resource Strain: Not on file   Food Insecurity: Not on file   Transportation Needs: Not on file   Physical Activity: Not on file   Stress: Not on file   Social Connections: Not on file   Intimate Partner Violence: Not on file   Housing Stability: Not on file     Family History   Problem Relation Age of Onset    Hypertension Mother     Depression Mother     Hypertension Father     Headache Father     Diabetes Father     Arthritis-rheumatoid Father     Heart Attack Father     Heart Failure Father     OSTEOARTHRITIS Father     Heart Disease Father     Hypertension Sister     Diabetes Sister     Cancer Sister 47        breast cancer    Heart Failure Sister     Arthritis Sister     Hypertension Brother     Attention Deficit Disorder Son     Asthma Granddaughter     Cancer Sister     Hypertension Sister     Diabetes Sister     Hypertension Sister     Diabetes Sister     Hypertension Sister     Hypertension Sister      Current Outpatient Medications   Medication Sig Dispense Refill    rizatriptan (MAXALT) 10 mg tablet Take 1 Tablet by mouth as needed for Migraine. May repeat in 2 hours if needed. Max 2 doses in 24 hours. 12 Tablet 5    fluticasone propionate (FLONASE) 50 mcg/actuation nasal spray SHAKE LIQUID AND USE 2 SPRAYS IN EACH NOSTRIL DAILY AS NEEDED FOR RHINITIS 48 g 1    telmisartan-hydroCHLOROthiazide (MICARDIS HCT) 80-25 mg per tablet Take 1 Tablet by mouth daily. 90 Tablet 1    cetirizine (ZYRTEC) 5 mg tablet Take 1 Tablet by mouth daily as needed for Allergies. 90 Tablet 0    amLODIPine (NORVASC) 5 mg tablet Take 1 Tablet by mouth daily.  90 Tablet 1    fluticasone propionate (FLOVENT HFA) 110 mcg/actuation inhaler Take 1 Puff by inhalation every twelve (12) hours. 12 g 0    mometasone (ELOCON) 0.1 % topical cream Apply  to affected area daily. 30 g 0       Review of Systems   Constitutional:  Negative for chills, diaphoresis, fever, malaise/fatigue and weight loss. Respiratory:  Negative for cough, hemoptysis, shortness of breath and wheezing. Cardiovascular:  Negative for chest pain, palpitations and leg swelling. Gastrointestinal:  Negative for abdominal pain, diarrhea, heartburn, nausea and vomiting. Genitourinary:  Negative for dysuria, frequency, hematuria and urgency. Musculoskeletal:  Negative for joint pain and myalgias. Skin:  Negative for itching and rash. Neurological:  Negative for dizziness, seizures, weakness and headaches. Psychiatric/Behavioral:  Negative for depression. The patient does not have insomnia. Objective:   Visit Vitals  /69   Pulse (!) 57   Resp 16   Ht 5' 1\" (1.549 m)   Wt 91.6 kg (202 lb)   SpO2 96%   BMI 38.17 kg/m²       ECOG Performance Status (grade): 0  0 - able to carry on all pre-disease activity w/out restriction  1 - restricted but able to carry out light work  2 - ambulatory and can self- care but unable to carry out work  3 - bed or chair >50% of waking hours  4 - completely disable, total care, confined to bed or chair    Physical Exam  Constitutional:       Appearance: Normal appearance. HENT:      Head: Normocephalic and atraumatic. Eyes:      Pupils: Pupils are equal, round, and reactive to light. Cardiovascular:      Rate and Rhythm: Normal rate and regular rhythm. Heart sounds: Normal heart sounds. Pulmonary:      Effort: Pulmonary effort is normal.      Breath sounds: Normal breath sounds. Abdominal:      General: Bowel sounds are normal.      Palpations: Abdomen is soft. Tenderness: There is no abdominal tenderness. There is no guarding. Musculoskeletal:         General: Normal range of motion. Cervical back: Neck supple. Right lower leg: No edema. Left lower leg: No edema. Skin:     General: Skin is warm. Neurological:      General: No focal deficit present. Mental Status: She is alert and oriented to person, place, and time. Mental status is at baseline. Diagnostics:      No results found for this or any previous visit (from the past 96 hour(s)). Imaging:  No results found for this or any previous visit. Results for orders placed during the hospital encounter of 10/04/19    XR HIP LT W OR WO PELV 2-3 VWS    Narrative  CPT CODE: 05103    Two views of the left  hip including AP pelvis are reviewed. INDICATION: History of bursitis, worsening pain. COMPARISON: 2013. FINDINGS:    Negative for fracture or subluxation. No osteolytic or osteoblastic lesions are evident. Mild degenerative changes noted at the hips. Bone mineralization seems adequate. Dystrophic calcifications are noted along the left greater trochanter and  inferiorly apparently sequelae of trochanteric bursitis    Impression  IMPRESSION:    Chronic left trochanteric bursitis with this atrophic calcifications. No results found for this or any previous visit. Pathology          Assessment:                                        1. Thrombocytosis        Plan:                                        # Thrombocytosis  -- Past medical history significant of diabetes, arthritis, hypertension, allergic rhinitis, anemia with history of blood transfusion.  -- 9/7/2022 CBC reported hemoglobin 12, hematocrit 38.9%, platelet 573,045, WBC 6.6.  -- Today I have reviewed with the patient about recent lab reports and differential diagnosis. Plan:  -- Initial lab check: CBC with diff, chemistry, Iron profile, ferritin, ESR/CRP, LDH, and JAK2 mut.  -- The patient will be notified if any interventions is warranted. Further workup will be guided by results from aforementioned initial study. -- The patient was agreeable with the plan.    -- We will see the patient back in about 2 weeks to review reports. Always sooner if required. Orders Placed This Encounter    METABOLIC PANEL, COMPREHENSIVE     Standing Status:   Future     Standing Expiration Date:   11/30/2023    IRON PROFILE     Standing Status:   Future     Standing Expiration Date:   11/30/2023    FERRITIN     Standing Status:   Future     Standing Expiration Date:   11/29/2023    CBC WITH AUTOMATED DIFF     Standing Status:   Future     Standing Expiration Date:   11/30/2023    RETICULOCYTE COUNT     Standing Status:   Future     Standing Expiration Date:   11/29/2023    LD     Standing Status:   Future     Standing Expiration Date:   11/29/2023    C REACTIVE PROTEIN, QT     Standing Status:   Future     Standing Expiration Date:   11/29/2023    SED RATE (ESR)     Standing Status:   Future     Standing Expiration Date:   11/29/2023    JAK2 MUTATION ANALYSIS     Standing Status:   Future     Standing Expiration Date:   11/29/2023             Ms. Panchito Granados has a reminder for a \"due or due soon\" health maintenance. I have asked that she contact her primary care provider for follow-up on this health maintenance. All of patient's questions answered to their apparent satisfaction. They verbally show understanding and agreement with aforementioned plan. Manuelito Araiza MD  11/29/2022            Above mentioned total time spent for this encounter with more than 50% of the time spent in face-to-face counseling, discussing on diagnosis and management plan going forward, and co-ordination of care. Parts of this document has been produced using Dragon dictation system. Unrecognized errors in transcription may be present. Please do not hesitate to reach out for any questions or clarifications.       CC: Ailyn Singh MD

## 2022-11-30 LAB
A-G RATIO,AGRAT: 1.6 RATIO (ref 1.1–2.6)
ABSOLUTE LYMPHOCYTE COUNT, 10803: 2.2 K/UL (ref 1–4.8)
ABSOLUTE RETIC COUNT,RETA: 0.06 M/UL (ref 0.03–0.1)
ALBUMIN SERPL-MCNC: 4.1 G/DL (ref 3.5–5)
ALP SERPL-CCNC: 33 U/L (ref 40–120)
ALT SERPL-CCNC: 14 U/L (ref 5–40)
ANION GAP SERPL CALC-SCNC: 7 MMOL/L (ref 3–15)
AST SERPL W P-5'-P-CCNC: 15 U/L (ref 10–37)
BASOPHILS # BLD: 0 K/UL (ref 0–0.2)
BASOPHILS NFR BLD: 0 % (ref 0–2)
BILIRUB SERPL-MCNC: 0.3 MG/DL (ref 0.2–1.2)
BUN SERPL-MCNC: 12 MG/DL (ref 6–22)
C-REACTIVE PROTEIN, QT, 006627: 1 MG/DL
CALCIUM SERPL-MCNC: 9.7 MG/DL (ref 8.4–10.5)
CHLORIDE SERPL-SCNC: 107 MMOL/L (ref 98–110)
CO2 SERPL-SCNC: 29 MMOL/L (ref 20–32)
CREAT SERPL-MCNC: 0.9 MG/DL (ref 0.8–1.4)
EOSINOPHIL # BLD: 0.1 K/UL (ref 0–0.5)
EOSINOPHIL NFR BLD: 1 % (ref 0–6)
ERYTHROCYTE [DISTWIDTH] IN BLOOD BY AUTOMATED COUNT: 15.5 % (ref 10–15.5)
FE % SATURATION,PSAT: 15 % (ref 20–50)
GLOBULIN,GLOB: 2.5 G/DL (ref 2–4)
GLOMERULAR FILTRATION RATE: >60 ML/MIN/1.73 SQ.M.
GLUCOSE SERPL-MCNC: 96 MG/DL (ref 70–99)
GRANULOCYTES,GRANS: 58 % (ref 40–75)
HCT VFR BLD AUTO: 38.8 % (ref 35.1–48)
HGB BLD-MCNC: 11.7 G/DL (ref 11.7–16)
IRON,IRN: 48 MCG/DL (ref 30–160)
LYMPHOCYTES, LYMLT: 36 % (ref 20–45)
MCH RBC QN AUTO: 25 PG (ref 26–34)
MCHC RBC AUTO-ENTMCNC: 30 G/DL (ref 31–36)
MCV RBC AUTO: 84 FL (ref 80–99)
MONOCYTES # BLD: 0.3 K/UL (ref 0.1–1)
MONOCYTES NFR BLD: 4 % (ref 3–12)
NEUTROPHILS # BLD AUTO: 3.4 K/UL (ref 1.8–7.7)
PLATELET # BLD AUTO: 416 K/UL (ref 140–440)
PMV BLD AUTO: 9.3 FL (ref 9–13)
POTASSIUM SERPL-SCNC: 4.4 MMOL/L (ref 3.5–5.5)
PROT SERPL-MCNC: 6.6 G/DL (ref 6.2–8.1)
RBC # BLD AUTO: 4.64 M/UL (ref 3.8–5.2)
RETIC COUNT, AUTOMATED,RETIC: 1.4 % (ref 0.5–2)
RETIC HEMOGLOBIN: 28.5 PG (ref 28.2–38.2)
SED RATE (ESR): 19 MM/HR
SODIUM SERPL-SCNC: 143 MMOL/L (ref 133–145)
TIBC,TIBC: 329 MCG/DL (ref 228–428)
UIBC SERPL-MCNC: 281 MCG/DL (ref 110–370)
WBC # BLD AUTO: 6 K/UL (ref 4–11)

## 2022-12-04 LAB
FERRITIN SERPL-MCNC: 57 NG/ML (ref 10–291)
LDH SERPL L TO P-CCNC: 141 U/L (ref 98–192)

## 2022-12-15 ENCOUNTER — OFFICE VISIT (OUTPATIENT)
Dept: ONCOLOGY | Age: 62
End: 2022-12-15
Payer: COMMERCIAL

## 2022-12-15 VITALS
HEIGHT: 61 IN | BODY MASS INDEX: 38.1 KG/M2 | HEART RATE: 65 BPM | WEIGHT: 201.8 LBS | DIASTOLIC BLOOD PRESSURE: 64 MMHG | RESPIRATION RATE: 16 BRPM | SYSTOLIC BLOOD PRESSURE: 131 MMHG | OXYGEN SATURATION: 98 %

## 2022-12-15 DIAGNOSIS — D50.9 IRON DEFICIENCY ANEMIA, UNSPECIFIED IRON DEFICIENCY ANEMIA TYPE: Primary | ICD-10-CM

## 2022-12-15 DIAGNOSIS — D75.839 THROMBOCYTOSIS: ICD-10-CM

## 2022-12-15 RX ORDER — LANOLIN ALCOHOL/MO/W.PET/CERES
325 CREAM (GRAM) TOPICAL
Qty: 30 TABLET | Refills: 5 | Status: SHIPPED | OUTPATIENT
Start: 2022-12-15 | End: 2023-01-14

## 2022-12-15 NOTE — PROGRESS NOTES
Hematology/Oncology Note      Date: 12/15/2022    Name: Janina Gates  : 1960        Rohini Nicholas MD         Subjective:     Chief complaint: Thrombocytosis    History of Present Illness:   Ms. Patrica Gonzalez is a most pleasant 63y.o. year old female who was seen for consultation of thrombocytosis. The patient reported doing well overall. The patient otherwise has no other complaints. Denied fever, chills, night sweat, unintentional weight loss, skin lumps or bumps, acute bleeding or bruising issues. Denied headache, acute vision change, dizziness, chest pain, worsen shortness of breath, palpitation, productive cough, nausea, vomiting, abdominal pain, altered bowel habits, dysuria, worsen bone pain or back pain, new focal numbness or weakness. Past Medical History, Family History, and Social History:    Past Medical History:   Diagnosis Date    AR (allergic rhinitis)     Arthritis     Diabetes ([facility] [address]     Headache     Heart murmur     History of blood transfusion     Hypertension     Hypovitaminosis D 3/3/2014     Past Surgical History:   Procedure Laterality Date     E [facility]    HX COLONOSCOPY  2012    normal per patient (by Dr. Kerri Corbin)    HX HYSTERECTOMY      s/p vaginal hysterectomy      Social History     Socioeconomic History    Marital status:      Spouse name: Not on file    Number of children: Not on file    Years of education: Not on file    Highest education level: Not on file   Occupational History    Not on file   Tobacco Use    Smoking status: Never    Smokeless tobacco: Never   Substance and Sexual Activity    Alcohol use:  Yes     Alcohol/week: 0.8 standard drinks     Types: 1 Standard drinks or equivalent per week    Drug use: No    Sexual activity: Yes     Partners: Male     Birth control/protection: None     Comment: Had hysterectomy   Other Topics Concern    Not on file   Social History Narrative    Not on file     Social Determinants of Health Financial Resource Strain: Not on file   Food Insecurity: Not on file   Transportation Needs: Not on file   Physical Activity: Not on file   Stress: Not on file   Social Connections: Not on file   Intimate Partner Violence: Not on file   Housing Stability: Not on file     Family History   Problem Relation Age of Onset    Hypertension Mother     Depression Mother     Hypertension Father     Headache Father     Diabetes Father     Arthritis-rheumatoid Father     Heart Attack Father     Heart Failure Father     OSTEOARTHRITIS Father     Heart Disease Father     Hypertension Sister     Diabetes Sister     Cancer Sister 47        breast cancer    Heart Failure Sister     Arthritis Sister     Hypertension Brother     Attention Deficit Disorder Son     Asthma Granddaughter     Cancer Sister     Hypertension Sister     Diabetes Sister     Hypertension Sister     Diabetes Sister     Hypertension Sister     Hypertension Sister      Current Outpatient Medications   Medication Sig Dispense Refill    ferrous sulfate 325 mg (65 mg iron) tablet Take 1 Tablet by mouth Daily (before breakfast) for 30 days. 30 Tablet 5    rizatriptan (MAXALT) 10 mg tablet Take 1 Tablet by mouth as needed for Migraine. May repeat in 2 hours if needed. Max 2 doses in 24 hours. 12 Tablet 5    fluticasone propionate (FLONASE) 50 mcg/actuation nasal spray SHAKE LIQUID AND USE 2 SPRAYS IN EACH NOSTRIL DAILY AS NEEDED FOR RHINITIS 48 g 1    telmisartan-hydroCHLOROthiazide (MICARDIS HCT) 80-25 mg per tablet Take 1 Tablet by mouth daily. 90 Tablet 1    cetirizine (ZYRTEC) 5 mg tablet Take 1 Tablet by mouth daily as needed for Allergies. 90 Tablet 0    amLODIPine (NORVASC) 5 mg tablet Take 1 Tablet by mouth daily. 90 Tablet 1    fluticasone propionate (FLOVENT HFA) 110 mcg/actuation inhaler Take 1 Puff by inhalation every twelve (12) hours. 12 g 0    mometasone (ELOCON) 0.1 % topical cream Apply  to affected area daily.  30 g 0       Review of Systems Constitutional:  Negative for chills, diaphoresis, fever, malaise/fatigue and weight loss. Respiratory:  Negative for cough, hemoptysis, shortness of breath and wheezing. Cardiovascular:  Negative for chest pain, palpitations and leg swelling. Gastrointestinal:  Negative for abdominal pain, diarrhea, heartburn, nausea and vomiting. Genitourinary:  Negative for dysuria, frequency, hematuria and urgency. Musculoskeletal:  Negative for joint pain and myalgias. Skin:  Negative for itching and rash. Neurological:  Negative for dizziness, seizures, weakness and headaches. Psychiatric/Behavioral:  Negative for depression. The patient does not have insomnia. Objective:   Visit Vitals  /64   Pulse 65   Resp 16   Ht 5' 1\" (1.549 m)   Wt 91.5 kg (201 lb 12.8 oz)   SpO2 98%   BMI 38.13 kg/m²         ECOG Performance Status (grade): 0  0 - able to carry on all pre-disease activity w/out restriction  1 - restricted but able to carry out light work  2 - ambulatory and can self- care but unable to carry out work  3 - bed or chair >50% of waking hours  4 - completely disable, total care, confined to bed or chair    Physical Exam  Constitutional:       Appearance: Normal appearance. HENT:      Head: Normocephalic and atraumatic. Eyes:      Pupils: Pupils are equal, round, and reactive to light. Cardiovascular:      Rate and Rhythm: Normal rate and regular rhythm. Heart sounds: Normal heart sounds. Pulmonary:      Effort: Pulmonary effort is normal.      Breath sounds: Normal breath sounds. Abdominal:      General: Bowel sounds are normal.      Palpations: Abdomen is soft. Tenderness: There is no abdominal tenderness. There is no guarding. Musculoskeletal:         General: Normal range of motion. Cervical back: Neck supple. Right lower leg: No edema. Left lower leg: No edema. Skin:     General: Skin is warm.    Neurological:      General: No focal deficit present. Mental Status: She is alert and oriented to person, place, and time. Mental status is at baseline. Diagnostics:      No results found for this or any previous visit (from the past 96 hour(s)). Imaging:  No results found for this or any previous visit. Results for orders placed during the hospital encounter of 10/04/19    XR HIP LT W OR WO PELV 2-3 VWS    Narrative  CPT CODE: 51944    Two views of the left  hip including AP pelvis are reviewed. INDICATION: History of bursitis, worsening pain. COMPARISON: 2013. FINDINGS:    Negative for fracture or subluxation. No osteolytic or osteoblastic lesions are evident. Mild degenerative changes noted at the hips. Bone mineralization seems adequate. Dystrophic calcifications are noted along the left greater trochanter and  inferiorly apparently sequelae of trochanteric bursitis    Impression  IMPRESSION:    Chronic left trochanteric bursitis with this atrophic calcifications. No results found for this or any previous visit. Pathology          Assessment:                                        1. Iron deficiency anemia, unspecified iron deficiency anemia type    2. Thrombocytosis          Plan:                                        # Iron deficiency anemia  # Thrombocytosis  -- Past medical history significant of diabetes, arthritis, hypertension, allergic rhinitis, anemia with history of blood transfusion.  -- 9/7/2022 CBC reported hemoglobin 12, hematocrit 38.9%, platelet 705,998, WBC 6.6.  -- Today I have reviewed with the patient about recent lab reports. 11/29/2022 CBC reported hemoglobin 11.7, hematocrit 38.8%, WBC 6.0, platelet 179,172. Iron saturation 15%, TIBC 329, ferritin 57. Negative JAK2 mutation  -- Her thrombocytosis was improving, likely reactive 2/2 MARY KATE. Plan:  -- Iron therapy: oral Iron therapy. Prescriptions will be provided.   -- Repeat labs CBC with diff, Iron profile, ferritin with iron therapy. -- The patient states she will fu PCP for scheduling colonoscopy. -- The patient was agreeable with the plan. -- We will see the patient back in about 6 months. Always sooner if required. Orders Placed This Encounter    ferrous sulfate 325 mg (65 mg iron) tablet     Sig: Take 1 Tablet by mouth Daily (before breakfast) for 30 days. Dispense:  30 Tablet     Refill:  5               Ms. Adri Dubois has a reminder for a \"due or due soon\" health maintenance. I have asked that she contact her primary care provider for follow-up on this health maintenance. All of patient's questions answered to their apparent satisfaction. They verbally show understanding and agreement with aforementioned plan. Felisa Landa MD  12/15/2022            Above mentioned total time spent for this encounter with more than 50% of the time spent in face-to-face counseling, discussing on diagnosis and management plan going forward, and co-ordination of care. Parts of this document has been produced using Dragon dictation system. Unrecognized errors in transcription may be present. Please do not hesitate to reach out for any questions or clarifications.       CC: Doc Lozada MD

## 2023-01-17 ENCOUNTER — TRANSCRIBE ORDER (OUTPATIENT)
Dept: SCHEDULING | Age: 63
End: 2023-01-17

## 2023-01-17 ENCOUNTER — TELEPHONE (OUTPATIENT)
Dept: FAMILY MEDICINE CLINIC | Age: 63
End: 2023-01-17

## 2023-01-17 DIAGNOSIS — Z12.31 ENCOUNTER FOR SCREENING MAMMOGRAM FOR MALIGNANT NEOPLASM OF BREAST: Primary | ICD-10-CM

## 2023-01-17 DIAGNOSIS — Z12.31 VISIT FOR SCREENING MAMMOGRAM: Primary | ICD-10-CM

## 2023-01-28 ENCOUNTER — TRANSCRIBE ORDERS (OUTPATIENT)
Facility: HOSPITAL | Age: 63
End: 2023-01-28

## 2023-01-28 DIAGNOSIS — Z12.31 VISIT FOR SCREENING MAMMOGRAM: Primary | ICD-10-CM

## 2023-02-14 ENCOUNTER — HOSPITAL ENCOUNTER (OUTPATIENT)
Facility: HOSPITAL | Age: 63
Discharge: HOME | End: 2023-02-17
Payer: COMMERCIAL

## 2023-02-14 DIAGNOSIS — Z12.31 VISIT FOR SCREENING MAMMOGRAM: ICD-10-CM

## 2023-02-14 PROCEDURE — 77067 SCR MAMMO BI INCL CAD: CPT

## 2023-03-09 ENCOUNTER — TELEPHONE (OUTPATIENT)
Age: 63
End: 2023-03-09

## 2023-03-09 NOTE — TELEPHONE ENCOUNTER
----- Message from Ambar Silver sent at 3/9/2023 11:55 AM EST -----  Subject: Message to Provider    QUESTIONS  Information for Provider? Patient was inquiring about labs if they are   needed for 6 month follow up appt in May.   ---------------------------------------------------------------------------  --------------  Kamala CHADWICK  [phone]; OK to leave message on voicemail  ---------------------------------------------------------------------------  --------------  SCRIPT ANSWERS  Relationship to Patient?  Self

## 2023-03-09 NOTE — TELEPHONE ENCOUNTER
Spoke with patient (two identifiers verified) to advise Dr. Allen Souza will order labs at visit, if it is determined they are needed. Patient verbalized understanding.

## 2023-06-01 DIAGNOSIS — D75.839 THROMBOCYTOSIS, UNSPECIFIED: ICD-10-CM

## 2023-06-01 DIAGNOSIS — D50.9 IRON DEFICIENCY ANEMIA, UNSPECIFIED IRON DEFICIENCY ANEMIA TYPE: Primary | ICD-10-CM

## 2023-07-06 ENCOUNTER — TELEPHONE (OUTPATIENT)
Age: 63
End: 2023-07-06

## 2023-07-06 NOTE — TELEPHONE ENCOUNTER
Pt is requesting the a letter for her health savings acct to cover a weight loss program she join through [facility] [employer] called [employer]. Please advise.

## 2023-07-17 RX ORDER — TELMISARTAN AND HYDROCHLORTHIAZIDE 80; 25 MG/1; MG/1
1 TABLET ORAL DAILY
Qty: 90 TABLET | Refills: 0 | OUTPATIENT
Start: 2023-07-17

## 2023-07-23 RX ORDER — TELMISARTAN AND HYDROCHLORTHIAZIDE 80; 25 MG/1; MG/1
1 TABLET ORAL DAILY
Qty: 90 TABLET | Refills: 0 | Status: SHIPPED | OUTPATIENT
Start: 2023-07-23

## 2023-08-31 ENCOUNTER — OFFICE VISIT (OUTPATIENT)
Age: 63
End: 2023-08-31
Payer: COMMERCIAL

## 2023-08-31 VITALS
HEIGHT: 61 IN | BODY MASS INDEX: 38.93 KG/M2 | WEIGHT: 206.2 LBS | OXYGEN SATURATION: 97 % | HEART RATE: 65 BPM | RESPIRATION RATE: 16 BRPM | TEMPERATURE: 96.9 F | SYSTOLIC BLOOD PRESSURE: 126 MMHG | DIASTOLIC BLOOD PRESSURE: 72 MMHG

## 2023-08-31 DIAGNOSIS — E78.5 DYSLIPIDEMIA: ICD-10-CM

## 2023-08-31 DIAGNOSIS — E11.9 WELL CONTROLLED DIABETES MELLITUS (HCC): ICD-10-CM

## 2023-08-31 DIAGNOSIS — E04.9 ENLARGED THYROID GLAND: ICD-10-CM

## 2023-08-31 DIAGNOSIS — I10 PRIMARY HYPERTENSION: Primary | ICD-10-CM

## 2023-08-31 DIAGNOSIS — R79.89 ELEVATED PLATELET COUNT: ICD-10-CM

## 2023-08-31 DIAGNOSIS — E61.1 IRON DEFICIENCY: ICD-10-CM

## 2023-08-31 PROCEDURE — 3078F DIAST BP <80 MM HG: CPT | Performed by: FAMILY MEDICINE

## 2023-08-31 PROCEDURE — 3074F SYST BP LT 130 MM HG: CPT | Performed by: FAMILY MEDICINE

## 2023-08-31 PROCEDURE — 99214 OFFICE O/P EST MOD 30 MIN: CPT | Performed by: FAMILY MEDICINE

## 2023-08-31 RX ORDER — AMLODIPINE BESYLATE 5 MG/1
5 TABLET ORAL DAILY
Qty: 90 TABLET | Refills: 1 | Status: SHIPPED | OUTPATIENT
Start: 2023-08-31

## 2023-08-31 RX ORDER — TELMISARTAN AND HYDROCHLORTHIAZIDE 80; 25 MG/1; MG/1
1 TABLET ORAL DAILY
Qty: 90 TABLET | Refills: 1 | Status: SHIPPED | OUTPATIENT
Start: 2023-08-31

## 2023-08-31 SDOH — ECONOMIC STABILITY: FOOD INSECURITY: WITHIN THE PAST 12 MONTHS, THE FOOD YOU BOUGHT JUST DIDN'T LAST AND YOU DIDN'T HAVE MONEY TO GET MORE.: NEVER TRUE

## 2023-08-31 SDOH — ECONOMIC STABILITY: INCOME INSECURITY: HOW HARD IS IT FOR YOU TO PAY FOR THE VERY BASICS LIKE FOOD, HOUSING, MEDICAL CARE, AND HEATING?: NOT HARD AT ALL

## 2023-08-31 SDOH — ECONOMIC STABILITY: HOUSING INSECURITY
IN THE LAST 12 MONTHS, WAS THERE A TIME WHEN YOU DID NOT HAVE A STEADY PLACE TO SLEEP OR SLEPT IN A SHELTER (INCLUDING NOW)?: NO

## 2023-08-31 SDOH — ECONOMIC STABILITY: FOOD INSECURITY: WITHIN THE PAST 12 MONTHS, YOU WORRIED THAT YOUR FOOD WOULD RUN OUT BEFORE YOU GOT MONEY TO BUY MORE.: NEVER TRUE

## 2023-08-31 ASSESSMENT — PATIENT HEALTH QUESTIONNAIRE - PHQ9
SUM OF ALL RESPONSES TO PHQ QUESTIONS 1-9: 2
2. FEELING DOWN, DEPRESSED OR HOPELESS: 1
SUM OF ALL RESPONSES TO PHQ QUESTIONS 1-9: 2
1. LITTLE INTEREST OR PLEASURE IN DOING THINGS: 1
SUM OF ALL RESPONSES TO PHQ QUESTIONS 1-9: 2
SUM OF ALL RESPONSES TO PHQ QUESTIONS 1-9: 2
SUM OF ALL RESPONSES TO PHQ9 QUESTIONS 1 & 2: 2

## 2023-08-31 NOTE — PROGRESS NOTES
1. \"Have you been to the ER, urgent care clinic since your last visit? Hospitalized since your last visit? \" No    2. \"Have you seen or consulted any other health care providers outside of the [address] [place] [address] since your last visit? \" No     3. For patients aged 43-73: Has the patient had a colonoscopy / FIT/ Cologuard? Yes - no Care Gap present      If the patient is female:    4. For patients aged 43-66: Has the patient had a mammogram within the past 2 years? Yes - no Care Gap present      5. For patients aged 21-65: Has the patient had a pap smear? No - hysterectomy    A1C was 5.8 % on 8/23/23.  A1C was checked through wellness program.

## 2023-08-31 NOTE — PROGRESS NOTES
Kezia Hines is a 66 y.o. female complains of   Chief Complaint   Patient presents with    Diabetes    Hypertension    Cholesterol Problem    Other     Elevated platelet count       HPI:  Here for follow-up. History of hypertension, well-controlled diabetes, iron deficiency, elevated platelet count. Vitals been stable. Asymptomatic. Needed a new hematology referral as prior physician esophagitis. Has been working on lifestyle and diet modification. Recent A1c done at work was around 5.9. Encouraged her to continue lifestyle medication. She had no records of labs so she wants to repeat the labs. No hypo or hyperglycemic symptoms. Palpable enlarge thyroid gland on exam.  Asymptomatic. No trouble swallowing or change in voice. No weight changes. We will consider doing ultrasound. Denies any headache, dizziness, no chest pain or trouble breathing, no arm or leg weakness. No nausea or vomiting, no weight or appetite changes, no mood changes . No urine or bowel complains, no palpitation, no diaphoresis. No abdominal pain. No cold or cough. No leg swelling. No fever. No sleep trouble.    CMP:  Lab Results   Component Value Date/Time     06/02/2023 11:25 AM    K 4.1 06/02/2023 11:25 AM     06/02/2023 11:25 AM    CO2 26 06/02/2023 11:25 AM    BUN 12 06/02/2023 11:25 AM    CREATININE 0.9 06/02/2023 11:25 AM    GLUCOSE 95 06/02/2023 11:25 AM    CALCIUM 9.8 06/02/2023 11:25 AM    PROT 6.6 06/02/2023 11:25 AM    LABALBU 4.2 06/02/2023 11:25 AM    BILITOT 0.3 06/02/2023 11:25 AM    AST 12 06/02/2023 11:25 AM    ALT 12 06/02/2023 11:25 AM          CBC:  Lab Results   Component Value Date/Time    WBC 6.9 06/02/2023 11:25 AM    WBC 6.0 11/29/2022 11:52 AM    RBC 4.87 06/02/2023 11:25 AM    HGB 12.6 06/02/2023 11:25 AM    HCT 41.6 06/02/2023 11:25 AM    MCV 85 06/02/2023 11:25 AM    MCH 26 06/02/2023 11:25 AM    MCHC 30 06/02/2023 11:25 AM    RDW 14.9 06/02/2023 11:25 AM     06/02/2023 11:25

## 2023-09-08 LAB
A/G RATIO: 1.9 RATIO (ref 1.1–2.6)
ALBUMIN SERPL-MCNC: 4.4 G/DL (ref 3.5–5)
ALP BLD-CCNC: 34 U/L (ref 40–120)
ALT SERPL-CCNC: 18 U/L (ref 5–40)
ANION GAP SERPL CALCULATED.3IONS-SCNC: 12 MMOL/L (ref 3–15)
AST SERPL-CCNC: 17 U/L (ref 10–37)
AVERAGE GLUCOSE: 145 MG/DL (ref 91–123)
BILIRUB SERPL-MCNC: 0.4 MG/DL (ref 0.2–1.2)
BUN BLDV-MCNC: 9 MG/DL (ref 6–22)
CALCIUM SERPL-MCNC: 9.5 MG/DL (ref 8.4–10.5)
CHLORIDE BLD-SCNC: 102 MMOL/L (ref 98–110)
CHOLESTEROL/HDL RATIO: 3.4 (ref 0–5)
CHOLESTEROL: 181 MG/DL (ref 110–200)
CO2: 26 MMOL/L (ref 20–32)
CREAT SERPL-MCNC: 0.9 MG/DL (ref 0.8–1.4)
CREATININE URINE: 110 MG/DL
GLOBULIN: 2.3 G/DL (ref 2–4)
GLOMERULAR FILTRATION RATE: >60 ML/MIN/1.73 SQ.M.
GLUCOSE: 101 MG/DL (ref 70–99)
HBA1C MFR BLD: 6.7 % (ref 4.8–5.6)
HDLC SERPL-MCNC: 54 MG/DL
LDL CHOLESTEROL CALCULATED: 112 MG/DL (ref 50–99)
LDL/HDL RATIO: 2.1
MICROALB/CREAT RATIO (UG/MG CREAT.): NORMAL
MICROALBUMIN/CREAT 24H UR: <12 MG/L (ref 0.1–17)
NON-HDL CHOLESTEROL: 127 MG/DL
POTASSIUM SERPL-SCNC: 3.9 MMOL/L (ref 3.5–5.5)
SODIUM BLD-SCNC: 140 MMOL/L (ref 133–145)
TOTAL PROTEIN: 6.7 G/DL (ref 6.2–8.1)
TRIGL SERPL-MCNC: 71 MG/DL (ref 40–149)
TSH SERPL DL<=0.05 MIU/L-ACNC: 2.12 MCU/ML (ref 0.27–4.2)
VLDLC SERPL CALC-MCNC: 14 MG/DL (ref 8–30)

## 2023-09-14 ENCOUNTER — HOSPITAL ENCOUNTER (OUTPATIENT)
Facility: HOSPITAL | Age: 63
Discharge: HOME | End: 2023-09-14
Attending: FAMILY MEDICINE
Payer: COMMERCIAL

## 2023-09-14 DIAGNOSIS — E04.9 ENLARGED THYROID GLAND: ICD-10-CM

## 2023-09-14 PROCEDURE — 76536 US EXAM OF HEAD AND NECK: CPT

## 2023-09-25 ENCOUNTER — OFFICE VISIT (OUTPATIENT)
Age: 63
End: 2023-09-25
Payer: COMMERCIAL

## 2023-09-25 VITALS
DIASTOLIC BLOOD PRESSURE: 69 MMHG | HEIGHT: 61 IN | WEIGHT: 206 LBS | OXYGEN SATURATION: 98 % | HEART RATE: 69 BPM | SYSTOLIC BLOOD PRESSURE: 121 MMHG | TEMPERATURE: 97.6 F | RESPIRATION RATE: 17 BRPM | BODY MASS INDEX: 38.89 KG/M2

## 2023-09-25 DIAGNOSIS — E66.01 CLASS 2 SEVERE OBESITY DUE TO EXCESS CALORIES WITH SERIOUS COMORBIDITY AND BODY MASS INDEX (BMI) OF 39.0 TO 39.9 IN ADULT (HCC): Primary | ICD-10-CM

## 2023-09-25 DIAGNOSIS — Z71.3 ENCOUNTER FOR DIETARY CONSULTATION: ICD-10-CM

## 2023-09-25 DIAGNOSIS — Z71.3 WEIGHT LOSS COUNSELING, ENCOUNTER FOR: ICD-10-CM

## 2023-09-25 PROCEDURE — 3078F DIAST BP <80 MM HG: CPT | Performed by: NURSE PRACTITIONER

## 2023-09-25 PROCEDURE — 3074F SYST BP LT 130 MM HG: CPT | Performed by: NURSE PRACTITIONER

## 2023-09-25 PROCEDURE — 99203 OFFICE O/P NEW LOW 30 MIN: CPT | Performed by: NURSE PRACTITIONER

## 2023-09-25 ASSESSMENT — ENCOUNTER SYMPTOMS
RESPIRATORY NEGATIVE: 1
ALLERGIC/IMMUNOLOGIC NEGATIVE: 1
GASTROINTESTINAL NEGATIVE: 1
EYES NEGATIVE: 1

## 2023-09-25 NOTE — PROGRESS NOTES
Weight Loss Progress Note: Initial Physician Visit      Alex Harris is a 66 y.o. female with BMI 39.0 who is here for her initial evaluation for the medical weight loss LCD program. Patient has not previously done a [facility] program, but wants to lose weight to be healthier and alleviate her joint issues. CC: Obesity    Weight History  Current weight 206lb   Goal weight 180lbs  Highest weight 206lbs   (See weight gain time line scanned into media section of chart)    Food intolerances (gas, bloating, diarrhea, vomiting)? Dairy and beans. Weight loss History  How many weight loss attempts have you had? A few attempts in the past.  Which program were you most successful doing? Contrave - in 2016 and lost about 15lbs. Significant Medical History    Have you ever taken appetite suppressants? Yes. If yes: Rx or OTC? Contrave in 2016. If yes; Any negative side effects? Severe constipation    Ever diagnosed with sleep apnea or put on CPAP? No.    Ever had bariatric surgery?: No.    Pregnant or planning on becoming pregnant w/in 6 months: No.      Significant Psychosocial History   Any history of drug abuse or dependence: No.  Current Major Lifestyle Changes: Arthritis, HTN. Any potential unsupportive people: No.  Why are you starting a weight loss program now? Patient desires to lose weight to alleviate joint pains that have been worsening since gaining weight as well as to feel physically comfortable. .  Are you ready? Yes. History of binge eating disorder or anorexia: No.  If yes, are you currently being treated? Social History  Social History     Tobacco Use    Smoking status: Never    Smokeless tobacco: Never   Substance Use Topics    Alcohol use: Yes     How many times a week do you eat out?  4-5 times weekly. Do you drink any EtOH? Yes. If so, how much? Occasional.      Exercise  How many days a week do you currently exercise? Walking 3 days weekly x 30 mins.   Have you ever been

## 2023-10-12 ENCOUNTER — CLINICAL DOCUMENTATION (OUTPATIENT)
Age: 63
End: 2023-10-12

## 2023-10-13 ENCOUNTER — TELEPHONE (OUTPATIENT)
Age: 63
End: 2023-10-13

## 2023-10-13 NOTE — TELEPHONE ENCOUNTER
Contacted patient and left vm asking what pt's weight at age 27 was, in order to complete the medical necessity letter that was requested by patient for FSA assistance.

## 2023-10-13 NOTE — TELEPHONE ENCOUNTER
Patient returned call in regards to medical necessity letter questions. Patient states she sent a form to [employer] and that is the form she wants sent back once filled out. Spoke with patient about medical necessity smartphrase that Jayde Ascencio had sent for me to complete and patient did not understand why we needed to know her weight at age 27 and that is not a question the form asks. Patient states she would need the form she sent to [employer] to be completed since it is for [facility] and has been waiting since the 26th. I apologized to patient for misunderstanding and that I was only told to complete a letter that was sent to me by the weight loss . Advised patient I will reach out to Jayde Ascencio and have her contact patient for further clarification. Patient understood.

## 2023-12-01 ENCOUNTER — OFFICE VISIT (OUTPATIENT)
Age: 63
End: 2023-12-01
Payer: COMMERCIAL

## 2023-12-01 VITALS
HEIGHT: 61 IN | DIASTOLIC BLOOD PRESSURE: 61 MMHG | WEIGHT: 195 LBS | RESPIRATION RATE: 17 BRPM | SYSTOLIC BLOOD PRESSURE: 117 MMHG | OXYGEN SATURATION: 99 % | TEMPERATURE: 97.6 F | BODY MASS INDEX: 36.82 KG/M2 | HEART RATE: 84 BPM

## 2023-12-01 DIAGNOSIS — E66.01 CLASS 2 SEVERE OBESITY DUE TO EXCESS CALORIES WITH SERIOUS COMORBIDITY AND BODY MASS INDEX (BMI) OF 36.0 TO 36.9 IN ADULT (HCC): Primary | ICD-10-CM

## 2023-12-01 DIAGNOSIS — Z71.3 WEIGHT LOSS COUNSELING, ENCOUNTER FOR: ICD-10-CM

## 2023-12-01 DIAGNOSIS — Z71.3 ENCOUNTER FOR DIETARY CONSULTATION: ICD-10-CM

## 2023-12-01 PROCEDURE — 3074F SYST BP LT 130 MM HG: CPT | Performed by: NURSE PRACTITIONER

## 2023-12-01 PROCEDURE — 3078F DIAST BP <80 MM HG: CPT | Performed by: NURSE PRACTITIONER

## 2023-12-01 PROCEDURE — 99213 OFFICE O/P EST LOW 20 MIN: CPT | Performed by: NURSE PRACTITIONER

## 2023-12-01 ASSESSMENT — ENCOUNTER SYMPTOMS
RESPIRATORY NEGATIVE: 1
EYES NEGATIVE: 1
GASTROINTESTINAL NEGATIVE: 1
ALLERGIC/IMMUNOLOGIC NEGATIVE: 1

## 2023-12-01 NOTE — PROGRESS NOTES
[facility] Progress Note:   F/up Provider Visit    Chief Complaint   Patient presents with    Follow-up     Medical Weight Loss - [place]        Reinier Barnard is a 66 y.o. female who is here for her  f/up medical weight loss visit for the LCD program. Patient did well this past month she is down 9lbs and 3in overall this visit. -9 lbs lost.   Arm Circumference: 13in  Waist Circumference: 35in  Thigh Circumference: 22in  Percent Body Fat: 44.4%      Progress and challenges thus far. Patient endorses that it took a few weeks for her to become fully accustomed to the LCD program, however she persevered and feels as though she has adjusted well to it. She is getting in her two meals replacement shakes per day and focusing on her one meal on her own being high in protein and vegetables. She does not have a consistent exercise regimen so I did suggest wall pilates as something that she can do in the comfort of her home that is low impact on her joints but does offer ways to tighten and tone the core. Diet? LCD. Did you have any problems adhering to the program last week? No.  If yes, please explain:     Since your last visit, have you experienced any complications? No.    Have you received any other medical care this week? No.  If yes, where and for what? Have you had any change in your medications since your last visit? No.  If yes what? Are you taking an appetite suppressant? No.   If yes:  Do you need a refill? BP Readings from Last 3 Encounters:   12/01/23 117/61   09/25/23 121/69   08/31/23 126/72        Eating Habits Over Last Week:  Did you take in 64 oz of non-caloric fluids? No. About 48-50oz. Did you consume your prescribed meal replacement regimen each day? Yes.       Physical Activity Over the Past Week:    Aerobic exercise: 0 min  Resistance exercise: 0 workouts / week      Weight History  Starting wt: 206lbs  Goal wt: 180lbs  EKG due: None  Ideal body

## 2024-01-15 RX ORDER — AMLODIPINE BESYLATE 5 MG/1
5 TABLET ORAL DAILY
Qty: 90 TABLET | Refills: 1 | Status: SHIPPED | OUTPATIENT
Start: 2024-01-15

## 2024-01-29 ENCOUNTER — OFFICE VISIT (OUTPATIENT)
Age: 64
End: 2024-01-29
Payer: OTHER GOVERNMENT

## 2024-01-29 VITALS
HEIGHT: 61 IN | SYSTOLIC BLOOD PRESSURE: 132 MMHG | BODY MASS INDEX: 35.68 KG/M2 | TEMPERATURE: 97.7 F | WEIGHT: 189 LBS | DIASTOLIC BLOOD PRESSURE: 64 MMHG | HEART RATE: 67 BPM | RESPIRATION RATE: 18 BRPM | OXYGEN SATURATION: 99 %

## 2024-01-29 DIAGNOSIS — Z71.3 ENCOUNTER FOR DIETARY COUNSELING AND SURVEILLANCE: ICD-10-CM

## 2024-01-29 DIAGNOSIS — E66.01 CLASS 2 SEVERE OBESITY DUE TO EXCESS CALORIES WITH SERIOUS COMORBIDITY AND BODY MASS INDEX (BMI) OF 35.0 TO 35.9 IN ADULT (HCC): Primary | ICD-10-CM

## 2024-01-29 DIAGNOSIS — Z71.3 ENCOUNTER FOR WEIGHT LOSS COUNSELING: ICD-10-CM

## 2024-01-29 PROCEDURE — 3078F DIAST BP <80 MM HG: CPT | Performed by: NURSE PRACTITIONER

## 2024-01-29 PROCEDURE — 99213 OFFICE O/P EST LOW 20 MIN: CPT | Performed by: NURSE PRACTITIONER

## 2024-01-29 PROCEDURE — 3075F SYST BP GE 130 - 139MM HG: CPT | Performed by: NURSE PRACTITIONER

## 2024-01-29 NOTE — PROGRESS NOTES
[facility] Progress Note:   F/up Provider Visit    CC: Weight Management    Glory Burdick is a 63 y.o. female who is here for her  f/up medical provider visit for the grocery LCD program.  Previous patient of YARON Blair was asked to assume her care.    Starting weight: 206  Weight last visit: 195  Weight today: 189    Arm: 13  Waist: 35  Thigh: 20.5  Body Fat Percentage: 42.6%    Down 6 lbs and 1.5 inches since last visit. Down 17 lbs since starting program in September 2023.  Doing 2 MR + 1 meal, will do lunch grocery meal during the week and dinner as her grocery meal on the weekends.  [place], reports she did well with diet.    Did you have any problems adhering to the program since last visit? no  If yes, please explain: n/a    Since your last visit, have you experienced any complications? no    Have you received any other medical care since last visit? no  If yes, where and for what? N/a    Have you had any change in your medications since your last visit? no  If yes what? N/a    BP Readings from Last 3 Encounters:   01/29/24 132/64   12/01/23 117/61   09/25/23 121/69        Eating Habits Over Last Week:  Did you take in 64 oz of non-caloric fluids? no 48 oz    Did you consume your prescribed meal replacement regimen each day? yes       Physical Activity Over the Past Week:    Aerobic exercise: walking 30 min 3-5 days weekly  Resistance exercise: 0 workouts / week      Weight History      1/29/2024    10:52 AM 12/1/2023    10:53 AM 9/25/2023     1:38 PM 8/31/2023    11:19 AM 12/15/2022     1:16 PM 11/29/2022    11:14 AM 10/3/2022    11:21 AM   Weight Metrics   Weight 189 lb 195 lb 206 lb 206 lb 3.2 oz 201 lb 12.8 oz 202 lb 200 lb   BMI (Calculated) 35.8 kg/m2 36.9 kg/m2 39 kg/m2 39 kg/m2 38.2 kg/m2 38.2 kg/m2 37.9 kg/m2         Starting wt: 206  Goal wt: 180  EKG due: 156  Ideal body weight: 47.8 kg (105 lb 6.1 oz)  Adjusted ideal body weight: 63 kg (138 lb

## 2024-02-01 ENCOUNTER — TRANSCRIBE ORDERS (OUTPATIENT)
Facility: HOSPITAL | Age: 64
End: 2024-02-01

## 2024-02-01 DIAGNOSIS — Z12.31 VISIT FOR SCREENING MAMMOGRAM: Primary | ICD-10-CM

## 2024-02-01 ASSESSMENT — ENCOUNTER SYMPTOMS
ABDOMINAL PAIN: 0
DIARRHEA: 0
VOMITING: 0
CONSTIPATION: 0
NAUSEA: 0
SHORTNESS OF BREATH: 0
COUGH: 0

## 2024-02-15 ENCOUNTER — HOSPITAL ENCOUNTER (OUTPATIENT)
Facility: HOSPITAL | Age: 64
Discharge: HOME | End: 2024-02-15
Attending: FAMILY MEDICINE
Payer: COMMERCIAL

## 2024-02-15 VITALS — HEIGHT: 61 IN | BODY MASS INDEX: 35.73 KG/M2

## 2024-02-15 DIAGNOSIS — Z12.31 VISIT FOR SCREENING MAMMOGRAM: ICD-10-CM

## 2024-02-15 PROCEDURE — 77063 BREAST TOMOSYNTHESIS BI: CPT

## 2024-02-21 RX ORDER — FLUTICASONE PROPIONATE 50 MCG
SPRAY, SUSPENSION (ML) NASAL
Qty: 48 G | Refills: 0 | Status: SHIPPED | OUTPATIENT
Start: 2024-02-21

## 2024-02-22 ENCOUNTER — TELEPHONE (OUTPATIENT)
Age: 64
End: 2024-02-22

## 2024-02-22 NOTE — TELEPHONE ENCOUNTER
Pt states that she has not heard from the Hematology-oncology referral that was placed on 8/23/2023 . Please advise.

## 2024-02-26 NOTE — TELEPHONE ENCOUNTER
Referral order, patient demographics, insurance card, med list, most recent office note and lab results have been faxed to [facility]. A fax confirmation has been received.    Spoke with patient (two identifiers verified) to advise of referral to [facility], Dr. Yen Tee. Patient is aware referral has been faxed. She was instructed to contact [facility], at [phone], to schedule an appointment. Patient verbalized understanding. She was given the address to [facility] office location, as well.

## 2024-03-07 ENCOUNTER — OFFICE VISIT (OUTPATIENT)
Age: 64
End: 2024-03-07
Payer: COMMERCIAL

## 2024-03-07 VITALS
BODY MASS INDEX: 35.3 KG/M2 | RESPIRATION RATE: 18 BRPM | WEIGHT: 187 LBS | DIASTOLIC BLOOD PRESSURE: 61 MMHG | TEMPERATURE: 97.6 F | SYSTOLIC BLOOD PRESSURE: 128 MMHG | HEIGHT: 61 IN | OXYGEN SATURATION: 99 %

## 2024-03-07 DIAGNOSIS — E66.01 CLASS 2 SEVERE OBESITY DUE TO EXCESS CALORIES WITH SERIOUS COMORBIDITY AND BODY MASS INDEX (BMI) OF 35.0 TO 35.9 IN ADULT (HCC): Primary | ICD-10-CM

## 2024-03-07 DIAGNOSIS — Z71.3 ENCOUNTER FOR DIETARY COUNSELING AND SURVEILLANCE: ICD-10-CM

## 2024-03-07 DIAGNOSIS — Z71.3 ENCOUNTER FOR WEIGHT LOSS COUNSELING: ICD-10-CM

## 2024-03-07 PROCEDURE — 99213 OFFICE O/P EST LOW 20 MIN: CPT | Performed by: NURSE PRACTITIONER

## 2024-03-07 PROCEDURE — 3074F SYST BP LT 130 MM HG: CPT | Performed by: NURSE PRACTITIONER

## 2024-03-07 PROCEDURE — 3078F DIAST BP <80 MM HG: CPT | Performed by: NURSE PRACTITIONER

## 2024-03-07 RX ORDER — SEMAGLUTIDE 0.5 MG/.5ML
0.5 INJECTION, SOLUTION SUBCUTANEOUS
Qty: 2 ML | Refills: 0 | Status: SHIPPED | OUTPATIENT
Start: 2024-03-07

## 2024-03-07 RX ORDER — SEMAGLUTIDE 0.25 MG/.5ML
0.25 INJECTION, SOLUTION SUBCUTANEOUS
Qty: 2 ML | Refills: 0 | Status: SHIPPED | OUTPATIENT
Start: 2024-03-07

## 2024-03-07 RX ORDER — SEMAGLUTIDE 1 MG/.5ML
1 INJECTION, SOLUTION SUBCUTANEOUS
Qty: 2 ML | Refills: 0 | Status: SHIPPED | OUTPATIENT
Start: 2024-03-07

## 2024-03-07 NOTE — PROGRESS NOTES
[facility] Progress Note:   F/up Provider Visit    CC: Weight Management    Glory Burdick is a 63 y.o. female who is here for her f/up medical provider visit for the LCD program.         3/7/2024     2:50 PM   Non-Surgical Weight Loss Tracker   Consult Date 9/25/2023   Initial Height 5' 1\"   Initial Weight 206 lb   Ideal Body Weight 122 lb   Initial BMI 38.92   Initial Body Fat % 46.7   EBW 84 lb   Date 3/7/2024   Height 5' 1\"   Weight 187 lb   BMI 35.33   Weight Change 187 lb   Total Weight Change 0 lb   % EBWL <UNK>%   Subsequent Body Fat % 42.4   Body Fat % Change 42.4        Arm: 13in  Waist: 35in  Thigh: 22.5in  Body Fat Percentage: 43.4%    Body composition scanned to media    Down 2 lbs and 0 inches since last visit. Down 19 lbs since starting program on 9/25/2023.    Goal wt: 180  EKG due: 156  Ideal body weight: 47.8 kg (105 lb 6.1 oz)  Adjusted ideal body weight: 62.6 kg (138 lb 0.5 oz)  Body mass index is 35.33 kg/m².    Slightly got off track with diet during trip to [place].  Reports she is getting tired of the meal replacements and is interested in anti-obesity medication.  Lower abd pain since yesterday triggered by eating. Improved with BM.     Did you have any problems adhering to the program since last visit? Yes  If yes, please explain: Patient has gone on some traveling excursions with her  and eating outside of her diet but being mindful of carb intake    Since your last visit, have you experienced any complications?  See above    Have you received any other medical care since last visit? No  If yes, where and for what?     Have you had any change in your medications since your last visit? No If yes what?     Are you taking an anti-obesity medication? No If yes what and are you experiencing any side effects?      Would you still like to continue your current medication and dosage?  N/A    BP Readings from Last 3 Encounters:   03/07/24 128/61   01/29/24

## 2024-03-12 ASSESSMENT — ENCOUNTER SYMPTOMS
COUGH: 0
NAUSEA: 0
SHORTNESS OF BREATH: 0
VOMITING: 0
DIARRHEA: 0
CONSTIPATION: 0

## 2024-03-14 ENCOUNTER — TELEPHONE (OUTPATIENT)
Age: 64
End: 2024-03-14

## 2024-03-14 ENCOUNTER — OFFICE VISIT (OUTPATIENT)
Age: 64
End: 2024-03-14
Payer: COMMERCIAL

## 2024-03-14 VITALS
RESPIRATION RATE: 16 BRPM | HEART RATE: 54 BPM | TEMPERATURE: 97.1 F | BODY MASS INDEX: 35.5 KG/M2 | HEIGHT: 61 IN | OXYGEN SATURATION: 98 % | SYSTOLIC BLOOD PRESSURE: 120 MMHG | DIASTOLIC BLOOD PRESSURE: 70 MMHG | WEIGHT: 188 LBS

## 2024-03-14 DIAGNOSIS — E78.5 DYSLIPIDEMIA: ICD-10-CM

## 2024-03-14 DIAGNOSIS — R79.89 ELEVATED PLATELET COUNT: ICD-10-CM

## 2024-03-14 DIAGNOSIS — R10.84 GENERALIZED ABDOMINAL PAIN: ICD-10-CM

## 2024-03-14 DIAGNOSIS — I10 PRIMARY HYPERTENSION: ICD-10-CM

## 2024-03-14 DIAGNOSIS — E61.1 IRON DEFICIENCY: ICD-10-CM

## 2024-03-14 DIAGNOSIS — E11.9 WELL CONTROLLED DIABETES MELLITUS (HCC): Primary | ICD-10-CM

## 2024-03-14 PROCEDURE — 99214 OFFICE O/P EST MOD 30 MIN: CPT | Performed by: FAMILY MEDICINE

## 2024-03-14 PROCEDURE — 3078F DIAST BP <80 MM HG: CPT | Performed by: FAMILY MEDICINE

## 2024-03-14 PROCEDURE — 3074F SYST BP LT 130 MM HG: CPT | Performed by: FAMILY MEDICINE

## 2024-03-14 RX ORDER — AMLODIPINE BESYLATE 5 MG/1
5 TABLET ORAL DAILY
Qty: 90 TABLET | Refills: 1 | Status: SHIPPED | OUTPATIENT
Start: 2024-03-14

## 2024-03-14 SDOH — ECONOMIC STABILITY: FOOD INSECURITY: WITHIN THE PAST 12 MONTHS, YOU WORRIED THAT YOUR FOOD WOULD RUN OUT BEFORE YOU GOT MONEY TO BUY MORE.: NEVER TRUE

## 2024-03-14 SDOH — ECONOMIC STABILITY: INCOME INSECURITY: HOW HARD IS IT FOR YOU TO PAY FOR THE VERY BASICS LIKE FOOD, HOUSING, MEDICAL CARE, AND HEATING?: NOT HARD AT ALL

## 2024-03-14 SDOH — ECONOMIC STABILITY: FOOD INSECURITY: WITHIN THE PAST 12 MONTHS, THE FOOD YOU BOUGHT JUST DIDN'T LAST AND YOU DIDN'T HAVE MONEY TO GET MORE.: NEVER TRUE

## 2024-03-14 ASSESSMENT — PATIENT HEALTH QUESTIONNAIRE - PHQ9
SUM OF ALL RESPONSES TO PHQ QUESTIONS 1-9: 0
2. FEELING DOWN, DEPRESSED OR HOPELESS: 0
1. LITTLE INTEREST OR PLEASURE IN DOING THINGS: 0
SUM OF ALL RESPONSES TO PHQ9 QUESTIONS 1 & 2: 0

## 2024-03-14 NOTE — PROGRESS NOTES
1. \"Have you been to the ER, urgent care clinic since your last visit?  Hospitalized since your last visit?\" No    2. \"Have you seen or consulted any other health care providers outside of the [facility] since your last visit?\" [facility] LOV: 3/14/24.    Chief Complaint   Patient presents with    Hypertension    Diabetes    Cholesterol Problem    iron deficiency; elevated platelet count    Thyroid Problem     Enlarged thyroid gland     Last diabetic eye exam was with Dr. Howard in 10/2023 - notes have been requested from his office.  
back in 2 wks if no improvement in symptoms from symptomatic treatment then will consider abdominal x-ray and GI referral.      Pt understood and agree with the plan       No follow-ups on file. Sooner if no improvement in symptoms otherwise as scheduled.     Orders Placed This Encounter   Procedures    Hemoglobin A1C    Comprehensive Metabolic Panel    Microalbumin / Creatinine Urine Ratio    CBC with Auto Differential    Iron and TIBC   Lucy Glez MD

## 2024-03-14 NOTE — TELEPHONE ENCOUNTER
Left message for the patient to return our call regarding the prior authorization for the wegovy. It was denied due to it is not a covered medication under .

## 2024-03-16 LAB
A/G RATIO: 1.3 RATIO (ref 1.1–2.6)
ALBUMIN SERPL-MCNC: 4.3 G/DL (ref 3.5–5)
ALP BLD-CCNC: 31 U/L (ref 40–120)
ALT SERPL-CCNC: 8 U/L (ref 5–40)
ANION GAP SERPL CALCULATED.3IONS-SCNC: 12 MMOL/L (ref 3–15)
AST SERPL-CCNC: 12 U/L (ref 10–37)
BASOPHILS # BLD: 0 % (ref 0–2)
BASOPHILS ABSOLUTE: 0 K/UL (ref 0–0.2)
BILIRUB SERPL-MCNC: 0.4 MG/DL (ref 0.2–1.2)
BUN BLDV-MCNC: 14 MG/DL (ref 6–22)
CALCIUM SERPL-MCNC: 9.6 MG/DL (ref 8.4–10.5)
CHLORIDE BLD-SCNC: 103 MMOL/L (ref 98–110)
CO2: 26 MMOL/L (ref 20–32)
CREAT SERPL-MCNC: 0.8 MG/DL (ref 0.8–1.4)
CREATININE URINE: 53 MG/DL
CREATININE URINE: 53 MG/DL
EOSINOPHIL # BLD: 1 % (ref 0–6)
EOSINOPHILS ABSOLUTE: 0.1 K/UL (ref 0–0.5)
ESTIMATED AVERAGE GLUCOSE: 130 MG/DL (ref 91–123)
GLOBULIN: 3.2 G/DL (ref 2–4)
GLOMERULAR FILTRATION RATE: >60 ML/MIN/1.73 SQ.M.
GLUCOSE: 103 MG/DL (ref 70–99)
HBA1C MFR BLD: 6.2 % (ref 4.8–5.6)
HCT VFR BLD CALC: 38.1 % (ref 35.1–48)
HEMOGLOBIN: 12.3 G/DL (ref 11.7–16)
IRON % SATURATION: 29 % (ref 20–50)
IRON: 92 MCG/DL (ref 30–160)
LYMPHOCYTES # BLD: 37 % (ref 20–45)
LYMPHOCYTES ABSOLUTE: 2 K/UL (ref 1–4.8)
MCH RBC QN AUTO: 26 PG (ref 26–34)
MCHC RBC AUTO-ENTMCNC: 32 G/DL (ref 31–36)
MCV RBC AUTO: 82 FL (ref 80–99)
MICROALB/CREAT RATIO (UG/MG CREAT.): NORMAL
MICROALBUMIN/CREAT 24H UR: <12 MG/L (ref 0.1–17)
MONOCYTES ABSOLUTE: 0.3 K/UL (ref 0.1–1)
MONOCYTES: 5 % (ref 3–12)
NEUTROPHILS ABSOLUTE: 3.1 K/UL (ref 1.8–7.7)
NEUTROPHILS: 57 % (ref 40–75)
PDW BLD-RTO: 14.2 % (ref 10–15.5)
PLATELET # BLD: 408 K/UL (ref 140–440)
PMV BLD AUTO: 8.9 FL (ref 9–13)
POTASSIUM SERPL-SCNC: 3.8 MMOL/L (ref 3.5–5.5)
RBC: 4.66 M/UL (ref 3.8–5.2)
SODIUM BLD-SCNC: 141 MMOL/L (ref 133–145)
TOTAL IRON BINDING CAPACITY: 313 MCG/DL (ref 228–428)
TOTAL PROTEIN: 7.5 G/DL (ref 6.2–8.1)
UIBC: 221 MCG/DL (ref 110–370)
WBC: 5.4 K/UL (ref 4–11)

## 2024-03-20 ENCOUNTER — CLINICAL DOCUMENTATION (OUTPATIENT)
Age: 64
End: 2024-03-20

## 2024-03-20 NOTE — PROGRESS NOTES
[employer] PA form for Wegovy 0.25mg faxed and sent to insurance. Awaiting determination.

## 2024-04-10 RX ORDER — TELMISARTAN AND HYDROCHLORTHIAZIDE 80; 25 MG/1; MG/1
1 TABLET ORAL DAILY
Qty: 90 TABLET | Refills: 1 | Status: SHIPPED | OUTPATIENT
Start: 2024-04-10

## 2024-04-18 ENCOUNTER — OFFICE VISIT (OUTPATIENT)
Age: 64
End: 2024-04-18
Payer: COMMERCIAL

## 2024-04-18 VITALS
BODY MASS INDEX: 35.12 KG/M2 | HEIGHT: 61 IN | SYSTOLIC BLOOD PRESSURE: 124 MMHG | OXYGEN SATURATION: 99 % | WEIGHT: 186 LBS | RESPIRATION RATE: 18 BRPM | DIASTOLIC BLOOD PRESSURE: 65 MMHG | HEART RATE: 52 BPM | TEMPERATURE: 98 F

## 2024-04-18 DIAGNOSIS — Z71.3 ENCOUNTER FOR DIETARY COUNSELING AND SURVEILLANCE: ICD-10-CM

## 2024-04-18 DIAGNOSIS — E66.01 CLASS 2 SEVERE OBESITY DUE TO EXCESS CALORIES WITH SERIOUS COMORBIDITY AND BODY MASS INDEX (BMI) OF 35.0 TO 35.9 IN ADULT (HCC): Primary | ICD-10-CM

## 2024-04-18 DIAGNOSIS — Z71.3 ENCOUNTER FOR WEIGHT LOSS COUNSELING: ICD-10-CM

## 2024-04-18 DIAGNOSIS — Z79.899 FOLLOW-UP ENCOUNTER INVOLVING MEDICATION: ICD-10-CM

## 2024-04-18 PROCEDURE — 99213 OFFICE O/P EST LOW 20 MIN: CPT | Performed by: NURSE PRACTITIONER

## 2024-04-18 PROCEDURE — 3074F SYST BP LT 130 MM HG: CPT | Performed by: NURSE PRACTITIONER

## 2024-04-18 PROCEDURE — 3078F DIAST BP <80 MM HG: CPT | Performed by: NURSE PRACTITIONER

## 2024-04-18 NOTE — PROGRESS NOTES
intolerant + lactose intolerant)    Physical Activity Routine:    Aerobic exercise: Walking 3 days weekly x 30-45 mins + bike riding 2 days weekly x 30 mins.    Resistance exercise: 0 min    History    Past Medical History:   Diagnosis Date    AR (allergic rhinitis)     Arthritis     Diabetes (HCC)     Headache     Heart murmur     History of blood transfusion     Hypertension     Hypovitaminosis D 3/3/2014    Obesity        Past Surgical History:   Procedure Laterality Date     SECTION      COLONOSCOPY      normal per patient (by Dr. Rutledge)    HYSTERECTOMY (CERVIX STATUS UNKNOWN)      s/p vaginal hysterectomy        Current Outpatient Medications   Medication Sig Dispense Refill    telmisartan-hydroCHLOROthiazide (MICARDIS HCT) 80-25 MG per tablet TAKE 1 TABLET BY MOUTH EVERY DAY 90 tablet 1    amLODIPine (NORVASC) 5 MG tablet Take 1 tablet by mouth daily 90 tablet 1    fluticasone (FLONASE) 50 MCG/ACT nasal spray SHAKE LIQUID AND USE 2 SPRAYS IN EACH NOSTRIL DAILY AS NEEDED FOR RHINITIS 48 g 0    CINNAMON PO Take by mouth      cetirizine (ZYRTEC) 5 MG tablet Take 1 tablet by mouth daily as needed      mometasone (ELOCON) 0.1 % cream Apply topically daily       No current facility-administered medications for this visit.       No Known Allergies    Social History     Tobacco Use    Smoking status: Never    Smokeless tobacco: Never   Substance Use Topics    Alcohol use: Yes    Drug use: Never       Family History   Problem Relation Age of Onset    Rheum Arthritis Father     Cancer Sister     Headache Father     Hypertension Father     Depression Mother     Hypertension Mother     Hypertension Sister     Hypertension Sister     Diabetes Sister     Diabetes Sister     Hypertension Sister     Hypertension Sister     Heart Attack Father     Heart Failure Father     Osteoarthritis Father     Heart Disease Father     Hypertension Sister     Diabetes Sister     Cancer Sister 54        breast cancer 
   Alcohol use: Yes    Drug use: Never       Family History   Problem Relation Age of Onset    Rheum Arthritis Father     Cancer Sister     Headache Father     Hypertension Father     Depression Mother     Hypertension Mother     Hypertension Sister     Hypertension Sister     Diabetes Sister     Diabetes Sister     Hypertension Sister     Hypertension Sister     Heart Attack Father     Heart Failure Father     Osteoarthritis Father     Heart Disease Father     Hypertension Sister     Diabetes Sister     Cancer Sister 54        breast cancer    Heart Failure Sister     Arthritis Sister     Hypertension Brother     Attention Deficit Disorder Son     Asthma Granddaughter     Diabetes Father        Family Status   Relation Name Status    Father Kleber Valente     Sister Meghna Jordan (Not Specified)    Mother Kenney [place]     Sister Pricilla Kinney (Not Specified)    Sister Gi Sneed (Not Specified)    Sister Clarice [place] (Not Specified)    Sister  (Not Specified)    Brother  (Not Specified)    Son  (Not Specified)    Granddaughte  (Not Specified)       Review of Systems  Review of Systems   Constitutional:  Negative for chills and fever.   Respiratory:  Negative for cough and shortness of breath.    Cardiovascular:  Negative for chest pain and palpitations.   Gastrointestinal:  Positive for abdominal distention. Negative for abdominal pain, constipation, diarrhea, nausea and vomiting.   Neurological: Negative.          Objective  Vitals:    24 1043   BP: 124/65   Pulse: 52   Resp: 18   Temp: 98 °F (36.7 °C)   TempSrc: Temporal   SpO2: 99%   Weight: 84.4 kg (186 lb)   Height: 1.549 m (5' 1\")      No LMP recorded. Patient has had a hysterectomy.    Physical Exam  Physical Exam  Vitals and nursing note reviewed.   Constitutional:       Appearance: She is obese.   HENT:      Head: Normocephalic and atraumatic.   Pulmonary:      Effort: Pulmonary effort is normal.   Musculoskeletal:

## 2024-04-20 ASSESSMENT — ENCOUNTER SYMPTOMS
NAUSEA: 0
COUGH: 0
ABDOMINAL PAIN: 0
SHORTNESS OF BREATH: 0
VOMITING: 0
CONSTIPATION: 0
ABDOMINAL DISTENTION: 1
DIARRHEA: 0

## 2024-04-25 ENCOUNTER — TELEPHONE (OUTPATIENT)
Age: 64
End: 2024-04-25

## 2024-04-25 DIAGNOSIS — E66.01 CLASS 2 SEVERE OBESITY DUE TO EXCESS CALORIES WITH SERIOUS COMORBIDITY AND BODY MASS INDEX (BMI) OF 35.0 TO 35.9 IN ADULT (HCC): Primary | ICD-10-CM

## 2024-04-26 RX ORDER — PHENTERMINE HYDROCHLORIDE 37.5 MG/1
TABLET ORAL
Qty: 30 TABLET | Refills: 1 | Status: SHIPPED | OUTPATIENT
Start: 2024-04-26 | End: 2024-06-25

## 2024-06-05 ENCOUNTER — OFFICE VISIT (OUTPATIENT)
Age: 64
End: 2024-06-05
Payer: COMMERCIAL

## 2024-06-05 VITALS
BODY MASS INDEX: 34.8 KG/M2 | RESPIRATION RATE: 18 BRPM | OXYGEN SATURATION: 98 % | TEMPERATURE: 97.7 F | HEART RATE: 80 BPM | DIASTOLIC BLOOD PRESSURE: 80 MMHG | HEIGHT: 61 IN | SYSTOLIC BLOOD PRESSURE: 118 MMHG | WEIGHT: 184.3 LBS

## 2024-06-05 DIAGNOSIS — Z71.3 ENCOUNTER FOR WEIGHT LOSS COUNSELING: ICD-10-CM

## 2024-06-05 DIAGNOSIS — Z79.899 FOLLOW-UP ENCOUNTER INVOLVING MEDICATION: ICD-10-CM

## 2024-06-05 DIAGNOSIS — Z71.3 ENCOUNTER FOR DIETARY COUNSELING AND SURVEILLANCE: ICD-10-CM

## 2024-06-05 DIAGNOSIS — E66.09 CLASS 1 OBESITY DUE TO EXCESS CALORIES WITH SERIOUS COMORBIDITY AND BODY MASS INDEX (BMI) OF 34.0 TO 34.9 IN ADULT: Primary | ICD-10-CM

## 2024-06-05 PROCEDURE — 3079F DIAST BP 80-89 MM HG: CPT | Performed by: NURSE PRACTITIONER

## 2024-06-05 PROCEDURE — 99213 OFFICE O/P EST LOW 20 MIN: CPT | Performed by: NURSE PRACTITIONER

## 2024-06-05 PROCEDURE — 3074F SYST BP LT 130 MM HG: CPT | Performed by: NURSE PRACTITIONER

## 2024-06-05 ASSESSMENT — ENCOUNTER SYMPTOMS
NAUSEA: 0
SHORTNESS OF BREATH: 0
VOMITING: 0
DIARRHEA: 0
ABDOMINAL PAIN: 0
COUGH: 0
CONSTIPATION: 0

## 2024-06-05 NOTE — PROGRESS NOTES
[facility] Progress Note:   F/up Provider Visit    CC: Weight Management    History of Present Illness  The patient is a 64-year-old female presenting for weight management. She was prescribed phentermine since last visit.    The patient has been adhering to a regimen of half a tablet of phentermine, typically administered around noon. She reports no adverse effects from the medication, but a single instance of tachycardia on her Apple watch was noted, but this symptom has since resolved.The medication does not interfere with her sleep. Depending on her dietary habits, she may consider taking an additional half tablet in the evening.. She is also incorporating protein-rich foods into her diet, excluding substantial amounts of meat. She acknowledges the potential benefits of whole foods. There have been no alterations in her medication regimen or health status since her last visit. She has attended a few [facility] nutrition classes, but adherence has been inconsistent.        3/7/2024     2:50 PM 4/18/2024    10:36 AM 6/5/2024    12:26 PM   Non-Surgical Weight Loss Tracker   Consult Date 9/25/2023 9/25/2023    Initial Height 5' 1\" 5' 1\"    Initial Weight 206 lb 206 lb    Ideal Body Weight 122 lb     Initial BMI 38.92 38.92    Initial Body Fat % 46.7 46.7    EBW 84 lb     Date 3/7/2024 4/18/2024 6/5/2024   Height 5' 1\" 5' 1\" 5' 1\"   Weight 187 lb 186 lb 184 lb   BMI 35.33 35.14 34.76   Weight Change 187 lb -20 lb -22 lb   Total Weight Change 0 lb -20 lb -22 lb   % EBWL <UNK>% 24% 26%   Subsequent Body Fat % 42.4 42.17 41.71   Body Fat % Change 42.4 -0.23 -0.46   Subseq. Anti-obesity Meds   phentermine        Arm: 12.75  Waist: 36  Thigh: 23  Body Fat Percentage: 42.0    Body composition scanned to media    Down 2 lbs since last visit. Down 22 lbs since starting program on 9/25/2023 .    Goal wt: 180    Ideal body weight: 47.8 kg (105 lb 6.1 oz)  Adjusted ideal body weight: 62.1 kg

## 2024-06-05 NOTE — PROGRESS NOTES
Chief Complaint   Patient presents with    Weight Management    1. Have you been to the ER, urgent care clinic since your last visit?  Hospitalized since your last visit?No    2. Have you seen or consulted any other health care providers outside of the [facility] since your last visit?  Include any pap smears or colon screening. No   [facility] Nurse Note:       CC: Weight Management    Glory Burdick is a 64 y.o. female who is here for her f/up medical provider visit for the Medication program.       Did you have any problems adhering to the program since last visit? No  If yes, please explain:     Since your last visit, have you experienced any complications? No    Have you received any other medical care since last visit? No  If yes, where and for what?     Have you had any change in your medications since your last visit? No If yes what?     Are you taking an anti-obesity medication? Yes If yes what and are you experiencing any side effects?      Would you still like to continue your current medication and dosage? Yes    BP Readings from Last 3 Encounters:   04/18/24 124/65   03/14/24 120/70   03/07/24 128/61        Eating Habits Over Last Week:    How many oz of sugar-free fluids are you consuming? 64    Did you consume your prescribed meal replacement regimen each day? Yes    Physical Activity Routine:    Aerobic exercise: walk and ride bike 3 days at least    Resistance exercise: 0

## 2024-06-07 ENCOUNTER — CLINICAL DOCUMENTATION (OUTPATIENT)
Facility: HOSPITAL | Age: 64
End: 2024-06-07

## 2024-06-07 NOTE — PROGRESS NOTES
EDILMA reached out to patient and LVM and e-mailed patient to set up a [facility] initial visit with EDILMA.    Jordana Madera RD

## 2024-07-17 ENCOUNTER — CLINICAL DOCUMENTATION (OUTPATIENT)
Facility: HOSPITAL | Age: 64
End: 2024-07-17

## 2024-07-17 ENCOUNTER — HOSPITAL ENCOUNTER (OUTPATIENT)
Facility: HOSPITAL | Age: 64
Discharge: HOME | End: 2024-07-20

## 2024-07-17 NOTE — PROGRESS NOTES
[facility]    Patient's Name:  Glory Burdick  Age: 64 y.o.  Date of Birth:   female    Date:    7/17/2024  Height:  5' 1\"  Weight:  182  Lbs.  1st thing in the morning     Starting Weight:  206    Do you smoke:  no    Alcohol Intake: every now and then.      Diet History:  Wake up 6:15 am I drink about 24 ounces of water before I eat.    8:30  Monday and Tuesday:  protein, fried eggs.  1:00 - 1:30 when working [employer] 1/2 tuna sandwich.  When home grilled cheese 647   3:00 [place] Greek yogurt bars.  Honey , Vanilla nut clusters.  Cashews, almonds.  8:00 pm salmon, vegetable.    Night time snack:  a piece of fruit.  Plum Cheeries.  Greek yogurt.     Intake of Eating out or take out or to go:  salmon, baked sweet potato, brussel sprouts,     Are you counting carbohydrates:  at first, mindful.    Ounces of water consumed each day:  at least 48 ounces of water.    Fluids being consumed:  water, Gold Peak sugar free tea.     EXERCISE HISTORY:    Patient is doing walking for exercise.    BEHAVIOR:    Patient is mindfully eating and is not eating in front of the TV or on a device:  some times.    Patient is sleeping in a dark room at night:  yes    Do you eat breakfast within an hour of waking up:  no    Summary:    Weight loss is at 24 pounds.  I have reinforced the key diet principles to the patient.  Patient was instructed to follow a three meal per day routine.  I have reinforced the importance of filling up on meat and vegetables and avoiding too many carbohydrates.  I have reinforced the importance of choosing protein 1st when hungry.  Reinforced the importance of drinking > 64 ounces of water a day.  Reinforced to patient the need to do 30 minutes of exercise per day.  Reinforced the idea of getting good quality sleep at night.      We also discussed SMART goals.  Patient has created the following SMART goals:  Eat protein within 1 hour of waking up.  > 64 ounces of water.  Will try the green

## 2024-07-19 NOTE — PROGRESS NOTES
Understanding Norovirus  Norovirus is a virus that can infect the stomach and intestines. It is the most common cause of diarrhea and vomiting. The virus spreads easily in areas of close human contact, such as schools and cruise ships.  What causes norovirus infection?  You can be infected with norovirus by coming into contact with a person who has the virus or by touching a contaminated surface. Norovirus is very contagious. Washing your hands well can lower your risk of getting the virus.  You can also get it by consuming food and water contaminated with the virus. Foods most likely to become tainted include:    Shellfish    Ready-to-eat salads and sandwiches    Produce such as celery, melons, and leafy vegetables  What are the symptoms of norovirus?  Some people may have no symptoms. In people who do, symptoms show up suddenly,  typically  within a day of being exposed. The illness lasts 1 to 3 days. Symptoms include:    Fever    Diarrhea    Nausea    Vomiting    Headache    Achiness    Stomach cramping  More severe cases are often seen in infants, older adults, and people with other health problems. Symptoms may last longer and be more severe in these groups.  How is  norovirus treated?  There is no medicine to cure norovirus. Treatment includes:    Rest. You may feel better faster if you get plenty of rest.    Fluids. Drinking lots of fluids will help you stay hydrated. Don t drink alcohol or beverages with caffeine. They can make your symptoms worse.    Medicine. Over-the-counter medicines for diarrhea may ease symptoms. These should be used only by adults. Pain relievers, such as acetaminophen or ibuprofen, can help with headaches and body aches.  What are the possible complications of norovirus?  Dehydrationis the main concern with norovirus infection. Severe dehydration may need to be treated in the hospital. You may need to get fluids through an IV (directly into a vein).  When should I call my  HISTORY OF PRESENT ILLNESS  Christy Anand is a 63 y.o. female. HPI: Here for follow-up. Hypertension. Vitals been stable. Taking medication with compliance and no side effects. Diabetes. Well-controlled. No hyper or hypoglycemic symptoms. Taking medication with compliance and no side effects. Complaining of bilateral foot pain mainly over the sole of the foot. Needed a letter to provide at the work to allow to wear the tennis shoes. Provided letter. Foot exam was done today. Visit Vitals  /70 (BP 1 Location: Right upper arm, BP Patient Position: Sitting, BP Cuff Size: Adult)   Pulse 69   Temp 97 °F (36.1 °C) (Temporal)   Resp 16   Ht 5' 1\" (1.549 m)   Wt 202 lb 12.8 oz (92 kg)   SpO2 99%   BMI 38.32 kg/m²     Review medication list, vitals, problem list,allergies. Lab Results   Component Value Date/Time    WBC 5.3 03/25/2020 08:34 AM    HGB 11.4 (L) 03/25/2020 08:34 AM    HCT 38.2 03/25/2020 08:34 AM    PLATELET 180 20/27/2057 08:34 AM    MCV 88 03/25/2020 08:34 AM     Lab Results   Component Value Date/Time    Sodium 142 05/04/2021 09:06 AM    Potassium 4.0 05/04/2021 09:06 AM    Chloride 104 05/04/2021 09:06 AM    CO2 27 05/04/2021 09:06 AM    Anion gap 11.0 05/04/2021 09:06 AM    Glucose 100 (H) 05/04/2021 09:06 AM    BUN 17 05/04/2021 09:06 AM    Creatinine 0.8 05/04/2021 09:06 AM    Calcium 9.0 05/04/2021 09:06 AM    Bilirubin, total 0.3 05/04/2021 09:06 AM    Alk.  phosphatase 30 05/04/2021 09:06 AM    Protein, total 6.4 05/04/2021 09:06 AM    Albumin 4.1 05/04/2021 09:06 AM    Globulin 2.3 05/04/2021 09:06 AM    A-G Ratio 1.8 05/04/2021 09:06 AM    ALT (SGPT) 28 05/04/2021 09:06 AM    AST (SGOT) 20 05/04/2021 09:06 AM     Lab Results   Component Value Date/Time    Cholesterol, total 167 05/04/2021 09:06 AM    HDL Cholesterol 52 05/04/2021 09:06 AM    LDL, calculated 104 (H) 05/04/2021 09:06 AM    VLDL, calculated 12 05/04/2021 09:06 AM    Triglyceride 59 05/04/2021 09:06 AM     Lab  healthcare provider?  Call your healthcare provider right away if you have any of these:    Fever of 100.4 F (38 C) or higher, or as directed    Belly (abdominal) pain that gets worse    Severe dizziness, especially when getting up from bed    Vomiting so severe that you can t keep fluids down   Date Last Reviewed: 3/28/2016    5973-3090 [employer]. [address] [place], [place] [address]. All rights reserved. This information is not intended as a substitute for professional medical care. Always follow your healthcare professional's instructions.         Results   Component Value Date/Time    TSH 2.64 05/04/2021 09:06 AM     Lab Results   Component Value Date/Time    Hemoglobin A1c 6.0 (H) 05/04/2021 09:06 AM    Hemoglobin A1c (POC) 6.1 10/11/2021 11:17 AM     Lab Results   Component Value Date/Time    VITAMIN D, 25-HYDROXY 43.9 05/04/2021 09:06 AM       Lab Results   Component Value Date/Time    Microalb/Creat ratio (ug/mg creat.)  05/04/2021 09:06 AM      Comment:      ** Unable to calculate Microablumin/Creatinine Ratio due to low Microalbumin       ROS: See HPI    Physical Exam  Constitutional:       General: She is not in acute distress. Cardiovascular:      Rate and Rhythm: Normal rate and regular rhythm. Heart sounds: Normal heart sounds. Abdominal:      General: Bowel sounds are normal.      Palpations: Abdomen is soft. Tenderness: There is no abdominal tenderness. Musculoskeletal:         General: No swelling. Cervical back: Neck supple. Comments: No callus, no open skin area, peripheral pulsations of dorsalis pedis palpable bilaterally. Monofilament test normal.   Peripheral sensations of dorsalis pedis palpable bilaterally. Neurological:      Mental Status: She is oriented to person, place, and time. Psychiatric:         Behavior: Behavior normal.         ASSESSMENT and PLAN    ICD-10-CM ICD-9-CM    1. Pituitary microadenoma [facility]): Last MRI showed no presence of microadenoma. Will observe D35.2 227.3     last MRI did not show adenoma. 2. Laryngitis: Salt water gargle and given symptomatic treatment J04.0 464.00 cetirizine (ZYRTEC) 5 mg tablet   3. Well controlled diabetes mellitus ([address]): A1c well controlled. Diet and lifestyle modification. We will continue current plan E11.9 250.00  DIABETES FOOT EXAM   4. Dyslipidemia: Diet and lifestyle modification E78.5 272.4    5. Hypovitaminosis D: On supplement E55.9 268.9    6. Hypertension, unspecified type:well controlled.  Continue current dose of medication and low salt diet. Exercise as tolerated. I10 401.9    7. Arthritis  M19.90 716.90    8. Pain in both feet: On and off. Mainly over the sole of the foot. Probably from prolonged standing. At this time provided letter to allow her to wear the tennis shoes for more comfort:  M79.671 729.5     M79.672     9. BMI 38.0-38.9,adult: Discussed exercise and diet modification. She is working on it. Discussed importance of weight loss as well Z68.38 V85.38    10. Abnormal mammogram: She is aware that a repeat diagnostic mammogram is due in July this year R92.8 793.80    Patient understood and agreed with the plan  Follow-up and Dispositions    · Return in about 6 months (around 11/11/2022). Please note that this dictation was completed with [employer], the computer voice recognition software. Quite often unanticipated grammatical, syntax, homophones, and other interpretive errors are inadvertently transcribed by the computer software. Please disregard these errors. Please excuse any errors that have escaped final proofreading. Name band;

## 2024-08-08 ENCOUNTER — OFFICE VISIT (OUTPATIENT)
Age: 64
End: 2024-08-08
Payer: COMMERCIAL

## 2024-08-08 VITALS
SYSTOLIC BLOOD PRESSURE: 126 MMHG | DIASTOLIC BLOOD PRESSURE: 64 MMHG | HEIGHT: 61 IN | BODY MASS INDEX: 34.29 KG/M2 | OXYGEN SATURATION: 100 % | TEMPERATURE: 97.4 F | RESPIRATION RATE: 18 BRPM | HEART RATE: 70 BPM | WEIGHT: 181.6 LBS

## 2024-08-08 DIAGNOSIS — Z79.899 FOLLOW-UP ENCOUNTER INVOLVING MEDICATION: ICD-10-CM

## 2024-08-08 DIAGNOSIS — E66.09 CLASS 1 OBESITY DUE TO EXCESS CALORIES WITH SERIOUS COMORBIDITY AND BODY MASS INDEX (BMI) OF 34.0 TO 34.9 IN ADULT: Primary | ICD-10-CM

## 2024-08-08 DIAGNOSIS — Z71.3 ENCOUNTER FOR WEIGHT LOSS COUNSELING: ICD-10-CM

## 2024-08-08 PROCEDURE — 3074F SYST BP LT 130 MM HG: CPT | Performed by: NURSE PRACTITIONER

## 2024-08-08 PROCEDURE — 99213 OFFICE O/P EST LOW 20 MIN: CPT | Performed by: NURSE PRACTITIONER

## 2024-08-08 PROCEDURE — 3078F DIAST BP <80 MM HG: CPT | Performed by: NURSE PRACTITIONER

## 2024-08-08 RX ORDER — PHENTERMINE HYDROCHLORIDE 37.5 MG/1
37.5 TABLET ORAL
COMMUNITY
End: 2024-08-08

## 2024-08-08 RX ORDER — PHENTERMINE HYDROCHLORIDE 37.5 MG/1
37.5 TABLET ORAL
Qty: 30 TABLET | Refills: 1 | Status: SHIPPED | OUTPATIENT
Start: 2024-08-08 | End: 2024-10-07

## 2024-08-08 ASSESSMENT — ENCOUNTER SYMPTOMS
COUGH: 0
VOMITING: 0
ABDOMINAL PAIN: 0
NAUSEA: 0
CONSTIPATION: 0
SHORTNESS OF BREATH: 0
DIARRHEA: 0

## 2024-08-08 NOTE — PROGRESS NOTES
Chief Complaint   Patient presents with    Weight Management    1. Have you been to the ER, urgent care clinic since your last visit?  Hospitalized since your last visit?No    2. Have you seen or consulted any other health care providers outside of the [facility] since your last visit?  Include any pap smears or colon screening. No   [facility] Nurse Note:       CC: Weight Management    Glory Burdick is a 64 y.o. female who is here for her f/up medical provider visit for the Medication program.       Did you have any problems adhering to the program since last visit? Yes  If yes, please explain: vacation cravings    Since your last visit, have you experienced any complications? No    Have you received any other medical care since last visit? No  If yes, where and for what?     Have you had any change in your medications since your last visit? No If yes what?     Are you taking an anti-obesity medication? Yes If yes what and are you experiencing any side effects?      Would you still like to continue your current medication and dosage? Yes    BP Readings from Last 3 Encounters:   06/05/24 118/80   04/18/24 124/65   03/14/24 120/70        Eating Habits Over Last Week:    How many oz of sugar-free fluids are you consuming? 64    Did you consume your prescribed meal replacement regimen each day? Yes    Physical Activity Routine:    Aerobic exercise: walking and biking    Resistance exercise: 0    
History:   Diagnosis Date    AR (allergic rhinitis)     Arthritis     Diabetes (HCC)     Headache     Heart murmur     History of blood transfusion     Hypertension     Hypovitaminosis D 3/3/2014    Obesity        Past Surgical History:   Procedure Laterality Date     SECTION      COLONOSCOPY  2012    normal per patient (by Dr. Rutledge)    HYSTERECTOMY (CERVIX STATUS UNKNOWN)      s/p vaginal hysterectomy        Current Outpatient Medications   Medication Sig Dispense Refill    phentermine (ADIPEX-P) 37.5 MG tablet Take 1 tablet by mouth every morning (before breakfast) for 60 days. Max Daily Amount: 37.5 mg 30 tablet 1    telmisartan-hydroCHLOROthiazide (MICARDIS HCT) 80-25 MG per tablet TAKE 1 TABLET BY MOUTH EVERY DAY 90 tablet 1    amLODIPine (NORVASC) 5 MG tablet Take 1 tablet by mouth daily 90 tablet 1    fluticasone (FLONASE) 50 MCG/ACT nasal spray SHAKE LIQUID AND USE 2 SPRAYS IN EACH NOSTRIL DAILY AS NEEDED FOR RHINITIS 48 g 0    CINNAMON PO Take by mouth      cetirizine (ZYRTEC) 5 MG tablet Take 1 tablet by mouth daily as needed      mometasone (ELOCON) 0.1 % cream Apply topically daily       No current facility-administered medications for this visit.       No Known Allergies    Social History     Tobacco Use    Smoking status: Never    Smokeless tobacco: Never   Substance Use Topics    Alcohol use: Yes     Comment: rare    Drug use: Never       Family History   Problem Relation Age of Onset    Rheum Arthritis Father     Cancer Sister     Headache Father     Hypertension Father     Depression Mother     Hypertension Mother     Hypertension Sister     Hypertension Sister     Diabetes Sister     Diabetes Sister     Hypertension Sister     Hypertension Sister     Heart Attack Father     Heart Failure Father     Osteoarthritis Father     Heart Disease Father     Hypertension Sister     Diabetes Sister     Cancer Sister 54        breast cancer    Heart Failure Sister     Arthritis Sister

## 2024-09-16 ENCOUNTER — OFFICE VISIT (OUTPATIENT)
Facility: CLINIC | Age: 64
End: 2024-09-16

## 2024-09-16 VITALS
SYSTOLIC BLOOD PRESSURE: 130 MMHG | HEIGHT: 61 IN | RESPIRATION RATE: 16 BRPM | DIASTOLIC BLOOD PRESSURE: 70 MMHG | BODY MASS INDEX: 34.51 KG/M2 | WEIGHT: 182.8 LBS | HEART RATE: 65 BPM | TEMPERATURE: 96.9 F | OXYGEN SATURATION: 99 %

## 2024-09-16 DIAGNOSIS — E11.9 WELL CONTROLLED DIABETES MELLITUS (HCC): ICD-10-CM

## 2024-09-16 DIAGNOSIS — Z23 NEED FOR VACCINATION: Primary | ICD-10-CM

## 2024-09-16 DIAGNOSIS — L72.9 SKIN CYST: ICD-10-CM

## 2024-09-16 DIAGNOSIS — E55.9 HYPOVITAMINOSIS D: ICD-10-CM

## 2024-09-16 DIAGNOSIS — I10 PRIMARY HYPERTENSION: ICD-10-CM

## 2024-09-16 RX ORDER — MUPIROCIN 20 MG/G
OINTMENT TOPICAL
Qty: 30 G | Refills: 0 | Status: SHIPPED | OUTPATIENT
Start: 2024-09-16 | End: 2024-09-23

## 2024-09-16 RX ORDER — MOMETASONE FUROATE 1 MG/G
CREAM TOPICAL DAILY
Qty: 45 G | Refills: 0 | Status: SHIPPED | OUTPATIENT
Start: 2024-09-16

## 2024-09-16 ASSESSMENT — PATIENT HEALTH QUESTIONNAIRE - PHQ9
SUM OF ALL RESPONSES TO PHQ QUESTIONS 1-9: 0
SUM OF ALL RESPONSES TO PHQ9 QUESTIONS 1 & 2: 0
SUM OF ALL RESPONSES TO PHQ QUESTIONS 1-9: 0
2. FEELING DOWN, DEPRESSED OR HOPELESS: NOT AT ALL
1. LITTLE INTEREST OR PLEASURE IN DOING THINGS: NOT AT ALL

## 2024-09-18 ENCOUNTER — HOSPITAL ENCOUNTER (OUTPATIENT)
Facility: HOSPITAL | Age: 64
Discharge: HOME | End: 2024-09-21

## 2024-09-18 DIAGNOSIS — I10 PRIMARY HYPERTENSION: ICD-10-CM

## 2024-09-19 RX ORDER — AMLODIPINE BESYLATE 5 MG/1
5 TABLET ORAL DAILY
Qty: 90 TABLET | Refills: 1 | OUTPATIENT
Start: 2024-09-19

## 2024-09-19 RX ORDER — AMLODIPINE BESYLATE 5 MG/1
5 TABLET ORAL DAILY
Qty: 90 TABLET | Refills: 1 | Status: SHIPPED | OUTPATIENT
Start: 2024-09-19

## 2024-09-24 ENCOUNTER — CLINICAL DOCUMENTATION (OUTPATIENT)
Facility: HOSPITAL | Age: 64
End: 2024-09-24

## 2024-10-14 ENCOUNTER — OFFICE VISIT (OUTPATIENT)
Age: 64
End: 2024-10-14
Payer: COMMERCIAL

## 2024-10-14 VITALS
WEIGHT: 185 LBS | HEIGHT: 61 IN | HEART RATE: 62 BPM | DIASTOLIC BLOOD PRESSURE: 70 MMHG | TEMPERATURE: 97.9 F | RESPIRATION RATE: 18 BRPM | OXYGEN SATURATION: 98 % | SYSTOLIC BLOOD PRESSURE: 145 MMHG | BODY MASS INDEX: 34.93 KG/M2

## 2024-10-14 DIAGNOSIS — E66.811 CLASS 1 OBESITY DUE TO EXCESS CALORIES WITH SERIOUS COMORBIDITY AND BODY MASS INDEX (BMI) OF 34.0 TO 34.9 IN ADULT: Primary | ICD-10-CM

## 2024-10-14 DIAGNOSIS — Z79.899 FOLLOW-UP ENCOUNTER INVOLVING MEDICATION: ICD-10-CM

## 2024-10-14 DIAGNOSIS — R73.03 PREDIABETES: ICD-10-CM

## 2024-10-14 DIAGNOSIS — E66.09 CLASS 1 OBESITY DUE TO EXCESS CALORIES WITH SERIOUS COMORBIDITY AND BODY MASS INDEX (BMI) OF 34.0 TO 34.9 IN ADULT: Primary | ICD-10-CM

## 2024-10-14 DIAGNOSIS — Z71.3 ENCOUNTER FOR WEIGHT LOSS COUNSELING: ICD-10-CM

## 2024-10-14 PROCEDURE — 3077F SYST BP >= 140 MM HG: CPT | Performed by: NURSE PRACTITIONER

## 2024-10-14 PROCEDURE — 3078F DIAST BP <80 MM HG: CPT | Performed by: NURSE PRACTITIONER

## 2024-10-14 PROCEDURE — 99213 OFFICE O/P EST LOW 20 MIN: CPT | Performed by: NURSE PRACTITIONER

## 2024-10-14 RX ORDER — SEMAGLUTIDE 0.5 MG/.5ML
0.5 INJECTION, SOLUTION SUBCUTANEOUS
Qty: 2 ML | Refills: 0 | Status: SHIPPED | OUTPATIENT
Start: 2024-10-14

## 2024-10-14 RX ORDER — SEMAGLUTIDE 0.25 MG/.5ML
0.25 INJECTION, SOLUTION SUBCUTANEOUS
Qty: 2 ML | Refills: 0 | Status: SHIPPED | OUTPATIENT
Start: 2024-10-14

## 2024-10-14 ASSESSMENT — ENCOUNTER SYMPTOMS
ABDOMINAL PAIN: 0
COUGH: 0
VOMITING: 0
CONSTIPATION: 0
NAUSEA: 0
DIARRHEA: 0
SHORTNESS OF BREATH: 0

## 2024-10-14 NOTE — PROGRESS NOTES
[facility] Nurse Note:     Glory Burdick is a 64 y.o. female who is here for her f/up medical provider visit for the Medication program.       Did you have any problems adhering to the program since last visit? No  If yes, please explain:    Since your last visit, have you experienced any complications? No    Have you received any other medical care since last visit? No  If yes, where and for what?     Have you had any change in your medications since your last visit? No If yes what?     Are you taking an anti-obesity medication? Yes If yes what and are you experiencing any side effects? Phentermine 37.5mg with no side effects. Taking intermittently/PRN.     Would you still like to continue your current medication and dosage? Yes    BP Readings from Last 3 Encounters:   10/14/24 (!) 145/70   09/16/24 130/70   08/08/24 126/64        Eating Habits Over Last Week:    How many oz of sugar-free fluids are you consuming? 64 oz daily.    Did you consume your prescribed meal replacement regimen each day? Yes    Physical Activity Routine:    Aerobic exercise: Walking + biking     Resistance exercise: 0 min

## 2024-10-14 NOTE — PROGRESS NOTES
[facility] Progress Note:   F/up Provider Visit    CC: Weight Management    History of Present Illness  The patient is a 64-year-old female presenting for weight management. She is currently on phentermine 37.5 mg.     She has not taken the phentermine for the past week and a half as she does not want to become too reliant on the medication. She is interested in transitioning to Wegovy to assist with her prediabetes as well as weight loss. She has been prediabetic for a long time and would like to avoid starting metformin.     She has been experiencing emotional distress due to the recent loss of her sister, which has led to a lack of focus and irregular eating habits. Despite feeling hungry, she often lacks the desire to eat. She is considering correction this year, which may result in the loss of her primary insurance.        3/7/2024     2:50 PM 4/18/2024    10:36 AM 6/5/2024    12:26 PM 8/8/2024     8:54 AM 10/14/2024     8:58 AM   Non-Surgical Weight Loss Tracker   Consult Date 9/25/2023 9/25/2023 9/25/2023    Initial Height 5' 1\" 5' 1\"  5' 1\"    Initial Weight 206 lb 206 lb  206 lb    Ideal Body Weight 122 lb       Initial BMI 38.92 38.92  38.92    Initial Body Fat % 46.7 46.7  46.7    EBW 84 lb       Date 3/7/2024 4/18/2024 6/5/2024 8/8/2024 10/14/2024   Height 5' 1\" 5' 1\" 5' 1\" 5' 1\" 5' 1\"   Weight 187 lb 186 lb 184 lb 181 lb 9.6 oz 185 lb   BMI 35.33 35.14 34.76 34.31 34.95   Weight Change 187 lb -20 lb -22 lb -2 lb 6.4 oz -21 lb   Total Weight Change 0 lb -20 lb -22 lb -24 lb 6.4 oz -21 lb   % EBWL <UNK>% 24% 26% 29% 25%   Subsequent Body Fat % 42.4 42.17 41.71 41.17 41.94   Body Fat % Change 42.4 -0.23 -0.46 -0.54 0.77   Subseq. Anti-obesity Meds   phentermine  phentermine        Arm: 12  Waist: 35  Thigh: 20  Body Fat Percentage: 42.1    Body composition scanned to media    Up 4 lbs since last visit. Down 21 lbs since starting program on 9/25/2023 .    Ideal body weight: 47.8 kg

## 2024-10-16 ENCOUNTER — CLINICAL DOCUMENTATION (OUTPATIENT)
Age: 64
End: 2024-10-16

## 2024-10-16 ENCOUNTER — PATIENT MESSAGE (OUTPATIENT)
Age: 64
End: 2024-10-16

## 2024-10-16 NOTE — PROGRESS NOTES
Prior Authorization for  Wegovy 0.25 was submitted via [facility] through [employer] insurance, .    M KEY/CASE ID: [id number]  STATUS: Denied  If approved, authorization expires: n/a  If Denied, reason: Message from [employer]: Drug is not covered by plan    PATIENT NOTIFIED via [employer]

## 2024-11-07 ENCOUNTER — PATIENT MESSAGE (OUTPATIENT)
Age: 64
End: 2024-11-07

## 2024-11-07 DIAGNOSIS — E66.811 CLASS 1 OBESITY DUE TO EXCESS CALORIES WITH SERIOUS COMORBIDITY AND BODY MASS INDEX (BMI) OF 34.0 TO 34.9 IN ADULT: ICD-10-CM

## 2024-11-07 DIAGNOSIS — E66.09 CLASS 1 OBESITY DUE TO EXCESS CALORIES WITH SERIOUS COMORBIDITY AND BODY MASS INDEX (BMI) OF 34.0 TO 34.9 IN ADULT: ICD-10-CM

## 2024-11-08 RX ORDER — SEMAGLUTIDE 0.5 MG/.5ML
0.5 INJECTION, SOLUTION SUBCUTANEOUS
Qty: 2 ML | Refills: 0 | Status: SHIPPED | OUTPATIENT
Start: 2024-11-08

## 2024-11-08 RX ORDER — SEMAGLUTIDE 0.25 MG/.5ML
0.25 INJECTION, SOLUTION SUBCUTANEOUS
Qty: 2 ML | Refills: 0 | Status: SHIPPED | OUTPATIENT
Start: 2024-11-08

## 2024-11-18 DIAGNOSIS — E11.9 WELL CONTROLLED DIABETES MELLITUS (HCC): Primary | ICD-10-CM

## 2024-11-18 DIAGNOSIS — E11.9 WELL CONTROLLED DIABETES MELLITUS (HCC): ICD-10-CM

## 2024-11-20 DIAGNOSIS — Z13.220 SCREENING FOR HYPERLIPIDEMIA: ICD-10-CM

## 2024-11-20 DIAGNOSIS — Z13.220 SCREENING FOR HYPERLIPIDEMIA: Primary | ICD-10-CM

## 2024-12-02 ENCOUNTER — HOSPITAL ENCOUNTER (OUTPATIENT)
Facility: HOSPITAL | Age: 64
Setting detail: SPECIMEN
Discharge: HOME | End: 2024-12-05

## 2024-12-02 ENCOUNTER — HOSPITAL ENCOUNTER (OUTPATIENT)
Facility: HOSPITAL | Age: 64
Discharge: HOME | End: 2024-12-05

## 2024-12-02 LAB — SENTARA SPECIMEN COLLECTION: NORMAL

## 2024-12-02 PROCEDURE — 99001 SPECIMEN HANDLING PT-LAB: CPT

## 2024-12-04 LAB
CHOLESTEROL, TOTAL: 192 MG/DL (ref 110–200)
CHOLESTEROL/HDL RATIO: 3.3 (ref 0–5)
DIRECT LDL: 111 MG/DL (ref 50–99)
HDLC SERPL-MCNC: 58 MG/DL
LDL CHOLESTEROL: 120 MG/DL (ref 50–99)
LDL/HDL RATIO: 2.1
NON-HDL CHOLESTEROL: 134 MG/DL
TRIGL SERPL-MCNC: 69 MG/DL (ref 40–149)
VLDLC SERPL CALC-MCNC: 14 MG/DL (ref 8–30)

## 2024-12-06 RX ORDER — TELMISARTAN AND HYDROCHLORTHIAZIDE 80; 25 MG/1; MG/1
1 TABLET ORAL DAILY
Qty: 90 TABLET | Refills: 1 | Status: SHIPPED | OUTPATIENT
Start: 2024-12-06

## 2024-12-06 NOTE — TELEPHONE ENCOUNTER
Last ov: 9/16/2024  Next ov: 3/18/2025      Requested Prescriptions    telmisartan-hydroCHLOROthiazide (MICARDIS HCT) 80-25 MG per tablet [[id number]]

## 2024-12-11 ENCOUNTER — HOSPITAL ENCOUNTER (OUTPATIENT)
Facility: HOSPITAL | Age: 64
Discharge: HOME | End: 2024-12-14

## 2024-12-18 ENCOUNTER — OFFICE VISIT (OUTPATIENT)
Age: 64
End: 2024-12-18
Payer: COMMERCIAL

## 2024-12-18 VITALS
HEART RATE: 58 BPM | BODY MASS INDEX: 34.17 KG/M2 | HEIGHT: 61 IN | SYSTOLIC BLOOD PRESSURE: 127 MMHG | DIASTOLIC BLOOD PRESSURE: 61 MMHG | WEIGHT: 181 LBS

## 2024-12-18 DIAGNOSIS — Z79.899 FOLLOW-UP ENCOUNTER INVOLVING MEDICATION: ICD-10-CM

## 2024-12-18 DIAGNOSIS — E66.09 CLASS 1 OBESITY DUE TO EXCESS CALORIES WITH SERIOUS COMORBIDITY AND BODY MASS INDEX (BMI) OF 34.0 TO 34.9 IN ADULT: Primary | ICD-10-CM

## 2024-12-18 DIAGNOSIS — Z71.3 ENCOUNTER FOR WEIGHT LOSS COUNSELING: ICD-10-CM

## 2024-12-18 DIAGNOSIS — E66.811 CLASS 1 OBESITY DUE TO EXCESS CALORIES WITH SERIOUS COMORBIDITY AND BODY MASS INDEX (BMI) OF 34.0 TO 34.9 IN ADULT: Primary | ICD-10-CM

## 2024-12-18 PROCEDURE — 3074F SYST BP LT 130 MM HG: CPT | Performed by: NURSE PRACTITIONER

## 2024-12-18 PROCEDURE — 99213 OFFICE O/P EST LOW 20 MIN: CPT | Performed by: NURSE PRACTITIONER

## 2024-12-18 PROCEDURE — 3078F DIAST BP <80 MM HG: CPT | Performed by: NURSE PRACTITIONER

## 2024-12-18 RX ORDER — SEMAGLUTIDE 1 MG/.5ML
1 INJECTION, SOLUTION SUBCUTANEOUS
Qty: 2 ML | Refills: 0 | Status: SHIPPED | OUTPATIENT
Start: 2024-12-18

## 2024-12-18 RX ORDER — SEMAGLUTIDE 0.5 MG/.5ML
0.5 INJECTION, SOLUTION SUBCUTANEOUS
Qty: 2 ML | Refills: 0 | Status: SHIPPED | OUTPATIENT
Start: 2024-12-18

## 2024-12-18 ASSESSMENT — ENCOUNTER SYMPTOMS
DIARRHEA: 0
SHORTNESS OF BREATH: 0
COUGH: 0
ABDOMINAL PAIN: 0
VOMITING: 0
CONSTIPATION: 1
NAUSEA: 1

## 2024-12-18 NOTE — PROGRESS NOTES
Chief Complaint   Patient presents with    Follow-up     States doing well on medication;experiencing some nausea     [facility] Nurse Note:       CC: Weight Management    Glory Burdick is a 64 y.o. female who is here for her f/up medical provider visit for the Medication program.       Did you have any problems adhering to the program since last visit? No  If yes, please explain:     Since your last visit, have you experienced any complications? Yes some nausea    Have you received any other medical care since last visit? Yes  If yes, where and for what?     Have you had any change in your medications since your last visit? No If yes what?     Are you taking an anti-obesity medication? Yes If yes what and are you experiencing any side effects?  nausea    Would you still like to continue your current medication and dosage? Yes    BP Readings from Last 3 Encounters:   10/14/24 (!) 145/70   09/16/24 130/70   08/08/24 126/64        Eating Habits Over Last Week:    How many oz of sugar-free fluids are you consuming? 64 or more    Did you consume your prescribed meal replacement regimen each day? No    Physical Activity Routine:    Aerobic exercise: walking    Resistance exercise: none  
to generate a clinical note. The patient (or guardian, if applicable) and other individuals in attendance at the appointment consented to the use of AI, including the recording.

## 2025-01-02 ENCOUNTER — CLINICAL DOCUMENTATION (OUTPATIENT)
Facility: HOSPITAL | Age: 65
End: 2025-01-02

## 2025-01-02 NOTE — PROGRESS NOTES
RD reached out to patient to discuss previously set SMART goals.  Patient has done well with lifestyle changes.  Future SMART goals are:   Strength training.    Jordana Madera RD

## 2025-01-03 DIAGNOSIS — I10 PRIMARY HYPERTENSION: ICD-10-CM

## 2025-01-03 NOTE — TELEPHONE ENCOUNTER
This patient contacted office for the following prescriptions to be filled:    Medication requested :   telmisartan-hydroCHLOROthiazide (MICARDIS HCT) 80-25 MG per tablet QTY 90 Refill 1     amLODIPine (NORVASC) 5 MG tablet QTY 90 Refill 1  PCP: Amaris  Pharmacy or Print: [facility]   Mail order or Local pharmacy [address]    Scheduled appointment if not seen by current providers in office:  LOV 9/16/2024 FU 3/18/2025 pt is changing pharmacies and has not picked up medication from [facility] . Pt is aware that Dr Glez is not in the office and this may not be refilled till Monday.

## 2025-01-03 NOTE — TELEPHONE ENCOUNTER
Scheduled appointment if not seen by current providers in office:  LOV 9/16/2024 FU 3/18/2025 pt is changing pharmacies and has not picked up medication from [facility] . Pt is aware that Dr Glez is not in the office and this may not be refilled till Monday.     LOV 9/16/2024 FU 3/18/2025

## 2025-01-06 ENCOUNTER — PATIENT MESSAGE (OUTPATIENT)
Age: 65
End: 2025-01-06

## 2025-01-06 DIAGNOSIS — E66.811 CLASS 1 OBESITY DUE TO EXCESS CALORIES WITH SERIOUS COMORBIDITY AND BODY MASS INDEX (BMI) OF 34.0 TO 34.9 IN ADULT: Primary | ICD-10-CM

## 2025-01-06 DIAGNOSIS — E66.09 CLASS 1 OBESITY DUE TO EXCESS CALORIES WITH SERIOUS COMORBIDITY AND BODY MASS INDEX (BMI) OF 34.0 TO 34.9 IN ADULT: Primary | ICD-10-CM

## 2025-01-06 RX ORDER — SEMAGLUTIDE 0.5 MG/.5ML
0.5 INJECTION, SOLUTION SUBCUTANEOUS
Qty: 2 ML | Refills: 0 | Status: SHIPPED | OUTPATIENT
Start: 2025-01-06

## 2025-01-06 RX ORDER — AMLODIPINE BESYLATE 5 MG/1
5 TABLET ORAL DAILY
Qty: 90 TABLET | Refills: 1 | Status: SHIPPED | OUTPATIENT
Start: 2025-01-06

## 2025-01-06 RX ORDER — TELMISARTAN AND HYDROCHLORTHIAZIDE 80; 25 MG/1; MG/1
1 TABLET ORAL DAILY
Qty: 90 TABLET | Refills: 1 | Status: SHIPPED | OUTPATIENT
Start: 2025-01-06

## 2025-02-04 ENCOUNTER — PATIENT MESSAGE (OUTPATIENT)
Age: 65
End: 2025-02-04

## 2025-02-06 RX ORDER — ONDANSETRON 4 MG/1
4 TABLET, ORALLY DISINTEGRATING ORAL 3 TIMES DAILY PRN
Qty: 30 TABLET | Refills: 1 | Status: SHIPPED | OUTPATIENT
Start: 2025-02-06

## 2025-02-07 ENCOUNTER — TELEPHONE (OUTPATIENT)
Age: 65
End: 2025-02-07

## 2025-02-07 NOTE — TELEPHONE ENCOUNTER
Patient left message stating she sent mychart on 2/4/25 and has not received a response. States awaiting. Can be reached at [phone]

## 2025-02-10 DIAGNOSIS — E66.811 CLASS 1 OBESITY DUE TO EXCESS CALORIES WITH SERIOUS COMORBIDITY AND BODY MASS INDEX (BMI) OF 34.0 TO 34.9 IN ADULT: ICD-10-CM

## 2025-02-10 DIAGNOSIS — E66.09 CLASS 1 OBESITY DUE TO EXCESS CALORIES WITH SERIOUS COMORBIDITY AND BODY MASS INDEX (BMI) OF 34.0 TO 34.9 IN ADULT: ICD-10-CM

## 2025-02-11 RX ORDER — SEMAGLUTIDE 0.5 MG/.5ML
0.5 INJECTION, SOLUTION SUBCUTANEOUS
Qty: 2 ML | Refills: 0 | Status: SHIPPED | OUTPATIENT
Start: 2025-02-11

## 2025-03-06 ENCOUNTER — OFFICE VISIT (OUTPATIENT)
Age: 65
End: 2025-03-06
Payer: OTHER GOVERNMENT

## 2025-03-06 VITALS
DIASTOLIC BLOOD PRESSURE: 63 MMHG | BODY MASS INDEX: 33.23 KG/M2 | HEIGHT: 61 IN | HEART RATE: 64 BPM | WEIGHT: 176 LBS | SYSTOLIC BLOOD PRESSURE: 135 MMHG

## 2025-03-06 DIAGNOSIS — E66.09 CLASS 1 OBESITY DUE TO EXCESS CALORIES WITH SERIOUS COMORBIDITY AND BODY MASS INDEX (BMI) OF 33.0 TO 33.9 IN ADULT: Primary | ICD-10-CM

## 2025-03-06 DIAGNOSIS — Z79.899 FOLLOW-UP ENCOUNTER INVOLVING MEDICATION: ICD-10-CM

## 2025-03-06 DIAGNOSIS — E66.811 CLASS 1 OBESITY DUE TO EXCESS CALORIES WITH SERIOUS COMORBIDITY AND BODY MASS INDEX (BMI) OF 33.0 TO 33.9 IN ADULT: Primary | ICD-10-CM

## 2025-03-06 DIAGNOSIS — Z71.3 ENCOUNTER FOR WEIGHT LOSS COUNSELING: ICD-10-CM

## 2025-03-06 PROCEDURE — 99214 OFFICE O/P EST MOD 30 MIN: CPT | Performed by: NURSE PRACTITIONER

## 2025-03-06 PROCEDURE — 3078F DIAST BP <80 MM HG: CPT | Performed by: NURSE PRACTITIONER

## 2025-03-06 PROCEDURE — 3075F SYST BP GE 130 - 139MM HG: CPT | Performed by: NURSE PRACTITIONER

## 2025-03-06 RX ORDER — SEMAGLUTIDE 0.5 MG/.5ML
0.5 INJECTION, SOLUTION SUBCUTANEOUS
Qty: 2 ML | Refills: 2 | Status: SHIPPED | OUTPATIENT
Start: 2025-03-06

## 2025-03-06 RX ORDER — CETIRIZINE HYDROCHLORIDE 10 MG/1
TABLET ORAL
COMMUNITY
Start: 2025-02-10

## 2025-03-06 NOTE — PROGRESS NOTES
No chief complaint on file.    [facility] Nurse Note:       CC: Weight Management    Glory Burdick is a 64 y.o. female who is here for her f/up medical provider visit for the Medication program.       Did you have any problems adhering to the program since last visit? No  If yes, please explain:     Since your last visit, have you experienced any complications? No    Have you received any other medical care since last visit? No  If yes, where and for what?     Have you had any change in your medications since your last visit? No If yes what?     Are you taking an anti-obesity medication? Yes If yes what and are you experiencing any side effects? Wegovy 0.5 mg with no side effects.    Would you still like to continue your current medication and dosage? Yes    BP Readings from Last 3 Encounters:   12/18/24 127/61   10/14/24 (!) 145/70   09/16/24 130/70        Eating Habits Over Last Week:    How many oz of sugar-free fluids are you consuming? 50 oz daily.    Did you consume your prescribed meal replacement regimen each day? No    Physical Activity Routine:    Aerobic exercise: Walking daily.    Resistance exercise: 0 min

## 2025-03-10 ASSESSMENT — ENCOUNTER SYMPTOMS
ABDOMINAL PAIN: 0
SHORTNESS OF BREATH: 0
DIARRHEA: 0
CONSTIPATION: 0
COUGH: 0
VOMITING: 0
NAUSEA: 1

## 2025-03-10 NOTE — PROGRESS NOTES
[facility] Progress Note:   F/up Provider Visit    CC: Weight Management    History of Present Illness  The patient is a 64-year-old female presenting for weight management. She is currently on Wegovy 0.5 mg.     She resumed her Wegovy injections on Monday, following a two-week hiatus during her cruise. She experienced mild nausea after the injection, which was alleviated by consuming a cookie.     She has been adhering to a low-carbohydrate diet, focusing on fruits and vegetables, and has been managing her intake of high-sugar and high-fat foods. She plans to start attending [facility] classes, which include cardio on Mondays and strength training on Wednesdays. Her goal weight is 165 pounds.     She has lost 9 lbs since starting Wegovy which is 5% of her starting weight of 185 lbs.        3/7/2024     2:50 PM 4/18/2024    10:36 AM 6/5/2024    12:26 PM 8/8/2024     8:54 AM 10/14/2024     8:58 AM 12/18/2024    10:18 AM 3/6/2025    11:31 AM   Non-Surgical Weight Loss Tracker   Consult Date 9/25/2023 9/25/2023 9/25/2023      Initial Height 5' 1\" 5' 1\"  5' 1\"  5' 1\"    Initial Weight 206 lb 206 lb  206 lb  206 lb    Ideal Body Weight 122 lb     122 lb    Initial BMI 38.92 38.92  38.92  38.92    Initial Body Fat % 46.7 46.7  46.7  46.7    EBW 84 lb     84 lb    Date 3/7/2024 4/18/2024 6/5/2024 8/8/2024 10/14/2024 12/18/2024 3/6/2025   Height 5' 1\" 5' 1\" 5' 1\" 5' 1\" 5' 1\" 5' 1\" 5' 1\"   Weight 187 lb 186 lb 184 lb 181 lb 9.6 oz 185 lb 181 lb 176 lb   BMI 35.33 35.14 34.76 34.31 34.95 34.2 33.25   Weight Change 187 lb -20 lb -22 lb -2 lb 6.4 oz -21 lb -4 lb -30 lb   Total Weight Change 0 lb -20 lb -22 lb -24 lb 6.4 oz -21 lb -25 lb -30 lb   % EBWL <UNK>% 24% 26% 29% 25% 30% 36%   Subsequent Body Fat % 42.4 42.17 41.71 41.17 41.94 41.04 39.9   Body Fat % Change 42.4 -0.23 -0.46 -0.54 0.77 -0.9 -1.14   Subseq. Anti-obesity Meds   phentermine  phentermine     General Comments      Wegovy 0.25 mg

## 2025-03-11 ENCOUNTER — RESULTS FOLLOW-UP (OUTPATIENT)
Facility: CLINIC | Age: 65
End: 2025-03-11

## 2025-03-11 LAB
A/G RATIO: 1.4 RATIO (ref 1.1–2.6)
ALBUMIN: 4.2 G/DL (ref 3.5–5)
ALP BLD-CCNC: 33 U/L (ref 40–120)
ALT SERPL-CCNC: 9 U/L (ref 5–40)
ANION GAP SERPL CALCULATED.3IONS-SCNC: 9 MMOL/L (ref 3–15)
AST SERPL-CCNC: 11 U/L (ref 10–37)
BILIRUB SERPL-MCNC: 0.4 MG/DL (ref 0.2–1.2)
BUN BLDV-MCNC: 15 MG/DL (ref 6–22)
CALCIUM SERPL-MCNC: 9.5 MG/DL (ref 8.4–10.5)
CHLORIDE BLD-SCNC: 104 MMOL/L (ref 98–110)
CO2: 29 MMOL/L (ref 20–32)
CREAT SERPL-MCNC: 0.8 MG/DL (ref 0.8–1.4)
GFR, ESTIMATED: >60 ML/MIN/1.73 SQ.M.
GLOBULIN: 2.9 G/DL (ref 2–4)
GLUCOSE: 89 MG/DL (ref 70–99)
POTASSIUM SERPL-SCNC: 3.8 MMOL/L (ref 3.5–5.5)
SODIUM BLD-SCNC: 142 MMOL/L (ref 133–145)
TOTAL PROTEIN: 7.1 G/DL (ref 6.2–8.1)

## 2025-03-12 LAB
CHOLESTEROL, TOTAL: 192 MG/DL (ref 110–200)
CHOLESTEROL/HDL RATIO: 3.6 (ref 0–5)
ESTIMATED AVERAGE GLUCOSE: 116 MG/DL (ref 91–123)
HBA1C MFR BLD: 5.7 % (ref 4.8–5.6)
HDLC SERPL-MCNC: 54 MG/DL
LDL CHOLESTEROL: 127 MG/DL (ref 50–99)
LDL/HDL RATIO: 2.4
NON-HDL CHOLESTEROL: 138 MG/DL
TRIGL SERPL-MCNC: 54 MG/DL (ref 40–149)
VLDLC SERPL CALC-MCNC: 11 MG/DL (ref 8–30)

## 2025-03-18 ENCOUNTER — OFFICE VISIT (OUTPATIENT)
Facility: CLINIC | Age: 65
End: 2025-03-18
Payer: OTHER GOVERNMENT

## 2025-03-18 VITALS
OXYGEN SATURATION: 98 % | RESPIRATION RATE: 16 BRPM | TEMPERATURE: 96.9 F | DIASTOLIC BLOOD PRESSURE: 60 MMHG | BODY MASS INDEX: 33.19 KG/M2 | HEIGHT: 61 IN | HEART RATE: 67 BPM | WEIGHT: 175.8 LBS | SYSTOLIC BLOOD PRESSURE: 130 MMHG

## 2025-03-18 DIAGNOSIS — Z23 NEED FOR TDAP VACCINATION: ICD-10-CM

## 2025-03-18 DIAGNOSIS — I10 PRIMARY HYPERTENSION: ICD-10-CM

## 2025-03-18 DIAGNOSIS — E78.5 DYSLIPIDEMIA: ICD-10-CM

## 2025-03-18 DIAGNOSIS — R52 PAIN OF MULTIPLE SITES: ICD-10-CM

## 2025-03-18 DIAGNOSIS — Z23 NEED FOR PNEUMOCOCCAL 20-VALENT CONJUGATE VACCINATION: ICD-10-CM

## 2025-03-18 DIAGNOSIS — Z00.00 ENCOUNTER FOR WELL ADULT EXAM WITHOUT ABNORMAL FINDINGS: Primary | ICD-10-CM

## 2025-03-18 DIAGNOSIS — E11.9 WELL CONTROLLED DIABETES MELLITUS (HCC): ICD-10-CM

## 2025-03-18 PROCEDURE — 90715 TDAP VACCINE 7 YRS/> IM: CPT | Performed by: FAMILY MEDICINE

## 2025-03-18 PROCEDURE — 90472 IMMUNIZATION ADMIN EACH ADD: CPT | Performed by: FAMILY MEDICINE

## 2025-03-18 PROCEDURE — 3075F SYST BP GE 130 - 139MM HG: CPT | Performed by: FAMILY MEDICINE

## 2025-03-18 PROCEDURE — 99214 OFFICE O/P EST MOD 30 MIN: CPT | Performed by: FAMILY MEDICINE

## 2025-03-18 PROCEDURE — 90677 PCV20 VACCINE IM: CPT | Performed by: FAMILY MEDICINE

## 2025-03-18 PROCEDURE — 90471 IMMUNIZATION ADMIN: CPT | Performed by: FAMILY MEDICINE

## 2025-03-18 PROCEDURE — 99396 PREV VISIT EST AGE 40-64: CPT | Performed by: FAMILY MEDICINE

## 2025-03-18 PROCEDURE — 3078F DIAST BP <80 MM HG: CPT | Performed by: FAMILY MEDICINE

## 2025-03-18 RX ORDER — GUAIFENESIN 600 MG/1
TABLET, EXTENDED RELEASE ORAL
COMMUNITY
Start: 2025-02-10

## 2025-03-18 RX ORDER — SODIUM CHLORIDE 0.65 %
AEROSOL, SPRAY (ML) NASAL
COMMUNITY
Start: 2025-02-10

## 2025-03-18 RX ORDER — ATORVASTATIN CALCIUM 10 MG/1
10 TABLET, FILM COATED ORAL DAILY
Qty: 90 TABLET | Refills: 1 | Status: SHIPPED | OUTPATIENT
Start: 2025-03-18

## 2025-03-18 RX ORDER — ACETAMINOPHEN 325 MG/1
TABLET ORAL
COMMUNITY
Start: 2025-02-10

## 2025-03-18 RX ORDER — BENZOCAINE AND MENTHOL 15; 3.6 MG/1; MG/1
LOZENGE ORAL
COMMUNITY
Start: 2025-02-10

## 2025-03-18 RX ORDER — CLINDAMYCIN PHOSPHATE 10 UG/ML
LOTION TOPICAL
COMMUNITY
Start: 2025-01-27

## 2025-03-18 RX ORDER — DOXYCYCLINE 100 MG/1
TABLET ORAL
COMMUNITY
Start: 2025-01-27

## 2025-03-18 SDOH — ECONOMIC STABILITY: FOOD INSECURITY: WITHIN THE PAST 12 MONTHS, THE FOOD YOU BOUGHT JUST DIDN'T LAST AND YOU DIDN'T HAVE MONEY TO GET MORE.: NEVER TRUE

## 2025-03-18 SDOH — ECONOMIC STABILITY: FOOD INSECURITY: WITHIN THE PAST 12 MONTHS, YOU WORRIED THAT YOUR FOOD WOULD RUN OUT BEFORE YOU GOT MONEY TO BUY MORE.: NEVER TRUE

## 2025-03-18 ASSESSMENT — PATIENT HEALTH QUESTIONNAIRE - PHQ9
1. LITTLE INTEREST OR PLEASURE IN DOING THINGS: NOT AT ALL
SUM OF ALL RESPONSES TO PHQ QUESTIONS 1-9: 0
2. FEELING DOWN, DEPRESSED OR HOPELESS: NOT AT ALL
SUM OF ALL RESPONSES TO PHQ QUESTIONS 1-9: 0

## 2025-03-18 NOTE — PROGRESS NOTES
Patient presents for the following vaccines: TDAP & Prevnar 20. Patient consent obtained by patient. Patient tolerated procedure well at left deltoid (TDAP) and right deltoid (Prevnar 20). Patient advised to remain in lobby for period of 15 minutes post injection to ensure no reaction. VIS was given to patient.    TDAP  Lot #   Exp: 4/19/2027  MFG: "[employer]  NDC: 08384-614-52    Prevnar 20  Lot # [id number]  Exp: 2/01/2026  MFG: [facility]  NDC: 1118-6813-51  
\"Have you been to the ER, urgent care clinic since your last visit?  Hospitalized since your last visit?\"    NO    “Have you seen or consulted any other health care providers outside our system since your last visit?”    [facility] LOV: 1/2025      “Have you had a diabetic eye exam?”    Last dm eye exam was in 2024 - Dr. Luque. Record has been requested.    Date of last diabetic eye exam: 10/16/2023     Last dm foot exam - No. Patient states Dr. Glez does dm foot exam. Shoes and socks have been removed for exam.    Last pap smear - hysterectomy    Last mammogram - 2/15/24    Last colonosocpy - 4/13/23           Chief Complaint   Patient presents with    Annual Exam         
BOOSTRIX (age 10y+), IM, 0.5mL 09/14/2012    Td vaccine (adult) 09/01/2012    Zoster Recombinant (Shingrix) 08/18/2020, 10/19/2020        Health Maintenance Due   Topic Date Due    HIV screen  Never done    Respiratory Syncytial Virus (RSV) Pregnant or age 60 yrs+ (1 - Risk 60-74 years 1-dose series) Never done    DTaP/Tdap/Td vaccine (2 - Td or Tdap) 09/14/2022    Diabetic Alb to Cr ratio (uACR) test  09/07/2023    Pneumococcal 50+ years Vaccine (3 of 3 - PCV20 or PCV21) 04/05/2024    Diabetic foot exam  08/31/2024    COVID-19 Vaccine (6 - 2024-25 season) 09/01/2024    Diabetic retinal exam  10/16/2024      Recommendations for Preventive Services Due: see orders and patient instructions/AVS.    The patient (or guardian, if applicable) and other individuals in attendance with the patient were advised that Artificial Intelligence will be utilized during this visit to record and process the conversation to generate a clinical note. The patient (or guardian, if applicable) and other individuals in attendance at the appointment consented to the use of AI, including the recording.

## 2025-03-18 NOTE — PATIENT INSTRUCTIONS
hands, brush your teeth twice a day, and wear a seat belt in the car.   Where can you learn more?  Go to https://www.[employer].net/patientEd and enter P072 to learn more about \"Well Visit, Ages 18 to 65: Care Instructions.\"  Current as of: April 30, 2024  Content Version: 14.4  © 0978-4737 LVL[employer].   Care instructions adapted under license by [employer]. If you have questions about a medical condition or this instruction, always ask your healthcare professional. [employer] disclaims any warranty or liability for your use of this information.

## 2025-04-11 ENCOUNTER — HOSPITAL ENCOUNTER (OUTPATIENT)
Facility: HOSPITAL | Age: 65
Discharge: HOME | End: 2025-04-14
Payer: OTHER GOVERNMENT

## 2025-04-11 VITALS — BODY MASS INDEX: 32.85 KG/M2 | HEIGHT: 61 IN | WEIGHT: 174 LBS

## 2025-04-11 DIAGNOSIS — Z12.31 VISIT FOR SCREENING MAMMOGRAM: ICD-10-CM

## 2025-04-11 PROCEDURE — 77063 BREAST TOMOSYNTHESIS BI: CPT

## 2025-04-14 ENCOUNTER — RESULTS FOLLOW-UP (OUTPATIENT)
Facility: CLINIC | Age: 65
End: 2025-04-14

## 2025-06-06 ENCOUNTER — OFFICE VISIT (OUTPATIENT)
Age: 65
End: 2025-06-06
Payer: OTHER GOVERNMENT

## 2025-06-06 VITALS
DIASTOLIC BLOOD PRESSURE: 70 MMHG | HEIGHT: 61 IN | BODY MASS INDEX: 32.85 KG/M2 | SYSTOLIC BLOOD PRESSURE: 118 MMHG | HEART RATE: 73 BPM | WEIGHT: 174 LBS

## 2025-06-06 DIAGNOSIS — E66.09 CLASS 1 OBESITY DUE TO EXCESS CALORIES WITH SERIOUS COMORBIDITY AND BODY MASS INDEX (BMI) OF 32.0 TO 32.9 IN ADULT: Primary | ICD-10-CM

## 2025-06-06 DIAGNOSIS — E66.811 CLASS 1 OBESITY DUE TO EXCESS CALORIES WITH SERIOUS COMORBIDITY AND BODY MASS INDEX (BMI) OF 32.0 TO 32.9 IN ADULT: Primary | ICD-10-CM

## 2025-06-06 DIAGNOSIS — Z71.3 ENCOUNTER FOR WEIGHT LOSS COUNSELING: ICD-10-CM

## 2025-06-06 DIAGNOSIS — Z79.899 FOLLOW-UP ENCOUNTER INVOLVING MEDICATION: ICD-10-CM

## 2025-06-06 PROCEDURE — 99214 OFFICE O/P EST MOD 30 MIN: CPT | Performed by: NURSE PRACTITIONER

## 2025-06-06 PROCEDURE — 3074F SYST BP LT 130 MM HG: CPT | Performed by: NURSE PRACTITIONER

## 2025-06-06 PROCEDURE — 3078F DIAST BP <80 MM HG: CPT | Performed by: NURSE PRACTITIONER

## 2025-06-06 RX ORDER — SEMAGLUTIDE 0.5 MG/.5ML
0.5 INJECTION, SOLUTION SUBCUTANEOUS
Qty: 4 ML | Refills: 1 | Status: SHIPPED | OUTPATIENT
Start: 2025-06-06

## 2025-06-06 NOTE — PROGRESS NOTES
[facility] Progress Note:   F/up Provider Visit    CC: Weight Management    History of Present Illness  The patient is a 65-year-old female presenting for weight management. She is currently on Wegovy 0.5 mg.    She experienced an episode of dizziness this week, which she attributes to late-night eating following a concert. The dizziness was severe enough to prevent her from getting out of bed on Wednesday, accompanied by nausea and loss of appetite. Although the symptoms have since subsided, mild dizziness and a sensation of being off-balance persist. No other changes in routine or diet are reported. Adequate hydration is maintained throughout the day, and no electrolyte-containing beverages are consumed. She reports no lightheadedness, chest pain, or palpitations. Wegovy 0.5 mg has been used for approximately 3 to 4 months without prior issues, with the last injection administered on Monday.    Additional weight loss has been achieved, with plans to maintain weight at 165 pounds. Consideration is given to increasing the Wegovy dosage to 1 mg, though apprehension is expressed due to the recent dizziness episode. An increase in appetite towards the end of the week is noted. Participation in [employer] classes, line dancing, and regular walking exercises continues.    She has lost 11 lbs since starting Wegovy which is 6% of her starting weight of 185 lbs.     Last Non-Surgical Weight Loss:      6/6/2025    11:37 AM 3/6/2025    11:31 AM 12/18/2024    10:18 AM 10/14/2024     8:58 AM 8/8/2024     8:54 AM 6/5/2024    12:26 PM 4/18/2024    10:36 AM   Non-Surgical Weight Loss Tracker   Consult Date     9/25/2023 9/25/2023   Initial Height 5' 1\"  5' 1\"  5' 1\"  5' 1\"   Initial Weight 206 lbs  206 lbs  206 lbs  206 lbs   Ideal Body Weight 122 lb  122 lb       Initial BMI 38.9  38.92  38.92  38.92   Initial EBW 84 lb  84 lb       Date 6/6/2025 3/6/2025 12/18/2024 10/14/2024 8/8/2024 6/5/2024

## 2025-06-06 NOTE — PROGRESS NOTES
[facility] Nurse Note:       CC: Weight Management    Glory Burdick is a 65 y.o. female who is here for her f/up medical provider visit for the Medication program.       Did you have any problems adhering to the program since last visit? No  If yes, please explain:     Since your last visit, have you experienced any complications? No    Have you received any other medical care since last visit? No  If yes, where and for what?     Have you had any change in your medications since your last visit? No If yes what?     Are you taking an anti-obesity medication? Yes If yes what and are you experiencing any side effects?  Dizzy    Would you still like to continue your current medication and dosage? Yes    BP Readings from Last 3 Encounters:   06/06/25 118/70   03/18/25 130/60   03/06/25 135/63        Eating Habits Over Last Week:    How many oz of sugar-free fluids are you consuming? 64 or more    Did you consume your prescribed meal replacement regimen each day? No    Physical Activity Routine:    Aerobic exercise: walking     Resistance exercise: walking

## 2025-06-10 ENCOUNTER — OFFICE VISIT (OUTPATIENT)
Facility: CLINIC | Age: 65
End: 2025-06-10
Payer: OTHER GOVERNMENT

## 2025-06-10 VITALS
SYSTOLIC BLOOD PRESSURE: 102 MMHG | RESPIRATION RATE: 16 BRPM | TEMPERATURE: 97.2 F | HEART RATE: 66 BPM | OXYGEN SATURATION: 97 % | DIASTOLIC BLOOD PRESSURE: 60 MMHG | BODY MASS INDEX: 33.27 KG/M2 | WEIGHT: 176.2 LBS | HEIGHT: 61 IN

## 2025-06-10 DIAGNOSIS — I10 PRIMARY HYPERTENSION: ICD-10-CM

## 2025-06-10 DIAGNOSIS — R42 DIZZINESS: Primary | ICD-10-CM

## 2025-06-10 DIAGNOSIS — R26.89 BALANCE PROBLEM: ICD-10-CM

## 2025-06-10 PROCEDURE — 3074F SYST BP LT 130 MM HG: CPT | Performed by: FAMILY MEDICINE

## 2025-06-10 PROCEDURE — 3078F DIAST BP <80 MM HG: CPT | Performed by: FAMILY MEDICINE

## 2025-06-10 PROCEDURE — 99213 OFFICE O/P EST LOW 20 MIN: CPT | Performed by: FAMILY MEDICINE

## 2025-06-10 RX ORDER — MECLIZINE HCL 12.5 MG 12.5 MG/1
12.5 TABLET ORAL
Qty: 10 TABLET | Refills: 0 | Status: SHIPPED | OUTPATIENT
Start: 2025-06-10 | End: 2025-06-20

## 2025-06-10 RX ORDER — TELMISARTAN 80 MG/1
80 TABLET ORAL DAILY
Qty: 30 TABLET | Refills: 1 | Status: SHIPPED | OUTPATIENT
Start: 2025-06-10

## 2025-06-10 ASSESSMENT — ENCOUNTER SYMPTOMS
COUGH: 0
ABDOMINAL PAIN: 0
CONSTIPATION: 0
VOMITING: 0
DIARRHEA: 0
NAUSEA: 1
SHORTNESS OF BREATH: 0

## 2025-06-10 NOTE — PROGRESS NOTES
Glory Burdick (:  1960) is a 65 y.o. female, Established patient, here for evaluation of the following chief complaint(s):  Dizziness (Vertigo started starting 25)         Assessment & Plan  1. Dizziness.  - Dizziness may be attributed to low blood pressure, potentially exacerbated by hydrochlorothiazide.  - Increased balance issues and intermittent headaches.  - Advised to maintain adequate hydration and regular meal times; consumption of salty snacks suggested to help elevate blood pressure.  - Discontinued hydrochlorothiazide component of blood pressure medication; prescribed meclizine to be taken at bedtime when not planning to leave the house, with a warning that it may induce sleepiness and occasionally dizziness.     Follow-up in 1 month.    Results    1. Dizziness  -     meclizine (ANTIVERT) 12.5 MG tablet; Take 1 tablet by mouth nightly as needed for Dizziness, Disp-10 tablet, R-0Normal  2. Primary hypertension  -     telmisartan (MICARDIS) 80 MG tablet; Take 1 tablet by mouth daily, Disp-30 tablet, R-1Normal  3. Balance problem    Return in about 1 month (around 7/10/2025).       Subjective   History of Present Illness  The patient presents for evaluation of dizziness.    She reports experiencing dizziness, which she believes may have been triggered by attending a concert the previous night where she felt floor vibrations. The onset of her symptoms was on 2025, initially severe to the point of incapacitation, but has since improved. She describes her dizziness as a sensation of being intoxicated, with associated balance issues. She is able to drive and perform daily activities, although with some difficulty due to persistent balance issues. She also experienced a headache upon awakening this morning.     She reports no recent cold symptoms, earache, or ear infection. She maintains adequate hydration and regular meals. She reports no chest pain, respiratory distress,

## 2025-06-10 NOTE — PROGRESS NOTES
Have you been to the ER, urgent care clinic since your last visit?  Hospitalized since your last visit?   NO    Have you seen or consulted any other health care providers outside our system since your last visit?   NO      “Have you had a diabetic eye exam?”    NO     Date of last diabetic eye exam: 10/16/2023            Chief Complaint   Patient presents with    Dizziness     Vertigo started starting 6/04/25

## 2025-07-07 ENCOUNTER — PATIENT MESSAGE (OUTPATIENT)
Age: 65
End: 2025-07-07

## 2025-07-07 DIAGNOSIS — E66.09 CLASS 1 OBESITY DUE TO EXCESS CALORIES WITH SERIOUS COMORBIDITY AND BODY MASS INDEX (BMI) OF 32.0 TO 32.9 IN ADULT: Primary | ICD-10-CM

## 2025-07-07 DIAGNOSIS — E66.811 CLASS 1 OBESITY DUE TO EXCESS CALORIES WITH SERIOUS COMORBIDITY AND BODY MASS INDEX (BMI) OF 32.0 TO 32.9 IN ADULT: Primary | ICD-10-CM

## 2025-07-08 RX ORDER — SEMAGLUTIDE 1 MG/.5ML
1 INJECTION, SOLUTION SUBCUTANEOUS
Qty: 2 ML | Refills: 1 | Status: SHIPPED | OUTPATIENT
Start: 2025-07-08

## 2025-07-16 ENCOUNTER — OFFICE VISIT (OUTPATIENT)
Facility: CLINIC | Age: 65
End: 2025-07-16
Payer: OTHER GOVERNMENT

## 2025-07-16 VITALS
BODY MASS INDEX: 33.34 KG/M2 | WEIGHT: 176.6 LBS | TEMPERATURE: 97.4 F | HEIGHT: 61 IN | DIASTOLIC BLOOD PRESSURE: 78 MMHG | SYSTOLIC BLOOD PRESSURE: 130 MMHG | RESPIRATION RATE: 16 BRPM | OXYGEN SATURATION: 100 % | HEART RATE: 69 BPM

## 2025-07-16 DIAGNOSIS — I10 PRIMARY HYPERTENSION: ICD-10-CM

## 2025-07-16 DIAGNOSIS — R42 DIZZINESS: Primary | ICD-10-CM

## 2025-07-16 PROCEDURE — 99213 OFFICE O/P EST LOW 20 MIN: CPT | Performed by: FAMILY MEDICINE

## 2025-07-16 PROCEDURE — 3078F DIAST BP <80 MM HG: CPT | Performed by: FAMILY MEDICINE

## 2025-07-16 PROCEDURE — 3075F SYST BP GE 130 - 139MM HG: CPT | Performed by: FAMILY MEDICINE

## 2025-07-16 RX ORDER — AMLODIPINE BESYLATE 5 MG/1
5 TABLET ORAL DAILY
Qty: 90 TABLET | Refills: 1 | Status: SHIPPED | OUTPATIENT
Start: 2025-07-16

## 2025-07-16 NOTE — PROGRESS NOTES
Have you been to the ER, urgent care clinic since your last visit?  Hospitalized since your last visit?   NO    Have you seen or consulted any other health care providers outside our system since your last visit?   NO      “Have you had a diabetic eye exam?”    NO     Date of last diabetic eye exam: 10/16/2023     Chief Complaint   Patient presents with    Dizziness    Hypertension    Balance problem

## 2025-07-16 NOTE — PROGRESS NOTES
Glory Burdick (:  1960) is a 65 y.o. female, Established patient, here for evaluation of the following chief complaint(s):  Dizziness, Hypertension, and Balance problem         Assessment & Plan  1. Blood pressure management.  - Blood pressure is mildly elevated.  - Physical exam normal; no leg or ankle swelling.  - Advised to continue low-salt diet and weight loss program with Wegovy.  - Monitor blood pressure at home at least twice a week and bring readings to the next appointment in 2025. Recheck blood pressure today. Continue taking Micardis and amlodipine faithfully.    2. Weight loss.  - Currently on Wegovy, recently received a new prescription for the 1 mg dose.  - Reports needing to pace herself with the medication due to previous sickness at 1 mg dose.  - Will start the 1 mg dose next week.  - Continue weight loss program and monitor for any adverse effects.    3. Headaches.  - Reports occasional headaches managed with CoQ10 supplements.  - No new or worsening symptoms noted.    4. Dizziness.  - No recent dizziness, headaches, nausea, or vomiting.  - Continue to monitor symptoms and report any changes.    Follow-up in 2025 for routine labs and diabetes management.    Results    1. Dizziness  2. Primary hypertension  3. BMI 33.0-33.9,adult    Return for as scheduled .       Subjective   History of Present Illness  The patient presents for evaluation of blood pressure, weight loss, and headaches.    Blood Pressure  - She has been adhering to her medication regimen, which includes Micardis, amlodipine, and Lipitor.  - She does not regularly monitor her blood pressure at home but does so when she suspects it may be elevated.  - She reports no chest pain, breathing difficulties, or palpitations.    Weight Loss  - She is currently on a weight loss program and continues to take Wegovy.  - She recently received a new prescription for 1 mg and plans to start this dosage next week.  - She has

## 2025-08-08 DIAGNOSIS — I10 PRIMARY HYPERTENSION: ICD-10-CM

## 2025-08-11 RX ORDER — TELMISARTAN 80 MG/1
80 TABLET ORAL DAILY
Qty: 30 TABLET | Refills: 1 | Status: SHIPPED | OUTPATIENT
Start: 2025-08-11